# Patient Record
Sex: FEMALE | Race: WHITE | NOT HISPANIC OR LATINO | Employment: OTHER | URBAN - NONMETROPOLITAN AREA
[De-identification: names, ages, dates, MRNs, and addresses within clinical notes are randomized per-mention and may not be internally consistent; named-entity substitution may affect disease eponyms.]

---

## 2017-01-02 ENCOUNTER — HISTORY (OUTPATIENT)
Dept: FAMILY MEDICINE | Facility: OTHER | Age: 62
End: 2017-01-02

## 2017-01-02 ENCOUNTER — OFFICE VISIT - GICH (OUTPATIENT)
Dept: FAMILY MEDICINE | Facility: OTHER | Age: 62
End: 2017-01-02

## 2017-01-02 ENCOUNTER — COMMUNICATION - GICH (OUTPATIENT)
Dept: FAMILY MEDICINE | Facility: OTHER | Age: 62
End: 2017-01-02

## 2017-01-02 DIAGNOSIS — M76.60 ACHILLES TENDINITIS: ICD-10-CM

## 2017-01-02 DIAGNOSIS — L82.1 OTHER SEBORRHEIC KERATOSIS: ICD-10-CM

## 2017-01-02 ASSESSMENT — PATIENT HEALTH QUESTIONNAIRE - PHQ9: SUM OF ALL RESPONSES TO PHQ QUESTIONS 1-9: 15

## 2017-02-08 ENCOUNTER — COMMUNICATION - GICH (OUTPATIENT)
Dept: FAMILY MEDICINE | Facility: OTHER | Age: 62
End: 2017-02-08

## 2017-02-08 DIAGNOSIS — G89.4 CHRONIC PAIN SYNDROME: ICD-10-CM

## 2017-02-23 ENCOUNTER — COMMUNICATION - GICH (OUTPATIENT)
Dept: FAMILY MEDICINE | Facility: OTHER | Age: 62
End: 2017-02-23

## 2017-03-03 ENCOUNTER — OFFICE VISIT - GICH (OUTPATIENT)
Dept: FAMILY MEDICINE | Facility: OTHER | Age: 62
End: 2017-03-03

## 2017-03-03 ENCOUNTER — HISTORY (OUTPATIENT)
Dept: FAMILY MEDICINE | Facility: OTHER | Age: 62
End: 2017-03-03

## 2017-03-03 DIAGNOSIS — J06.9 ACUTE UPPER RESPIRATORY INFECTION: ICD-10-CM

## 2017-03-03 DIAGNOSIS — B97.89 OTHER VIRAL AGENTS AS THE CAUSE OF DISEASES CLASSIFIED ELSEWHERE: ICD-10-CM

## 2017-05-16 ENCOUNTER — OFFICE VISIT - GICH (OUTPATIENT)
Dept: FAMILY MEDICINE | Facility: OTHER | Age: 62
End: 2017-05-16

## 2017-05-16 ENCOUNTER — HISTORY (OUTPATIENT)
Dept: FAMILY MEDICINE | Facility: OTHER | Age: 62
End: 2017-05-16

## 2017-05-16 DIAGNOSIS — M75.51 BURSITIS OF RIGHT SHOULDER: ICD-10-CM

## 2017-05-16 DIAGNOSIS — G89.4 CHRONIC PAIN SYNDROME: ICD-10-CM

## 2017-06-06 ENCOUNTER — OFFICE VISIT - GICH (OUTPATIENT)
Dept: FAMILY MEDICINE | Facility: OTHER | Age: 62
End: 2017-06-06

## 2017-06-06 ENCOUNTER — HISTORY (OUTPATIENT)
Dept: FAMILY MEDICINE | Facility: OTHER | Age: 62
End: 2017-06-06

## 2017-06-06 DIAGNOSIS — G89.29 OTHER CHRONIC PAIN: ICD-10-CM

## 2017-06-06 DIAGNOSIS — M25.512 PAIN IN LEFT SHOULDER: ICD-10-CM

## 2017-06-09 ENCOUNTER — HOSPITAL ENCOUNTER (OUTPATIENT)
Dept: RADIOLOGY | Facility: OTHER | Age: 62
End: 2017-06-09
Attending: FAMILY MEDICINE

## 2017-06-09 DIAGNOSIS — G89.29 OTHER CHRONIC PAIN: ICD-10-CM

## 2017-06-09 DIAGNOSIS — M25.512 PAIN IN LEFT SHOULDER: ICD-10-CM

## 2017-06-20 ENCOUNTER — COMMUNICATION - GICH (OUTPATIENT)
Dept: FAMILY MEDICINE | Facility: OTHER | Age: 62
End: 2017-06-20

## 2017-06-20 DIAGNOSIS — M25.511 PAIN IN RIGHT SHOULDER: ICD-10-CM

## 2017-06-20 DIAGNOSIS — G89.29 OTHER CHRONIC PAIN: ICD-10-CM

## 2017-06-20 DIAGNOSIS — M25.512 PAIN IN LEFT SHOULDER: ICD-10-CM

## 2017-08-03 ENCOUNTER — HISTORY (OUTPATIENT)
Dept: FAMILY MEDICINE | Facility: OTHER | Age: 62
End: 2017-08-03

## 2017-08-03 ENCOUNTER — OFFICE VISIT - GICH (OUTPATIENT)
Dept: FAMILY MEDICINE | Facility: OTHER | Age: 62
End: 2017-08-03

## 2017-08-03 DIAGNOSIS — R06.83 SNORING: ICD-10-CM

## 2017-08-03 DIAGNOSIS — Z63.79 OTHER STRESSFUL LIFE EVENTS AFFECTING FAMILY AND HOUSEHOLD: ICD-10-CM

## 2017-08-03 DIAGNOSIS — R40.0 SOMNOLENCE: ICD-10-CM

## 2017-08-03 DIAGNOSIS — F33.2 MAJOR DEPRESSIVE DISORDER, RECURRENT SEVERE WITHOUT PSYCHOTIC FEATURES (H): ICD-10-CM

## 2017-08-20 ENCOUNTER — HOSPITAL ENCOUNTER (OUTPATIENT)
Dept: SLEEP MEDICINE | Facility: OTHER | Age: 62
Setting detail: THERAPIES SERIES
End: 2017-08-20
Attending: INTERNAL MEDICINE

## 2017-08-25 ENCOUNTER — AMBULATORY - GICH (OUTPATIENT)
Dept: SCHEDULING | Facility: OTHER | Age: 62
End: 2017-08-25

## 2017-09-08 ENCOUNTER — OFFICE VISIT - GICH (OUTPATIENT)
Dept: FAMILY MEDICINE | Facility: OTHER | Age: 62
End: 2017-09-08

## 2017-09-08 ENCOUNTER — HISTORY (OUTPATIENT)
Dept: FAMILY MEDICINE | Facility: OTHER | Age: 62
End: 2017-09-08

## 2017-09-08 ENCOUNTER — HOSPITAL ENCOUNTER (OUTPATIENT)
Dept: RADIOLOGY | Facility: OTHER | Age: 62
End: 2017-09-08
Attending: FAMILY MEDICINE

## 2017-09-08 ENCOUNTER — COMMUNICATION - GICH (OUTPATIENT)
Dept: FAMILY MEDICINE | Facility: OTHER | Age: 62
End: 2017-09-08

## 2017-09-08 DIAGNOSIS — F33.2 MAJOR DEPRESSIVE DISORDER, RECURRENT SEVERE WITHOUT PSYCHOTIC FEATURES (H): ICD-10-CM

## 2017-09-08 DIAGNOSIS — M25.512 PAIN IN LEFT SHOULDER: ICD-10-CM

## 2017-09-08 DIAGNOSIS — M77.8 OTHER ENTHESOPATHIES, NOT ELSEWHERE CLASSIFIED: ICD-10-CM

## 2017-09-08 DIAGNOSIS — M25.532 PAIN IN LEFT WRIST: ICD-10-CM

## 2017-09-08 DIAGNOSIS — G89.29 OTHER CHRONIC PAIN: ICD-10-CM

## 2017-09-08 DIAGNOSIS — M75.42 IMPINGEMENT SYNDROME OF LEFT SHOULDER: ICD-10-CM

## 2017-09-08 DIAGNOSIS — E78.5 HYPERLIPIDEMIA: ICD-10-CM

## 2017-09-08 DIAGNOSIS — D86.9 SARCOIDOSIS: ICD-10-CM

## 2017-09-08 LAB
ABSOLUTE BASOPHILS - HISTORICAL: 0.1 THOU/CU MM
ABSOLUTE EOSINOPHILS - HISTORICAL: 0.6 THOU/CU MM
ABSOLUTE IMMATURE GRANULOCYTES(METAS,MYELOS,PROS) - HISTORICAL: 0 THOU/CU MM
ABSOLUTE LYMPHOCYTES - HISTORICAL: 1.8 THOU/CU MM (ref 0.9–2.9)
ABSOLUTE MONOCYTES - HISTORICAL: 0.6 THOU/CU MM
ABSOLUTE NEUTROPHILS - HISTORICAL: 5.1 THOU/CU MM (ref 1.7–7)
ANION GAP - HISTORICAL: 11 (ref 5–18)
BASOPHILS # BLD AUTO: 1.3 %
BUN SERPL-MCNC: 18 MG/DL (ref 7–25)
BUN/CREAT RATIO - HISTORICAL: 14
CALCIUM SERPL-MCNC: 9.6 MG/DL (ref 8.6–10.3)
CHLORIDE SERPLBLD-SCNC: 106 MMOL/L (ref 98–107)
CO2 SERPL-SCNC: 24 MMOL/L (ref 21–31)
CREAT SERPL-MCNC: 1.28 MG/DL (ref 0.7–1.3)
EOSINOPHIL NFR BLD AUTO: 7.2 %
ERYTHROCYTE [DISTWIDTH] IN BLOOD BY AUTOMATED COUNT: 12.9 % (ref 11.5–15.5)
GFR IF NOT AFRICAN AMERICAN - HISTORICAL: 43 ML/MIN/1.73M2
GLUCOSE SERPL-MCNC: 114 MG/DL (ref 70–105)
HCT VFR BLD AUTO: 38.3 % (ref 33–51)
HEMOGLOBIN: 12.5 G/DL (ref 12–16)
IMMATURE GRANULOCYTES(METAS,MYELOS,PROS) - HISTORICAL: 0.4 %
LYMPHOCYTES NFR BLD AUTO: 21.5 % (ref 20–44)
MCH RBC QN AUTO: 32 PG (ref 26–34)
MCHC RBC AUTO-ENTMCNC: 32.6 G/DL (ref 32–36)
MCV RBC AUTO: 98 FL (ref 80–100)
MONOCYTES NFR BLD AUTO: 7.1 %
NEUTROPHILS NFR BLD AUTO: 62.5 % (ref 42–72)
PLATELET # BLD AUTO: 280 THOU/CU MM (ref 140–440)
PMV BLD: 8.4 FL (ref 6.5–11)
POTASSIUM SERPL-SCNC: 4.5 MMOL/L (ref 3.5–5.1)
RED BLOOD COUNT - HISTORICAL: 3.91 MIL/CU MM (ref 4–5.2)
SODIUM SERPL-SCNC: 141 MMOL/L (ref 133–143)
WHITE BLOOD COUNT - HISTORICAL: 8.2 THOU/CU MM (ref 4.5–11)

## 2017-10-09 ENCOUNTER — OFFICE VISIT - GICH (OUTPATIENT)
Dept: SCHEDULING | Facility: OTHER | Age: 62
End: 2017-10-09

## 2017-11-20 ENCOUNTER — HOSPITAL ENCOUNTER (OUTPATIENT)
Dept: RADIOLOGY | Facility: OTHER | Age: 62
End: 2017-11-20
Attending: FAMILY MEDICINE

## 2017-11-20 ENCOUNTER — HISTORY (OUTPATIENT)
Dept: FAMILY MEDICINE | Facility: OTHER | Age: 62
End: 2017-11-20

## 2017-11-20 ENCOUNTER — HISTORY (OUTPATIENT)
Dept: RADIOLOGY | Facility: OTHER | Age: 62
End: 2017-11-20

## 2017-11-20 ENCOUNTER — OFFICE VISIT - GICH (OUTPATIENT)
Dept: FAMILY MEDICINE | Facility: OTHER | Age: 62
End: 2017-11-20

## 2017-11-20 DIAGNOSIS — D86.9 SARCOIDOSIS: ICD-10-CM

## 2017-11-20 DIAGNOSIS — Z00.00 ENCOUNTER FOR GENERAL ADULT MEDICAL EXAMINATION WITHOUT ABNORMAL FINDINGS: ICD-10-CM

## 2017-11-20 DIAGNOSIS — H04.123 DRY EYE SYNDROME OF BOTH LACRIMAL GLANDS: ICD-10-CM

## 2017-11-20 DIAGNOSIS — Z23 ENCOUNTER FOR IMMUNIZATION: ICD-10-CM

## 2017-11-20 DIAGNOSIS — G89.4 CHRONIC PAIN SYNDROME: ICD-10-CM

## 2017-11-20 DIAGNOSIS — Z99.89 DEPENDENCE ON OTHER ENABLING MACHINES AND DEVICES: ICD-10-CM

## 2017-11-20 DIAGNOSIS — G47.33 OBSTRUCTIVE SLEEP APNEA: ICD-10-CM

## 2017-11-20 DIAGNOSIS — E78.5 HYPERLIPIDEMIA: ICD-10-CM

## 2017-11-20 DIAGNOSIS — F33.2 MAJOR DEPRESSIVE DISORDER, RECURRENT SEVERE WITHOUT PSYCHOTIC FEATURES (H): ICD-10-CM

## 2017-11-20 DIAGNOSIS — Z12.31 ENCOUNTER FOR SCREENING MAMMOGRAM FOR MALIGNANT NEOPLASM OF BREAST: ICD-10-CM

## 2017-11-20 LAB
A/G RATIO - HISTORICAL: 1.6 (ref 1–2)
ABSOLUTE BASOPHILS - HISTORICAL: 0.1 THOU/CU MM
ABSOLUTE EOSINOPHILS - HISTORICAL: 0.9 THOU/CU MM
ABSOLUTE IMMATURE GRANULOCYTES(METAS,MYELOS,PROS) - HISTORICAL: 0 THOU/CU MM
ABSOLUTE LYMPHOCYTES - HISTORICAL: 3.1 THOU/CU MM (ref 0.9–2.9)
ABSOLUTE MONOCYTES - HISTORICAL: 1.1 THOU/CU MM
ABSOLUTE NEUTROPHILS - HISTORICAL: 5.5 THOU/CU MM (ref 1.7–7)
ALBUMIN SERPL-MCNC: 4.4 G/DL (ref 3.5–5.7)
ALP SERPL-CCNC: 80 IU/L (ref 34–104)
ALT (SGPT) - HISTORICAL: 16 IU/L (ref 7–52)
ANION GAP - HISTORICAL: 11 (ref 5–18)
AST SERPL-CCNC: 19 IU/L (ref 13–39)
BASOPHILS # BLD AUTO: 0.9 %
BILIRUB SERPL-MCNC: 0.3 MG/DL (ref 0.3–1)
BUN SERPL-MCNC: 22 MG/DL (ref 7–25)
BUN/CREAT RATIO - HISTORICAL: 16
CALCIUM SERPL-MCNC: 9.6 MG/DL (ref 8.6–10.3)
CHLORIDE SERPLBLD-SCNC: 105 MMOL/L (ref 98–107)
CHOL/HDL RATIO - HISTORICAL: 4.64
CHOLESTEROL TOTAL: 269 MG/DL
CO2 SERPL-SCNC: 21 MMOL/L (ref 21–31)
CREAT SERPL-MCNC: 1.34 MG/DL (ref 0.7–1.3)
EOSINOPHIL NFR BLD AUTO: 7.9 %
ERYTHROCYTE [DISTWIDTH] IN BLOOD BY AUTOMATED COUNT: 13.4 % (ref 11.5–15.5)
GFR IF NOT AFRICAN AMERICAN - HISTORICAL: 40 ML/MIN/1.73M2
GLOBULIN - HISTORICAL: 2.7 G/DL (ref 2–3.7)
GLUCOSE SERPL-MCNC: 74 MG/DL (ref 70–105)
HCT VFR BLD AUTO: 37.7 % (ref 33–51)
HDLC SERPL-MCNC: 58 MG/DL (ref 23–92)
HEMOGLOBIN: 12.2 G/DL (ref 12–16)
IMMATURE GRANULOCYTES(METAS,MYELOS,PROS) - HISTORICAL: 0.4 %
LDLC SERPL CALC-MCNC: 161 MG/DL
LYMPHOCYTES NFR BLD AUTO: 29.3 % (ref 20–44)
MCH RBC QN AUTO: 31 PG (ref 26–34)
MCHC RBC AUTO-ENTMCNC: 32.4 G/DL (ref 32–36)
MCV RBC AUTO: 96 FL (ref 80–100)
MONOCYTES NFR BLD AUTO: 10.3 %
NEUTROPHILS NFR BLD AUTO: 51.2 % (ref 42–72)
NON-HDL CHOLESTEROL - HISTORICAL: 211 MG/DL
PLATELET # BLD AUTO: 323 THOU/CU MM (ref 140–440)
PMV BLD: 8.9 FL (ref 6.5–11)
POTASSIUM SERPL-SCNC: 4.1 MMOL/L (ref 3.5–5.1)
PROT SERPL-MCNC: 7.1 G/DL (ref 6.4–8.9)
PROVIDER ORDERDED STATUS - HISTORICAL: ABNORMAL
RED BLOOD COUNT - HISTORICAL: 3.94 MIL/CU MM (ref 4–5.2)
SODIUM SERPL-SCNC: 137 MMOL/L (ref 133–143)
TRIGL SERPL-MCNC: 252 MG/DL
WHITE BLOOD COUNT - HISTORICAL: 10.7 THOU/CU MM (ref 4.5–11)

## 2017-11-28 ENCOUNTER — AMBULATORY - GICH (OUTPATIENT)
Dept: SCHEDULING | Facility: OTHER | Age: 62
End: 2017-11-28

## 2017-12-18 ENCOUNTER — AMBULATORY - GICH (OUTPATIENT)
Dept: SCHEDULING | Facility: OTHER | Age: 62
End: 2017-12-18

## 2017-12-18 ENCOUNTER — HISTORY (OUTPATIENT)
Dept: SCHEDULING | Facility: OTHER | Age: 62
End: 2017-12-18

## 2017-12-18 ENCOUNTER — OFFICE VISIT - GICH (OUTPATIENT)
Dept: SCHEDULING | Facility: OTHER | Age: 62
End: 2017-12-18

## 2017-12-22 ENCOUNTER — COMMUNICATION - GICH (OUTPATIENT)
Dept: FAMILY MEDICINE | Facility: OTHER | Age: 62
End: 2017-12-22

## 2017-12-22 DIAGNOSIS — F33.2 MAJOR DEPRESSIVE DISORDER, RECURRENT SEVERE WITHOUT PSYCHOTIC FEATURES (H): ICD-10-CM

## 2017-12-27 NOTE — PROGRESS NOTES
Patient Information     Patient Name MRIvy Landeros [id number] Female 1955      Progress Notes by Olivier Glass MD at 8/3/2017 11:15 AM     Author:  Olivier Glass MD Service:  (none) Author Type:  Physician     Filed:  2017 10:00 AM Encounter Date:  8/3/2017 Status:  Signed     :  Olivier Glass MD (Physician)            SUBJECTIVE:  62 y.o. female who presents for evaluation for possible sleep apnea. She notes that she has excessive snoring, and her family including her  and daughter have told her that she snores excessively loud. They have not mentioned that she positive send her breathing, but she does have daytime somnolence.    She's been somewhat stressed and depressed because of the recent death of her mother. There've been some family dynamics that it made things difficult. She is working on this and plans to seek psychologic counseling from Re[facility]. This is been successful for her in the past.    Her left shoulder is bothering her and she plans to have surgery in the fall. She plans on a preop and general evaluation at that time.    Medications otherwise remain the same. She has used occasional lorazepam and would like a refill.    Additional Review of Systems: see HPI:   No new symptoms otherwise    Past Medical History:     Diagnosis  Date     ALLERGIC RHINITIS, SEASONAL      BACK PAIN 10/7/2010     Dyslipidemia 10/24/2013     Elevated C-reactive protein      Elevated sedimentation rate     chronically      Erythema nodosum  2009     Essential tremor     Possible benign      History of pregnancy      Hx of pregnancy     Childbirth      ll defined rheumatologic disorder     characterized by chronic pain, positive BARBARA at a low titer of 1:160, negative HLAB27,       MIGRAINE HEADACHE      Postconcussion syndrome 2016     Sarcoidosis (HC) 2007        Current Outpatient Prescriptions       Medication  Sig Dispense Refill      "acetaminophen (TYLENOL EXTRA STRGTH) 500 mg tablet Take 2 tablets by mouth 3 times daily if needed. Max acetaminophen dose: 4000mg in 24 hrs.  0     buPROPion (WELLBUTRIN XL) 300 mg Extended-Release tablet Take 1 tablet by mouth once daily. 90 tablet 4     cholecalciferol (VITAMIN D) 1,000 unit capsule Take 1 capsule by mouth once daily.  0     Cholestyramine powd 1-3 tsp in water Q AM 1 jar 3     cycloSPORINE (RESTASIS) 0.05 % ophthalmic emulsion Place 1 Drop into both eyes 2 times daily. 1 Bottle 12     gabapentin (NEURONTIN) 600 mg tablet Take 1 tablet by mouth 3 times daily. 270 tablet 4     lamoTRIgine (LAMICTAL) 100 mg tablet Take 1 tablet po every morning, and continue the 200 mg dose at night. 90 tablet 4     lamoTRIgine (LAMICTAL) 200 mg tablet Take 1 tablet by mouth once daily. 90 tablet 4     lidocaine 5% (LIDODERM) 5 % patch Apply 1 Patch on dry, clean, hairless skin once daily. 30 Patch 4     LORazepam (ATIVAN) 0.5 mg tab Take 1 tablet by mouth 3 times daily if needed for Other (Specify) (irritability). 30 tablet 2     medication order composer Melox/lamo/lido/pril 0.2/2.5/2/2%  For headaches.  0     oxyCODONE-acetaminophen, 5-325 mg, (PERCOCET) 5-325 mg per tablet Take 1 tablet by mouth every 6 hours if needed  for Pain Max acetaminophen dose: 4000mg in 24 hrs. 35 tablet 0     SUMAtriptan (IMITREX) 100 mg tablet Take one tablet PO after headache. May repeat after 2 hours if headache returns. Do not exceed 200 mg in 24 hours       venlafaxine (EFFEXOR XR) 150 mg Extended-Release capsule Take 1 capsule by mouth once daily with a meal. 90 capsule 4     No current facility-administered medications for this visit.      Medications have been reviewed by me and are current to the best of my knowledge and ability.      Allergies as of 08/03/2017 - Jasvir as Reviewed 08/03/2017      Allergen  Reaction Noted     Penicillins Rash 09/05/2012        OBJECTIVE:  /80  Pulse 80  Ht 1.626 m (5' 4\")  " Breastfeeding? No  EXAM:  General Appearance: Pleasant, alert, appropriate appearance for age. No acute distress, however becomes quite tearful when discussing her mother's death.  OroPharynx Exam: Uvula is midline. There is decreased between the tonsillar pillars and decrease pain is posteriorly between the uvula and the posterior pharynx.  Thyroid Exam: No nodules or enlargement.  Chest/Respiratory Exam: Normal chest wall and respirations. Clear to auscultation.  Cardiovascular Exam: Regular rate and rhythm. S1, S2, no murmur, click, gallop, or rubs.  Neurologic Exam: Intact and nonfocal  Psychiatric Exam: Alert and oriented, appropriate affect.  Somewhat sad, tearful, discussing her mother's death.  She is also somewhat resentful of her siblings because of the arguments and are taking place.      ASSESSMENT/PLAN:  Probable sleep apnea. She has daytime somnolence and heavy snoring. I recommended a sleep study which will be scheduled.    History of major depressive disorder recurrent, mainly the issue now is stress over her mother's death and her problems with her siblings. 20 minutes were spent in counseling discussing the issues with her mother's death, depression, and the problems with her siblings.    Plan: Sleep study will be scheduled. Lorazepam refilled. Referral was made for her to see Reji Davis for counseling. We will see her back in follow-up in September for a general exam and she plans on scheduling shoulder surgery in October.  Olivier Glass MD ....................  8/4/2017   9:59 AM

## 2017-12-27 NOTE — PROGRESS NOTES
Patient Information     Patient Name MRN Sex Ivy Bentley [id number] Female 1955      Progress Notes by L[facility] R.T. [facility] at 2017 12:07 PM     Author:  Lacy Toussaint R.T. ([facility]) Service:  (none) Author Type:  (none)     Filed:  2017 12:07 PM Date of Service:  2017 12:07 PM Status:  Signed     :  Lacy Toussaint R.T. (BRIANAT) ([facility] - Registered Radiologic Technologist)            Falls Risk Criteria:    Age 65 and older or under age 4        Sensory deficits    Poor vision    Use of ambulatory aides    Impaired judgment    Unable to walk independently    Meets High Risk criteria for falls:  no

## 2017-12-28 NOTE — TELEPHONE ENCOUNTER
Patient Information     Patient Name MRN Ivy Gonzalez [id number] Female 1955      Telephone Encounter by Malu Glass at 2017 12:47 PM     Author:  Malu Glass Service:  (none) Author Type:  (none)     Filed:  2017 12:49 PM Encounter Date:  2017 Status:  Signed     :  Malu Glass            Patient called regarding a prescription that was suppose to be put in but the pharmacy never received it.    Malu Glass ....................  2017   12:48 PM

## 2017-12-28 NOTE — TELEPHONE ENCOUNTER
Patient Information     Patient Name MRN Sex Ivy Bentley [id number] Female 1955      Telephone Encounter by Amena Day at 2017  1:19 PM     Author:  Amena Day Service:  (none) Author Type:  (none)     Filed:  2017  1:21 PM Encounter Date:  2017 Status:  Signed     :  Amena Day            Patient came in and requested a ortho referral to be placed for Dr Friedman in [place], for at least 3 visits starting  for insurance reasons. Thanks.    Amena Day ....................  2017   1:20 PM

## 2017-12-28 NOTE — PROGRESS NOTES
Patient Information     Patient Name MRN Ivy Gonzalez [id number] Female 1955      Progress Notes by Olivier Glass MD at 2017  2:00 PM     Author:  Olivier Glass MD Service:  (none) Author Type:  Physician     Filed:  2017  3:42 PM Encounter Date:  2017 Status:  Signed     :  Olivier Glass MD (Physician)            SUBJECTIVE:  62 y.o. female who presents for follow-up of the pain in her  left shoulder. Prednisone did not give any benefit. It hurts to lie on it, it hurts to move in all directions and it's sore diffusely around the shoulder.    She had surgery done 2 years ago which was basically a debridement and subacromial decompression. At that time an MRI done previously showed a possible labral tear. She can't tell if it feels unstable or not. There is been no new injury.    Additional Review of Systems: see HPI:   The right shoulder feels fine, especially since she had the replacement done    Past Medical History:     Diagnosis  Date     ALLERGIC RHINITIS, SEASONAL      BACK PAIN 10/7/2010     Dyslipidemia 10/24/2013     Elevated C-reactive protein      Elevated sedimentation rate     chronically      Erythema nodosum  2009     Essential tremor     Possible benign      History of pregnancy      Hx of pregnancy     Childbirth      ll defined rheumatologic disorder     characterized by chronic pain, positive BARBARA at a low titer of 1:160, negative HLAB27,       MIGRAINE HEADACHE      Postconcussion syndrome 2016     Sarcoidosis (HC)         Current Outpatient Prescriptions       Medication  Sig Dispense Refill     acetaminophen (TYLENOL EXTRA STRGTH) 500 mg tablet Take 2 tablets by mouth 3 times daily if needed. Max acetaminophen dose: 4000mg in 24 hrs.  0     buPROPion (WELLBUTRIN XL) 300 mg Extended-Release tablet Take 1 tablet by mouth once daily. 90 tablet 4     cholecalciferol (VITAMIN D) 1,000 unit capsule Take 1 capsule by mouth once  "daily.  0     Cholestyramine powd 1-3 tsp in water Q AM 1 jar 3     cycloSPORINE (RESTASIS) 0.05 % ophthalmic emulsion Place 1 Drop into both eyes 2 times daily. 1 Bottle 12     gabapentin (NEURONTIN) 600 mg tablet Take 1 tablet by mouth 3 times daily. 270 tablet 4     lamoTRIgine (LAMICTAL) 100 mg tablet Take 1 tablet po every morning, and continue the 200 mg dose at night. 90 tablet 4     lamoTRIgine (LAMICTAL) 200 mg tablet Take 1 tablet by mouth once daily. 90 tablet 4     lidocaine 5% (LIDODERM) 5 % patch Apply 1 Patch on dry, clean, hairless skin once daily. 30 Patch 4     LORazepam (ATIVAN) 0.5 mg tab Take 1 tablet by mouth 3 times daily if needed for Other (Specify) (irritability). 30 tablet 2     medication order composer Melox/lamo/lido/pril 0.2/2.5/2/2%  For headaches.  0     oxyCODONE-acetaminophen, 5-325 mg, (PERCOCET) 5-325 mg per tablet Take 1 tablet by mouth every 6 hours if needed  for Pain Max acetaminophen dose: 4000mg in 24 hrs. 35 tablet 0     SUMAtriptan (IMITREX) 100 mg tablet Take one tablet PO after headache. May repeat after 2 hours if headache returns. Do not exceed 200 mg in 24 hours       venlafaxine (EFFEXOR XR) 150 mg Extended-Release capsule Take 1 capsule by mouth once daily with a meal. 90 capsule 4     No current facility-administered medications for this visit.      Medications have been reviewed by me and are current to the best of my knowledge and ability.      Allergies as of 06/06/2017 - Jasvir as Reviewed 06/06/2017      Allergen  Reaction Noted     Penicillins Rash 09/05/2012        OBJECTIVE:  /80  Pulse 86  Ht 1.626 m (5' 4\")  Breastfeeding? No  EXAM:  General Appearance: Pleasant, alert, appropriate appearance for age. No acute distress  Musculoskeletal Exam: Shoulder: Exam of the right shoulder reveals no gross deformity. There is mild diffuse tenderness but more tenderness directly over the before meals joint area and the acromion. There is no skin rash. Range of " motion is limited because of pain by about 30%. This is in all directions but mainly with abduction. Distal CMS is intact.  Report of the MRI done in 2014 was reviewed      ASSESSMENT/PLAN:  Persistent shoulder pain with previous surgery and evidence of a possible labral tear done recently on MRI. We discussed further evaluation and/or the possibility of physical therapy. At this point we will repeat the MRI and then make appropriate recommendations. No new medications were prescribed.  Olivier Glass MD ....................  6/6/2017   3:41 PM

## 2017-12-28 NOTE — TELEPHONE ENCOUNTER
Patient Information     Patient Name MRN Ivy Gonzalez [id number] Female 1955      Telephone Encounter by Jackie Patel at 2017  9:34 AM     Author:  Jackie Patel Service:  (none) Author Type:  NURS- Student Practical Nurse     Filed:  2017  9:34 AM Encounter Date:  2017 Status:  Signed     :  Jackie Patel (NURS- Student Practical Nurse)            Please Advise.   Jackie Patel ....................  2017   9:34 AM

## 2017-12-28 NOTE — PROGRESS NOTES
Patient Information     Patient Name MRN Sex Ivy Bentley [id number] Female 1955      Progress Notes by Olivier Glass MD at 2017  8:45 AM     Author:  Olivier Glass MD Service:  (none) Author Type:  Physician     Filed:  2017 12:53 PM Encounter Date:  2017 Status:  Signed     :  Olivier Glass MD (Physician)            .

## 2017-12-28 NOTE — PROGRESS NOTES
Patient Information     Patient Name MRN Sex Ivy Bentley [id number] Female 1955      Progress Notes by Susana Armendariz at 10/17/2017 10:21 AM     Author:  Susana Armendariz Service:  (none) Author Type:  (none)     Filed:  10/17/2017 10:21 AM Encounter Date:  10/9/2017 Status:  Signed     :  Susana Armendariz              This encounter was opened in error.  Please disregard.

## 2017-12-28 NOTE — TELEPHONE ENCOUNTER
Patient Information     Patient Name MRN Ivy Gonzalez [id number] Female 1955      Telephone Encounter by Gisele Lepe at 2017  9:03 AM     Author:  Gisele Lepe Service:  (none) Author Type:  (none)     Filed:  2017  9:03 AM Encounter Date:  2017 Status:  Signed     :  Gisele Lepe            Patient states that the pharmacy did rec'd the prescription .  Gisele Amos ....................  2017   9:03 AM

## 2017-12-28 NOTE — TELEPHONE ENCOUNTER
Patient Information     Patient Name MRN Ivy Gonzalez [id number] Female 1955      Telephone Encounter by Hanna Perdue at 2017  9:24 AM     Author:  Hanna Perdue Service:  (none) Author Type:  (none)     Filed:  2017  9:24 AM Encounter Date:  2017 Status:  Signed     :  Hanna Perdue            Left message stating referral was placed.  Hanna Perdue LPN...................2017  9:24 AM

## 2017-12-28 NOTE — PROGRESS NOTES
Patient Information     Patient Name MRN Sex Ivy Bentley [id number] Female 1955      Progress Notes by Jeanie Zamora at 2017  1:17 PM     Author:  Jeanie Zamora Service:  (none) Author Type:  Other Clinical Staff     Filed:  2017  1:17 PM Date of Service:  2017  1:17 PM Status:  Signed     :  Jeanie Zamora (Other Clinical Staff)            Falls Risk Criteria:    Age 65 and older or under age 4        Sensory deficits    Poor vision    Use of ambulatory aides    Impaired judgment    Unable to walk independently    Meets High Risk criteria for falls:  no

## 2017-12-28 NOTE — TELEPHONE ENCOUNTER
Patient Information     Patient Name MRN Sex Ivy Bentley [id number] Female 1955      Telephone Encounter by Dayanara Franks at 2017  2:35 PM     Author:  Dayanara Franks Service:  (none) Author Type:  (none)     Filed:  2017  2:35 PM Encounter Date:  2017 Status:  Signed     :  Dayanara Franks            Left message to call back  ....................Whitney Franks LPN  2017   2:35 PM

## 2017-12-29 NOTE — H&P
Patient Information     Patient Name MRN Ivy Gonzalez [id number] Female 1955      H&P by Olivier Glass MD at 2017  8:45 AM     Author:  Olivier Glass MD Service:  (none) Author Type:  Physician     Filed:  2017 12:53 PM Encounter Date:  2017 Status:  Signed     :  Olivier Glass MD (Physician)            ----------------- PREOPERATIVE EXAM ------------------  2017    SUBJECTIVE:  Ivy Darling is a 62 y.o. female here for preoperative optimization.    I was asked to see Ivy Darling by Dr. Friedman for  preoperative evaluation due to a history of sarcoidosis and major depressive disorder.    HPI:  Patient is 62-year-old female who has left shoulder pain. She is scheduled to undergo arthroscopy. She has a history of major depressive disorder and has been stable on antidepressant as well as mood stabilizers.    She also has a history of sarcoidosis. She's had no recent pulmonary problems and no pulmonary involvement that is known. At this point other than the left shoulder pain she feels well.    She is currently in the process of being evaluated for sleep apnea and likely will be a candidate for CPAP.    She also has significant pain in the left wrist which is been bothering her.    Nursing Notes:   Gisele Lepe  2017  9:19 AM  Signed  Patient presents to clinic for pre op exam. Patient is scheduled at [facility] in [place] on 17 for Left shoulder surgery with Dr. Friedman.  Gisele Amos ....................  2017   8:47 AM          Patient Active Problem List      Diagnosis Date Noted     Dyslipidemia 10/24/2013     Major depressive disorder, recurrent episode, severe (HC) 08/10/2011     FIBROMYALGIA      CHRONIC PAIN SYNDROME      SARCOIDOSIS 2007       Past Medical History:     Diagnosis  Date     ALLERGIC RHINITIS, SEASONAL      BACK PAIN 10/7/2010     Dyslipidemia 10/24/2013     Elevated  C-reactive protein      Elevated sedimentation rate     chronically      Erythema nodosum  2009     Essential tremor     Possible benign      History of pregnancy      Hx of pregnancy     Childbirth      ll defined rheumatologic disorder     characterized by chronic pain, positive BARBARA at a low titer of 1:160, negative HLAB27,       MIGRAINE HEADACHE      Postconcussion syndrome 1/26/2016     Sarcoidosis (HC) 2007       Past Surgical History:      Procedure  Laterality Date     COLONOSCOPY SCREENING  8/2011    Normal--10 year followup       DILATION AND CURETTAGE  11/05     and ablation       LAP CHOLECYSTECTOMY  6/23/15    Cholecystectomy,Laparoscopic       SHOULDER ARTHROSCOPY  12/07    Right type 3 SLAP lesion repair of labrum - suprascapular ganglion removal with posterior approach - acromioplasty and distal clavicle resection       SHOULDER ARTHROSCOPY Left 2/2015     SHOULDER REPLACEMENT Right 2012    improvement in pain control and mobility       TONSILLECTOMY       and adenoidectomy age 12         Current Outpatient Prescriptions       Medication  Sig Dispense Refill     acetaminophen (TYLENOL EXTRA STRGTH) 500 mg tablet Take 2 tablets by mouth 3 times daily if needed. Max acetaminophen dose: 4000mg in 24 hrs.  0     buPROPion (WELLBUTRIN XL) 300 mg Extended-Release tablet Take 1 tablet by mouth once daily. 90 tablet 4     cholecalciferol (VITAMIN D) 1,000 unit capsule Take 1 capsule by mouth once daily.  0     Cholestyramine powd 1-3 tsp in water Q AM 1 jar 3     cycloSPORINE (RESTASIS) 0.05 % ophthalmic emulsion Place 1 Drop into both eyes 2 times daily. 1 Bottle 12     gabapentin (NEURONTIN) 600 mg tablet Take 1 tablet by mouth 3 times daily. 270 tablet 4     lamoTRIgine (LAMICTAL) 100 mg tablet Take 1 tablet po every morning, and continue the 200 mg dose at night. 90 tablet 4     lamoTRIgine (LAMICTAL) 200 mg tablet Take 1 tablet by mouth once daily. 90 tablet 4     lidocaine 5% (LIDODERM) 5 % patch  Apply 1 Patch on dry, clean, hairless skin once daily. 30 Patch 4     LORazepam (ATIVAN) 0.5 mg tab Take 1 tablet by mouth 3 times daily if needed for Other (Specify) (irritability). 30 tablet 2     medication order composer Melox/lamo/lido/pril 0.2/2.5/2/2%  For headaches.  0     oxyCODONE-acetaminophen, 5-325 mg, (PERCOCET) 5-325 mg per tablet Take 1 tablet by mouth every 6 hours if needed  for Pain Max acetaminophen dose: 4000mg in 24 hrs. 35 tablet 0     SUMAtriptan (IMITREX) 100 mg tablet Take one tablet PO after headache. May repeat after 2 hours if headache returns. Do not exceed 200 mg in 24 hours       venlafaxine (EFFEXOR XR) 150 mg Extended-Release capsule Take 1 capsule by mouth once daily with a meal. 90 capsule 4     No current facility-administered medications for this visit.      Medications have been reviewed by me and are current to the best of my knowledge and ability.    Recent use of: She does use aspirin and anti-inflammatories    Allergies:  Allergies     Allergen  Reactions     Penicillins Rash     Latex allergy  no  Immunizations:  Immunization History     Administered  Date(s) Administered     Herpes-zoster Vaccine 11/09/2015     Influenza Virus, Unspecified 09/01/2010     Influenza, IIV3 (Age >=3 years) 10/22/2012, 10/24/2013     Influenza, IIV4 10/23/2014, 11/09/2015, 09/20/2016     Pneumococcal Poly,23-Valent (Pneumovax) 10/23/2014     Pneumococcal conj 13-Valent (Prevnar 13) 11/09/2015     Tdap 10/31/2011, 10/22/2012     Tdap, Unspecified 11/26/2003       Family History       Problem   Relation Age of Onset     Adopted: Yes        Unknown  Other      Unknown. She is ADOPTED         Denies family hx of bleeding tendencies, anesthesia complications, or other problems with surgery.    Social History        Substance Use Topics          Smoking status:   Former Smoker      Quit date:  10/22/1991      Smokeless tobacco:   Never Used      Alcohol use   Yes      Comment: rarely         ROS:   "  Surgical:  patient denies previous complications from prior surgeries including but not limited to prolonged bleeding, anesthesia complications, dysrhythmias, surgical wound infections, or prolonged hospital stay.  Cardiorespiratory: denies chest pain, palpitations, shortness of breath, cough. Most exertion in the past 2 weeks was greater than 4 METs  Complete ROS otherwise negative except as noted in HPI.     -------------------------------------------------------------    PHYSICAL EXAM:  /80  Pulse 72  Temp 97.5  F (36.4  C) (Oral)  Ht 1.626 m (5' 4\")  Wt 97.5 kg (215 lb)  SpO2 98%  BMI 36.9 kg/m2    EXAM:  General Appearance: Pleasant, alert, appropriate appearance for age. No acute distress  Head Exam: Normal. Normocephalic, atraumatic.  Eyes: PERRL, EOMI  Ears: Normal TM's bilaterally.   OroPharynx: Mucosa pink and moist. Dentition in good repair.  Neck: Supple, no masses or nodes, no lymphadenopathy.  No thyromegaly.  Lungs: Normal chest wall and respirations. Clear to auscultation, no wheezes or crackles.  Cardiovascular: Regular rate and rhythm. S1, S2, no murmurs.  Gastrointestinal: Soft, nontender, no abnormal masses or organomegaly. BS normal   Musculoskeletal: No edema. No warm or erythematous joints.  Skin: no concerning or new rashes.  Neurologic Exam: CN 2-12 grossly intact.  Normal gait.  Symmetric DTRs, normal gross motor movement, tone, and coordination. No tremor.  Psychiatric Exam: Alert and oriented, appropriate affect.      EKG:  EKG was personally reviewed by me. There is a first-degree AV block otherwise normal. CBC and basic metabolic panel are essentially normal. Chest x-ray and wrist x-ray were personally reviewed by me and appear unremarkable.  ---------------------------------------------------------------    ASSESSEMENT AND PLAN: Normal preoperative exam. The patient does have possible sleep apnea and is in the process of being evaluated for possible CPAP. Sarcoidosis is " in remission. Left wrist pain is likely tendinitis and she was given a wrist splint for this.    Ivy was seen today for pre-op exam.    Diagnoses and all orders for this visit:    SARCOIDOSIS  -     EKG 12 LEAD UNIT PERFORMED  -     CBC WITH DIFFERENTIAL; Future  -     BASIC METABOLIC PANEL; Future  -     XR CHEST 2 VIEWS PA AND LATERAL; Future  -     CBC WITH DIFFERENTIAL  -     BASIC METABOLIC PANEL  -     CBC WITH AUTO DIFFERENTIAL    Dyslipidemia  -     EKG 12 LEAD UNIT PERFORMED  -     CBC WITH DIFFERENTIAL; Future  -     BASIC METABOLIC PANEL; Future  -     CBC WITH DIFFERENTIAL  -     BASIC METABOLIC PANEL  -     CBC WITH AUTO DIFFERENTIAL    Chronic left shoulder pain    Shoulder impingement, left    Left wrist pain  -     XR WRIST 3 VIEWS LEFT; Future  -     WRIST SPLINT    Left wrist tendonitis  -     XR WRIST 3 VIEWS LEFT; Future  -     WRIST SPLINT    Major depressive disorder, recurrent, severe without psychotic features (HC)  -     lamoTRIgine (LAMICTAL) 200 mg tablet; Take 1 tablet by mouth once daily.        PRE OP RECOMMENDATIONS:    No family history of problems with bleeding or anesthesia. Patient is able to tolerate greater than 4 METs of activity without any cardiopulmonary symptoms. ASA PS class 1 . No cardiopulmonary workup is neccessary for the current procedure. Please contact the office with any questions or concerns.    Patient is on chronic pain medications (NO);   Patient is on antiplatlet/anticoagulation (NO)  Other medications that need adjustment perioperatively (NO)    Other:  Patient was advised to call our office and the surgical services with any change in condition or new symptoms if they were to develop between today and their surgical date; especially any cardiopulmonary symptoms or symptoms concerning for an infection.    Recommendations: Clearance is given to proceed with surgery as scheduled. This is based on today's exam, review of history, review of her EKG and chest  x-ray, and lab work.  Olivier Glass MD ....................  9/8/2017   12:51 PM

## 2017-12-30 NOTE — NURSING NOTE
Patient Information     Patient Name MRN Sex Ivy Bentley [id number] Female 1955      Nursing Note by Gisele Lepe at 2017 12:45 PM     Author:  Gisele Lepe Service:  (none) Author Type:  (none)     Filed:  2017  1:03 PM Encounter Date:  2017 Status:  Signed     :  Gisele Lepe            Patient presents to clinic for annual exam  Gisele Amos ....................  2017   12:53 PM

## 2017-12-30 NOTE — NURSING NOTE
Patient Information     Patient Name MRN Ivy Gonzalez [id number] Female 1955      Nursing Note by Gisele Lepe at 2017  8:45 AM     Author:  Gisele Lepe Service:  (none) Author Type:  (none)     Filed:  2017  9:19 AM Encounter Date:  2017 Status:  Signed     :  Gisele Lepe            Patient presents to clinic for pre op exam. Patient is scheduled at [facility] on 17 for Left shoulder surgery with Dr. Friedman.  Gisele Amos ....................  2017   8:47 AM

## 2017-12-30 NOTE — NURSING NOTE
Patient Information     Patient Name MRN Ivy Gonzalez [id number] Female 1955      Nursing Note by Darline Zazueta at 2017  2:00 PM     Author:  Darline Zazueta Service:  (none) Author Type:  (none)     Filed:  2017  2:34 PM Encounter Date:  2017 Status:  Signed     :  Darline Zazueta            Patient presents to the clinic today for left shoulder and arm pain.      Darline Zazueta ....................  2017   2:09 PM

## 2017-12-30 NOTE — NURSING NOTE
Patient Information     Patient Name MRN Ivy Gonzalez [id number] Female 1955      Nursing Note by Darline Zazueta at 8/3/2017 11:15 AM     Author:  Darline Zazueta Service:  (none) Author Type:  (none)     Filed:  8/3/2017 11:52 AM Encounter Date:  8/3/2017 Status:  Signed     :  Darline Zazueta            Patient presents to the clinic today to discuss sleep apnea.    Darline Zazueta ....................  8/3/2017   11:28 AM

## 2018-01-02 NOTE — PROGRESS NOTES
Patient Information     Patient Name MRN Ivy Gonzalez [id number] Female 1955      Progress Notes by Olivier Glass MD at 2017  3:15 PM     Author:  Olivier Glass MD Service:  (none) Author Type:  Physician     Filed:  2017  4:47 PM Encounter Date:  2017 Status:  Signed     :  Olivier Glass MD (Physician)            SUBJECTIVE:  61 y.o. female who presents for evaluation of a skin lesion on her back. She said there is a small lesion that her  and noted. He was worried about it. She said it really doesn't bother her at all although it occasionally itches.    She also has developed some mild pain in the left heel where the Achilles inserts. Is not severe, it bothers her more in the morning and gets better as the morning goes by. She has had no injury.    Additional Review of Systems: see HPI:       Past Medical History      Diagnosis   Date     ALLERGIC RHINITIS, SEASONAL       BACK PAIN  10/7/2010     Dyslipidemia  10/24/2013     Elevated C-reactive protein       Elevated sedimentation rate       chronically      Erythema nodosum   2009     Essential tremor       Possible benign      History of pregnancy       Hx of pregnancy       Childbirth      ll defined rheumatologic disorder       characterized by chronic pain, positive BARBARA at a low titer of 1:160, negative HLAB27,       MIGRAINE HEADACHE       Postconcussion syndrome  2016     Sarcoidosis (HC)          Current Outpatient Prescriptions       Medication  Sig Dispense Refill     acetaminophen (TYLENOL EXTRA STRGTH) 500 mg tablet Take 2 tablets by mouth 3 times daily if needed. Max acetaminophen dose: 4000mg in 24 hrs.  0     buPROPion (WELLBUTRIN XL) 300 mg Extended-Release tablet Take 1 tablet by mouth once daily. 90 tablet 4     cholecalciferol (VITAMIN D) 1,000 unit capsule Take 1 capsule by mouth once daily.  0     Cholestyramine powd 1-3 tsp in water Q AM 1 jar 3     clindamycin  (CLEOCIN) 150 mg capsule 1 capsule 1 hour piror to dental appointments. 40 capsule 0     cycloSPORINE (RESTASIS) 0.05 % ophthalmic emulsion Place 1 Drop into both eyes 2 times daily. 1 Bottle 12     gabapentin (NEURONTIN) 600 mg tablet Take 1 tablet by mouth 3 times daily. 270 tablet 4     lamoTRIgine (LAMICTAL) 100 mg tablet Take 1 tablet po every morning, and continue the 200 mg dose at night. 90 tablet 4     lamoTRIgine (LAMICTAL) 200 mg tablet Take 1 tablet by mouth once daily. 90 tablet 4     lidocaine 5% (LIDODERM) 5 % patch Apply 1 Patch on dry, clean, hairless skin once daily. 30 Patch 4     LORazepam (ATIVAN) 0.5 mg tab Take 1 tablet by mouth 3 times daily if needed for Other (Specify) (irritability). 30 tablet 2     medication order composer Melox/lamo/lido/pril 0.2/2.5/2/2%  For headaches.  0     oxyCODONE-acetaminophen, 5-325 mg, (PERCOCET) 5-325 mg per tablet Take 1 tablet by mouth every 6 hours if needed  for Pain Max acetaminophen dose: 4000mg in 24 hrs. 35 tablet 0     silver sulfADIAZINE (SILVADENE) 1 % cream Apply  topically to affected area(s) once daily. Use with daily dressing changes 20 g 0     SUMAtriptan (IMITREX) 100 mg tablet Take one tablet PO after headache. May repeat after 2 hours if headache returns. Do not exceed 200 mg in 24 hours       traMADol (ULTRAM) 50 mg tablet Take 2 tablets by mouth 3 times daily. 180 tablet 5     traZODone (DESYREL) 100 mg tablet Take 1 tablet by mouth at bedtime. 30 tablet 12     venlafaxine (EFFEXOR XR) 150 mg Extended-Release capsule Take 1 capsule by mouth once daily with a meal. 90 capsule 4     ZOSTAVAX, PF, 19,400 unit/0.65 mL injection        No current facility-administered medications for this visit.      Medications have been reviewed by me and are current to the best of my knowledge and ability.      Allergies as of 01/02/2017 - Jasvir as Reviewed 01/02/2017      Allergen  Reaction Noted     Penicillins Rash 09/05/2012        OBJECTIVE:  Visit  "Vitals       /80     Pulse 80     Ht 1.626 m (5' 4\")     EXAM:  General Appearance: Pleasant, alert, appropriate appearance for age. No acute distress  Foot Exam: Normal pulses. Mild tenderness over the insertion of the left Achilles tendon on the calcaneus. No erythema, minimal tenderness going up the tendon, and no sign of any more significant problem.  Skin: no rash or abnormalities and the lesion in question on the back, about 3 cm below the bra strap, on the left side, is a small slightly raised about 3 mm scaly lesion consistent with seborrheic keratosis. She has 2 smaller ones on the back and several cherry hemangiomas.      ASSESSMENT/PLAN:  Skin lesion-seborrheic keratosis. She was reassured that no treatment was needed but it could be removed if it bothers her.    Mild Achilles tendinitis. Recommended she eat dairy up good in the morning and stretch her Achilles well, use anti-inflammatory's if needed, and ice it down at night. Consider physical therapy if it doesn't improve.  Olivier Glass MD ....................  1/2/2017   4:47 PM            "

## 2018-01-02 NOTE — TELEPHONE ENCOUNTER
Patient Information     Patient Name MRN Ivy Gonzalez [id number] Female 1955      Telephone Encounter by Emy Sellers RN at 2017 10:17 AM     Author:  Emy Sellers RN Service:  (none) Author Type:  NURS- Registered Nurse     Filed:  2017 10:22 AM Encounter Date:  2017 Status:  Signed     :  Emy Sellers RN (NURS- Registered Nurse)            Depression-in adults 18 and over    Office visit in the past 12 months or as indicated in chart.  Should have clinic visit 1-2 months after initial prescription.    Last visit with JV OLEA was on: 11/15/2016 in [facility]  Next visit with JV OLEA is on: No future appointment listed with this provider  Next visit with Family Practice is on: No future appointment listed in this department    Max refills 12 months from last office visit or per providers notes.    On 11/15/16 was given #90 with four refills, so will refuse as refills not needed at this time.    Prescription refilled per RN Medication Refill Policy.................... EMY SELLERS RN ....................  2017   10:21 AM

## 2018-01-03 NOTE — TELEPHONE ENCOUNTER
Patient Information     Patient Name MRN Ivy Gonzalez [id number] Female 1955      Telephone Encounter by Danii Mejias RN at 2017 11:17 AM     Author:  Danii Mejias RN Service:  (none) Author Type:  (none)     Filed:  2017 11:19 AM Encounter Date:  2017 Status:  Signed     :  Danii Mejias RN (NURS- Registered Nurse)            Nsaids    Office visit in the past 12 months or per provider note.    Last visit with JV OLEA was on: 2017 in [facility]  Next visit with JV OLEA is on: No future appointment listed with this provider  Next visit with Family Practice is on: No future appointment listed in this department    Max refill for 12 months from last office visit or per provider note.    Refill refused - patient was given #270 R-4 11/15/2016.    Unable to complete prescription refill per RN Medication Refill Policy.................... Danii Mejias ....................  2017   11:18 AM

## 2018-01-03 NOTE — NURSING NOTE
Patient Information     Patient Name MRN Sex Ivy Bentley [id number] Female 1955      Nursing Note by Master Yung LPN at 3/3/2017 12:30 PM     Author:  Master Yung LPN Service:  (none) Author Type:  NURS- Licensed Practical Nurse     Filed:  3/3/2017 12:41 PM Encounter Date:  3/3/2017 Status:  Signed     :  Master Yung LPN (NURS- Licensed Practical Nurse)            Patient presents to the clinic for a cough and sore throat started, last .  Master Yung LPN ..............3/3/2017 12:33 PM

## 2018-01-03 NOTE — PATIENT INSTRUCTIONS
Patient Information     Patient Name MRN Ivy Gonzalez [id number] Female 1955      Patient Instructions by Otilia Zee NP at 3/3/2017 12:30 PM     Author:  Otilia Zee NP Service:  (none) Author Type:  PHYS- Nurse Practitioner     Filed:  3/3/2017 12:37 PM Encounter Date:  3/3/2017 Status:  Signed     :  Otilia Zee NP (PHYS- Nurse Practitioner)            Cold Medicines   What are cold medicines?  Symptoms of the common cold start gradually over several days and usually last about two weeks. Symptoms may include sneezing, a stuffy or runny nose, sore throat, cough, watery eyes, mild headache, or body aches. A cold will go away on its own without treatment. However, there are many nonprescription products that may help relieve some of the symptoms of a cold. Cold medicines often contain more than one ingredient and are used to treat more than one symptom. Read the labels and buy products that have only the ingredients that you need. If you are not sure which medicine is best, ask your pharmacist.  How do they work?  Decongestants reduce swelling in your nose and sinuses. They may also lessen the amount of mucus made by your nose. If you use decongestants more often than directed, your stuffy nose may get worse.   Antihistamines block the effect of histamine. Histamine is a chemical your body makes when you have an allergic reaction. Antihistamines are most often used to treat itchy or watery eyes or a stuffy or runny nose caused by an allergy. Antihistamines may not help a stuffy or runny nose caused by a cold because they can make mucus thick and dry.  Mucolytics are medicines that make mucus thinner so that it is easier to cough up out of your throat and lungs.  Expectorants are cough medicines that may help to keep the mucus thin and bring up mucus from the lungs when you cough. This may relieve chest congestion and make it easier to breathe.   Cough  suppressants (antitussives) are medicines that lessen the urge to cough. They may give relief from a dry, hacking cough. If you have a cough that is wet sounding and produces mucus, it is important for you to cough the mucus up out of your lungs. For this reason, cough suppressants are not recommended for a wet sounding cough.  Fever and pain relievers, such as acetaminophen, aspirin, or other nonsteroidal anti-inflammatory drugs (NSAIDs), are often included in cold medicine. Read labels carefully to avoid taking more medicine than you need.  What else do I need to know about this medicine?    Talk to your healthcare provider if your symptoms start suddenly or you have severe symptoms. This may mean you have something more serious than a cold.    Follow the directions that come with your medicine, including information about food or alcohol. Make sure you know how and when to take your medicine. Do not take more or less than you are supposed to take.    Try to get all of your medicine at the same place. Your pharmacist can help make sure that all of your medicines are safe to take together.    Keep a list of your medicines with you. List all of the prescription medicines, nonprescription medicines, supplements, natural remedies, and vitamins that you take. Tell all healthcare providers who treat you about all of the products you are taking.    Many medicines have side effects. A side effect is a symptom or problem that is caused by the medicine. Ask your healthcare provider or pharmacist what side effects the medicine may cause and what you should do if you have side effects.    Nonsteroidal anti-inflammatory medicines (NSAIDs), such as ibuprofen, naproxen, and aspirin, may cause stomach bleeding and other problems. These risks increase with age. Read the label and take as directed. Unless recommended by your healthcare provider, do not take for more than 10 days for any reason.    Acetaminophen may cause liver  damage or other problems. Unless recommended by your provider, don't take more than 3000 milligrams (mg) in 24 hours. To make sure you don t take too much, check other medicines you take to see if they also contain acetaminophen. Ask your provider if you need to avoid drinking alcohol while taking this medicine.  If you have any questions, ask your healthcare provider or pharmacist for more information. Be sure to keep all appointments for provider visits or tests.      Symptoms likely due to virus. No antibiotic is needed at this time. Symptoms typically worse on days 2-5 and then stabilize and you are sick for days 5-12. Days 12-14 there is slow resolution and if there is a cough, studies show it can linger longer, however one is not as ill as in the beginning. If symptoms begin worsening or fail to improve after 14 days, return to clinic for reevaluation.

## 2018-01-03 NOTE — TELEPHONE ENCOUNTER
Patient Information     Patient Name MRN Ivy Gonzalez [id number] Female 1955      Telephone Encounter by Modesta Burgess at 2017 10:31 AM     Author:  Modesta Burgess Service:  (none) Author Type:  (none)     Filed:  2017 10:32 AM Encounter Date:  2017 Status:  Signed     :  Modesta Burgess            WLR - PT IS CHECKING ON REFERRAL

## 2018-01-03 NOTE — PROGRESS NOTES
Patient Information     Patient Name MRN Sex Ivy Bentley [id number] Female 1955      Progress Notes by Otilia Zee NP at 3/3/2017 12:30 PM     Author:  Otilia Zee NP Service:  (none) Author Type:  PHYS- Nurse Practitioner     Filed:  3/3/2017  1:00 PM Encounter Date:  3/3/2017 Status:  Signed     :  Otilia Zee NP (PHYS- Nurse Practitioner)            Nursing Notes:   Master Yung LPN  3/3/2017 12:41 PM  Signed  Patient presents to the clinic for a cough and sore throat started, last .  Master Yung LPN ..............3/3/2017 12:33 PM  SUBJECTIVE:    Ivy Darling is a 61 y.o. female who presents for Cough    URI    This is a new problem. The current episode started in the past 7 days. The problem has been unchanged. There has been no fever. Associated symptoms include congestion, coughing, headaches, a plugged ear sensation, rhinorrhea and a sore throat. Pertinent negatives include no ear pain, nausea, sneezing or wheezing. She has tried decongestant, acetaminophen and increased fluids for the symptoms. The treatment provided mild relief.       Current Outpatient Prescriptions on File Prior to Visit       Medication  Sig Dispense Refill     acetaminophen (TYLENOL EXTRA STRGTH) 500 mg tablet Take 2 tablets by mouth 3 times daily if needed. Max acetaminophen dose: 4000mg in 24 hrs.  0     buPROPion (WELLBUTRIN XL) 300 mg Extended-Release tablet Take 1 tablet by mouth once daily. 90 tablet 4     cholecalciferol (VITAMIN D) 1,000 unit capsule Take 1 capsule by mouth once daily.  0     Cholestyramine powd 1-3 tsp in water Q AM 1 jar 3     clindamycin (CLEOCIN) 150 mg capsule 1 capsule 1 hour piror to dental appointments. 40 capsule 0     cycloSPORINE (RESTASIS) 0.05 % ophthalmic emulsion Place 1 Drop into both eyes 2 times daily. 1 Bottle 12     gabapentin (NEURONTIN) 600 mg tablet Take 1 tablet by mouth 3 times daily. 270 tablet 4      "lamoTRIgine (LAMICTAL) 100 mg tablet Take 1 tablet po every morning, and continue the 200 mg dose at night. 90 tablet 4     lamoTRIgine (LAMICTAL) 200 mg tablet Take 1 tablet by mouth once daily. 90 tablet 4     lidocaine 5% (LIDODERM) 5 % patch Apply 1 Patch on dry, clean, hairless skin once daily. 30 Patch 4     LORazepam (ATIVAN) 0.5 mg tab Take 1 tablet by mouth 3 times daily if needed for Other (Specify) (irritability). 30 tablet 2     medication order composer Melox/lamo/lido/pril 0.2/2.5/2/2%  For headaches.  0     oxyCODONE-acetaminophen, 5-325 mg, (PERCOCET) 5-325 mg per tablet Take 1 tablet by mouth every 6 hours if needed  for Pain Max acetaminophen dose: 4000mg in 24 hrs. 35 tablet 0     silver sulfADIAZINE (SILVADENE) 1 % cream Apply  topically to affected area(s) once daily. Use with daily dressing changes 20 g 0     SUMAtriptan (IMITREX) 100 mg tablet Take one tablet PO after headache. May repeat after 2 hours if headache returns. Do not exceed 200 mg in 24 hours       traMADol (ULTRAM) 50 mg tablet Take 2 tablets by mouth 3 times daily. 180 tablet 5     traZODone (DESYREL) 100 mg tablet Take 1 tablet by mouth at bedtime. 30 tablet 12     venlafaxine (EFFEXOR XR) 150 mg Extended-Release capsule Take 1 capsule by mouth once daily with a meal. 90 capsule 4     ZOSTAVAX, PF, 19,400 unit/0.65 mL injection        No current facility-administered medications on file prior to visit.        REVIEW OF SYSTEMS:  Review of Systems   HENT: Positive for congestion, rhinorrhea and sore throat. Negative for ear pain and sneezing.    Respiratory: Positive for cough. Negative for wheezing.    Gastrointestinal: Negative for nausea.   Neurological: Positive for headaches.       OBJECTIVE:  /80  Pulse 86  Temp 97.2  F (36.2  C) (Tympanic)  Ht 1.626 m (5' 4\")    EXAM:   Physical Exam   Constitutional: She is well-developed, well-nourished, and in no distress.   HENT:   Head: Normocephalic and atraumatic.   Right " Ear: Tympanic membrane and ear canal normal.   Left Ear: Tympanic membrane and ear canal normal.   Nose: Mucosal edema and rhinorrhea present. Right sinus exhibits no maxillary sinus tenderness and no frontal sinus tenderness. Left sinus exhibits no maxillary sinus tenderness and no frontal sinus tenderness.   Mouth/Throat: Uvula is midline, oropharynx is clear and moist and mucous membranes are normal.   Eyes: Conjunctivae are normal.   Neck: Neck supple.   Cardiovascular: Normal rate, regular rhythm and normal heart sounds.    Pulmonary/Chest: Effort normal and breath sounds normal. No respiratory distress. She has no wheezes. She has no rales.   Lymphadenopathy:     She has no cervical adenopathy.   Nursing note and vitals reviewed.      ASSESSMENT/PLAN:    ICD-10-CM    1. Viral URI with cough J06.9      B97.89         Plan:  Home cares and OTC gone over. Symptoms likely due to virus. No antibiotic is needed at this time. Symptoms typically worse on days 2-5 and then stabilize and you are sick for days 5-12. Days 12-14 there is slow resolution and if there is a cough, studies show it can linger longer, however one is not as ill as in the beginning. If symptoms begin worsening or fail to improve after 14 days, return to clinic for reevaluation. All questions were answered and she is in agreement with plan.       JEM DIMAS NP ....................  3/3/2017   1:00 PM

## 2018-01-05 NOTE — NURSING NOTE
Patient Information     Patient Name MRN Ivy Gonzalez [id number] Female 1955      Nursing Note by Darline Zazueta at 2017 10:45 AM     Author:  Darline Zazueta Service:  (none) Author Type:  (none)     Filed:  2017 11:05 AM Encounter Date:  2017 Status:  Signed     :  Darline Zazueta            Patient presents to the clinic today for left shoulder pain, she thinks it is arthritis.     Darline Zazueta ....................  2017   10:53 AM

## 2018-01-05 NOTE — PROGRESS NOTES
Patient Information     Patient Name MRN Sex Ivy Bentley [id number] Female 1955      Progress Notes by Olivier Glass MD at 2017 10:45 AM     Author:  Olivier Glass MD Service:  (none) Author Type:  Physician     Filed:  2017 11:33 AM Encounter Date:  2017 Status:  Signed     :  Olivier Glass MD (Physician)            SUBJECTIVE:  61 y.o. female who presents for evaluation of left shoulder pain. She had arthroscopic surgery done on it 2 years ago, a decompression and debridement. MRI at that time did show a possible labral tear. She started having more pain 2-3 weeks ago. There is been no injury at all. It hurts to lie on the shoulder. She has a sore spot posteriorly over the shoulder blade and then the pain tends to radiate up toward the point of the shoulder and down the arm. It does not go all the way to the hand. She is able to move the shoulder but it's painful.    Additional Review of Systems: see HPI:   She has general aching all over due to fibromyalgia, which she states is unchanged. She does report the tramadol was not beneficial and she plans to discontinue it.    Past Medical History:     Diagnosis  Date     ALLERGIC RHINITIS, SEASONAL      BACK PAIN 10/7/2010     Dyslipidemia 10/24/2013     Elevated C-reactive protein      Elevated sedimentation rate     chronically      Erythema nodosum  2009     Essential tremor     Possible benign      History of pregnancy      Hx of pregnancy     Childbirth      ll defined rheumatologic disorder     characterized by chronic pain, positive BARBARA at a low titer of 1:160, negative HLAB27,       MIGRAINE HEADACHE      Postconcussion syndrome 2016     Sarcoidosis (HC)         Current Outpatient Prescriptions       Medication  Sig Dispense Refill     acetaminophen (TYLENOL EXTRA STRGTH) 500 mg tablet Take 2 tablets by mouth 3 times daily if needed. Max acetaminophen dose: 4000mg in 24 hrs.  0      buPROPion (WELLBUTRIN XL) 300 mg Extended-Release tablet Take 1 tablet by mouth once daily. 90 tablet 4     cholecalciferol (VITAMIN D) 1,000 unit capsule Take 1 capsule by mouth once daily.  0     Cholestyramine powd 1-3 tsp in water Q AM 1 jar 3     cycloSPORINE (RESTASIS) 0.05 % ophthalmic emulsion Place 1 Drop into both eyes 2 times daily. 1 Bottle 12     gabapentin (NEURONTIN) 600 mg tablet Take 1 tablet by mouth 3 times daily. 270 tablet 4     lamoTRIgine (LAMICTAL) 100 mg tablet Take 1 tablet po every morning, and continue the 200 mg dose at night. 90 tablet 4     lamoTRIgine (LAMICTAL) 200 mg tablet Take 1 tablet by mouth once daily. 90 tablet 4     lidocaine 5% (LIDODERM) 5 % patch Apply 1 Patch on dry, clean, hairless skin once daily. 30 Patch 4     LORazepam (ATIVAN) 0.5 mg tab Take 1 tablet by mouth 3 times daily if needed for Other (Specify) (irritability). 30 tablet 2     medication order composer Melox/lamo/lido/pril 0.2/2.5/2/2%  For headaches.  0     oxyCODONE-acetaminophen, 5-325 mg, (PERCOCET) 5-325 mg per tablet Take 1 tablet by mouth every 6 hours if needed  for Pain Max acetaminophen dose: 4000mg in 24 hrs. 35 tablet 0     predniSONE (DELTASONE) 20 mg tablet 60 mg po daily for 3 days, then 40 mg po daily for 3 days, then 20 mg po daily for 3 days 18 tablet 0     SUMAtriptan (IMITREX) 100 mg tablet Take one tablet PO after headache. May repeat after 2 hours if headache returns. Do not exceed 200 mg in 24 hours       venlafaxine (EFFEXOR XR) 150 mg Extended-Release capsule Take 1 capsule by mouth once daily with a meal. 90 capsule 4     ZOSTAVAX, PF, 19,400 unit/0.65 mL injection        No current facility-administered medications for this visit.      Medications have been reviewed by me and are current to the best of my knowledge and ability.      Allergies as of 05/16/2017 - Jasvir as Reviewed 05/16/2017      Allergen  Reaction Noted     Penicillins Rash 09/05/2012        OBJECTIVE:  /74   "Pulse 76  Ht 1.626 m (5' 4\")  Breastfeeding? No  EXAM:  General Appearance: Pleasant, alert, appropriate appearance for age. No acute distress  Musculoskeletal Exam: She has full range of motion of the left shoulder but pain with motion. There is a tender area directly over the middle of the scapula posteriorly. There is mild diffuse tenderness around the shoulder. There is no deformity, and no gross instability. Distal CMS is intact.  Skin: no rash or abnormalities      ASSESSMENT/PLAN:  Shoulder pain-likely bursitis. I reviewed her operative note and her previous MRI. Recommended a trial of prednisone tapering from 60 mg to 20 mg over 9 days and use heat to the area. Tramadol will be discontinued.  was reviewed and oxycodone was refilled, her last prescription being in November. We'll follow-up again in 2 weeks if symptoms don't improve.  Olivier Glass MD ....................  5/16/2017   11:32 AM            "

## 2018-01-25 ENCOUNTER — COMMUNICATION - GICH (OUTPATIENT)
Dept: FAMILY MEDICINE | Facility: OTHER | Age: 63
End: 2018-01-25

## 2018-01-25 DIAGNOSIS — F33.2 MAJOR DEPRESSIVE DISORDER, RECURRENT SEVERE WITHOUT PSYCHOTIC FEATURES (H): ICD-10-CM

## 2018-01-26 ENCOUNTER — DOCUMENTATION ONLY (OUTPATIENT)
Dept: FAMILY MEDICINE | Facility: OTHER | Age: 63
End: 2018-01-26

## 2018-01-26 PROBLEM — G89.4 CHRONIC PAIN DISORDER: Status: ACTIVE | Noted: 2018-01-26

## 2018-01-26 PROBLEM — G47.33 OSA ON CPAP: Status: ACTIVE | Noted: 2017-11-20

## 2018-01-26 RX ORDER — GABAPENTIN 600 MG/1
600 TABLET ORAL 3 TIMES DAILY
COMMUNITY
Start: 2016-11-15 | End: 2018-03-28

## 2018-01-26 RX ORDER — LAMOTRIGINE 200 MG/1
200 TABLET ORAL DAILY
COMMUNITY
Start: 2017-09-08 | End: 2018-09-24

## 2018-01-26 RX ORDER — SUMATRIPTAN 100 MG/1
1 TABLET, FILM COATED ORAL
COMMUNITY
Start: 2015-07-22 | End: 2019-11-04

## 2018-01-26 RX ORDER — LORAZEPAM 0.5 MG/1
0.5 TABLET ORAL 3 TIMES DAILY PRN
COMMUNITY
Start: 2017-08-03 | End: 2019-11-04

## 2018-01-26 RX ORDER — BUPROPION HYDROCHLORIDE 300 MG/1
300 TABLET ORAL DAILY
COMMUNITY
Start: 2017-11-20 | End: 2018-11-26

## 2018-01-26 RX ORDER — CYCLOSPORINE 0.5 MG/ML
1 EMULSION OPHTHALMIC 2 TIMES DAILY
COMMUNITY
Start: 2017-11-20 | End: 2019-11-04

## 2018-01-26 RX ORDER — ACETAMINOPHEN 500 MG
1000 TABLET ORAL 3 TIMES DAILY PRN
Status: ON HOLD | COMMUNITY
Start: 2015-07-22 | End: 2023-05-04

## 2018-01-26 RX ORDER — LAMOTRIGINE 100 MG/1
1 TABLET ORAL EVERY MORNING
COMMUNITY
Start: 2017-08-03 | End: 2018-11-26

## 2018-01-26 RX ORDER — VENLAFAXINE HYDROCHLORIDE 150 MG/1
150 CAPSULE, EXTENDED RELEASE ORAL
COMMUNITY
Start: 2017-11-20 | End: 2018-11-26

## 2018-01-26 RX ORDER — OXYCODONE AND ACETAMINOPHEN 5; 325 MG/1; MG/1
1 TABLET ORAL EVERY 6 HOURS PRN
COMMUNITY
Start: 2017-05-16 | End: 2019-11-04

## 2018-01-26 RX ORDER — LIDOCAINE 50 MG/G
1 PATCH TOPICAL DAILY
COMMUNITY
Start: 2017-11-20 | End: 2019-01-04

## 2018-01-27 VITALS
HEART RATE: 86 BPM | HEIGHT: 64 IN | TEMPERATURE: 97.2 F | DIASTOLIC BLOOD PRESSURE: 80 MMHG | SYSTOLIC BLOOD PRESSURE: 115 MMHG | HEIGHT: 64 IN | HEIGHT: 64 IN | HEART RATE: 72 BPM | BODY MASS INDEX: 36.7 KG/M2 | SYSTOLIC BLOOD PRESSURE: 130 MMHG | DIASTOLIC BLOOD PRESSURE: 80 MMHG | OXYGEN SATURATION: 98 % | WEIGHT: 215 LBS | SYSTOLIC BLOOD PRESSURE: 130 MMHG | DIASTOLIC BLOOD PRESSURE: 76 MMHG | HEART RATE: 80 BPM | TEMPERATURE: 97.5 F

## 2018-01-27 VITALS — HEIGHT: 64 IN | DIASTOLIC BLOOD PRESSURE: 80 MMHG | HEART RATE: 80 BPM | SYSTOLIC BLOOD PRESSURE: 120 MMHG

## 2018-01-27 VITALS — HEIGHT: 64 IN | DIASTOLIC BLOOD PRESSURE: 74 MMHG | SYSTOLIC BLOOD PRESSURE: 122 MMHG | HEART RATE: 76 BPM

## 2018-01-27 VITALS — HEIGHT: 64 IN | SYSTOLIC BLOOD PRESSURE: 132 MMHG | DIASTOLIC BLOOD PRESSURE: 80 MMHG | HEART RATE: 80 BPM

## 2018-01-27 VITALS — DIASTOLIC BLOOD PRESSURE: 80 MMHG | SYSTOLIC BLOOD PRESSURE: 114 MMHG | HEIGHT: 64 IN | HEART RATE: 86 BPM

## 2018-01-27 VITALS — SYSTOLIC BLOOD PRESSURE: 128 MMHG | HEIGHT: 64 IN | HEART RATE: 80 BPM | DIASTOLIC BLOOD PRESSURE: 80 MMHG

## 2018-01-31 ENCOUNTER — COMMUNICATION - GICH (OUTPATIENT)
Dept: FAMILY MEDICINE | Facility: OTHER | Age: 63
End: 2018-01-31

## 2018-01-31 DIAGNOSIS — M25.519 PAIN IN SHOULDER: ICD-10-CM

## 2018-02-02 ASSESSMENT — PATIENT HEALTH QUESTIONNAIRE - PHQ9: SUM OF ALL RESPONSES TO PHQ QUESTIONS 1-9: 15

## 2018-02-09 VITALS
SYSTOLIC BLOOD PRESSURE: 122 MMHG | WEIGHT: 215 LBS | HEART RATE: 82 BPM | DIASTOLIC BLOOD PRESSURE: 62 MMHG | HEIGHT: 64 IN | BODY MASS INDEX: 36.7 KG/M2

## 2018-02-12 NOTE — PROGRESS NOTES
Patient Information     Patient Name MRN Sex Ivy Bentley [id number] Female 1955      Progress Notes by Susana Armendariz at 2017  9:17 AM     Author:  Susana Armendariz Service:  (none) Author Type:  (none)     Filed:  2017  9:17 AM Encounter Date:  2017 Status:  Signed     :  Susana Armendariz              This encounter was opened in error.  Please disregard.

## 2018-02-12 NOTE — TELEPHONE ENCOUNTER
Patient Information     Patient Name MRN Ivy Gonzalez [id number] Female 1955      Telephone Encounter by Gila Sumner RN at 2017  8:43 AM     Author:  Gila Sumner RN Service:  (none) Author Type:  NURS- Registered Nurse     Filed:  2017  8:45 AM Encounter Date:  2017 Status:  Signed     :  Gila Sumner RN (NURS- Registered Nurse)            Medication filled 2017 #90 R4  Unable to complete prescription refill per RN Medication Refill Policy.................... Gila Sumner RN ....................  2017   8:45 AM

## 2018-02-12 NOTE — NURSING NOTE
Patient Information     Patient Name MRN Sex Ivy Bentley [id number] Female 1955      Nursing Note by Ayala Coe at 2017  1:40 PM     Author:  Ayala Coe Service:  (none) Author Type:  (none)     Filed:  2017  2:36 PM Encounter Date:  2017 Status:  Signed     :  Ayala Coe            Pt presents for follow up on C-pap.  Ayala Coe

## 2018-02-13 NOTE — TELEPHONE ENCOUNTER
Patient Information     Patient Name MRN Ivy Gonzalez [id number] Female 1955      Telephone Encounter by Christal Yuan RN at 2018  3:56 PM     Author:  Christal Yuan RN Service:  (none) Author Type:  NURS- Registered Nurse     Filed:  2018  3:57 PM Encounter Date:  2018 Status:  Signed     :  Christal Yuan RN (NURS- Registered Nurse)            Effexor refilled on 17 #90 x 4 refills to [facility].  CHRISTAL YUAN RN ....................  2018   3:57 PM

## 2018-02-13 NOTE — TELEPHONE ENCOUNTER
Patient Information     Patient Name MRN Sex Ivy Bentley [id number] Female 1955      Telephone Encounter by Malu Glass at 2018  2:01 PM     Author:  Malu Glass Service:  (none) Author Type:  (none)     Filed:  2018  2:02 PM Encounter Date:  2018 Status:  Signed     :  Malu Glass            Patient would like a referral put in to the insurance company to cover her ortho visit on 2017.

## 2018-02-13 NOTE — TELEPHONE ENCOUNTER
Patient Information     Patient Name MRN Sex Ivy Bentley [id number] Female 1955      Telephone Encounter by Ivette De lRosario at 2018  3:59 PM     Author:  Ivette Del Rosario Service:  (none) Author Type:  (none)     Filed:  2018  4:12 PM Encounter Date:  2018 Status:  Signed     :  Ivette Del Rosario            Needs another referral for orthopedic consult Follow up was for Dr. Friedman.  For Surgery orthoscopic Left shoulder   His office is in [place], [place]      Patient was only aloud 2 follow up visits but she needed 3.  Wondering if we can send a new referral.   Ivette Del Rosario LPN........................2018  4:07 PM

## 2018-03-01 ENCOUNTER — HOSPITAL ENCOUNTER (OUTPATIENT)
Dept: GENERAL RADIOLOGY | Facility: OTHER | Age: 63
Discharge: HOME | End: 2018-03-01
Attending: FAMILY MEDICINE | Admitting: FAMILY MEDICINE
Payer: COMMERCIAL

## 2018-03-01 ENCOUNTER — OFFICE VISIT (OUTPATIENT)
Dept: FAMILY MEDICINE | Facility: OTHER | Age: 63
End: 2018-03-01
Attending: FAMILY MEDICINE
Payer: COMMERCIAL

## 2018-03-01 VITALS — DIASTOLIC BLOOD PRESSURE: 74 MMHG | HEART RATE: 80 BPM | SYSTOLIC BLOOD PRESSURE: 142 MMHG

## 2018-03-01 DIAGNOSIS — S60.552A SUPERFICIAL FOREIGN BODY OF LEFT HAND, INITIAL ENCOUNTER: ICD-10-CM

## 2018-03-01 DIAGNOSIS — S60.552A SUPERFICIAL FOREIGN BODY OF LEFT HAND, INITIAL ENCOUNTER: Primary | ICD-10-CM

## 2018-03-01 PROCEDURE — 73130 X-RAY EXAM OF HAND: CPT | Mod: LT

## 2018-03-01 PROCEDURE — 99213 OFFICE O/P EST LOW 20 MIN: CPT | Performed by: FAMILY MEDICINE

## 2018-03-01 NOTE — MR AVS SNAPSHOT
"              After Visit Summary   3/1/2018    Ivy Abad    MRN: [id number]           Patient Information     Date Of Birth          1955        Visit Information        Provider Department      3/1/2018 9:45 AM Olivier Glass MD [facility]        Today's Diagnoses     Superficial foreign body of left hand, initial encounter    -  1       Follow-ups after your visit        Future tests that were ordered for you today     Open Future Orders        Priority Expected Expires Ordered    XR Hand Left G/E 3 Views Routine 3/1/2018 3/1/2019 3/1/2018            Who to contact     If you have questions or need follow up information about today's clinic visit or your schedule please contact [facility] directly at [phone].  Normal or non-critical lab and imaging results will be communicated to you by Snapsheethart, letter or phone within 4 business days after the clinic has received the results. If you do not hear from us within 7 days, please contact the clinic through Snapsheethart or phone. If you have a critical or abnormal lab result, we will notify you by phone as soon as possible.  Submit refill requests through Urvew or call your pharmacy and they will forward the refill request to us. Please allow 3 business days for your refill to be completed.          Additional Information About Your Visit        Snapsheethart Information     Urvew lets you send messages to your doctor, view your test results, renew your prescriptions, schedule appointments and more. To sign up, go to [email or web address] . Click on \"Log in\" on the left side of the screen, which will take you to the Welcome page. Then click on \"Sign up Now\" on the right side of the page.     You will be asked to enter the access code listed below, as well as some personal information. Please follow the directions to create your username and password.     Your access code is: [email or web address]  Expires: " 2018 12:12 PM     Your access code will  in 90 days. If you need help or a new code, please call your [place] clinic or [phone].        Care EveryWhere ID     This is your Care EveryWhere ID. This could be used by other organizations to access your [facility] medical records  [id number]        Your Vitals Were     Pulse                   80            Blood Pressure from Last 3 Encounters:   18 142/74   17 122/62   17 130/76    Weight from Last 3 Encounters:   17 215 lb (97.5 kg)   17 215 lb (97.5 kg)   16 214 lb (97.1 kg)               Primary Care Provider Office Phone # Fax #    Olivier Glass [phone]       [address] [facility] [address]        Equal Access to Services     [facility]: Hadii aad ku hadasho Soomaali, waaxda luqadaha, qaybta kaalmada adeegyada, mikel aritain haydarlenen sheeba acosta . So [facility] [phone].    ATENCIÓN: Si habla español, tiene a queen disposición servicios gratuitos de asistencia lingüística. Llame al [phone].    We comply with applicable federal civil rights laws and [place] laws. We do not discriminate on the basis of race, color, national origin, age, disability, sex, sexual orientation, or gender identity.            Thank you!     Thank you for choosing [facility] AND [facility]  for your care. Our goal is always to provide you with excellent care. Hearing back from our patients is one way we can continue to improve our services. Please take a few minutes to complete the written survey that you may receive in the mail after your visit with us. Thank you!             Your Updated Medication List - Protect others around you: Learn how to safely use, store and throw away your medicines at www.disposemymeds.org.          This list is accurate as of 3/1/18 12:12 PM.  Always use your most recent med list.                   Brand Name Dispense Instructions for use Diagnosis     acetaminophen 500 MG tablet    TYLENOL     Take 1,000 mg by mouth 3 times daily as needed        buPROPion 300 MG 24 hr tablet    WELLBUTRIN XL     Take 300 mg by mouth daily        cycloSPORINE 0.05 % ophthalmic emulsion    RESTASIS     Apply 1 drop to eye 2 times daily        gabapentin 600 MG tablet    NEURONTIN     Take 600 mg by mouth 3 times daily        * lamoTRIgine 100 MG tablet    LaMICtal     Take 1 tablet by mouth every morning        * lamoTRIgine 200 MG tablet    LaMICtal     Take 200 mg by mouth daily        lidocaine 5 % Patch    LIDODERM     Place 1 patch onto the skin daily        LORazepam 0.5 MG tablet    ATIVAN     Take 0.5 mg by mouth 3 times daily as needed for irritation        oxyCODONE-acetaminophen 5-325 MG per tablet    PERCOCET     Take 1 tablet by mouth every 6 hours as needed for pain        SUMAtriptan 100 MG tablet    IMITREX     Take 1 tablet by mouth every 2 hours as needed for headaches        venlafaxine 150 MG 24 hr capsule    EFFEXOR-XR     Take 150 mg by mouth daily with food        vitamin D3 1000 UNITS Caps      Take 1,000 Units by mouth daily        * Notice:  This list has 2 medication(s) that are the same as other medications prescribed for you. Read the directions carefully, and ask your doctor or other care provider to review them with you.

## 2018-03-01 NOTE — NURSING NOTE
Patient presents to clinic with concerns of left hand pain. Patient states that the pain is no longer there. She states that she had a sliver in it about 2 weeks ago.  Gisele Amos ....................  3/1/2018   9:58 AM

## 2018-03-01 NOTE — PROGRESS NOTES
SUBJECTIVE:  62 year old female who presents for evaluation of a possible foreign body in her left hand.  She was moving some furniture around 2 or 3 weeks ago when she got a sliver in the hand at the base of the little finger on the palmar surface.  She thought she got the whole thing out, but she has a little lump there and it is sore.  It is not really painful, does not affect range of motion, and she is noted no erythema.  No rash elsewhere.  No fever or chills.    She states that she had some lab work done for life insurance and was told that she had kidney trouble.  She denies any symptoms or problems at all.    Additional Review of Systems: See HPI:      Past Medical History:   Diagnosis Date     Allergic rhinitis due to pollen     No Comments Provided     Dorsalgia     10/7/2010     Elevated C-reactive protein (CRP)     No Comments Provided     Elevated erythrocyte sedimentation rate     chronically     Erythema nodosum      2009     Essential tremor     Possible benign     Hyperlipidemia     10/24/2013     Ill-defined and unknown cause of mortality (CODE)     characterized by chronic pain, positive BARBARA at a low titer of 1:160, negative HLAB27,     Migraine without status migrainosus, not intractable     No Comments Provided     Personal history of other medical treatment (CODE)     No Comments Provided     Personal history of other medical treatment (CODE)     Childbirth     Postconcussional syndrome     1/26/2016     Sarcoidosis     2007        Current Outpatient Prescriptions   Medication Sig Dispense Refill     acetaminophen (TYLENOL) 500 MG tablet Take 1,000 mg by mouth 3 times daily as needed       buPROPion (WELLBUTRIN XL) 300 MG 24 hr tablet Take 300 mg by mouth daily       Cholecalciferol (VITAMIN D3) 1000 UNITS CAPS Take 1,000 Units by mouth daily       cycloSPORINE (RESTASIS) 0.05 % ophthalmic emulsion Apply 1 drop to eye 2 times daily       gabapentin (NEURONTIN) 600 MG tablet Take 600 mg by  mouth 3 times daily       lamoTRIgine (LAMICTAL) 100 MG tablet Take 1 tablet by mouth every morning       lamoTRIgine (LAMICTAL) 200 MG tablet Take 200 mg by mouth daily       lidocaine (LIDODERM) 5 % Patch Place 1 patch onto the skin daily       LORazepam (ATIVAN) 0.5 MG tablet Take 0.5 mg by mouth 3 times daily as needed for irritation       oxyCODONE-acetaminophen (PERCOCET) 5-325 MG per tablet Take 1 tablet by mouth every 6 hours as needed for pain       SUMAtriptan (IMITREX) 100 MG tablet Take 1 tablet by mouth every 2 hours as needed for headaches       venlafaxine (EFFEXOR-XR) 150 MG 24 hr capsule Take 150 mg by mouth daily with food         Allergies as of 03/01/2018 - Jasvir as Reviewed 03/01/2018   Allergen Reaction Noted     Penicillins Rash 09/05/2012        OBJECTIVE:  /74  Pulse 80  EXAM: {EXAM -  General: She is a healthy-appearing 8-year-old female, pleasant and cooperative, in no acute distress  Chest/cardiac: Blood pressure recheck was 136/78.  Extremities: Examination of the left hand reveals a small scar at the base of the little finger on the palmar surface.  There is no sign of inflammation.  There is minimal tenderness to palpation.  She has full range of motion.  Skin: No rashes or lesions elsewhere  Neuro/psych: Alert and oriented with normal mood and affect    Labs/imaging: Reviewed her labs over the last 2 years with her, creatinine has been stable between 1.28 and 134.  X-ray was taken and personally reviewed by me.  No foreign body is seen.  She does have tiny accessory ossicles at the base of the little finger and the thumb.      ASSESSMENT/PLAN:  She was reassured that there is no evidence of a foreign body.  We also discussed her creatinine.  This is been stable and will be checked again at her next lab exam.  She will follow-up as needed for the finger problem.  I suspect the lump is just scar tissue and will gradually resolve.  JV OLEA MD on 3/1/2018 at 12:11  PM

## 2018-03-28 ENCOUNTER — TELEPHONE (OUTPATIENT)
Dept: FAMILY MEDICINE | Facility: OTHER | Age: 63
End: 2018-03-28

## 2018-03-28 DIAGNOSIS — G89.4 CHRONIC PAIN DISORDER: ICD-10-CM

## 2018-03-28 DIAGNOSIS — D86.9 SARCOIDOSIS: Primary | ICD-10-CM

## 2018-03-28 NOTE — TELEPHONE ENCOUNTER
Spoke with the pharmacy who stated that they had sent out a refill request and it was sent back stating it had been declined due to patient not having a primary. Patients primary is listed as Olivier Glass MD, clarified that with the pharmacy and they stated they would fax the request over again. The medication request is for gabapentin.   Writer spoke with the patient who verified her last name and birth date. Informed patient that there had been a mix up and that Olivier Glass MD is listed as her primary doctor. Patient stated she is out of the Gabapentin and is requested a refill as soon as possible. Informed her that a request had been made for [facility] to send over a new request and that this message would be forwarded to Olivier Glass MD for review. Patient was seen on 3/1/18 and will be seen again on 4/9/18. Please fill prescription if appropriate   Flavia Kerr LPN 3/28/2018 12:12 PM

## 2018-03-28 NOTE — TELEPHONE ENCOUNTER
[facility]-Pt was told by Karthikeyan that [facility] is not PCP for her so could not refill meds. I am seeing [facility] as PCP. Can we call Karthikeyan to clear this up for her? Thank You.  Jasmin Bartlett

## 2018-03-29 RX ORDER — GABAPENTIN 600 MG/1
600 TABLET ORAL 3 TIMES DAILY
Qty: 270 TABLET | Refills: 3 | Status: SHIPPED | OUTPATIENT
Start: 2018-03-29 | End: 2019-04-15

## 2018-04-09 ENCOUNTER — OFFICE VISIT (OUTPATIENT)
Dept: FAMILY MEDICINE | Facility: OTHER | Age: 63
End: 2018-04-09
Attending: FAMILY MEDICINE
Payer: COMMERCIAL

## 2018-04-09 VITALS — DIASTOLIC BLOOD PRESSURE: 70 MMHG | HEART RATE: 96 BPM | SYSTOLIC BLOOD PRESSURE: 138 MMHG

## 2018-04-09 DIAGNOSIS — M25.512 CHRONIC LEFT SHOULDER PAIN: Primary | ICD-10-CM

## 2018-04-09 DIAGNOSIS — M75.02 ADHESIVE CAPSULITIS OF LEFT SHOULDER: ICD-10-CM

## 2018-04-09 DIAGNOSIS — M75.82 ROTATOR CUFF TENDINITIS, LEFT: Primary | ICD-10-CM

## 2018-04-09 DIAGNOSIS — G89.29 CHRONIC LEFT SHOULDER PAIN: Primary | ICD-10-CM

## 2018-04-09 PROCEDURE — 99213 OFFICE O/P EST LOW 20 MIN: CPT | Performed by: FAMILY MEDICINE

## 2018-04-09 ASSESSMENT — PAIN SCALES - GENERAL: PAINLEVEL: SEVERE PAIN (6)

## 2018-04-09 NOTE — MR AVS SNAPSHOT
After Visit Summary   4/9/2018    Ivy Abad    MRN: [id number]           Patient Information     Date Of Birth          1955        Visit Information        Provider Department      4/9/2018 11:30 AM Olivier Glass MD [facility]        Today's Diagnoses     Rotator cuff tendinitis, left    -  1    Adhesive capsulitis of left shoulder           Follow-ups after your visit        Additional Services     ORTHOPEDICS ADULT REFERRAL       Your provider has referred you to: Dr. Magdaleno SIMMONS    Please be aware that coverage of these services is subject to the terms and limitations of your health insurance plan.  Call member services at your health plan with any benefit or coverage questions.      Please bring the following to your appointment:    >>   Any x-rays, CTs or MRIs which have been performed.  Contact the facility where they were done to arrange for  prior to your scheduled appointment.    >>   List of current medications   >>   This referral request   >>   Any documents/labs given to you for this referral                  Your next 10 appointments already scheduled     Apr 10, 2018  7:45 AM CDT   [facility] Visit with Roe Dolan DO   [facility]    [address]  [place] [id number] [phone]              Who to contact     If you have questions or need follow up information about today's clinic visit or your schedule please contact [facility] directly at [phone].  Normal or non-critical lab and imaging results will be communicated to you by MyChart, letter or phone within 4 business days after the clinic has received the results. If you do not hear from us within 7 days, please contact the clinic through MyChart or phone. If you have a critical or abnormal lab result, we will notify you by phone as soon as possible.  Submit refill requests  "through [employer] or call your pharmacy and they will forward the refill request to us. Please allow 3 business days for your refill to be completed.          Additional Information About Your Visit        myMatrixxhart Information     [employer] lets you send messages to your doctor, view your test results, renew your prescriptions, schedule appointments and more. To sign up, go to [email or web address] . Click on \"Log in\" on the left side of the screen, which will take you to the Welcome page. Then click on \"Sign up Now\" on the right side of the page.     You will be asked to enter the access code listed below, as well as some personal information. Please follow the directions to create your username and password.     Your access code is: [email or web address]  Expires: 2018  1:12 PM     Your access code will  in 90 days. If you need help or a new code, please call your [facility] clinic or [phone].        Care EveryWhere ID     This is your Care EveryWhere ID. This could be used by other organizations to access your [facility] medical records  [id number]        Your Vitals Were     Pulse                   96            Blood Pressure from Last 3 Encounters:   18 138/70   18 142/74   17 122/62    Weight from Last 3 Encounters:   17 215 lb (97.5 kg)   17 215 lb (97.5 kg)   16 214 lb (97.1 kg)              We Performed the Following     ORTHOPEDICS ADULT REFERRAL        Primary Care Provider Office Phone # Fax #    Olivier Glass [phone] [address] [facility] [address]        Equal Access to Services     [facility] : Hadcathy Hogue, mikel nolasco. So [facility] [phone].    ATENCIÓN: Si habla español, tiene a queen disposición servicios gratuitos de asistencia lingüística. Eze al [phone].    We comply with applicable federal civil rights laws and " [place] laws. We do not discriminate on the basis of race, color, national origin, age, disability, sex, sexual orientation, or gender identity.            Thank you!     Thank you for choosing [facility] AND hospitals  for your care. Our goal is always to provide you with excellent care. Hearing back from our patients is one way we can continue to improve our services. Please take a few minutes to complete the written survey that you may receive in the mail after your visit with us. Thank you!             Your Updated Medication List - Protect others around you: Learn how to safely use, store and throw away your medicines at www.disposemymeds.org.          This list is accurate as of 4/9/18 12:35 PM.  Always use your most recent med list.                   Brand Name Dispense Instructions for use Diagnosis    acetaminophen 500 MG tablet    TYLENOL     Take 1,000 mg by mouth 3 times daily as needed        buPROPion 300 MG 24 hr tablet    WELLBUTRIN XL     Take 300 mg by mouth daily        cycloSPORINE 0.05 % ophthalmic emulsion    RESTASIS     Apply 1 drop to eye 2 times daily        gabapentin 600 MG tablet    NEURONTIN    270 tablet    Take 1 tablet (600 mg) by mouth 3 times daily    Sarcoidosis, Chronic pain disorder       * lamoTRIgine 100 MG tablet    LaMICtal     Take 1 tablet by mouth every morning        * lamoTRIgine 200 MG tablet    LaMICtal     Take 200 mg by mouth daily        lidocaine 5 % Patch    LIDODERM     Place 1 patch onto the skin daily        LORazepam 0.5 MG tablet    ATIVAN     Take 0.5 mg by mouth 3 times daily as needed for irritation        oxyCODONE-acetaminophen 5-325 MG per tablet    PERCOCET     Take 1 tablet by mouth every 6 hours as needed for pain        SUMAtriptan 100 MG tablet    IMITREX     Take 1 tablet by mouth every 2 hours as needed for headaches        venlafaxine 150 MG 24 hr capsule    EFFEXOR-XR     Take 150 mg by mouth daily with food        vitamin D3 1000  UNITS Caps      Take 1,000 Units by mouth daily        * Notice:  This list has 2 medication(s) that are the same as other medications prescribed for you. Read the directions carefully, and ask your doctor or other care provider to review them with you.

## 2018-04-09 NOTE — PROGRESS NOTES
SUBJECTIVE:  62 year old female who presents for follow-up of left shoulder pain.  She had been seeing Dr. Friedman, and had an MRI done a year ago that showed fairly significant rotator cuff tendinitis.  She had an injection at that time which was beneficial.  She would like to turn her care over to our orthopedic department here and not travel to see Dr. Friedman.  She is having increased pain, and limited range of motion because of the pain.    Additional Review of Systems: See HPI: Follow-up of left shoulder pain, otherwise no new symptoms.    Past Medical History:   Diagnosis Date     Allergic rhinitis due to pollen     No Comments Provided     Dorsalgia     10/7/2010     Elevated C-reactive protein (CRP)     No Comments Provided     Elevated erythrocyte sedimentation rate     chronically     Erythema nodosum      2009     Essential tremor     Possible benign     Hyperlipidemia     10/24/2013     Ill-defined and unknown cause of mortality (CODE)     characterized by chronic pain, positive BARBARA at a low titer of 1:160, negative HLAB27,     Migraine without status migrainosus, not intractable     No Comments Provided     Personal history of other medical treatment (CODE)     No Comments Provided     Personal history of other medical treatment (CODE)     Childbirth     Postconcussional syndrome     1/26/2016     Sarcoidosis     2007        Current Outpatient Prescriptions   Medication Sig Dispense Refill     gabapentin (NEURONTIN) 600 MG tablet Take 1 tablet (600 mg) by mouth 3 times daily 270 tablet 3     acetaminophen (TYLENOL) 500 MG tablet Take 1,000 mg by mouth 3 times daily as needed       buPROPion (WELLBUTRIN XL) 300 MG 24 hr tablet Take 300 mg by mouth daily       Cholecalciferol (VITAMIN D3) 1000 UNITS CAPS Take 1,000 Units by mouth daily       cycloSPORINE (RESTASIS) 0.05 % ophthalmic emulsion Apply 1 drop to eye 2 times daily       lamoTRIgine (LAMICTAL) 100 MG tablet Take 1 tablet by mouth every morning        lamoTRIgine (LAMICTAL) 200 MG tablet Take 200 mg by mouth daily       lidocaine (LIDODERM) 5 % Patch Place 1 patch onto the skin daily       LORazepam (ATIVAN) 0.5 MG tablet Take 0.5 mg by mouth 3 times daily as needed for irritation       oxyCODONE-acetaminophen (PERCOCET) 5-325 MG per tablet Take 1 tablet by mouth every 6 hours as needed for pain       SUMAtriptan (IMITREX) 100 MG tablet Take 1 tablet by mouth every 2 hours as needed for headaches       venlafaxine (EFFEXOR-XR) 150 MG 24 hr capsule Take 150 mg by mouth daily with food         Allergies as of 04/09/2018 - Jasvir as Reviewed 04/09/2018   Allergen Reaction Noted     Penicillins Rash 09/05/2012        OBJECTIVE:  /70  Pulse 96  EXAM: {EXAM -  General: She is a pleasant 62-year-old female, cooperative, and in no apparent distress.  Chest/cardiac: Normal  Extremities: There is diffuse tenderness over the anterior aspect of the shoulder with marked limitation of motion mainly because of pain.  No gross deformity.  Distal CMS is intact.  Skin: No skin rash or lesions  Neuro/psych: Neurologically intact.  Alert and oriented with normal mood and affect.    Labs/imaging:, Reviewed her MRI report, done 1 year ago      ASSESSMENT/PLAN:  Rotator cuff tendinitis with increasing symptoms, and symptoms consistent with developing adhesive capsulitis.  Recommended referral to Dr. Dolan for consultation and probably another injection.  Consider physical therapy if not improving.  JV OLEA MD on 4/9/2018 at 12:34 PM

## 2018-04-09 NOTE — NURSING NOTE
Patient presents to clinic for left shoulder pain.  Gisele Amos ....................  4/9/2018   11:29 AM

## 2018-04-10 ENCOUNTER — OFFICE VISIT (OUTPATIENT)
Dept: ORTHOPEDICS | Facility: OTHER | Age: 63
End: 2018-04-10
Attending: FAMILY MEDICINE
Payer: COMMERCIAL

## 2018-04-10 ENCOUNTER — HOSPITAL ENCOUNTER (OUTPATIENT)
Dept: GENERAL RADIOLOGY | Facility: OTHER | Age: 63
Discharge: HOME | End: 2018-04-10
Attending: ORTHOPAEDIC SURGERY | Admitting: ORTHOPAEDIC SURGERY
Payer: COMMERCIAL

## 2018-04-10 VITALS
HEIGHT: 64 IN | BODY MASS INDEX: 36.7 KG/M2 | WEIGHT: 215 LBS | HEART RATE: 88 BPM | DIASTOLIC BLOOD PRESSURE: 72 MMHG | SYSTOLIC BLOOD PRESSURE: 148 MMHG

## 2018-04-10 DIAGNOSIS — M75.82 TENDINITIS OF LEFT ROTATOR CUFF: ICD-10-CM

## 2018-04-10 DIAGNOSIS — M25.512 CHRONIC LEFT SHOULDER PAIN: ICD-10-CM

## 2018-04-10 DIAGNOSIS — M75.42 IMPINGEMENT SYNDROME OF LEFT SHOULDER: ICD-10-CM

## 2018-04-10 DIAGNOSIS — G89.29 CHRONIC LEFT SHOULDER PAIN: ICD-10-CM

## 2018-04-10 DIAGNOSIS — M19.019 AC (ACROMIOCLAVICULAR) ARTHRITIS: ICD-10-CM

## 2018-04-10 DIAGNOSIS — M75.82 ROTATOR CUFF TENDINITIS, LEFT: ICD-10-CM

## 2018-04-10 DIAGNOSIS — M19.012 OSTEOARTHRITIS OF LEFT GLENOHUMERAL JOINT: Primary | ICD-10-CM

## 2018-04-10 PROCEDURE — 73030 X-RAY EXAM OF SHOULDER: CPT | Mod: LT

## 2018-04-10 PROCEDURE — 99243 OFF/OP CNSLTJ NEW/EST LOW 30: CPT | Performed by: ORTHOPAEDIC SURGERY

## 2018-04-10 ASSESSMENT — PAIN SCALES - GENERAL: PAINLEVEL: SEVERE PAIN (6)

## 2018-04-10 NOTE — MR AVS SNAPSHOT
"              After Visit Summary   4/10/2018    Ivy Abad    MRN: [id number]           Patient Information     Date Of Birth          1955        Visit Information        Provider Department      4/10/2018 7:45 AM Roe Dolan DO [facility]        Today's Diagnoses     Osteoarthritis of left glenohumeral joint    -  1    Rotator cuff tendinitis, left        AC (acromioclavicular) arthritis        Impingement syndrome of left shoulder        Tendinitis of left rotator cuff           Follow-ups after your visit        Follow-up notes from your care team     Return if symptoms worsen or fail to improve.      Future tests that were ordered for you today     Open Future Orders        Priority Expected Expires Ordered    XR Shoulder Left G/E 3 Views Routine 4/10/2018 4/9/2019 4/9/2018            Who to contact     If you have questions or need follow up information about today's clinic visit or your schedule please contact [facility] directly at [phone].  Normal or non-critical lab and imaging results will be communicated to you by [employer], letter or phone within 4 business days after the clinic has received the results. If you do not hear from us within 7 days, please contact the clinic through [employer] or phone. If you have a critical or abnormal lab result, we will notify you by phone as soon as possible.  Submit refill requests through i-marker or call your pharmacy and they will forward the refill request to us. Please allow 3 business days for your refill to be completed.          Additional Information About Your Visit        [employer] Information     i-marker lets you send messages to your doctor, view your test results, renew your prescriptions, schedule appointments and more. To sign up, go to [email or web address] . Click on \"Log in\" on the left side of the screen, which will take you to the Welcome page. Then click on \"Sign up Now\" on the " "right side of the page.     You will be asked to enter the access code listed below, as well as some personal information. Please follow the directions to create your username and password.     Your access code is: [email or web address]  Expires: 2018  1:12 PM     Your access code will  in 90 days. If you need help or a new code, please call your [facility] or [phone].        Care EveryWhere ID     This is your Care EveryWhere ID. This could be used by other organizations to access your [facility] medical records  [id number]        Your Vitals Were     Pulse Height BMI (Body Mass Index)             88 1.632 m (5' 4.25\") 36.62 kg/m2          Blood Pressure from Last 3 Encounters:   04/10/18 148/72   18 138/70   18 142/74    Weight from Last 3 Encounters:   04/10/18 97.5 kg (215 lb)   17 97.5 kg (215 lb)   17 97.5 kg (215 lb)              Today, you had the following     No orders found for display       Primary Care Provider Office Phone # Fax #    Olivier Glass [phone] [address] [facility] [address]        Equal Access to Services     LAEJA VILLATORO AH: Hadii lauri mobleyo Sonaomi, waaxda luqadaha, qaybta kaalmada adeegyada, mikel acosta . So [facility] [place] [phone].    ATENCIÓN: Si habla español, tiene a queen disposición servicios gratuitos de asistencia lingüística. Llame al [phone].    We comply with applicable federal civil rights laws and [place] laws. We do not discriminate on the basis of race, color, national origin, age, disability, sex, sexual orientation, or gender identity.            Thank you!     Thank you for choosing [facility] AND [facility]  for your care. Our goal is always to provide you with excellent care. Hearing back from our patients is one way we can continue to improve our services. Please take a few minutes to complete the written survey that you may receive in the mail " after your visit with us. Thank you!             Your Updated Medication List - Protect others around you: Learn how to safely use, store and throw away your medicines at www.disposemymeds.org.          This list is accurate as of 4/10/18  8:43 AM.  Always use your most recent med list.                   Brand Name Dispense Instructions for use Diagnosis    acetaminophen 500 MG tablet    TYLENOL     Take 1,000 mg by mouth 3 times daily as needed        buPROPion 300 MG 24 hr tablet    WELLBUTRIN XL     Take 300 mg by mouth daily        cycloSPORINE 0.05 % ophthalmic emulsion    RESTASIS     Apply 1 drop to eye 2 times daily        gabapentin 600 MG tablet    NEURONTIN    270 tablet    Take 1 tablet (600 mg) by mouth 3 times daily    Sarcoidosis, Chronic pain disorder       * lamoTRIgine 100 MG tablet    LaMICtal     Take 1 tablet by mouth every morning        * lamoTRIgine 200 MG tablet    LaMICtal     Take 200 mg by mouth daily        lidocaine 5 % Patch    LIDODERM     Place 1 patch onto the skin daily        LORazepam 0.5 MG tablet    ATIVAN     Take 0.5 mg by mouth 3 times daily as needed for irritation        oxyCODONE-acetaminophen 5-325 MG per tablet    PERCOCET     Take 1 tablet by mouth every 6 hours as needed for pain        SUMAtriptan 100 MG tablet    IMITREX     Take 1 tablet by mouth every 2 hours as needed for headaches        venlafaxine 150 MG 24 hr capsule    EFFEXOR-XR     Take 150 mg by mouth daily with food        vitamin D3 1000 UNITS Caps      Take 1,000 Units by mouth daily        * Notice:  This list has 2 medication(s) that are the same as other medications prescribed for you. Read the directions carefully, and ask your doctor or other care provider to review them with you.

## 2018-04-10 NOTE — PROGRESS NOTES
Ivy Abad was seen in consultation for JV OLEA MD for a chief complaint of pain about the left shoulder.    CHIEF COMPLAINT: Ivy Abad is a 62 year old  female  Chief Complaint   Patient presents with     Consult     Left Shoulder        HISTORY OF PRESENTING INJURY   History of presenting injury, patient comes in with ongoing complaints of left shoulder pain.  She has had 2 arthroscopic procedures done by Dr. Friedman on that shoulder.  She was told that she was eventually going to need a total arthroplasty on that shoulder she already has had the right shoulder completed.  She did have a recent injection with very minimal relief.  Pain is more dull achy and worse with increased activity.  Description of pain: mild.  Radiation of pain: No.  Pain course: gradually worsening.  Worse with: Increased activities.  Improved by: Rest.  History of injection: yes.  Any PT: no.      PAST MEDICAL HISTORY:  Past Medical History:   Diagnosis Date     AC (acromioclavicular) arthritis 4/10/2018     Allergic rhinitis due to pollen     No Comments Provided     Dorsalgia     10/7/2010     Elevated C-reactive protein (CRP)     No Comments Provided     Elevated erythrocyte sedimentation rate     chronically     Erythema nodosum      2009     Essential tremor     Possible benign     Hyperlipidemia     10/24/2013     Ill-defined and unknown cause of mortality (CODE)     characterized by chronic pain, positive BARBARA at a low titer of 1:160, negative HLAB27,     Impingement syndrome of left shoulder 4/10/2018     Migraine without status migrainosus, not intractable     No Comments Provided     Osteoarthritis of left glenohumeral joint 4/10/2018     Personal history of other medical treatment (CODE)     No Comments Provided     Personal history of other medical treatment (CODE)     Childbirth     Postconcussional syndrome     1/26/2016     Sarcoidosis     2007     Tendinitis of left rotator cuff  4/10/2018       PAST SURGICAL HISTORY:  Past Surgical History:   Procedure Laterality Date     ARTHROSCOPY SHOULDER      12/07,Right type 3 SLAP lesion repair of labrum - suprascapular ganglion removal with posterior approach - acromioplasty and distal clavicle resection     ARTHROSCOPY SHOULDER      2/2015     COLONOSCOPY      8/2011,Normal--10 year followup     DILATION AND CURETTAGE      11/05, and ablation     LAPAROSCOPIC CHOLECYSTECTOMY      6/23/15,Cholecystectomy,Laparoscopic     OTHER SURGICAL HISTORY      2012,[id number],SHOULDER REPLACEMENT,Right,improvement in pain control and mobility     TONSILLECTOMY       and adenoidectomy age 12       ALLERGIES:  Allergies   Allergen Reactions     Penicillins Rash       CURRENT MEDICATIONS:  Current Outpatient Prescriptions   Medication Sig Dispense Refill     gabapentin (NEURONTIN) 600 MG tablet Take 1 tablet (600 mg) by mouth 3 times daily 270 tablet 3     acetaminophen (TYLENOL) 500 MG tablet Take 1,000 mg by mouth 3 times daily as needed       buPROPion (WELLBUTRIN XL) 300 MG 24 hr tablet Take 300 mg by mouth daily       Cholecalciferol (VITAMIN D3) 1000 UNITS CAPS Take 1,000 Units by mouth daily       cycloSPORINE (RESTASIS) 0.05 % ophthalmic emulsion Apply 1 drop to eye 2 times daily       lamoTRIgine (LAMICTAL) 100 MG tablet Take 1 tablet by mouth every morning       lamoTRIgine (LAMICTAL) 200 MG tablet Take 200 mg by mouth daily       lidocaine (LIDODERM) 5 % Patch Place 1 patch onto the skin daily       LORazepam (ATIVAN) 0.5 MG tablet Take 0.5 mg by mouth 3 times daily as needed for irritation       oxyCODONE-acetaminophen (PERCOCET) 5-325 MG per tablet Take 1 tablet by mouth every 6 hours as needed for pain       SUMAtriptan (IMITREX) 100 MG tablet Take 1 tablet by mouth every 2 hours as needed for headaches       venlafaxine (EFFEXOR-XR) 150 MG 24 hr capsule Take 150 mg by mouth daily with food         SOCIAL HISTORY:  Marital Status: .  Children:  "yes.  Occupation: .  Alcohol use:Occassional.  Tobacco use: Smoker: no, quit smoking.  Are you or have you used illicit drugs:  no.    FAMILY HISTORY:  Family History   Problem Relation Age of Onset     Adopted: Yes     Unknown/Adopted Other      Unknown,Unknown. She is ADOPTED       REVIEW OF SYSTEMS:  The review of systems as documented in the HPI and on the intake questionnaire, completed by the patient on 4/10/2018 have been reviewed by myself and the pertinentpositives and negatives addressed.  The remainder of the complete review of systems was non-contributory.    PHYSICAL EXAM:   /72 (BP Location: Right arm, Patient Position: Sitting, Cuff Size: Adult Large)  Pulse 88  Ht 1.632 m (5' 4.25\")  Wt 97.5 kg (215 lb)  BMI 36.62 kg/m2 Body mass index is 36.62 kg/(m^2).    General Appearance: Pleasant 62 year old female in good appearance, mood and affect.  Alert and orientated times three ( time, date and location).    Skin: Intact there are well-healed surgical incision sites.    Shoulder:  Motion: Her active forward flexion 0-165  forward flexion passively to approximately 170 .  Fairly solid endpoint.  Her abduction is relatively the same range.  External rotation 45  internal rotation barely to her back pocket.  Hawkin's Sign: positive.  Neer's Sign: positive.  Cross body adduction:  positive.  Drop arm test: negative.  Weakness at waist level: negative.  Bicipital testing: negative.  Lift off test:  negative.  Cesar's test:negative.    Elbow:  Motion: Full motion.    Hand:  Sensation: Intact.  Radial and ulnar blood flow:  normal.    Eyes: Pupils are round.    Ears: Hearing: Intact.    Heart: Good capillary refill and her hands pulses are regular.    Lungs: Coarse breath sounds.    Radiographic images from 6/9/17, 4/10/18 where independently reviewed and discussed with the patient.      Xray:     X-rays demonstrate glenohumeral arthritis.  There is a type II acromial hook.  " Narrowing of the AC joint.  Cysts in the glenoid and also increased osteophytes off the inferior humeral head.    PROCEDURE: XR SHOULDER LT G/E 3 VW    HISTORY: ; Chronic left shoulder pain; Chronic left shoulder pain    COMPARISON: 7/21/2014    TECHNIQUE: 3 views of the left shoulder were obtained.    FINDINGS: There are moderate degenerative changes of the glenohumeral  joint including inferior humeral head osteophytes. There are also  moderate degenerative changes of the acromioclavicular joint. No acute  fracture or dislocation.     IMPRESSION: Moderate degenerative disease. No acute fracture.     SEGUNDO HI MD    MRI/MRA:     MRI from last year does show cystic changes in the glenoid as well as arthritic changes of the humeral head.    MR SHOULDER LEFT WO    HISTORY: 62 years Female Chronic left shoulder pain    COMPARISON: None    TECHNIQUE: Coronal fat sat, sagittal T2, sagittal T1, axial gradient echo and axial T2 fat-sat imaging of the right shoulder was performed.    FINDINGS:    Rotator cuff:  Supraspinatus : There is thickening and increased signal within the tendon with mild to moderate partial-thickness undersurface tearing.  Infraspinatus: The tendon is intact  Teres minor: The tendon is intact  Subscapularis: The tendon is intact      Biceps tendon: The intra-and extra-articular portions of the biceps tendon are intact. The biceps labral anchor is intact.    Labrum: There is a nondisplaced extensive labral tear of the superior to posterior labrum extending from the 12-5 o'clock positions. There is mild perilabral cystic change from the 11-10 o'clock positions and also at the 5:00 position.    There is subcortical sclerosis and cystic change of the glenoid with extensive grade 3 to grade IV chondromalacia of the glenoid.    Acromioclavicular joint: There is osteoarthritic change of moderate severity with inferior osteophytic spurring.    IMPRESSION:  Tendinosis of moderate severity of the rotator  cuff primarily involving the supraspinatus tendon. There is thickening and increased signal with mild to moderate partial-thickness undersurface tearing.    Advanced chondromalacia of the glenoid with associated subcortical cystic change or and sclerosis. There is an extensive nondisplaced degenerative appearing tear of the labrum with perilabral cystic change.    Electronically Signed By: Yaya Bourne M.D. on 6/9/2017 2:29 PM    IMPRESSION:  Left glenohumeral arthritic changes.  History of 2 arthroscopic procedures on the left shoulder by Dr. Friedman.  Left impingement syndrome.  Left AC arthritis.  Left rotator cuff tendinitis.    PLAN:  Risks, benefits, conservative, surgical and alternatives to treatment where discussed and the patient would like to proceed with consideration of left total shoulder arthroplasty.  She did have her right shoulder replaced by Dr. Friedman she would like to continue with him.  She will continue to work on range of motion exercises which I outlined here today for her.  Medications per her primary.  I would hold off on any injections if she is thinking about an upcoming replacement.  Questions and concerns answered.    Roe Dolan D.O.  Orthopaedic Surgeon    [address] [address]  Phone [phone] (KNEE)  Fax [phone]    Disclaimer:  This note consists of words and symbols derived from keyboarding, dictation, or using voice recognition software. As a result, there may be errors in the script that have gone undetected. Please consider this when interpreting information found in this note.    8:37 AM 4/10/2018

## 2018-06-20 ENCOUNTER — TELEPHONE (OUTPATIENT)
Dept: FAMILY MEDICINE | Facility: OTHER | Age: 63
End: 2018-06-20

## 2018-06-20 DIAGNOSIS — M25.512 SHOULDER PAIN, LEFT: ICD-10-CM

## 2018-06-20 DIAGNOSIS — M75.102 ROTATOR CUFF SYNDROME, LEFT: Primary | ICD-10-CM

## 2018-06-20 NOTE — TELEPHONE ENCOUNTER
Patient called and stated that she saw Dr. Friedman with orthopedics on April 23rd at [facility]. Please place referral and call pt to let her know when it's done. Thanks.    Amena Day on 6/20/2018 at 12:00 PM

## 2018-08-10 ENCOUNTER — OFFICE VISIT (OUTPATIENT)
Dept: FAMILY MEDICINE | Facility: OTHER | Age: 63
End: 2018-08-10
Attending: FAMILY MEDICINE
Payer: COMMERCIAL

## 2018-08-10 VITALS — SYSTOLIC BLOOD PRESSURE: 130 MMHG | HEART RATE: 80 BPM | DIASTOLIC BLOOD PRESSURE: 90 MMHG

## 2018-08-10 DIAGNOSIS — M19.012 OSTEOARTHRITIS OF LEFT GLENOHUMERAL JOINT: ICD-10-CM

## 2018-08-10 DIAGNOSIS — G47.33 OSA ON CPAP: ICD-10-CM

## 2018-08-10 DIAGNOSIS — F33.2 SEVERE EPISODE OF RECURRENT MAJOR DEPRESSIVE DISORDER, WITHOUT PSYCHOTIC FEATURES (H): Primary | ICD-10-CM

## 2018-08-10 DIAGNOSIS — D86.9 SARCOIDOSIS: ICD-10-CM

## 2018-08-10 LAB
ALBUMIN UR-MCNC: NEGATIVE MG/DL
ANION GAP SERPL CALCULATED.3IONS-SCNC: 6 MMOL/L (ref 3–14)
APPEARANCE UR: CLEAR
BASOPHILS # BLD AUTO: 0.1 10E9/L (ref 0–0.2)
BASOPHILS NFR BLD AUTO: 1 %
BILIRUB UR QL STRIP: NEGATIVE
BUN SERPL-MCNC: 17 MG/DL (ref 7–25)
CALCIUM SERPL-MCNC: 9.5 MG/DL (ref 8.6–10.3)
CHLORIDE SERPL-SCNC: 104 MMOL/L (ref 98–107)
CO2 SERPL-SCNC: 28 MMOL/L (ref 21–31)
COLOR UR AUTO: YELLOW
CREAT SERPL-MCNC: 1.53 MG/DL (ref 0.6–1.2)
DIFFERENTIAL METHOD BLD: NORMAL
EOSINOPHIL # BLD AUTO: 0.5 10E9/L (ref 0–0.7)
EOSINOPHIL NFR BLD AUTO: 6.7 %
ERYTHROCYTE [DISTWIDTH] IN BLOOD BY AUTOMATED COUNT: 14.5 % (ref 10–15)
GFR SERPL CREATININE-BSD FRML MDRD: 35 ML/MIN/1.7M2
GLUCOSE SERPL-MCNC: 99 MG/DL (ref 70–105)
GLUCOSE UR STRIP-MCNC: NEGATIVE MG/DL
HCT VFR BLD AUTO: 37.7 % (ref 35–47)
HGB BLD-MCNC: 12 G/DL (ref 11.7–15.7)
HGB UR QL STRIP: ABNORMAL
IMM GRANULOCYTES # BLD: 0 10E9/L (ref 0–0.4)
IMM GRANULOCYTES NFR BLD: 0.3 %
KETONES UR STRIP-MCNC: NEGATIVE MG/DL
LEUKOCYTE ESTERASE UR QL STRIP: NEGATIVE
LYMPHOCYTES # BLD AUTO: 1.8 10E9/L (ref 0.8–5.3)
LYMPHOCYTES NFR BLD AUTO: 24.1 %
MCH RBC QN AUTO: 30.1 PG (ref 26.5–33)
MCHC RBC AUTO-ENTMCNC: 31.8 G/DL (ref 31.5–36.5)
MCV RBC AUTO: 95 FL (ref 78–100)
MONOCYTES # BLD AUTO: 0.6 10E9/L (ref 0–1.3)
MONOCYTES NFR BLD AUTO: 8.7 %
NEUTROPHILS # BLD AUTO: 4.3 10E9/L (ref 1.6–8.3)
NEUTROPHILS NFR BLD AUTO: 59.2 %
NITRATE UR QL: NEGATIVE
NON-SQ EPI CELLS #/AREA URNS LPF: NORMAL /LPF
PH UR STRIP: 5.5 PH (ref 5–9)
PLATELET # BLD AUTO: 266 10E9/L (ref 150–450)
POTASSIUM SERPL-SCNC: 4.5 MMOL/L (ref 3.5–5.1)
RBC # BLD AUTO: 3.99 10E12/L (ref 3.8–5.2)
RBC #/AREA URNS AUTO: NORMAL /HPF
SODIUM SERPL-SCNC: 138 MMOL/L (ref 134–144)
SOURCE: ABNORMAL
SP GR UR STRIP: >1.03 (ref 1–1.03)
UROBILINOGEN UR STRIP-ACNC: 0.2 EU/DL (ref 0.2–1)
WBC # BLD AUTO: 7.3 10E9/L (ref 4–11)
WBC #/AREA URNS AUTO: NORMAL /HPF

## 2018-08-10 PROCEDURE — 85025 COMPLETE CBC W/AUTO DIFF WBC: CPT | Performed by: FAMILY MEDICINE

## 2018-08-10 PROCEDURE — 36415 COLL VENOUS BLD VENIPUNCTURE: CPT | Performed by: FAMILY MEDICINE

## 2018-08-10 PROCEDURE — 80048 BASIC METABOLIC PNL TOTAL CA: CPT | Performed by: FAMILY MEDICINE

## 2018-08-10 PROCEDURE — 81001 URINALYSIS AUTO W/SCOPE: CPT | Performed by: FAMILY MEDICINE

## 2018-08-10 PROCEDURE — 99214 OFFICE O/P EST MOD 30 MIN: CPT | Performed by: FAMILY MEDICINE

## 2018-08-10 RX ORDER — BUPROPION HYDROCHLORIDE 150 MG/1
150 TABLET ORAL EVERY MORNING
Qty: 90 TABLET | Refills: 3 | Status: SHIPPED | OUTPATIENT
Start: 2018-08-10 | End: 2018-11-26

## 2018-08-10 ASSESSMENT — ANXIETY QUESTIONNAIRES
1. FEELING NERVOUS, ANXIOUS, OR ON EDGE: NOT AT ALL
GAD7 TOTAL SCORE: 0
3. WORRYING TOO MUCH ABOUT DIFFERENT THINGS: NOT AT ALL
6. BECOMING EASILY ANNOYED OR IRRITABLE: NOT AT ALL
5. BEING SO RESTLESS THAT IT IS HARD TO SIT STILL: NOT AT ALL
7. FEELING AFRAID AS IF SOMETHING AWFUL MIGHT HAPPEN: NOT AT ALL
IF YOU CHECKED OFF ANY PROBLEMS ON THIS QUESTIONNAIRE, HOW DIFFICULT HAVE THESE PROBLEMS MADE IT FOR YOU TO DO YOUR WORK, TAKE CARE OF THINGS AT HOME, OR GET ALONG WITH OTHER PEOPLE: NOT DIFFICULT AT ALL
2. NOT BEING ABLE TO STOP OR CONTROL WORRYING: NOT AT ALL
4. TROUBLE RELAXING: NOT AT ALL

## 2018-08-10 ASSESSMENT — PAIN SCALES - GENERAL: PAINLEVEL: SEVERE PAIN (7)

## 2018-08-10 NOTE — Clinical Note
Please fax a copy of today's note, lab work done today, and a copy of the EKG done today, to the [facility] for surgery August 21 with Dr. Friedman

## 2018-08-10 NOTE — MR AVS SNAPSHOT
After Visit Summary   8/10/2018    Ivy Abad    MRN: [id number]           Patient Information     Date Of Birth          1955        Visit Information        Provider Department      8/10/2018 1:00 PM Olivier Glass MD [facility]        Today's Diagnoses     Severe episode of recurrent major depressive disorder, without psychotic features (H)    -  1    PRADIP on CPAP        Sarcoidosis        Osteoarthritis of left glenohumeral joint           Follow-ups after your visit        Who to contact     If you have questions or need follow up information about today's clinic visit or your schedule please contact [facility] directly at [phone].  Normal or non-critical lab and imaging results will be communicated to you by Triductorhart, letter or phone within 4 business days after the clinic has received the results. If you do not hear from us within 7 days, please contact the clinic through Triductorhart or phone. If you have a critical or abnormal lab result, we will notify you by phone as soon as possible.  Submit refill requests through Shipzi or call your pharmacy and they will forward the refill request to us. Please allow 3 business days for your refill to be completed.          Additional Information About Your Visit        MyChart Information     Shipzi gives you secure access to your electronic health record. If you see a primary care provider, you can also send messages to your care team and make appointments. If you have questions, please call your primary care clinic.  If you do not have a primary care provider, please call [phone] and they will assist you.        Care EveryWhere ID     This is your Care EveryWhere ID. This could be used by other organizations to access your [facility] medical records  [id number]        Your Vitals Were     Pulse                   80            Blood Pressure from Last 3 Encounters:   08/10/18  130/90   04/10/18 148/72   04/09/18 138/70    Weight from Last 3 Encounters:   04/10/18 215 lb (97.5 kg)   12/18/17 215 lb (97.5 kg)   09/08/17 215 lb (97.5 kg)              We Performed the Following     *UA reflex to Microscopic     Basic metabolic panel     CBC with platelets differential     Urine Microscopic          Today's Medication Changes          These changes are accurate as of 8/10/18  2:16 PM.  If you have any questions, ask your nurse or doctor.               These medicines have changed or have updated prescriptions.        Dose/Directions    * buPROPion 300 MG 24 hr tablet   Commonly known as:  WELLBUTRIN XL   This may have changed:  Another medication with the same name was added. Make sure you understand how and when to take each.   Changed by:  Olivier Glass MD        Dose:  300 mg   Take 300 mg by mouth daily   Refills:  0       * buPROPion 150 MG 24 hr tablet   Commonly known as:  WELLBUTRIN XL   This may have changed:  You were already taking a medication with the same name, and this prescription was added. Make sure you understand how and when to take each.   Used for:  Severe episode of recurrent major depressive disorder, without psychotic features (H)   Changed by:  Olivier Glass MD        Dose:  150 mg   Take 1 tablet (150 mg) by mouth every morning Take with a 300 mg dose, to total 450 mg daily   Quantity:  90 tablet   Refills:  3       * Notice:  This list has 2 medication(s) that are the same as other medications prescribed for you. Read the directions carefully, and ask your doctor or other care provider to review them with you.         Where to get your medicines      These medications were sent to [id number] - [place], [place] - [address], [facility] [address]     Phone:  [phone]     buPROPion 150 MG 24 hr tablet                Primary Care Provider Office Phone # Fax #    Olivier Glass  [phone]       [address]  [place] [address]        Equal Access to Services     BRIAN VILLATORO : Hadii aad ku hadlivedia Angeles, neemamilvia hdeztristaha, dawitmarbella wingbridgermilvia mendezjanicemilvia, mikel mcneil estelayolie brayivettemaryann bell. So [facility] [phone].    ATENCIÓN: Si habla español, tiene a queen disposición servicios gratuitos de asistencia lingüística. Llame al [phone].    We comply with applicable federal civil rights laws and [place] laws. We do not discriminate on the basis of race, color, national origin, age, disability, sex, sexual orientation, or gender identity.            Thank you!     Thank you for choosing [facility] AND [facility]  for your care. Our goal is always to provide you with excellent care. Hearing back from our patients is one way we can continue to improve our services. Please take a few minutes to complete the written survey that you may receive in the mail after your visit with us. Thank you!             Your Updated Medication List - Protect others around you: Learn how to safely use, store and throw away your medicines at www.disposemymeds.org.          This list is accurate as of 8/10/18  2:16 PM.  Always use your most recent med list.                   Brand Name Dispense Instructions for use Diagnosis    acetaminophen 500 MG tablet    TYLENOL     Take 1,000 mg by mouth 3 times daily as needed        * buPROPion 300 MG 24 hr tablet    WELLBUTRIN XL     Take 300 mg by mouth daily        * buPROPion 150 MG 24 hr tablet    WELLBUTRIN XL    90 tablet    Take 1 tablet (150 mg) by mouth every morning Take with a 300 mg dose, to total 450 mg daily    Severe episode of recurrent major depressive disorder, without psychotic features (H)       cycloSPORINE 0.05 % ophthalmic emulsion    RESTASIS     Apply 1 drop to eye 2 times daily        gabapentin 600 MG tablet    NEURONTIN    270 tablet    Take 1 tablet (600 mg) by mouth 3 times daily    Sarcoidosis, Chronic  pain disorder       * lamoTRIgine 100 MG tablet    LaMICtal     Take 1 tablet by mouth every morning        * lamoTRIgine 200 MG tablet    LaMICtal     Take 200 mg by mouth daily        lidocaine 5 % Patch    LIDODERM     Place 1 patch onto the skin daily        LORazepam 0.5 MG tablet    ATIVAN     Take 0.5 mg by mouth 3 times daily as needed for irritation        oxyCODONE-acetaminophen 5-325 MG per tablet    PERCOCET     Take 1 tablet by mouth every 6 hours as needed for pain        SUMAtriptan 100 MG tablet    IMITREX     Take 1 tablet by mouth every 2 hours as needed for headaches        venlafaxine 150 MG 24 hr capsule    EFFEXOR-XR     Take 150 mg by mouth daily with food        vitamin D3 1000 units Caps      Take 1,000 Units by mouth daily        * Notice:  This list has 4 medication(s) that are the same as other medications prescribed for you. Read the directions carefully, and ask your doctor or other care provider to review them with you.

## 2018-08-10 NOTE — PROGRESS NOTES
----------------- PREOPERATIVE EXAM ------------------  8/10/2018    SUBJECTIVE:  Ivy Abad is a 63 year old female here for preoperative optimization.    I was asked to see Ivy Abad by Dr. Friedman for preoperative optimization due to multiple medical problems including sarcoidosis and sleep apnea for which he uses CPAP set at a level of 8.    Date of Surgery: August 21, 2018  Type of Surgery: Left shoulder total arthroplasty.  Surgeon: Dr. Friedman  Hospital: [facility]    HPI: I am asked to see this patient for preoperative evaluation because of a history of sleep apnea, sarcoidosis, and also a history of major depression.  The patient reports that her depression symptoms have been worse.  She has been seeing a psychologist who suggested increasing her antidepressant medication.  She is interested in pursuing this.  She is on both Wellbutrin and Effexor, along with mood stabilizers.  She has been doing reasonably well.    She is on CPAP set at a level of 8.  She sleeps reasonably well with this although her depression symptoms have interfered with her sleep somewhat.  She has not been suicidal and has not been tearful.    She has a history of sarcoid which is been in remission for a number of years.  She has no cardiopulmonary symptoms.  Overall she feels fairly well.  All her medications remain the same.    She is allergic to penicillin.  She does not smoke or drink alcohol..      Fever/Chills or other infectious symptoms in past month: No  >10lb weight loss in past two months: No    Patient Active Problem List    Diagnosis Date Noted     Tendinitis of left rotator cuff 04/10/2018     Priority: Medium     Impingement syndrome of left shoulder 04/10/2018     Priority: Medium     AC (acromioclavicular) arthritis 04/10/2018     Priority: Medium     Osteoarthritis of left glenohumeral joint 04/10/2018     Priority: Medium     Chronic pain disorder 01/26/2018     Priority: Medium      Myalgia and myositis 01/26/2018     Priority: Medium     PRADIP on CPAP 11/20/2017     Priority: Medium     Dyslipidemia 10/24/2013     Priority: Medium     Major depressive disorder, recurrent episode, severe (H) 08/10/2011     Priority: Medium     Sarcoidosis 01/01/2007     Priority: Medium       Past Medical History:   Diagnosis Date     AC (acromioclavicular) arthritis 4/10/2018     Allergic rhinitis due to pollen     No Comments Provided     Dorsalgia     10/7/2010     Elevated C-reactive protein (CRP)     No Comments Provided     Elevated erythrocyte sedimentation rate     chronically     Erythema nodosum      2009     Essential tremor     Possible benign     Hyperlipidemia     10/24/2013     Ill-defined and unknown cause of mortality (CODE)     characterized by chronic pain, positive BARBARA at a low titer of 1:160, negative HLAB27,     Impingement syndrome of left shoulder 4/10/2018     Migraine without status migrainosus, not intractable     No Comments Provided     Osteoarthritis of left glenohumeral joint 4/10/2018     Personal history of other medical treatment (CODE)     No Comments Provided     Personal history of other medical treatment (CODE)     Childbirth     Postconcussional syndrome     1/26/2016     Sarcoidosis     2007     Tendinitis of left rotator cuff 4/10/2018       Past Surgical History:   Procedure Laterality Date     ARTHROSCOPY SHOULDER      12/07,Right type 3 SLAP lesion repair of labrum - suprascapular ganglion removal with posterior approach - acromioplasty and distal clavicle resection     ARTHROSCOPY SHOULDER      2/2015     COLONOSCOPY      8/2011,Normal--10 year followup     DILATION AND CURETTAGE      11/05, and ablation     LAPAROSCOPIC CHOLECYSTECTOMY      6/23/15,Cholecystectomy,Laparoscopic     OTHER SURGICAL HISTORY      2012,[id number],SHOULDER REPLACEMENT,Right,improvement in pain control and mobility     TONSILLECTOMY       and adenoidectomy age 12       Family History   Problem  Relation Age of Onset     Adopted: Yes     Unknown/Adopted Other      Unknown,Unknown. She is ADOPTED       Social History   Substance Use Topics     Smoking status: Former Smoker     Quit date: 10/22/1991     Smokeless tobacco: Never Used     Alcohol use Yes      Comment: Alcoholic Drinks/day: rarely       Current Outpatient Prescriptions   Medication Sig Dispense Refill     buPROPion (WELLBUTRIN XL) 150 MG 24 hr tablet Take 1 tablet (150 mg) by mouth every morning Take with a 300 mg dose, to total 450 mg daily 90 tablet 3     acetaminophen (TYLENOL) 500 MG tablet Take 1,000 mg by mouth 3 times daily as needed       buPROPion (WELLBUTRIN XL) 300 MG 24 hr tablet Take 300 mg by mouth daily       Cholecalciferol (VITAMIN D3) 1000 UNITS CAPS Take 1,000 Units by mouth daily       cycloSPORINE (RESTASIS) 0.05 % ophthalmic emulsion Apply 1 drop to eye 2 times daily       gabapentin (NEURONTIN) 600 MG tablet Take 1 tablet (600 mg) by mouth 3 times daily 270 tablet 3     lamoTRIgine (LAMICTAL) 100 MG tablet Take 1 tablet by mouth every morning       lamoTRIgine (LAMICTAL) 200 MG tablet Take 200 mg by mouth daily       lidocaine (LIDODERM) 5 % Patch Place 1 patch onto the skin daily       LORazepam (ATIVAN) 0.5 MG tablet Take 0.5 mg by mouth 3 times daily as needed for irritation       oxyCODONE-acetaminophen (PERCOCET) 5-325 MG per tablet Take 1 tablet by mouth every 6 hours as needed for pain       SUMAtriptan (IMITREX) 100 MG tablet Take 1 tablet by mouth every 2 hours as needed for headaches       venlafaxine (EFFEXOR-XR) 150 MG 24 hr capsule Take 150 mg by mouth daily with food         Allergies:  Allergies   Allergen Reactions     Penicillins Rash       ROS:    Surgical:  patient denies previous complications from prior surgeries including but not limited to prolonged bleeding, anesthesia complications, dysrhythmias, surgical wound infections, or prolonged hospital stay.  Other than was noted in the HPI, comprehensive  review of systems is negative.    Denies family hx of bleeding tendencies, anesthesia complications, or other problems with surgery.    Smoking/alcohol --none     -------------------------------------------------------------    PHYSICAL EXAM:  /90 (BP Location: Right arm, Patient Position: Sitting, Cuff Size: Adult Large)  Pulse 80    EXAM:  General Appearance: Pleasant, alert, appropriate appearance for age. No acute distress  Head Exam: Normal. Normocephalic, atraumatic.  Eyes: PERRL, EOMI  Ears: Normal TM's bilaterally. Normal auditory canals and external ears.   OroPharynx: Normal buccal mucosa. Normal pharynx.  No dentures  Neck: Supple, no masses or nodes, no lymphadenopathy.  No thyromegaly.  Good carotid pulses, no bruits heard.  Lungs: Normal chest wall and respirations. Clear to auscultation, no wheezes or crackles.  Cardiovascular: Regular rate and rhythm. S1, S2, no murmurs.  Peripheral pulses are palpable  Gastrointestinal: Soft, nontender, no abnormal masses or organomegaly. BS normal   Musculoskeletal: No edema.  No muscle atrophy.  Skin: no concerning or new rashes.  There are no lesions or rashes seen on the left shoulder area where she will be having her incisions.  Neurologic Exam: CN 2-12 grossly intact.  Normal gait.  Symmetric DTRs, normal gross motor movement, tone, and coordination. No tremor.  Psychiatric Exam: Alert and oriented, appropriate affect.      EKG:  normal EKG, normal sinus rhythm, 1st degree AV block.  CBC is normal, basic metabolic panel and urinalysis are pending.  ---------------------------------------------------------------  LABS  Results for orders placed or performed in visit on 08/10/18   CBC with platelets differential   Result Value Ref Range    WBC 7.3 4.0 - 11.0 10e9/L    RBC Count 3.99 3.8 - 5.2 10e12/L    Hemoglobin 12.0 11.7 - 15.7 g/dL    Hematocrit 37.7 35.0 - 47.0 %    MCV 95 78 - 100 fl    MCH 30.1 26.5 - 33.0 pg    MCHC 31.8 31.5 - 36.5 g/dL    RDW  14.5 10.0 - 15.0 %    Platelet Count 266 150 - 450 10e9/L    Diff Method Automated Method     % Neutrophils 59.2 %    % Lymphocytes 24.1 %    % Monocytes 8.7 %    % Eosinophils 6.7 %    % Basophils 1.0 %    % Immature Granulocytes 0.3 %    Absolute Neutrophil 4.3 1.6 - 8.3 10e9/L    Absolute Lymphocytes 1.8 0.8 - 5.3 10e9/L    Absolute Monocytes 0.6 0.0 - 1.3 10e9/L    Absolute Eosinophils 0.5 0.0 - 0.7 10e9/L    Absolute Basophils 0.1 0.0 - 0.2 10e9/L    Abs Immature Granulocytes 0.0 0 - 0.4 10e9/L       ASSESSEMENT AND PLAN:  Clearance is given to proceed with surgery based on today's exam, and review of her CBC.  Other labs are pending and will be reviewed.  EKG was also reviewed and was essentially normal.    (F33.2) Severe episode of recurrent major depressive disorder, without psychotic features (H)  (primary encounter diagnosis)  Comment: Symptoms have worsened.  Plan: buPROPion (WELLBUTRIN XL) 150 MG 24 hr tablet        Wellbutrin dose will be increased to 450 mg daily.  Continue same Effexor for now.  We will follow-up again postoperatively.    (G47.33,  Z99.89) PRADIP on CPAP  Comment: She is quite stable on her CPAP with a pressure of 8.  Plan: CBC with platelets differential, Basic         metabolic panel, *UA reflex to Microscopic        CBC normal, other labs pending.    (D86.9) Sarcoidosis  Comment: This is in remission.  Chest x-ray was done last fall and was normal.  She has no cardiopulmonary symptoms.  Plan: CBC with platelets differential, Basic         metabolic panel, *UA reflex to Microscopic        Check labs, okay for surgery.    (M19.012) Osteoarthritis of left glenohumeral joint  Comment: Symptoms are quite severe  Plan: Proceed with left total shoulder arthroplasty.      PRE OP RECOMMENDATIONS:  Patient is on chronic pain medications--she does not take pain meds chronically.  She does use Percocet occasionally.  Patient is on antiplatlet/anticoagulation medication--no  Other medications that  need adjustment perioperatively--none-just the increase in Wellbutrin as noted above.        Other:  Patient was advised to call our office and the surgical services with any change in condition or new symptoms if they were to develop between today and their surgical date.  Especially any cardiopulmonary symptoms or symptoms concerning for an infection.    JV OLEA 8/10/2018

## 2018-08-10 NOTE — NURSING NOTE
This patient presents today for a Preoperative exam for this procedure: Shoulder replacement   Date of Surgery: 8/21/18   Surgeon:  Dr. Friedman  Facility:  [facility] LPN..............8/10/2018 12:49 PM

## 2018-08-11 ASSESSMENT — PATIENT HEALTH QUESTIONNAIRE - PHQ9: SUM OF ALL RESPONSES TO PHQ QUESTIONS 1-9: 20

## 2018-08-11 ASSESSMENT — ANXIETY QUESTIONNAIRES: GAD7 TOTAL SCORE: 0

## 2018-08-27 ENCOUNTER — TELEPHONE (OUTPATIENT)
Dept: FAMILY MEDICINE | Facility: OTHER | Age: 63
End: 2018-08-27

## 2018-08-27 NOTE — TELEPHONE ENCOUNTER
WLR - Patient needs a referral to see Dr. Danilo Cobb on September 5th for a follow up on her surgery.  Please call patient with any questions.  Patient is ok with waiting for WLR to return to office.    Alyssa Rene

## 2018-09-24 ENCOUNTER — TELEPHONE (OUTPATIENT)
Dept: FAMILY MEDICINE | Facility: OTHER | Age: 63
End: 2018-09-24

## 2018-09-24 DIAGNOSIS — Z47.1 AFTERCARE FOLLOWING LEFT SHOULDER JOINT REPLACEMENT SURGERY: ICD-10-CM

## 2018-09-24 DIAGNOSIS — M79.602 PAIN OF LEFT UPPER EXTREMITY: Primary | ICD-10-CM

## 2018-09-24 DIAGNOSIS — F33.2 SEVERE EPISODE OF RECURRENT MAJOR DEPRESSIVE DISORDER, WITHOUT PSYCHOTIC FEATURES (H): ICD-10-CM

## 2018-09-24 DIAGNOSIS — Z96.612 AFTERCARE FOLLOWING LEFT SHOULDER JOINT REPLACEMENT SURGERY: ICD-10-CM

## 2018-09-24 DIAGNOSIS — G89.18 POSTOPERATIVE PAIN: ICD-10-CM

## 2018-09-24 RX ORDER — LAMOTRIGINE 200 MG/1
200 TABLET ORAL DAILY
Qty: 90 TABLET | Refills: 3 | Status: SHIPPED | OUTPATIENT
Start: 2018-09-24 | End: 2018-11-26

## 2018-09-24 NOTE — TELEPHONE ENCOUNTER
Patient needs multiple referrals for her left shoulder replacement, hospital stay and follow up care of the left shoulder in order for [employer] to cover everything that already taken place.  Referrals pending.    Patient also indicates that she tried to get a refill of Lamictal 200 mg with [facility] and it has no refills.  Patient is almost out and denies any further refills at this time.  Refill pending.      Mandi Ruiz LPN 9/24/2018 10:44 AM

## 2018-09-24 NOTE — TELEPHONE ENCOUNTER
After patient's name and date of birth verified the below information was given.    Roxy Perez ---- 9/24/2018 3:28 PM

## 2018-10-08 ENCOUNTER — TELEPHONE (OUTPATIENT)
Dept: FAMILY MEDICINE | Facility: OTHER | Age: 63
End: 2018-10-08

## 2018-10-08 DIAGNOSIS — M75.42 IMPINGEMENT SYNDROME OF LEFT SHOULDER: Primary | ICD-10-CM

## 2018-10-08 NOTE — TELEPHONE ENCOUNTER
Patient needs the date of the physical therapy referral to start on 09/01/18 so her insurance will cover her appointments in September.      Nell Curry on 10/8/2018 at 2:01 PM

## 2018-11-12 ENCOUNTER — TELEPHONE (OUTPATIENT)
Dept: FAMILY MEDICINE | Facility: OTHER | Age: 63
End: 2018-11-12

## 2018-11-12 NOTE — TELEPHONE ENCOUNTER
WLR-Pt needs a referral letter to see Dr Friedman for f/u from shoulder surgery. Please call her on primary number.    thanks

## 2018-11-12 NOTE — TELEPHONE ENCOUNTER
Left message to call back....................  11/12/2018   10:42 AM  Hanna Perdue LPN...................11/12/2018  10:42 AM

## 2018-11-14 NOTE — TELEPHONE ENCOUNTER
Patient notified referral is good through 12/31/18.  Ashley Slater LPN .............11/14/2018     10:58 AM

## 2018-11-26 ENCOUNTER — HOSPITAL ENCOUNTER (OUTPATIENT)
Dept: MAMMOGRAPHY | Facility: OTHER | Age: 63
Discharge: HOME | End: 2018-11-26
Attending: FAMILY MEDICINE | Admitting: FAMILY MEDICINE
Payer: COMMERCIAL

## 2018-11-26 ENCOUNTER — OFFICE VISIT (OUTPATIENT)
Dept: FAMILY MEDICINE | Facility: OTHER | Age: 63
End: 2018-11-26
Attending: FAMILY MEDICINE
Payer: COMMERCIAL

## 2018-11-26 VITALS
BODY MASS INDEX: 39.17 KG/M2 | HEART RATE: 80 BPM | DIASTOLIC BLOOD PRESSURE: 78 MMHG | RESPIRATION RATE: 18 BRPM | WEIGHT: 230 LBS | SYSTOLIC BLOOD PRESSURE: 132 MMHG

## 2018-11-26 DIAGNOSIS — E78.5 DYSLIPIDEMIA: ICD-10-CM

## 2018-11-26 DIAGNOSIS — G47.33 OSA ON CPAP: ICD-10-CM

## 2018-11-26 DIAGNOSIS — Z12.31 VISIT FOR SCREENING MAMMOGRAM: ICD-10-CM

## 2018-11-26 DIAGNOSIS — F33.2 SEVERE EPISODE OF RECURRENT MAJOR DEPRESSIVE DISORDER, WITHOUT PSYCHOTIC FEATURES (H): Primary | ICD-10-CM

## 2018-11-26 DIAGNOSIS — Z23 FLU VACCINE NEED: ICD-10-CM

## 2018-11-26 LAB
ALBUMIN SERPL-MCNC: 4 G/DL (ref 3.5–5.7)
ALP SERPL-CCNC: 112 U/L (ref 34–104)
ALT SERPL W P-5'-P-CCNC: 15 U/L (ref 7–52)
ANION GAP SERPL CALCULATED.3IONS-SCNC: 8 MMOL/L (ref 3–14)
AST SERPL W P-5'-P-CCNC: 16 U/L (ref 13–39)
BILIRUB SERPL-MCNC: 0.3 MG/DL (ref 0.3–1)
BUN SERPL-MCNC: 17 MG/DL (ref 7–25)
CALCIUM SERPL-MCNC: 9.3 MG/DL (ref 8.6–10.3)
CHLORIDE SERPL-SCNC: 105 MMOL/L (ref 98–107)
CHOLEST SERPL-MCNC: 217 MG/DL
CO2 SERPL-SCNC: 24 MMOL/L (ref 21–31)
CREAT SERPL-MCNC: 1.37 MG/DL (ref 0.6–1.2)
GFR SERPL CREATININE-BSD FRML MDRD: 39 ML/MIN/1.7M2
GLUCOSE SERPL-MCNC: 119 MG/DL (ref 70–105)
HDLC SERPL-MCNC: 46 MG/DL (ref 23–92)
LDLC SERPL CALC-MCNC: 136 MG/DL
NONHDLC SERPL-MCNC: 171 MG/DL
POTASSIUM SERPL-SCNC: 4.7 MMOL/L (ref 3.5–5.1)
PROT SERPL-MCNC: 7.3 G/DL (ref 6.4–8.9)
SODIUM SERPL-SCNC: 137 MMOL/L (ref 134–144)
TRIGL SERPL-MCNC: 175 MG/DL

## 2018-11-26 PROCEDURE — 90686 IIV4 VACC NO PRSV 0.5 ML IM: CPT | Performed by: FAMILY MEDICINE

## 2018-11-26 PROCEDURE — 36415 COLL VENOUS BLD VENIPUNCTURE: CPT | Performed by: FAMILY MEDICINE

## 2018-11-26 PROCEDURE — 80053 COMPREHEN METABOLIC PANEL: CPT | Performed by: FAMILY MEDICINE

## 2018-11-26 PROCEDURE — 80061 LIPID PANEL: CPT | Performed by: FAMILY MEDICINE

## 2018-11-26 PROCEDURE — 90471 IMMUNIZATION ADMIN: CPT | Performed by: FAMILY MEDICINE

## 2018-11-26 PROCEDURE — 77067 SCR MAMMO BI INCL CAD: CPT

## 2018-11-26 PROCEDURE — 99213 OFFICE O/P EST LOW 20 MIN: CPT | Mod: 25 | Performed by: FAMILY MEDICINE

## 2018-11-26 RX ORDER — BUPROPION HYDROCHLORIDE 300 MG/1
300 TABLET ORAL DAILY
Qty: 90 TABLET | Refills: 3 | Status: SHIPPED | OUTPATIENT
Start: 2018-11-26 | End: 2019-11-04

## 2018-11-26 RX ORDER — BUPROPION HYDROCHLORIDE 150 MG/1
150 TABLET ORAL EVERY MORNING
Qty: 90 TABLET | Refills: 3 | Status: SHIPPED | OUTPATIENT
Start: 2018-11-26 | End: 2020-01-07

## 2018-11-26 RX ORDER — LAMOTRIGINE 200 MG/1
200 TABLET ORAL EVERY EVENING
Qty: 90 TABLET | Refills: 3 | Status: SHIPPED | OUTPATIENT
Start: 2018-11-26 | End: 2019-10-29

## 2018-11-26 RX ORDER — LAMOTRIGINE 100 MG/1
100 TABLET ORAL EVERY MORNING
Qty: 90 TABLET | Refills: 3 | Status: SHIPPED | OUTPATIENT
Start: 2018-11-26 | End: 2019-10-29

## 2018-11-26 RX ORDER — VENLAFAXINE HYDROCHLORIDE 150 MG/1
150 CAPSULE, EXTENDED RELEASE ORAL
Qty: 90 CAPSULE | Refills: 3 | Status: SHIPPED | OUTPATIENT
Start: 2018-11-26 | End: 2020-01-07

## 2018-11-26 ASSESSMENT — PAIN SCALES - GENERAL: PAINLEVEL: EXTREME PAIN (8)

## 2018-11-26 NOTE — PROGRESS NOTES
SUBJECTIVE:  63 year old female who presents for medication refills.  She has had some difficulty getting her refills because she is on 2 different strengths of bupropion and Lamictal.  The Lamictal is taken twice daily, 100 mg in the morning and 200 mg at night.  These prescriptions were refilled.    The Wellbutrin is 450 mg of extended release daily, taking 300 mg and 150 mg together.  These were also refilled.  She also needs refills of her Effexor which is 150 mg daily.    She states she is doing very well from a depression standpoint.  Her family has agreed.  She will continue on these medications the same.    She had a mammogram done this morning.  She is due for labs to follow-up on her lipids.  She also requested CPAP supplies.  She is doing very well with the CPAP and says that it helps considerably, keeps her blood pressure down, and she has no daytime somnolence.    Her left shoulder is still quite uncomfortable and she is under undergoing physical therapy which is been beneficial.    Additional Review of Systems: See HPI: No new symptoms otherwise    Past Medical History:   Diagnosis Date     AC (acromioclavicular) arthritis 4/10/2018     Allergic rhinitis due to pollen     No Comments Provided     Dorsalgia     10/7/2010     Elevated C-reactive protein (CRP)     No Comments Provided     Elevated erythrocyte sedimentation rate     chronically     Erythema nodosum      2009     Essential tremor     Possible benign     Hyperlipidemia     10/24/2013     Ill-defined and unknown cause of mortality (CODE)     characterized by chronic pain, positive BARBARA at a low titer of 1:160, negative HLAB27,     Impingement syndrome of left shoulder 4/10/2018     Migraine without status migrainosus, not intractable     No Comments Provided     Osteoarthritis of left glenohumeral joint 4/10/2018     Personal history of other medical treatment (CODE)     No Comments Provided     Personal history of other medical treatment  (CODE)     Childbirth     Postconcussional syndrome     1/26/2016     Sarcoidosis     2007     Tendinitis of left rotator cuff 4/10/2018        Current Outpatient Prescriptions   Medication Sig Dispense Refill     acetaminophen (TYLENOL) 500 MG tablet Take 1,000 mg by mouth 3 times daily as needed       buPROPion (WELLBUTRIN XL) 150 MG 24 hr tablet Take 1 tablet (150 mg) by mouth every morning Take with a 300 mg dose, to total 450 mg daily 90 tablet 3     buPROPion (WELLBUTRIN XL) 300 MG 24 hr tablet Take 1 tablet (300 mg) by mouth daily 90 tablet 3     Cholecalciferol (VITAMIN D3) 1000 UNITS CAPS Take 1,000 Units by mouth daily       cycloSPORINE (RESTASIS) 0.05 % ophthalmic emulsion Apply 1 drop to eye 2 times daily       gabapentin (NEURONTIN) 600 MG tablet Take 1 tablet (600 mg) by mouth 3 times daily 270 tablet 3     lamoTRIgine (LAMICTAL) 100 MG tablet Take 1 tablet (100 mg) by mouth every morning 90 tablet 3     lamoTRIgine (LAMICTAL) 200 MG tablet Take 1 tablet (200 mg) by mouth every evening 90 tablet 3     lidocaine (LIDODERM) 5 % Patch Place 1 patch onto the skin daily       LORazepam (ATIVAN) 0.5 MG tablet Take 0.5 mg by mouth 3 times daily as needed for irritation       order for DME Equipment being ordered: CPAP supplies 1 each 0     oxyCODONE-acetaminophen (PERCOCET) 5-325 MG per tablet Take 1 tablet by mouth every 6 hours as needed for pain       SUMAtriptan (IMITREX) 100 MG tablet Take 1 tablet by mouth every 2 hours as needed for headaches       venlafaxine (EFFEXOR-XR) 150 MG 24 hr capsule Take 1 capsule (150 mg) by mouth daily with food 90 capsule 3     [DISCONTINUED] buPROPion (WELLBUTRIN XL) 150 MG 24 hr tablet Take 1 tablet (150 mg) by mouth every morning Take with a 300 mg dose, to total 450 mg daily 90 tablet 3     [DISCONTINUED] buPROPion (WELLBUTRIN XL) 300 MG 24 hr tablet Take 300 mg by mouth daily       [DISCONTINUED] lamoTRIgine (LAMICTAL) 100 MG tablet Take 1 tablet by mouth every  morning       [DISCONTINUED] lamoTRIgine (LAMICTAL) 200 MG tablet Take 1 tablet (200 mg) by mouth daily 90 tablet 3     [DISCONTINUED] venlafaxine (EFFEXOR-XR) 150 MG 24 hr capsule Take 150 mg by mouth daily with food         Allergies as of 11/26/2018 - Jasvir as Reviewed 11/26/2018   Allergen Reaction Noted     Penicillins Rash 09/05/2012        OBJECTIVE:  /78 (BP Location: Right arm, Patient Position: Sitting, Cuff Size: Adult Large)  Pulse 80  Resp 18  Wt 230 lb (104.3 kg)  LMP  (LMP Unknown)  Breastfeeding? No  BMI 39.17 kg/m2  EXAM: {EXAM -  General: She is pleasant and healthy-appearing and in no apparent distress  Lungs are clear.  Cardiac exam is normal.  Repeat blood pressure by me was the same.  Psychiatric exam reveals her to be alert and oriented with normal mood and affect.    Labs/imaging: Lipid panel and comprehensive metabolic panel will be done today      ASSESSMENT/PLAN:  History of major depression-stable on current meds.  Refills were given to last for a year of the Lamictal, bupropion, and Effexor.    Preventive healthcare-mammogram was done this morning and she will be notified of the results.  Flu vaccine was recommended and given.    Sleep apnea-prescription for supplies sent to [facility] in [place].    Dyslipidemia-labs pending, she will be notified of the results.  JV OLEA MD on 11/26/2018 at 12:21 PM

## 2018-11-26 NOTE — NURSING NOTE
"Ivy presents to the clinic today for a medication check with refills and would like to discuss a new provider. Denies flu shot today.          Milton Coe LPN 11/26/18 11:33 AM         Chief Complaint   Patient presents with     Recheck Medication     Medication check and refills       Initial /78 (BP Location: Right arm, Patient Position: Sitting, Cuff Size: Adult Large)  Pulse 80  Resp 18  Wt 230 lb (104.3 kg)  LMP  (LMP Unknown)  Breastfeeding? No  BMI 39.17 kg/m2 Estimated body mass index is 39.17 kg/(m^2) as calculated from the following:    Height as of 4/10/18: 5' 4.25\" (1.632 m).    Weight as of this encounter: 230 lb (104.3 kg).  Medication Reconciliation: complete    Milton Coe LPN        "

## 2018-11-26 NOTE — LETTER
November 26, 2018      Ivy Darling  [address] [id number]        Dear Ivy,    Your lab work is enclosed.  Your cholesterol is quite markedly improved from last year, down from 269.  You can continue to just watch your diet and have this checked again in a year.    Your blood profile is normal or stable.  The minimal elevation of the glucose or blood sugar is because she had breakfast this morning.  Your creatinine is stable, the same as it was last year.    Overall this lab work looks just fine and should just be repeated again in 1 year.    Resulted Orders   Lipid Profile   Result Value Ref Range    Cholesterol 217 (H) <200 mg/dL    Triglycerides 175 (H) <150 mg/dL      Comment:      Borderline high:  150-199 mg/dl  High:             200-499 mg/dl  Very high:       >499 mg/dl      HDL Cholesterol 46 23 - 92 mg/dL    LDL Cholesterol Calculated 136 (H) <100 mg/dL      Comment:      Above desirable:  100-129 mg/dl  Borderline High:  130-159 mg/dL  High:             160-189 mg/dL  Very high:       >189 mg/dl      Non HDL Cholesterol 171 (H) <130 mg/dL      Comment:      Above Desirable:  130-159 mg/dl  Borderline high:  160-189 mg/dl  High:             190-219 mg/dl  Very high:       >219 mg/dl     Comprehensive metabolic panel   Result Value Ref Range    Sodium 137 134 - 144 mmol/L    Potassium 4.7 3.5 - 5.1 mmol/L    Chloride 105 98 - 107 mmol/L    Carbon Dioxide 24 21 - 31 mmol/L    Anion Gap 8 3 - 14 mmol/L    Glucose 119 (H) 70 - 105 mg/dL    Urea Nitrogen 17 7 - 25 mg/dL    Creatinine 1.37 (H) 0.60 - 1.20 mg/dL    GFR Estimate 39 (L) >60 mL/min/1.7m2    GFR Estimate If Black 47 (L) >60 mL/min/1.7m2    Calcium 9.3 8.6 - 10.3 mg/dL    Bilirubin Total 0.3 0.3 - 1.0 mg/dL    Albumin 4.0 3.5 - 5.7 g/dL    Protein Total 7.3 6.4 - 8.9 g/dL    Alkaline Phosphatase 112 (H) 34 - 104 U/L    ALT 15 7 - 52 U/L    AST 16 13 - 39 U/L       If you have any questions or concerns, please call  the clinic at the number listed above.       Sincerely,        JV OLEA MD

## 2018-11-26 NOTE — MR AVS SNAPSHOT
After Visit Summary   11/26/2018    Ivy Darling    MRN: [id number]           Patient Information     Date Of Birth          1955        Visit Information        Provider Department      11/26/2018 11:30 AM Olivier Glass MD [facility]        Today's Diagnoses     Severe episode of recurrent major depressive disorder, without psychotic features (H)    -  1    Dyslipidemia        PRADIP on CPAP        Flu vaccine need           Follow-ups after your visit        Who to contact     If you have questions or need follow up information about today's clinic visit or your schedule please contact [facility] directly at [phone].  Normal or non-critical lab and imaging results will be communicated to you by [address] [employer], letter or phone within 4 business days after the clinic has received the results. If you do not hear from us within 7 days, please contact the clinic through [employer] or phone. If you have a critical or abnormal lab result, we will notify you by phone as soon as possible.  Submit refill requests through [employer] or call your pharmacy and they will forward the refill request to us. Please allow 3 business days for your refill to be completed.          Additional Information About Your Visit        MyChart Information     [employer] gives you secure access to your electronic health record. If you see a primary care provider, you can also send messages to your care team and make appointments. If you have questions, please call your primary care clinic.  If you do not have a primary care provider, please call [phone] and they will assist you.        Care EveryWhere ID     This is your Care EveryWhere ID. This could be used by other organizations to access your [place] medical records  [id number]        Your Vitals Were     Pulse Respirations Last Period Breastfeeding? BMI (Body Mass Index)       80 18 (LMP Unknown) No 39.17 kg/m2         Blood Pressure from Last 3 Encounters:   11/26/18 132/78   08/10/18 130/90   04/10/18 148/72    Weight from Last 3 Encounters:   11/26/18 230 lb (104.3 kg)   04/10/18 215 lb (97.5 kg)   12/18/17 215 lb (97.5 kg)              We Performed the Following     Comprehensive metabolic panel     GH IMM-  HC FLU VAC PRESRV FREE QUAD SPLIT VIR 3+YRS IM     Lipid Profile          Today's Medication Changes          These changes are accurate as of 11/26/18 12:21 PM.  If you have any questions, ask your nurse or doctor.               Start taking these medicines.        Dose/Directions    order for DME   Used for:  PRADIP on CPAP   Started by:  Olivier Glass MD        Equipment being ordered: CPAP supplies   Quantity:  1 each   Refills:  0         These medicines have changed or have updated prescriptions.        Dose/Directions    * lamoTRIgine 100 MG tablet   Commonly known as:  LaMICtal   This may have changed:  Another medication with the same name was changed. Make sure you understand how and when to take each.   Used for:  Severe episode of recurrent major depressive disorder, without psychotic features (H)   Changed by:  Olivier Glass MD        Dose:  100 mg   Take 1 tablet (100 mg) by mouth every morning   Quantity:  90 tablet   Refills:  3       * lamoTRIgine 200 MG tablet   Commonly known as:  LaMICtal   This may have changed:  when to take this   Used for:  Severe episode of recurrent major depressive disorder, without psychotic features (H)   Changed by:  Olivier Glass MD        Dose:  200 mg   Take 1 tablet (200 mg) by mouth every evening   Quantity:  90 tablet   Refills:  3       * Notice:  This list has 2 medication(s) that are the same as other medications prescribed for you. Read the directions carefully, and ask your doctor or other care provider to review them with you.         Where to get your medicines      These medications were sent to [facility] [id number] [facility]  [place], [place] - [address]  [address], [place] [address]     Phone:  [phone]     buPROPion 150 MG 24 hr tablet    buPROPion 300 MG 24 hr tablet    lamoTRIgine 100 MG tablet    lamoTRIgine 200 MG tablet    venlafaxine 150 MG 24 hr capsule         Some of these will need a paper prescription and others can be bought over the counter.  Ask your nurse if you have questions.     Bring a paper prescription for each of these medications     order for DME                Primary Care Provider Office Phone # Fax #    Yodit Starks [phone]       [address]  [place] [address]        Equal Access to Services     BRIAN VILLATORO : Alicia Angeles, waamelie panchal, qaybta kaalmada lenny, mikel acosta . So [facility] [phone].    ATENCIÓN: Si habla español, tiene a queen disposición servicios gratuitos de asistencia lingüística. Ll[facility] [phone].    We comply with applicable federal civil rights laws and [place] laws. We do not discriminate on the basis of race, color, national origin, age, disability, sex, sexual orientation, or gender identity.            Thank you!     Thank you for choosing [facility]  for your care. Our goal is always to provide you with excellent care. Hearing back from our patients is one way we can continue to improve our services. Please take a few minutes to complete the written survey that you may receive in the mail after your visit with us. Thank you!             Your Updated Medication List - Protect others around you: Learn how to safely use, store and throw away your medicines at www.disposemymeds.org.          This list is accurate as of 11/26/18 12:21 PM.  Always use your most recent med list.                   Brand Name Dispense Instructions for use Diagnosis    acetaminophen 500 MG tablet    TYLENOL     Take 1,000 mg by mouth 3 times  daily as needed        * buPROPion 300 MG 24 hr tablet    WELLBUTRIN XL    90 tablet    Take 1 tablet (300 mg) by mouth daily    Severe episode of recurrent major depressive disorder, without psychotic features (H)       * buPROPion 150 MG 24 hr tablet    WELLBUTRIN XL    90 tablet    Take 1 tablet (150 mg) by mouth every morning Take with a 300 mg dose, to total 450 mg daily    Severe episode of recurrent major depressive disorder, without psychotic features (H)       cycloSPORINE 0.05 % ophthalmic emulsion    RESTASIS     Apply 1 drop to eye 2 times daily        gabapentin 600 MG tablet    NEURONTIN    270 tablet    Take 1 tablet (600 mg) by mouth 3 times daily    Sarcoidosis, Chronic pain disorder       * lamoTRIgine 100 MG tablet    LaMICtal    90 tablet    Take 1 tablet (100 mg) by mouth every morning    Severe episode of recurrent major depressive disorder, without psychotic features (H)       * lamoTRIgine 200 MG tablet    LaMICtal    90 tablet    Take 1 tablet (200 mg) by mouth every evening    Severe episode of recurrent major depressive disorder, without psychotic features (H)       lidocaine 5 % Patch    LIDODERM     Place 1 patch onto the skin daily        LORazepam 0.5 MG tablet    ATIVAN     Take 0.5 mg by mouth 3 times daily as needed for irritation        order for DME     1 each    Equipment being ordered: CPAP supplies    PRADIP on CPAP       oxyCODONE-acetaminophen 5-325 MG tablet    PERCOCET     Take 1 tablet by mouth every 6 hours as needed for pain        SUMAtriptan 100 MG tablet    IMITREX     Take 1 tablet by mouth every 2 hours as needed for headaches        venlafaxine 150 MG 24 hr capsule    EFFEXOR-XR    90 capsule    Take 1 capsule (150 mg) by mouth daily with food    Severe episode of recurrent major depressive disorder, without psychotic features (H)       vitamin D3 1000 units Caps      Take 1,000 Units by mouth daily        * Notice:  This list has 4 medication(s) that are the same as  other medications prescribed for you. Read the directions carefully, and ask your doctor or other care provider to review them with you.

## 2018-12-06 ENCOUNTER — TELEPHONE (OUTPATIENT)
Dept: FAMILY MEDICINE | Facility: OTHER | Age: 63
End: 2018-12-06

## 2018-12-06 NOTE — TELEPHONE ENCOUNTER
Patient was [facility] patient, only saw [facility] once in 2015.   Ashley Slater LPN .............12/6/2018     2:24 PM    Patient is wanting a referral for shoulder, has been seeing Dr Friedman.   Ashley Slater LPN .............12/6/2018     2:29 PM

## 2018-12-06 NOTE — TELEPHONE ENCOUNTER
TYP-Pt has been seeing Dr. Friedman for shoulder issue and needs another referral to cover visits. Does she need an appt or can a referral be sent. Please call. Thank you.  Jasmin Bartlett

## 2018-12-12 ENCOUNTER — TELEPHONE (OUTPATIENT)
Dept: FAMILY MEDICINE | Facility: OTHER | Age: 63
End: 2018-12-12

## 2018-12-12 DIAGNOSIS — M75.42 IMPINGEMENT SYNDROME OF LEFT SHOULDER: ICD-10-CM

## 2018-12-12 DIAGNOSIS — M75.82 TENDINITIS OF LEFT ROTATOR CUFF: Primary | ICD-10-CM

## 2018-12-12 DIAGNOSIS — M19.019 ACROMIOCLAVICULAR JOINT ARTHRITIS, UNSPECIFIED LATERALITY: ICD-10-CM

## 2018-12-12 DIAGNOSIS — M19.012 OSTEOARTHRITIS OF LEFT GLENOHUMERAL JOINT: ICD-10-CM

## 2018-12-12 NOTE — TELEPHONE ENCOUNTER
The patient called to establish care with Dr Villalta which is scheduled for 1/4/19. She is in need of more PT appointments before then so asked to have Dr Glass could give an order for more.

## 2019-01-04 ENCOUNTER — OFFICE VISIT (OUTPATIENT)
Dept: FAMILY MEDICINE | Facility: OTHER | Age: 64
End: 2019-01-04
Attending: FAMILY MEDICINE
Payer: COMMERCIAL

## 2019-01-04 VITALS
TEMPERATURE: 98.3 F | DIASTOLIC BLOOD PRESSURE: 68 MMHG | SYSTOLIC BLOOD PRESSURE: 114 MMHG | RESPIRATION RATE: 20 BRPM | HEART RATE: 96 BPM

## 2019-01-04 DIAGNOSIS — F33.2 SEVERE EPISODE OF RECURRENT MAJOR DEPRESSIVE DISORDER, WITHOUT PSYCHOTIC FEATURES (H): ICD-10-CM

## 2019-01-04 DIAGNOSIS — G89.4 CHRONIC PAIN DISORDER: Primary | ICD-10-CM

## 2019-01-04 PROCEDURE — 99213 OFFICE O/P EST LOW 20 MIN: CPT | Performed by: FAMILY MEDICINE

## 2019-01-04 ASSESSMENT — ANXIETY QUESTIONNAIRES
7. FEELING AFRAID AS IF SOMETHING AWFUL MIGHT HAPPEN: SEVERAL DAYS
GAD7 TOTAL SCORE: 12
1. FEELING NERVOUS, ANXIOUS, OR ON EDGE: SEVERAL DAYS
IF YOU CHECKED OFF ANY PROBLEMS ON THIS QUESTIONNAIRE, HOW DIFFICULT HAVE THESE PROBLEMS MADE IT FOR YOU TO DO YOUR WORK, TAKE CARE OF THINGS AT HOME, OR GET ALONG WITH OTHER PEOPLE: SOMEWHAT DIFFICULT
6. BECOMING EASILY ANNOYED OR IRRITABLE: MORE THAN HALF THE DAYS
2. NOT BEING ABLE TO STOP OR CONTROL WORRYING: NEARLY EVERY DAY
5. BEING SO RESTLESS THAT IT IS HARD TO SIT STILL: NOT AT ALL
3. WORRYING TOO MUCH ABOUT DIFFERENT THINGS: MORE THAN HALF THE DAYS

## 2019-01-04 ASSESSMENT — PATIENT HEALTH QUESTIONNAIRE - PHQ9
5. POOR APPETITE OR OVEREATING: NEARLY EVERY DAY
SUM OF ALL RESPONSES TO PHQ QUESTIONS 1-9: 18

## 2019-01-04 ASSESSMENT — PAIN SCALES - GENERAL: PAINLEVEL: SEVERE PAIN (7)

## 2019-01-04 NOTE — NURSING NOTE
Patient is here to establish care, patient had previously been doctoring with Olivier Glass MD.  Ashley Slater LPN .............1/4/2019     1:04 PM      No LMP recorded. Patient has had an ablation.  Medication Reconciliation: complete    Ashley Slater LPN  1/4/2019 1:10 PM

## 2019-01-05 ASSESSMENT — ANXIETY QUESTIONNAIRES: GAD7 TOTAL SCORE: 12

## 2019-01-08 NOTE — PROGRESS NOTES
SUBJECTIVE:   Ivy Darling is a 63 year old female who presents to clinic today for the following health issues:  Nursing Notes:   Ashley Slater LPN  1/4/2019  1:21 PM  Signed  Patient is here to establish care, patient had previously been doctoring with Olivier Glass MD.  Ashley Slater LPN .............1/4/2019     1:04 PM      No LMP recorded. Patient has had an ablation.  Medication Reconciliation: complete    Ashley Slater LPN  1/4/2019 1:10 PM      HPI    63-year-old female presents to establish care.  She has no specific concerns or questions.  Had been seeing Dr. Glass for over 25 years.  Appears her chronic medical problems are stable.    Patient Active Problem List    Diagnosis Date Noted     Tendinitis of left rotator cuff 04/10/2018     Priority: Medium     Impingement syndrome of left shoulder 04/10/2018     Priority: Medium     AC (acromioclavicular) arthritis 04/10/2018     Priority: Medium     Osteoarthritis of left glenohumeral joint 04/10/2018     Priority: Medium     Chronic pain disorder 01/26/2018     Priority: Medium     Myalgia and myositis 01/26/2018     Priority: Medium     PRADIP on CPAP 11/20/2017     Priority: Medium     Dyslipidemia 10/24/2013     Priority: Medium     Major depressive disorder, recurrent episode, severe (H) 08/10/2011     Priority: Medium     Sarcoidosis 01/01/2007     Priority: Medium     Past Medical History:   Diagnosis Date     AC (acromioclavicular) arthritis 4/10/2018     Allergic rhinitis due to pollen     No Comments Provided     Dorsalgia     10/7/2010     Erythema nodosum      2009     Essential tremor     Possible benign     Hyperlipidemia     10/24/2013     Ill-defined and unknown cause of mortality (CODE)     characterized by chronic pain, positive BARBARA at a low titer of 1:160, negative HLAB27,     Migraine without status migrainosus, not intractable     No Comments Provided     Osteoarthritis of left glenohumeral joint  4/10/2018     Postconcussional syndrome     1/26/2016     Sarcoidosis     2007     Tendinitis of left rotator cuff 4/10/2018        Review of Systems   See hpi  OBJECTIVE:     /68 (BP Location: Right arm, Patient Position: Sitting, Cuff Size: Adult Large)   Pulse 96   Temp 98.3  F (36.8  C) (Tympanic)   Resp 20   Breastfeeding? No   There is no height or weight on file to calculate BMI.  Physical Exam   Constitutional: She appears well-developed.   Psychiatric: She has a normal mood and affect. Her behavior is normal.       Diagnostic Test Results:  none     ASSESSMENT/PLAN:         1. Chronic pain disorder    2. Severe episode of recurrent major depressive disorder, without psychotic features (H)      Chronic medical problems are stable.  She is not due for any refills.  Encouraged her to increase her exercise and attempt to lose weight.  She will follow-up in the spring when she is due for her annual physical.  I spent over 15 minutes with the patient, greater than 50% spent in counseling and coordination of care.        Yodit Villalta MD  [facility]

## 2019-01-09 DIAGNOSIS — F33.2 SEVERE EPISODE OF RECURRENT MAJOR DEPRESSIVE DISORDER, WITHOUT PSYCHOTIC FEATURES (H): ICD-10-CM

## 2019-01-10 RX ORDER — VENLAFAXINE HYDROCHLORIDE 150 MG/1
CAPSULE, EXTENDED RELEASE ORAL
Qty: 90 CAPSULE | Refills: 0 | OUTPATIENT
Start: 2019-01-10

## 2019-01-17 ENCOUNTER — TELEPHONE (OUTPATIENT)
Dept: FAMILY MEDICINE | Facility: OTHER | Age: 64
End: 2019-01-17

## 2019-01-17 DIAGNOSIS — M75.82 TENDINITIS OF LEFT ROTATOR CUFF: ICD-10-CM

## 2019-01-17 DIAGNOSIS — M75.42 IMPINGEMENT SYNDROME OF LEFT SHOULDER: Primary | ICD-10-CM

## 2019-01-17 NOTE — TELEPHONE ENCOUNTER
Patient needs another referral for insurance purposes again to see Dr. Solis @ [facility], regarding the ongoing shoulder issues. She said to call her if you need more info or have questions. Thanks

## 2019-01-18 ENCOUNTER — DOCUMENTATION ONLY (OUTPATIENT)
Dept: OTHER | Facility: CLINIC | Age: 64
End: 2019-01-18

## 2019-01-22 ENCOUNTER — TELEPHONE (OUTPATIENT)
Dept: FAMILY MEDICINE | Facility: OTHER | Age: 64
End: 2019-01-22

## 2019-01-22 DIAGNOSIS — M75.42 IMPINGEMENT SYNDROME OF LEFT SHOULDER: ICD-10-CM

## 2019-01-22 DIAGNOSIS — M75.82 TENDINITIS OF LEFT ROTATOR CUFF: Primary | ICD-10-CM

## 2019-01-22 NOTE — TELEPHONE ENCOUNTER
TYP- pt called in looking for another referral for more pt and to see Dr Friedman. Please call her on primary number.    Thanks

## 2019-01-24 ENCOUNTER — TELEPHONE (OUTPATIENT)
Dept: FAMILY MEDICINE | Facility: OTHER | Age: 64
End: 2019-01-24

## 2019-01-24 NOTE — TELEPHONE ENCOUNTER
Referral for P.T for left shoulder issues is pended.  Ashley Slater LPN .............1/24/2019     12:00 PM

## 2019-02-15 ENCOUNTER — HEALTH MAINTENANCE LETTER (OUTPATIENT)
Age: 64
End: 2019-02-15

## 2019-04-15 DIAGNOSIS — D86.9 SARCOIDOSIS: ICD-10-CM

## 2019-04-15 DIAGNOSIS — G89.4 CHRONIC PAIN DISORDER: ICD-10-CM

## 2019-04-16 RX ORDER — GABAPENTIN 600 MG/1
TABLET ORAL
Qty: 270 TABLET | Refills: 3 | Status: SHIPPED | OUTPATIENT
Start: 2019-04-16 | End: 2020-03-17

## 2019-04-16 NOTE — TELEPHONE ENCOUNTER
Chart review shows that Rx as requested was last filled as follows and is due for a refill:    Outpatient Medication Detail      Disp Refills Start End CADE   gabapentin (NEURONTIN) 600 MG tablet 270 tablet 3 3/29/2018  No   Sig - Route: Take 1 tablet (600 mg) by mouth 3 times daily - Oral   Sent to pharmacy as: gabapentin (NEURONTIN) 600 MG tablet   Class: E-Prescribe   Order: [id number]   E-Prescribing Status: Receipt confirmed by pharmacy (3/29/2018  8:52 AM CDT)   Printout Tracking     External Result Report   Pharmacy     [id number] - [place], [place] - [address]     LOV with PCP was on 1/4/19. No changes noted to Rx as requested as per office visit notes on that date. Writer will soco up and route Rx request to PCP for her consideration/approval.    Unable to complete prescription refill per RN Medication Refill Policy. Garrick Byers 4/16/2019 1:52 PM

## 2019-05-30 ENCOUNTER — TELEPHONE (OUTPATIENT)
Dept: FAMILY MEDICINE | Facility: OTHER | Age: 64
End: 2019-05-30

## 2019-05-30 NOTE — TELEPHONE ENCOUNTER
ELLE-Spoke with pt who has an appointment with Dr. Mahmood's office tomorrow, may31 @ 3pm. Says she needs a referral. Aware ELLE not here today. Please call. Thank you.  Jasmin Bartlett

## 2019-05-31 NOTE — TELEPHONE ENCOUNTER
Number left to return call has been changed, disconnected, or no longer in service.  Ashley Slater LPN .............5/31/2019     8:28 AM      Left message to call back on home number.   Gadiel LARSON ....................  5/31/2019   8:29 AM

## 2019-06-11 NOTE — TELEPHONE ENCOUNTER
Patient states can disregard referral request.   Ashley Slater LPN .............6/11/2019     11:27 AM

## 2019-06-18 ENCOUNTER — OFFICE VISIT (OUTPATIENT)
Dept: FAMILY MEDICINE | Facility: OTHER | Age: 64
End: 2019-06-18
Attending: FAMILY MEDICINE
Payer: COMMERCIAL

## 2019-06-18 VITALS
SYSTOLIC BLOOD PRESSURE: 112 MMHG | DIASTOLIC BLOOD PRESSURE: 74 MMHG | HEART RATE: 79 BPM | RESPIRATION RATE: 18 BRPM | OXYGEN SATURATION: 98 % | TEMPERATURE: 98.4 F

## 2019-06-18 DIAGNOSIS — G47.33 OSA ON CPAP: Primary | ICD-10-CM

## 2019-06-18 DIAGNOSIS — Z96.612 HISTORY OF LEFT SHOULDER REPLACEMENT: ICD-10-CM

## 2019-06-18 PROCEDURE — 99213 OFFICE O/P EST LOW 20 MIN: CPT | Performed by: FAMILY MEDICINE

## 2019-06-18 ASSESSMENT — PAIN SCALES - GENERAL: PAINLEVEL: SEVERE PAIN (7)

## 2019-06-18 ASSESSMENT — PATIENT HEALTH QUESTIONNAIRE - PHQ9: SUM OF ALL RESPONSES TO PHQ QUESTIONS 1-9: 24

## 2019-06-18 NOTE — NURSING NOTE
Patient is here for sleep apnea and renewal of cpap supplies.  Ashley Slater LPN .............6/18/2019     3:49 PM      No LMP recorded. Patient has had an ablation.  Medication Reconciliation: complete    Ashley Slater LPN  6/18/2019 3:55 PM

## 2019-06-18 NOTE — PROGRESS NOTES
SUBJECTIVE:   Ivy Darling is a 64 year old female who presents to clinic today for the following health issues:  Nursing Notes:   Ashley Slater LPN  6/18/2019  4:07 PM  Signed  Patient is here for sleep apnea and renewal of cpap supplies.  Ashley Slater LPN .............6/18/2019     3:49 PM      No LMP recorded. Patient has had an ablation.  Medication Reconciliation: complete    Ashley Slater LPN  6/18/2019 3:55 PM      HPI  65 yo female presents for renewal of CPAP supplies. Using nasal CPAP.  Continues to struggle with left shoulder plane.  She would like to continue physical therapy and needs a new referral.  Range of motion has not returned to normal after her shoulder replacement.    Patient Active Problem List    Diagnosis Date Noted     Tendinitis of left rotator cuff 04/10/2018     Priority: Medium     Impingement syndrome of left shoulder 04/10/2018     Priority: Medium     AC (acromioclavicular) arthritis 04/10/2018     Priority: Medium     Osteoarthritis of left glenohumeral joint 04/10/2018     Priority: Medium     Chronic pain disorder 01/26/2018     Priority: Medium     Myalgia and myositis 01/26/2018     Priority: Medium     PRADIP on CPAP 11/20/2017     Priority: Medium     Dyslipidemia 10/24/2013     Priority: Medium     Major depressive disorder, recurrent episode, severe (H) 08/10/2011     Priority: Medium     Sarcoidosis 01/01/2007     Priority: Medium     Past Medical History:   Diagnosis Date     AC (acromioclavicular) arthritis 4/10/2018     Allergic rhinitis due to pollen     No Comments Provided     Dorsalgia     10/7/2010     Erythema nodosum      2009     Essential tremor     Possible benign     Hyperlipidemia     10/24/2013     Ill-defined and unknown cause of mortality (CODE)     characterized by chronic pain, positive BARBARA at a low titer of 1:160, negative HLAB27,     Migraine without status migrainosus, not intractable     No Comments Provided     Osteoarthritis of  left glenohumeral joint 4/10/2018     Postconcussional syndrome     1/26/2016     Sarcoidosis     2007     Tendinitis of left rotator cuff 4/10/2018      Current Outpatient Medications   Medication Sig Dispense Refill     acetaminophen (TYLENOL) 500 MG tablet Take 1,000 mg by mouth 3 times daily as needed       buPROPion (WELLBUTRIN XL) 150 MG 24 hr tablet Take 1 tablet (150 mg) by mouth every morning Take with a 300 mg dose, to total 450 mg daily 90 tablet 3     buPROPion (WELLBUTRIN XL) 300 MG 24 hr tablet Take 1 tablet (300 mg) by mouth daily 90 tablet 3     Cholecalciferol (VITAMIN D3) 1000 UNITS CAPS Take 1,000 Units by mouth daily       cycloSPORINE (RESTASIS) 0.05 % ophthalmic emulsion Apply 1 drop to eye 2 times daily       gabapentin (NEURONTIN) 600 MG tablet TAKE 1 TABLET BY MOUTH THREE TIMES DAILY. 270 tablet 3     lamoTRIgine (LAMICTAL) 100 MG tablet Take 1 tablet (100 mg) by mouth every morning 90 tablet 3     lamoTRIgine (LAMICTAL) 200 MG tablet Take 1 tablet (200 mg) by mouth every evening 90 tablet 3     LORazepam (ATIVAN) 0.5 MG tablet Take 0.5 mg by mouth 3 times daily as needed for irritation       order for DME Equipment being ordered: CPAP supplies 1 each 0     oxyCODONE-acetaminophen (PERCOCET) 5-325 MG per tablet Take 1 tablet by mouth every 6 hours as needed for pain       SUMAtriptan (IMITREX) 100 MG tablet Take 1 tablet by mouth every 2 hours as needed for headaches       venlafaxine (EFFEXOR-XR) 150 MG 24 hr capsule Take 1 capsule (150 mg) by mouth daily with food 90 capsule 3       Review of Systems   See hpi  OBJECTIVE:     /74 (BP Location: Right arm, Patient Position: Sitting, Cuff Size: Adult Large)   Pulse 79   Temp 98.4  F (36.9  C) (Tympanic)   Resp 18   SpO2 98%   Breastfeeding? No   There is no height or weight on file to calculate BMI.  Physical Exam   Constitutional: She appears well-developed.   Neck: No thyromegaly present.   Musculoskeletal:        Left shoulder:  She exhibits decreased range of motion, spasm and abnormal pulse. She exhibits no tenderness, no bony tenderness, no swelling, no effusion, no crepitus and no pain.       Diagnostic Test Results:  none     ASSESSMENT/PLAN:           ICD-10-CM    1. PRADIP on CPAP G47.33 Sleep DME    Z99.89    2. History of left shoulder replacement Z96.612 PHYSICAL THERAPY REFERRAL     Renewed CPAP supplies.  Updated sleep study was provided for home care.    Patient will continue physical therapy status post left shoulder replacement.    Yodit Villalta MD  [facility]

## 2019-07-19 ENCOUNTER — TELEPHONE (OUTPATIENT)
Dept: FAMILY MEDICINE | Facility: OTHER | Age: 64
End: 2019-07-19

## 2019-07-19 DIAGNOSIS — G47.33 OSA ON CPAP: Primary | ICD-10-CM

## 2019-07-19 NOTE — TELEPHONE ENCOUNTER
Spoke to patient, [facility] home medical still has not received cpap orders. Notified information is in chart, will contact home medical.   Ashley Slater LPN .............7/19/2019     1:29 PM

## 2019-07-19 NOTE — TELEPHONE ENCOUNTER
Patient is calling regarding DME order & referral to [facility] from appt with you on 6/18/2019    Please call to discuss    Thank you

## 2019-07-19 NOTE — TELEPHONE ENCOUNTER
Patient needs CPAP supply order sent to [place]. Ruthie Whitten LPN .............7/19/2019  11:31 AM

## 2019-07-19 NOTE — TELEPHONE ENCOUNTER
"The order was in the chart under \"Other Orders\", rather than under Medications. They are able to locate it and use that.  They will follow up with the patient.  Abbey Radford CMA([facility])..................7/19/2019   1:34 PM   "

## 2019-08-17 DIAGNOSIS — F33.2 SEVERE EPISODE OF RECURRENT MAJOR DEPRESSIVE DISORDER, WITHOUT PSYCHOTIC FEATURES (H): ICD-10-CM

## 2019-08-21 RX ORDER — BUPROPION HYDROCHLORIDE 300 MG/1
TABLET ORAL
Qty: 90 TABLET | Refills: 0 | OUTPATIENT
Start: 2019-08-21

## 2019-10-29 DIAGNOSIS — F33.2 SEVERE EPISODE OF RECURRENT MAJOR DEPRESSIVE DISORDER, WITHOUT PSYCHOTIC FEATURES (H): Primary | ICD-10-CM

## 2019-11-04 RX ORDER — BUPROPION HYDROCHLORIDE 300 MG/1
TABLET ORAL
Qty: 90 TABLET | Refills: 3 | COMMUNITY
Start: 2019-11-04 | End: 2019-12-19

## 2019-11-04 NOTE — TELEPHONE ENCOUNTER
" Disp Refills Start End CADE   lamoTRIgine (LAMICTAL) 100 MG tablet 90 tablet 3 11/26/2018  No   Sig - Route: Take 1 tablet (100 mg) by mouth every morning - Oral      Disp Refills Start End CADE   lamoTRIgine (LAMICTAL) 200 MG tablet 90 tablet 3 11/26/2018  No   Sig - Route: Take 1 tablet (200 mg) by mouth every evening - Oral       LOV: 1/4/2019  Future Office visit: No future appointment scheduled at this time.       Routing refill request to provider for review/approval because:  Failed Protocol    Unable to complete prescription refill per RN Medication Refill Policy.................... Modesta Machado RN ....................  11/4/2019   9:48 AM    Requested Prescriptions   Pending Prescriptions Disp Refills     lamoTRIgine (LAMICTAL) 100 MG tablet [Pharmacy Med Name: LAMOTRIGINE 100MG TABLETS] 90 tablet 0     Sig: TAKE 1 TABLET BY MOUTH EVERY MORNING       Anti-Seizure Meds Protocol  Failed - 10/29/2019  8:49 AM        Failed - Recent (12 mo) or future (30 days) visit within the authorizing provider's specialty     Patient has had an office visit with the authorizing provider or a provider within the authorizing providers department within the previous 12 mos or has a future within next 30 days. See \"Patient Info\" tab in inbasket, or \"Choose Columns\" in Meds & Orders section of the refill encounter.              Failed - Review Authorizing provider's last note.      Refer to last progress notes: confirm request is for original authorizing provider (cannot be through other providers).          Passed - Normal CBC on file in past 26 months     Recent Labs   Lab Test 08/10/18  1346 11/20/17  1356   WBC 7.3  --    GICHWBC  --  10.7   RBC 3.99  --    GICHRBC  --  3.94*   HGB 12.0 12.2   HCT 37.7 37.7    323                 Passed - Normal ALT or AST on file in past 26 months     Recent Labs   Lab Test 11/26/18  1208 11/20/17  1410   ALT 15  --    GICHALT  --  16     Recent Labs   Lab Test 11/26/18  1208 " "11/20/17  1410   AST 16  --    GICHAST  --  19             Passed - Normal platelet count on file in past 26 months     Recent Labs   Lab Test 08/10/18  1346                  Passed - Medication is active on med list        Passed - No active pregnancy on record        Passed - No positive pregnancy test in last 12 months        lamoTRIgine (LAMICTAL) 200 MG tablet [Pharmacy Med Name: LAMOTRIGINE 200MG TABLETS] 90 tablet 0     Sig: TAKE 1 TABLET BY MOUTH EVERY EVENING       Anti-Seizure Meds Protocol  Failed - 10/29/2019  8:49 AM        Failed - Recent (12 mo) or future (30 days) visit within the authorizing provider's specialty     Patient has had an office visit with the authorizing provider or a provider within the authorizing providers department within the previous 12 mos or has a future within next 30 days. See \"Patient Info\" tab in inbasket, or \"Choose Columns\" in Meds & Orders section of the refill encounter.              Failed - Review Authorizing provider's last note.      Refer to last progress notes: confirm request is for original authorizing provider (cannot be through other providers).          Passed - Normal CBC on file in past 26 months     Recent Labs   Lab Test 08/10/18  1346 11/20/17  1356   WBC 7.3  --    GICHWBC  --  10.7   RBC 3.99  --    GICHRBC  --  3.94*   HGB 12.0 12.2   HCT 37.7 37.7    323                 Passed - Normal ALT or AST on file in past 26 months     Recent Labs   Lab Test 11/26/18  1208 11/20/17  1410   ALT 15  --    GICHALT  --  16     Recent Labs   Lab Test 11/26/18  1208 11/20/17  1410   AST 16  --    GICHAST  --  19             Passed - Normal platelet count on file in past 26 months     Recent Labs   Lab Test 08/10/18  1346                  Passed - Medication is active on med list        Passed - No active pregnancy on record        Passed - No positive pregnancy test in last 12 months        "

## 2019-11-04 NOTE — TELEPHONE ENCOUNTER
Called and verified medications. Updated medication list. Patient was previously seeing Dr. Glass. She reports she is only seeing you now. She is taking a Lamictal as listed in the Med list and the Wellbutrin she is taking 450mg in the AM. I did transfer her to scheduling to schedule an annual appointment. Modesta Machado RN  ....................  11/4/2019   3:35 PM

## 2019-11-04 NOTE — TELEPHONE ENCOUNTER
Call patient, given multiple mental health medications, assuming these meds are prescribed by someone else.=?    Yodit Villalta MD

## 2019-11-05 RX ORDER — LAMOTRIGINE 100 MG/1
TABLET ORAL
Qty: 90 TABLET | Refills: 0 | Status: SHIPPED | OUTPATIENT
Start: 2019-11-05 | End: 2020-01-07

## 2019-11-05 RX ORDER — LAMOTRIGINE 200 MG/1
TABLET ORAL
Qty: 90 TABLET | Refills: 0 | Status: SHIPPED | OUTPATIENT
Start: 2019-11-05 | End: 2020-01-07

## 2019-11-05 NOTE — TELEPHONE ENCOUNTER
I will refill for 3 months.    I am concerned about the use of high dose Wellbutrin with Lamictal. I would like her to see pyschiatry in the near future  Yodit Villalta MD

## 2019-11-05 NOTE — TELEPHONE ENCOUNTER
I called patient and relayed Dr. Jeter response. Patient was agreeable and verbalized understanding. Modesta Machado RN  ....................  11/5/2019   2:30 PM

## 2019-12-19 DIAGNOSIS — F33.2 SEVERE EPISODE OF RECURRENT MAJOR DEPRESSIVE DISORDER, WITHOUT PSYCHOTIC FEATURES (H): ICD-10-CM

## 2019-12-19 RX ORDER — BUPROPION HYDROCHLORIDE 300 MG/1
TABLET ORAL
Qty: 30 TABLET | Refills: 0 | Status: SHIPPED | OUTPATIENT
Start: 2019-12-19 | End: 2020-01-07

## 2019-12-19 NOTE — TELEPHONE ENCOUNTER
"Call center states Pt only has 4 days left of   buPROPion (WELLBUTRIN XL) 300 MG 24 hr tablet     Upcoming appt noted 1/7/2020 with PCP    Refilled 30 day supply.    Requested Prescriptions   Pending Prescriptions Disp Refills     buPROPion (WELLBUTRIN XL) 300 MG 24 hr tablet [Pharmacy Med Name: BUPROPION XL 300MG TABLETS] 90 tablet 3     Sig: TAKE 1 TABLET BY MOUTH ONCE DAILY       SSRIs Protocol Failed - 12/19/2019  1:13 PM        Failed - PHQ-9 score less than 5 in past 6 months     Please review last PHQ-9 score.           Passed - Medication is Bupropion     If the medication is Bupropion (Wellbutrin), and the patient is taking for smoking cessation; OK to refill.          Passed - Medication is active on med list        Passed - Patient is age 18 or older        Passed - No active pregnancy on record        Passed - No positive pregnancy test in last 12 months        Passed - Recent (6 mo) or future (30 days) visit within the authorizing provider's specialty     Patient had office visit in the last 6 months or has a visit in the next 30 days with authorizing provider or within the authorizing provider's specialty.  See \"Patient Info\" tab in inbasket, or \"Choose Columns\" in Meds & Orders section of the refill encounter.            Florecita Nichole RN  ....................  12/19/2019   2:38 PM      "

## 2020-01-07 ENCOUNTER — HOSPITAL ENCOUNTER (OUTPATIENT)
Dept: MAMMOGRAPHY | Facility: OTHER | Age: 65
Discharge: HOME | End: 2020-01-07
Attending: FAMILY MEDICINE | Admitting: FAMILY MEDICINE
Payer: COMMERCIAL

## 2020-01-07 ENCOUNTER — OFFICE VISIT (OUTPATIENT)
Dept: FAMILY MEDICINE | Facility: OTHER | Age: 65
End: 2020-01-07
Attending: FAMILY MEDICINE
Payer: COMMERCIAL

## 2020-01-07 VITALS
RESPIRATION RATE: 20 BRPM | DIASTOLIC BLOOD PRESSURE: 74 MMHG | HEART RATE: 96 BPM | HEIGHT: 63 IN | OXYGEN SATURATION: 93 % | BODY MASS INDEX: 40.74 KG/M2 | SYSTOLIC BLOOD PRESSURE: 138 MMHG | TEMPERATURE: 98.7 F

## 2020-01-07 DIAGNOSIS — Z11.59 ENCOUNTER FOR HEPATITIS C SCREENING TEST FOR LOW RISK PATIENT: ICD-10-CM

## 2020-01-07 DIAGNOSIS — E78.5 DYSLIPIDEMIA: ICD-10-CM

## 2020-01-07 DIAGNOSIS — G47.33 OSA ON CPAP: ICD-10-CM

## 2020-01-07 DIAGNOSIS — F33.2 SEVERE EPISODE OF RECURRENT MAJOR DEPRESSIVE DISORDER, WITHOUT PSYCHOTIC FEATURES (H): ICD-10-CM

## 2020-01-07 DIAGNOSIS — Z00.00 ANNUAL PHYSICAL EXAM: Primary | ICD-10-CM

## 2020-01-07 DIAGNOSIS — Z12.31 VISIT FOR SCREENING MAMMOGRAM: ICD-10-CM

## 2020-01-07 PROBLEM — M75.42 IMPINGEMENT SYNDROME OF LEFT SHOULDER: Status: RESOLVED | Noted: 2018-04-10 | Resolved: 2020-01-07

## 2020-01-07 PROBLEM — M19.019 AC (ACROMIOCLAVICULAR) ARTHRITIS: Status: RESOLVED | Noted: 2018-04-10 | Resolved: 2020-01-07

## 2020-01-07 PROBLEM — M75.82 TENDINITIS OF LEFT ROTATOR CUFF: Status: RESOLVED | Noted: 2018-04-10 | Resolved: 2020-01-07

## 2020-01-07 PROCEDURE — 90471 IMMUNIZATION ADMIN: CPT | Performed by: FAMILY MEDICINE

## 2020-01-07 PROCEDURE — 90686 IIV4 VACC NO PRSV 0.5 ML IM: CPT | Performed by: FAMILY MEDICINE

## 2020-01-07 PROCEDURE — 77067 SCR MAMMO BI INCL CAD: CPT

## 2020-01-07 PROCEDURE — 99396 PREV VISIT EST AGE 40-64: CPT | Mod: 25 | Performed by: FAMILY MEDICINE

## 2020-01-07 RX ORDER — VENLAFAXINE HYDROCHLORIDE 150 MG/1
150 CAPSULE, EXTENDED RELEASE ORAL
Qty: 90 CAPSULE | Refills: 3 | Status: SHIPPED | OUTPATIENT
Start: 2020-01-07 | End: 2020-11-03

## 2020-01-07 RX ORDER — BUPROPION HYDROCHLORIDE 300 MG/1
TABLET ORAL
Qty: 90 TABLET | Refills: 3 | Status: SHIPPED | OUTPATIENT
Start: 2020-01-07 | End: 2020-11-03

## 2020-01-07 RX ORDER — LAMOTRIGINE 200 MG/1
200 TABLET ORAL AT BEDTIME
Qty: 90 TABLET | Refills: 3 | Status: SHIPPED | OUTPATIENT
Start: 2020-01-07 | End: 2020-11-03

## 2020-01-07 RX ORDER — LAMOTRIGINE 100 MG/1
100 TABLET ORAL EVERY MORNING
Qty: 90 TABLET | Refills: 3 | Status: SHIPPED | OUTPATIENT
Start: 2020-01-07 | End: 2020-11-03

## 2020-01-07 ASSESSMENT — ANXIETY QUESTIONNAIRES
7. FEELING AFRAID AS IF SOMETHING AWFUL MIGHT HAPPEN: MORE THAN HALF THE DAYS
5. BEING SO RESTLESS THAT IT IS HARD TO SIT STILL: NOT AT ALL
3. WORRYING TOO MUCH ABOUT DIFFERENT THINGS: NOT AT ALL
GAD7 TOTAL SCORE: 11
6. BECOMING EASILY ANNOYED OR IRRITABLE: NEARLY EVERY DAY
1. FEELING NERVOUS, ANXIOUS, OR ON EDGE: NEARLY EVERY DAY
2. NOT BEING ABLE TO STOP OR CONTROL WORRYING: NEARLY EVERY DAY
IF YOU CHECKED OFF ANY PROBLEMS ON THIS QUESTIONNAIRE, HOW DIFFICULT HAVE THESE PROBLEMS MADE IT FOR YOU TO DO YOUR WORK, TAKE CARE OF THINGS AT HOME, OR GET ALONG WITH OTHER PEOPLE: VERY DIFFICULT

## 2020-01-07 ASSESSMENT — PATIENT HEALTH QUESTIONNAIRE - PHQ9
SUM OF ALL RESPONSES TO PHQ QUESTIONS 1-9: 23
5. POOR APPETITE OR OVEREATING: NOT AT ALL

## 2020-01-07 ASSESSMENT — PAIN SCALES - GENERAL: PAINLEVEL: SEVERE PAIN (6)

## 2020-01-07 NOTE — NURSING NOTE
Patient presents to clinic for physical and annual exam.    Medication Reconciliation: complete    Kelly Smith LPN

## 2020-01-07 NOTE — PATIENT INSTRUCTIONS
Decrease Wellbutrin to 300 mg in the morning (stop the 150 mg tablet)    May consider decreasing dosage of gabepentin    Lab tomorrow morning    Pap next year    Continue annual mammograms

## 2020-01-07 NOTE — PROGRESS NOTES
Female Physical Note    Concerns today: No special concerns today.      65 yo female presents for annual exam.  She has a long history of mental health.  Currently on multiple medications previously prescribed by Dr. Glass.  She was followed by psychiatry briefly but then transferred care back to her primary.  She feels these moods allow her to be functional and stable in her life and relationships.  She is willing to consider decreasing dosage.    She believes her with 10 to 15 pounds due to lack of exercise.  She had a shoulder operation 2 years ago and this is limited her ability to walk her dog.    Mammogram done today  Pap due in 2020  Denies vaginal bleeding, no abnormal pap, no new partners      ROS:  CONSTITUTIONAL: no fatigue, no unexpected change in weight  SKIN: no worrisome rashes, no worrisome moles, no worrisome lesions  EYES: no acute vision problems or changes  ENT: no ear problems, no mouth problems, no throat problems  RESP: no significant cough, no shortness of breath  CV: no chest pain, no palpitations, no new or worsening peripheral edema  GI: no nausea, no vomiting, no constipation, no diarrhea  MS: no claudication, no myalgias, no joint aches  NEURO: no weakness, no dizziness, no syncope, no headaches    Sexually Active: Yes  Sexual concerns: No   Contraception:not needed   P: 1  Menarche:  No LMP recorded (lmp unknown). Patient has had an ablation. Menopausal since: age 52  STD History: Neg  Last Pap Smear Date:  normal  Abnormal Pap History: None    Patient Active Problem List   Diagnosis     Chronic pain disorder     Dyslipidemia     Myalgia and myositis     Major depressive disorder, recurrent episode, severe (H)     PRADIP on CPAP     Sarcoidosis     Tendinitis of left rotator cuff     Impingement syndrome of left shoulder     AC (acromioclavicular) arthritis     Osteoarthritis of left glenohumeral joint       Current Outpatient Medications   Medication Sig Dispense Refill      acetaminophen (TYLENOL) 500 MG tablet Take 1,000 mg by mouth 3 times daily as needed       buPROPion (WELLBUTRIN XL) 150 MG 24 hr tablet Take 1 tablet (150 mg) by mouth every morning Take with a 300 mg dose, to total 450 mg daily 90 tablet 3     buPROPion (WELLBUTRIN XL) 300 MG 24 hr tablet TAKE 1 TABLET BY MOUTH ONCE DAILY 30 tablet 0     Cholecalciferol (VITAMIN D3) 1000 UNITS CAPS Take 1,000 Units by mouth daily       gabapentin (NEURONTIN) 600 MG tablet TAKE 1 TABLET BY MOUTH THREE TIMES DAILY. 270 tablet 3     lamoTRIgine (LAMICTAL) 100 MG tablet TAKE 1 TABLET BY MOUTH EVERY MORNING 90 tablet 0     lamoTRIgine (LAMICTAL) 200 MG tablet TAKE 1 TABLET BY MOUTH EVERY EVENING 90 tablet 0     order for DME Equipment being ordered: CPAP supplies 1 each 0     venlafaxine (EFFEXOR-XR) 150 MG 24 hr capsule Take 1 capsule (150 mg) by mouth daily with food 90 capsule 3       Past Medical History:   Diagnosis Date     AC (acromioclavicular) arthritis 4/10/2018     Allergic rhinitis due to pollen     No Comments Provided     Dorsalgia     10/7/2010     Erythema nodosum      2009     Essential tremor     Possible benign     Hyperlipidemia     10/24/2013     Ill-defined and unknown cause of mortality (CODE)     characterized by chronic pain, positive BARBARA at a low titer of 1:160, negative HLAB27,     Migraine without status migrainosus, not intractable     No Comments Provided     Osteoarthritis of left glenohumeral joint 4/10/2018     Postconcussional syndrome     1/26/2016     Sarcoidosis     2007     Tendinitis of left rotator cuff 4/10/2018        Family History     *Patient is Adopted       Problem (# of Occurrences) Relation (Name,Age of Onset)    Prostate Cancer (1) Father: metastic Prostate CA    Unknown/Adopted (1) Other: Unknown,Unknown. She is ADOPTED          Problem List Medication List and Allergy List were reviewed.    Patient is an established patient of this clinic..    Social History     Tobacco Use      "Smoking status: Former Smoker     Last attempt to quit: 10/22/1991     Years since quittin.2     Smokeless tobacco: Never Used   Substance Use Topics     Alcohol use: Yes     Comment: Alcoholic Drinks/day: rarely       Children ? yes     Has anyone hurt you physically, for example by pushing, hitting, slapping or kicking you or forcing you to have sex? Denies  Do you feel threatened or controlled by a partner, ex-partner or anyone in your life? Denies    RISK BEHAVIORS AND HEALTHY HABITS:  Tobacco Use/Smoking: None  Illicit Drug Use: None  Do you use alcohol? Yes  Number of drinking days a week 2  Diet (5-7 servings of fruits/veg daily): Yes   Exercise (30 min accumulated most days):Yes  Dental Care: Yes   Calcium 1500 mg/d:  Yes  Seat Belt Use: Yes     Pap/HPV cotest every 5 years for women 30-65   Testing not indicated   Breast CA Screening (>41 yo or 10 y before 1st degree relative diagnosis): Testing not indicated   CV Risk based on Pooled Cohort Risk:   Pooled Cohort Risk Calculator    Immunization History   Administered Date(s) Administered     DTaP, Unspecified 2003     Flu, Unspecified 10/05/2009, 2010     HepB-Adult 1996     Influenza (IIV3) PF 10/31/2011, 10/22/2012, 10/24/2013     Influenza Vaccine IM > 6 months Valent IIV4 10/23/2014, 2015, 2016, 2017, 2018     Pneumo Conj 13-V (2010&after) 2015     Pneumococcal 23 valent 10/12/1998, 10/23/2014     TD (ADULT, 7+) 1992, 2003     TDAP Vaccine (Boostrix) 10/31/2011, 10/22/2012     Td (Adult), Adsorbed 1992, 2003     Zoster vaccine recombinant adjuvanted (SHINGRIX) 2018, 2018     Zoster vaccine, live 2015    Reviewed Immunization Record Today    EXAMINATION:   /74 (BP Location: Right arm, Patient Position: Sitting, Cuff Size: Adult Large)   Pulse 96   Temp 98.7  F (37.1  C) (Tympanic)   Resp 20   Ht 1.6 m (5' 3\")   LMP  (LMP Unknown)   SpO2 (!) " 83%   Breastfeeding No   BMI 40.74 kg/m    GENERAL: healthy, alert and no distress  EYES: Eyes grossly normal to inspection, extraocular movements - intact, and PERRL  HENT: ear canals- normal; TMs- normal; Nose- normal; Mouth- no ulcers, no lesions  NECK: no tenderness, no adenopathy, no asymmetry, no masses, no stiffness; thyroid- normal to palpation  RESP: lungs clear to auscultation - no rales, no rhonchi, no wheezes  BREAST: no masses, no tenderness, no nipple discharge, no palpable axillary masses or adenopathy  CV: regular rates and rhythm, normal S1 S2, no S3 or S4 and no murmur, no click or rub -  ABDOMEN: soft, no tenderness, no  hepatosplenomegaly, no masses, normal bowel sounds  MS: extremities- no gross deformities noted, no edema  SKIN: no suspicious lesions, no rashes  NEURO: strength and tone- normal, sensory exam- grossly normal, mentation- intact, speech- normal, reflexes- symmetric  PSYCH: Alert and oriented times 3; speech- coherent , normal rate and volume; able to articulate logical thoughts, able to abstract reason, no tangential thoughts, no hallucinations or delusions, affect- normal  LYMPHATICS: ant. cervical- normal, post. cervical- normal, axillary- normal, supraclavicular- normal, inguinal- normal    ASSESSMENT:    1. Annual physical exam    2. Severe episode of recurrent major depressive disorder, without psychotic features (H)    3. Dyslipidemia    4. Encounter for hepatitis C screening test for low risk patient    5. PRADIP on CPAP        PLAN:  Patient is counseled on importance of regular self breast exams and mammograms every 1-2 years starting at age 40. Patient will proceed with pap smears every 3-5 years until age 65. Discussed importance of calcium and vitamin D supplementation and osteoporosis screening. Immunizations are updated based on CDC recommendations and patients desire. Reviewed importance of sunscreen, limit sun exposure and monitoring for changing moles with ABCDEs.  Recommend seatbelt use and helmets with biking, skiing and ATV/Snowmobile use.    Prolonged discussion about polypharmacy and I recommend weaning down on some medications.  I think the best option is to try going down to Wellbutrin 300 mg daily which is a total dosage decrease of 150 mg daily.  I doubt she is getting much therapeutic benefit from this dosage and potential risks are greater than benefit.    I would like her also to consider weaning down on the Lamictal 200 mg twice daily.  She will consider this in the near future.    Preventive cares are reviewed.  Patient Instructions   Decrease Wellbutrin to 300 mg in the morning (stop the 150 mg tablet)    May consider decreasing dosage of gabepentin    Lab tomorrow morning    Pap next year    Continue annual mammograms            Yodit Villalta MD

## 2020-01-08 ASSESSMENT — ANXIETY QUESTIONNAIRES: GAD7 TOTAL SCORE: 11

## 2020-01-09 DIAGNOSIS — R73.09 ELEVATED GLUCOSE: Primary | ICD-10-CM

## 2020-01-09 DIAGNOSIS — E78.5 DYSLIPIDEMIA: ICD-10-CM

## 2020-01-09 DIAGNOSIS — Z11.59 ENCOUNTER FOR HEPATITIS C SCREENING TEST FOR LOW RISK PATIENT: ICD-10-CM

## 2020-01-09 LAB
ALBUMIN SERPL-MCNC: 4.3 G/DL (ref 3.5–5.7)
ALP SERPL-CCNC: 98 U/L (ref 34–104)
ALT SERPL W P-5'-P-CCNC: 15 U/L (ref 7–52)
ANION GAP SERPL CALCULATED.3IONS-SCNC: 9 MMOL/L (ref 3–14)
AST SERPL W P-5'-P-CCNC: 24 U/L (ref 13–39)
BASOPHILS # BLD AUTO: 0.1 10E9/L (ref 0–0.2)
BASOPHILS NFR BLD AUTO: 1.1 %
BILIRUB SERPL-MCNC: 0.4 MG/DL (ref 0.3–1)
BUN SERPL-MCNC: 23 MG/DL (ref 7–25)
CALCIUM SERPL-MCNC: 9 MG/DL (ref 8.6–10.3)
CHLORIDE SERPL-SCNC: 104 MMOL/L (ref 98–107)
CHOLEST SERPL-MCNC: 234 MG/DL
CO2 SERPL-SCNC: 23 MMOL/L (ref 21–31)
CREAT SERPL-MCNC: 1.46 MG/DL (ref 0.6–1.2)
DIFFERENTIAL METHOD BLD: ABNORMAL
EOSINOPHIL # BLD AUTO: 0.6 10E9/L (ref 0–0.7)
EOSINOPHIL NFR BLD AUTO: 8.6 %
ERYTHROCYTE [DISTWIDTH] IN BLOOD BY AUTOMATED COUNT: 13.3 % (ref 10–15)
GFR SERPL CREATININE-BSD FRML MDRD: 36 ML/MIN/{1.73_M2}
GLUCOSE SERPL-MCNC: 120 MG/DL (ref 70–105)
HCT VFR BLD AUTO: 40.2 % (ref 35–47)
HDLC SERPL-MCNC: 55 MG/DL (ref 23–92)
HGB BLD-MCNC: 12.5 G/DL (ref 11.7–15.7)
IMM GRANULOCYTES # BLD: 0 10E9/L (ref 0–0.4)
IMM GRANULOCYTES NFR BLD: 0.3 %
LDLC SERPL CALC-MCNC: 151 MG/DL
LYMPHOCYTES # BLD AUTO: 1.5 10E9/L (ref 0.8–5.3)
LYMPHOCYTES NFR BLD AUTO: 23.6 %
MCH RBC QN AUTO: 30.1 PG (ref 26.5–33)
MCHC RBC AUTO-ENTMCNC: 31.1 G/DL (ref 31.5–36.5)
MCV RBC AUTO: 97 FL (ref 78–100)
MONOCYTES # BLD AUTO: 0.6 10E9/L (ref 0–1.3)
MONOCYTES NFR BLD AUTO: 8.4 %
NEUTROPHILS # BLD AUTO: 3.8 10E9/L (ref 1.6–8.3)
NEUTROPHILS NFR BLD AUTO: 58 %
NONHDLC SERPL-MCNC: 179 MG/DL
PLATELET # BLD AUTO: 303 10E9/L (ref 150–450)
POTASSIUM SERPL-SCNC: 4.6 MMOL/L (ref 3.5–5.1)
PROT SERPL-MCNC: 7.2 G/DL (ref 6.4–8.9)
RBC # BLD AUTO: 4.15 10E12/L (ref 3.8–5.2)
SODIUM SERPL-SCNC: 136 MMOL/L (ref 134–144)
TRIGL SERPL-MCNC: 138 MG/DL
WBC # BLD AUTO: 6.5 10E9/L (ref 4–11)

## 2020-01-09 PROCEDURE — 36415 COLL VENOUS BLD VENIPUNCTURE: CPT | Mod: ZL | Performed by: FAMILY MEDICINE

## 2020-01-09 PROCEDURE — 85025 COMPLETE CBC W/AUTO DIFF WBC: CPT | Mod: ZL | Performed by: FAMILY MEDICINE

## 2020-01-09 PROCEDURE — 86803 HEPATITIS C AB TEST: CPT | Mod: ZL | Performed by: FAMILY MEDICINE

## 2020-01-09 PROCEDURE — 80061 LIPID PANEL: CPT | Mod: ZL | Performed by: FAMILY MEDICINE

## 2020-01-09 PROCEDURE — 80053 COMPREHEN METABOLIC PANEL: CPT | Mod: ZL | Performed by: FAMILY MEDICINE

## 2020-01-10 LAB — HCV AB SERPL QL IA: NONREACTIVE

## 2020-01-14 DIAGNOSIS — F33.2 SEVERE EPISODE OF RECURRENT MAJOR DEPRESSIVE DISORDER, WITHOUT PSYCHOTIC FEATURES (H): ICD-10-CM

## 2020-01-15 RX ORDER — BUPROPION HYDROCHLORIDE 150 MG/1
TABLET ORAL
Qty: 90 TABLET | Refills: 3 | OUTPATIENT
Start: 2020-01-15

## 2020-01-15 NOTE — TELEPHONE ENCOUNTER
"[facility] sent Rx request for the following:      Bupropion 150 mg 24 hr tablet    Medication discontinued 1/7/2020 by Dr Villalta - \"decrease Wellbutrin to 300 mg in the morning (stop the 150 mg tablet)\"      Last Office Visit:              1/7/2020   Future Office visit:           None noted    Refill request refused.    Unable to complete prescription refill per RNMedication Refill Policy.................... Kelly Pritchett RN ....................  1/15/2020   2:52 PM              "

## 2020-03-10 ENCOUNTER — DOCUMENTATION ONLY (OUTPATIENT)
Dept: OTHER | Facility: CLINIC | Age: 65
End: 2020-03-10

## 2020-03-11 ENCOUNTER — HEALTH MAINTENANCE LETTER (OUTPATIENT)
Age: 65
End: 2020-03-11

## 2020-03-13 ENCOUNTER — NURSE TRIAGE (OUTPATIENT)
Dept: FAMILY MEDICINE | Facility: OTHER | Age: 65
End: 2020-03-13
Payer: COMMERCIAL

## 2020-03-13 DIAGNOSIS — R05.9 COUGH: Primary | ICD-10-CM

## 2020-03-13 LAB
FLUAV+FLUBV RNA SPEC QL NAA+PROBE: NEGATIVE
FLUAV+FLUBV RNA SPEC QL NAA+PROBE: NEGATIVE
RSV RNA SPEC NAA+PROBE: NEGATIVE
SPECIMEN SOURCE: NORMAL

## 2020-03-13 PROCEDURE — 87631 RESP VIRUS 3-5 TARGETS: CPT | Mod: ZL | Performed by: FAMILY MEDICINE

## 2020-03-13 PROCEDURE — 99207 ZZC NO CHARGE NURSE ONLY: CPT | Performed by: FAMILY MEDICINE

## 2020-03-13 NOTE — TELEPHONE ENCOUNTER
Yes the patient should be tested for influenza and coronavirus. Please give them directions on how/when to present to the ER for evaluation and testing.   Jeanie Caal PA-C ..................3/13/2020 12:13 PM

## 2020-03-13 NOTE — TELEPHONE ENCOUNTER
Contacted ED and updated to physician's response for testing of Influenza and Coronavirus. Writer directed to House Supervisor. Per House Supervisor gather the following information:    ETA- 130 pm   Car make/model-2019 Red Ford Escape.    Bring a photo ID.   Patient to find a parking spot in the ED parking lot and call #[phone] for further instruction.     Patient contacted and provided the above information. Patient verbalized understanding and intent to comply.     Contacted ED and informed Rachel of physician's response and the above information. Rachel verbalized understanding.    Adore Earl, RN on 3/13/2020 at 12:44 PM

## 2020-03-13 NOTE — TELEPHONE ENCOUNTER
"Patient called and feels like she has a \"cold\" but people have told her she should come in and be tested for the \"Virus\" she would like a call to see if she should stay home or if she should come in and be seen.  Please give patient a call and advise    Harriett Mendoza on 3/13/2020 at 10:25 AM    "

## 2020-03-13 NOTE — TELEPHONE ENCOUNTER
Instructions for Patients  It is recommended that you go to one of our designated Corona Virus 19 testing centers to get a test done from your car.  To do this follow these instructions:    What to expect:    When you arrive please come park in the parking lot.    They will add you to the queue to get your test (you will stay in your car the entire time).    On that phone call you will give them the information to register your for the visit.    You will then be met by a provider who will perform a brief assessment in your car and collect samples to send for Corona Virus 19,  influenza and possibly RSV.    We will call you with the results.    Isolate Yourself:    Isolate yourself while traveling.    Do Not allow any visitors within 6 feet.    Do Not go to work or school.    Do Not go to [employer],  centers, shopping, or other public places.    Do Not shake hands.    Avoid close contact with others (hugging, kissing).    Protect Others:    Cover Your Mouth and Nose with a mask, disposable tissue or wash cloth to avoid spreading germs to others.    Wash your hands and face frequently with soap and water    Fever Medicines:    For fever relief, take acetaminophen or ibuprofen.    Treat fevers above 101  F (38.3  C) to lower fevers and make you more comfortable.     Acetaminophen (e.g., Tylenol): Take 650 mg (two 325 mg pills) by mouth every 4-6 hours as needed of regular strength Tyleno or 1,000 mg (two 500 mg pills) every 8 hours as needed of Extra Strength Tylenol.     Ibuprofen (e.g., Motrin, Advil): Take 400 mg (two 200 mg pills) by mouth every 6 hours as needed.     Acetaminophen is thought to be safer than ibuprofen or naproxen for people over 65 years old. Acetaminophen is in many OTC and prescription medicines. It might be in more than one medicine that you are taking. You need to be careful and not take an overdose. Before taking any medicine, read all the instructions on the package.    Caution  -NSAIDs (e.g., ibuprofen, naproxen): Do not take nonsteroidal anti-inflammatory drugs (NSAIDs) if you have stomach problems, kidney disease, heart failure, or other contraindications to using this type of medicine. Do not take NSAID medicines for over 7 days without consulting your PCP. Do not take NSAID medicines if you are pregnant. Do not take NSAID medicines if you are also taking blood thinners.     Call Back If: Breathing difficulty develops or you become worse.      Thank you for limiting contact with others, wearing a simple mask to cover your cough, practice good hand hygiene habits and accessing our virtual services where possible to limit the spread of this virus.    For more information about COVID19 and options for caring for yourself at home, please visit the CDC website at https://www.cdc.gov/coronavirus/2019-ncov/about/steps-when-sick.html  For more options for care at [facility], please visit our website at [email or web address]     Adore Earl RN on 3/13/2020 at 1:01 PM

## 2020-03-13 NOTE — TELEPHONE ENCOUNTER
"S-(situation): Per call center- Patient called and feels like she has a \"cold\" but people have told her she should come in and be tested for the \"Virus\" she would like a call to see if she should stay home or if she should come in and be seen.  Please give patient a call and advise       B-(background): Patient reports no known exposure of positive Coronavirus. We just got home after traveling for 3 weeks in the car- \"We were in [facility] and could very well have been exposed to coronavirus\".      A-(assessment): Patient reports symptoms started Wednesday night.   Cough, feel hot and cold, laryngitis, headache comes and goes. Like a tension headache.  Runny nose yesterday. No wheezing. No chest pain. No difficulty breathing. Slight fever. 99.0 oral. I am usually 97.3. I think it is a bad cold\".       R-(recommendations): Please note writer did review coronavirus care advice with patient. Patient is requesting physician consideration to review and advise, \"Should I get tested for coronavirus'?    PCP is out of clinic. Will route to doc of the day for consideration.     Pt requests physician consideration and a callback today please      Adore Earl RN on 3/13/2020 at 11:24 AM      . Reason for Disposition    [1] No CORONAVIRUS EXPOSURE BUT [2] questions about    Additional Information    Negative: Bluish (or gray) lips or face    Negative: Severe difficulty breathing (e.g., struggling for each breath, speaks in single words)    Negative: Rapid onset of cough and has hives    Negative: Coughing started suddenly after medicine, an allergic food or bee sting    Negative: Difficulty breathing after exposure to flames, smoke, or fumes    Negative: Sounds like a life-threatening emergency to the triager    Negative: Previous asthma attacks and this feels like asthma attack    Negative: Chest pain present when not coughing    Negative: Difficulty breathing    Negative: Passed out (i.e., fainted, " collapsed and was not responding)    Negative: Patient sounds very sick or weak to the triager    Negative: Coughed up > 1 tablespoon (15 ml) blood (Exception: blood-tinged sputum)    Negative: Fever > 103 F (39.4 C)    Negative: Fever > 101 F (38.3 C) and over 60 years of age    Negative: Fever > 100.0 F (37.8 C) and has diabetes mellitus or a weak immune system (e.g., HIV positive, cancer chemotherapy, organ transplant, splenectomy, chronic steroids)    Negative: Fever > 100.0 F (37.8 C) and bedridden (e.g., nursing home patient, stroke, chronic illness, recovering from surgery)    Negative: Increasing ankle swelling    Negative: Wheezing is present    Negative: SEVERE coughing spells (e.g., whooping sound after coughing, vomiting after coughing)    Negative: Coughing up destiny-colored (reddish-brown) or blood-tinged sputum    Negative: Fever present > 3 days (72 hours)    Negative: Fever returns after gone for over 24 hours and symptoms worse or not improved    Negative: Using nasal washes and pain medicine > 24 hours and sinus pain persists    Negative: Known COPD or other severe lung disease (i.e., bronchiectasis, cystic fibrosis, lung surgery) and worsening symptoms (i.e., increased sputum purulence or amount, increased breathing difficulty)    Negative: Severe difficulty breathing (e.g., struggling for each breath, speak in single words, bluish lips)    Negative: Sounds like a life-threatening emergency to the triager    Negative: [1] Difficulty breathing (shortness of breath) occurs AND [2] onset > 14 days after CORONAVIRUS EXPOSURE (Close Contact)    Negative: [1] Dry cough occurs AND [2] onset > 14 days after CORONAVIRUS EXPOSURE    Negative: [1] Wet cough (i.e., white-yellow, yellow, green, or destiny colored sputum) AND [2] onset > 14 days after CORONAVIRUS EXPOSURE    Negative: [1] Common cold symptoms AND [2] onset > 14 days after CORONAVIRUS EXPOSURE    Negative: [1] Difficulty breathing occurs AND [2]  "within 14 days of CORONAVIRUS EXPOSURE    Negative: Patient sounds very sick or weak to the triager    Negative: [1] Fever or feeling feverish AND [2] within 14 Days of CORONAVIRUS EXPOSURE    Negative: [1] Cough occurs AND [2] within 14 days of CORONAVIRUS EXPOSURE    Negative: [1] Fever or feeling feverish AND [2] symptoms of lower respiratory illness (e.g., cough, difficulty breathing) AND [3] TRAVEL FROM [place] within last 14 days    Negative: [1] Body aches, chills, diarrhea, headache, runny nose, or sore throat occur AND [2] within 14 days of CORONAVIRUS EXPOSURE    Negative: [1] CORONAVIRUS EXPOSURE within last 14 days AND [2] NO cough, fever, or breathing difficulty    Negative: [1] TRAVEL FROM [place] in last 14 days AND  [2] NO cough or fever or breathing difficulty    Answer Assessment - Initial Assessment Questions  1. ONSET: \"When did the cough begin?\"       Wednesday night   2. SEVERITY: \"How bad is the cough today?\"       Cold cough. Not that bad  3. RESPIRATORY DISTRESS: \"Describe your breathing.\"       No trouble breathing   4. FEVER: \"Do you have a fever?\" If so, ask: \"What is your temperature, how was it measured, and when did it start?\"      Slight fever. 99.0 oral. I am usually 97.3. I think it is a bad cold.   5. HEMOPTYSIS: \"Are you coughing up any blood?\" If so ask: \"How much?\" (flecks, streaks, tablespoons, etc.)      No   6. TREATMENT: \"What have you done so far to treat the cough?\" (e.g., meds, fluids, humidifier)      No just fluids   7. CARDIAC HISTORY: \"Do you have any history of heart disease?\" (e.g., heart attack, congestive heart failure)       No   8. LUNG HISTORY: \"Do you have any history of lung disease?\"  (e.g., pulmonary embolus, asthma, emphysema)    Sarcoidosis  9. PE RISK FACTORS: \"Do you have a history of blood clots?\" (or: recent major surgery, recent prolonged travel, bedridden )      No   10. OTHER SYMPTOMS: \"Do you have any other symptoms? (e.g., runny nose, wheezing, chest " "pain)        Runny nose yesterday. No wheezing. No chest pain. Feel hot and cold. Headache that comes and goes. Like a tension headache   11. PREGNANCY: \"Is there any chance you are pregnant?\" \"When was your last menstrual period?\"        No   12. TRAVEL: \"Have you traveled out of the country in the last month?\" (e.g., travel history, exposures)        No    Protocols used: CORONAVIRUS (2019-NCOV) EXPOSURE-A-AH, COUGH-A-OH    "

## 2020-03-14 ENCOUNTER — VIRTUAL VISIT (OUTPATIENT)
Dept: FAMILY MEDICINE | Facility: OTHER | Age: 65
End: 2020-03-14

## 2020-03-17 ENCOUNTER — NURSE TRIAGE (OUTPATIENT)
Dept: FAMILY MEDICINE | Facility: OTHER | Age: 65
End: 2020-03-17

## 2020-03-17 DIAGNOSIS — D86.9 SARCOIDOSIS: ICD-10-CM

## 2020-03-17 DIAGNOSIS — G89.4 CHRONIC PAIN DISORDER: ICD-10-CM

## 2020-03-17 RX ORDER — GABAPENTIN 600 MG/1
TABLET ORAL
Qty: 270 TABLET | Refills: 0 | Status: SHIPPED | OUTPATIENT
Start: 2020-03-17 | End: 2020-06-12

## 2020-03-17 NOTE — TELEPHONE ENCOUNTER
S-(situation): Cough    B-(background): Cough wont go away. PRADIP on CPAP.     A-(assessment): Cough medicine: [facility] severe congestion and cough. Cough suppressant. Started it on Saturday. She says it helps. No SOB. Able to talk a lot more now.  Patient also requested a refill for her gabapentin as she is almost out of it.     R-(recommendations): Recommended to continue taking medication that she received as she reports that has been helping. Discussed home care strategies. Modesta Machado RN  ....................  3/17/2020   1:05 PM        Additional Information    Cough with no complications    Protocols used: COUGH-A-[place]

## 2020-03-17 NOTE — TELEPHONE ENCOUNTER
Patient was recently triaged, no lab for corona done.  Will send to triage to triage again  Ashley Slater LPN .............3/17/2020     10:59 AM

## 2020-03-17 NOTE — TELEPHONE ENCOUNTER
Patient has an ongoing cough that won't go away. She was seen for this awhile ago and is wondering if there is something stronger that she could take to help.

## 2020-03-19 NOTE — PROGRESS NOTES
"Date: 2020 14:05:16  Clinician: Vika Ford  Clinician NPI: [id number]  Patient: Ivy Romero  Patient : 1955  Patient Address: [address] [place] [id number]  Patient Phone: [phone]  Visit Protocol: URI  Patient Summary:  Ivy is a 64 year old ( : 1955 ) female who initiated a Visit for COVID-19 (Coronavirus) evaluation and screening. When asked the question \"Please sign me up to receive news, health information and promotions from [employer].\", Ivy responded \"No\".    Ivy states her symptoms started suddenly 3-6 days ago. After her symptoms started, they improved and then got worse again.   Her symptoms consist of ear pain, malaise, a headache, facial pain or pressure, myalgia, chills, a sore throat, a cough, and nasal congestion. She is experiencing difficulty breathing due to nasal congestion but she is not short of breath. Ivy also feels feverish.   Symptom details     Nasal secretions: The color of her mucus is white.    Cough: Ivy coughs every 5-10 minutes and her cough is more bothersome at night. Phlegm does not come into her throat when she coughs. She does not believe her cough is caused by post-nasal drip.     Sore throat: Ivy reports having moderate throat pain (4-6 on a 10 point pain scale), does not have exudate on her tonsils, and can swallow liquids. She is not sure if the lymph nodes in her neck are enlarged. A rash has not appeared on the skin since the sore throat started.     Temperature: Her current temperature is 99 degrees Fahrenheit.     Facial pain or pressure: The facial pain or pressure feels worse when bending over or leaning forward.     Headache: She states the headache is moderate (4-6 on a 10 point pain scale).      Ivy denies having teeth pain, rhinitis, and wheezing. She also denies having recent facial or sinus surgery in the past 60 days and taking antibiotic medication for the symptoms.   Precipitating events  " Ivy is not sure if she has been exposed to someone with strep throat. She has not recently been exposed to someone with influenza. Ivy has been in close contact with the following high risk individuals: adults 65 or older and people with asthma, heart disease or diabetes.   Pertinent COVID-19 (Coronavirus) information  Ivy has not traveled internationally or to the areas where COVID-19 (Coronavirus) is widespread in the last 14 days before the start of her symptoms.   Ivy has not had close contact with a suspected or laboratory-confirmed COVID-19 patient within 14 days of symptom onset.   Ivy is not a healthcare worker and does not work in a healthcare facility.   Pertinent medical history  Ivy had 2 sinus infections within the past year.   Ivy does not get yeast infections when she takes antibiotics.   Ivy does not need a return to work/school note.   Weight: 215 lbs   Ivy does not smoke or use smokeless tobacco.   Additional information as reported by the patient (free text): Sarcoidosis   Weight: 215 lbs    MEDICATIONS: No current medications, ALLERGIES: Penicillins  Clinician Response:  Dear Ivy,   Dear Ivy,  Based on the information you have provided, it does not appear you need Coronavirus (COVID-19) testing. Unfortunately, based on your information we are also not able to provide a diagnosis. We feel an in-person visit with a provider is more appropriate for your condition based on your interview. We'd be honored to see you in one of our clinics or urgent cares. Please call [phone] to schedule an appointment.  We request that you bring documentation of your completed [facility] visit to your clinic appointment. Failure to do so may result in a request to repeat your [facility] visit. Documentation could include a printout from your visit or show the  the completed visit on your phone.  Why no testing?  At this time, we recommend testing primarily for those  people who have typical symptoms of cough and fever and have either traveled to a known high risk area of infection or who have been exposed to someone with Coronavirus by close contact.   For more information about COVID19 and options for caring for yourself at home, please visit the CDC website at https://www.cdc.gov/coronavirus/2019-ncov/about/steps-when-sick.html   For more options for care at [facility], please visit our website at [email or web address]     Diagnosis: Cough  Diagnosis ICD: R05

## 2020-03-23 ENCOUNTER — OFFICE VISIT (OUTPATIENT)
Dept: FAMILY MEDICINE | Facility: OTHER | Age: 65
End: 2020-03-23
Payer: COMMERCIAL

## 2020-03-23 ENCOUNTER — NURSE TRIAGE (OUTPATIENT)
Dept: FAMILY MEDICINE | Facility: OTHER | Age: 65
End: 2020-03-23

## 2020-03-23 VITALS
OXYGEN SATURATION: 95 % | RESPIRATION RATE: 16 BRPM | SYSTOLIC BLOOD PRESSURE: 140 MMHG | TEMPERATURE: 97.4 F | HEART RATE: 82 BPM | DIASTOLIC BLOOD PRESSURE: 80 MMHG

## 2020-03-23 DIAGNOSIS — R05.9 COUGH: Primary | ICD-10-CM

## 2020-03-23 PROCEDURE — 99213 OFFICE O/P EST LOW 20 MIN: CPT | Performed by: FAMILY MEDICINE

## 2020-03-23 RX ORDER — CODEINE PHOSPHATE AND GUAIFENESIN 10; 100 MG/5ML; MG/5ML
1-2 SOLUTION ORAL EVERY 4 HOURS PRN
Qty: 120 ML | Refills: 1 | Status: SHIPPED | OUTPATIENT
Start: 2020-03-23 | End: 2020-11-03

## 2020-03-23 ASSESSMENT — PATIENT HEALTH QUESTIONNAIRE - PHQ9: SUM OF ALL RESPONSES TO PHQ QUESTIONS 1-9: 20

## 2020-03-23 ASSESSMENT — PAIN SCALES - GENERAL: PAINLEVEL: MODERATE PAIN (5)

## 2020-03-23 NOTE — PATIENT INSTRUCTIONS
Please use the information at the end of this document to sign up for [facility] [employer] where you can get your results and a message about those results sent to you through the [employer] application. If you do not have mychart we will call you with your results but it may take longer.    Regardless of if you have been tested or not:  Patient who have symptoms (cough, fever, or shortness of breath), need to isolate for 7 days from when symptoms started OR 72 hours after fever resolves (without fever reducing medications) AND improvement of respiratory symptoms (whichever is longer).      Isolate yourself at home (in own room/own bathroom if possible)    Do Not allow any visitors    Do Not go to work or school    Do Not go to [employer],  centers, shopping, or other public places.    Do Not shake hands.    Avoid close and intimate contact with others (hugging, kissing).    Follow CDC recommendations for household cleaning of frequently touched services.     After the initial 7 days, continue to isolate yourself from household members as much as possible. To continue decrease the risk of community spread and exposure, you and any members of your household should limit activities in public for 14 days after starting home isolation.     You can reference the following CDC link for helpful home isolation/care tips:  https://www.cdc.gov/coronavirus/2019-ncov/downloads/10Things.pdf    Protect Others:    Cover Your Mouth and Nose with a mask, disposable tissue or wash cloth to avoid spreading germs to others.    Wash your hands and face frequently with soap and water    Call Back If: Breathing difficulty develops or you become worse.    For more information about COVID19 and options for caring for yourself at home, please visit the CDC website at https://www.cdc.gov/coronavirus/2019-ncov/about/steps-when-sick.html  For more options for care at [facility], please visit our website at  [email or web address]

## 2020-03-23 NOTE — TELEPHONE ENCOUNTER
Pt transferred to triage from [facility] with worsening of symptoms since last triage 3/17/2020 and virtual visit 3/14/2020.    Negative for influenza 3/13/2020    Her cough has since worsening with burning sensation in lungs/chest, laryngitis, sore throat.      Pt had traveled by car from [place] to [place] and back 2-3 weeks ago and has been sick since returning.     Hx of sarcoidosis and concerned of inflamed lungs as chest feels heavier and congested along with sinus congestion.  Pt states she has difficulty expelling any sputum and cough feels tight.  Despite cough/cold medicine, drinking honey tea, and hot showers, cough has not improved.  Lungs burn most of the day now even without coughing.     Denies fever, wheezing, lightheadedness.    Pt will present to front of clinic for assessment/to be seen and will call back/present to ED with any worsening of symptoms.    Florecita Nichole RN  ....................  3/23/2020   10:17 AM

## 2020-03-23 NOTE — PROGRESS NOTES
"Nursing Notes:   Bunny Colindres LPN  3/23/2020 11:19 AM  Signed  Patient presents with cough. States that lungs burn and that she has laryngitis, a sore throat. Patient states the \"actual reason\" she is here today is because she has scardosis in her right lung.   Medication Reconciliation: complete    Bunny Colindres LPN  3/23/2020 11:07 AM    SUBJECTIVE:  Ivy Darling is a 64 year old female who complains of a cough Cough  Here with throat sore, then laryngitis in past 10 days but comes and goes.   No fevers.   Distant history of sarcoidosis in 2007 and no current symptoms or treatment.  Returned form [place] via auto early March.   No SOB.       Current Outpatient Medications   Medication Sig Dispense Refill     acetaminophen (TYLENOL) 500 MG tablet Take 1,000 mg by mouth 3 times daily as needed       buPROPion (WELLBUTRIN XL) 300 MG 24 hr tablet TAKE 1 TABLET BY MOUTH ONCE DAILY 90 tablet 3     Cholecalciferol (VITAMIN D3) 1000 UNITS CAPS Take 1,000 Units by mouth daily       gabapentin (NEURONTIN) 600 MG tablet TAKE 1 TABLET BY MOUTH THREE TIMES DAILY. 270 tablet 0     lamoTRIgine (LAMICTAL) 100 MG tablet Take 1 tablet (100 mg) by mouth every morning 90 tablet 3     lamoTRIgine (LAMICTAL) 200 MG tablet Take 1 tablet (200 mg) by mouth At Bedtime 90 tablet 3     order for DME Equipment being ordered: CPAP supplies 1 each 0     venlafaxine (EFFEXOR-XR) 150 MG 24 hr capsule Take 1 capsule (150 mg) by mouth daily with food 90 capsule 3        Allergies   Allergen Reactions     Penicillins Rash       Social History     Socioeconomic History     Marital status:      Spouse name: None     Number of children: None     Years of education: None     Highest education level: None   Occupational History     None   Social Needs     Financial resource strain: None     Food insecurity     Worry: None     Inability: None     Transportation needs     Medical: None     Non-medical: None   Tobacco Use     " Smoking status: Former Smoker     Last attempt to quit: 10/22/1991     Years since quittin.4     Smokeless tobacco: Never Used   Substance and Sexual Activity     Alcohol use: Not Currently     Comment: Alcoholic Drinks/day: rarely     Drug use: No     Comment: Drug use: No     Sexual activity: Yes     Partners: Male     Birth control/protection: None   Lifestyle     Physical activity     Days per week: None     Minutes per session: None     Stress: None   Relationships     Social connections     Talks on phone: None     Gets together: None     Attends L[employer] service: None     Active member of club or organization: None     Attends meetings of clubs or organizations: None     Relationship status: None     Intimate partner violence     Fear of current or ex partner: None     Emotionally abused: None     Physically abused: None     Forced sexual activity: None   Other Topics Concern     Parent/sibling w/ CABG, MI or angioplasty before 65F 55M? Not Asked   Social History Narrative    .   One child.   Substitute teaches.    employed by the [employer].   Daughter in college.    Walks her dog for exercise on a daily basis.        OBJECTIVE:  BP (!) 140/80 (BP Location: Right arm, Patient Position: Sitting, Cuff Size: Adult Large)   Pulse 82   Temp 97.4  F (36.3  C) (Tympanic)   Resp 16   SpO2 95%   Breastfeeding No   General appearance: alert, cooperative and pleasant. No stridor and voice not hoarse at present time.   Ear exam: External ears normal. Canals clear. TM's normal.  Oropharynx: moist, pink, no tonsillar hypertrophy and no exudates present  Neck: supple, non-tender, free range of motion, no adenopathy  Lungs: clear to auscultation  CXR: Discussed and patient declined.     ASSESSMENT/PLAN:  1. Cough        Meds as per orders:  Prescription for codeine cough syrup provided.   Symptomatic therapy suggested: use vaporizer and drink lots of fluids prn.    Call or return to clinic prn if these  symptoms worsen or fail to improve as anticipated. COVID 19 symptoms reviewed.       Hanane Brown MD ........... 11:27 AM 3/23/2020

## 2020-06-10 DIAGNOSIS — G89.4 CHRONIC PAIN DISORDER: ICD-10-CM

## 2020-06-10 DIAGNOSIS — D86.9 SARCOIDOSIS: ICD-10-CM

## 2020-06-12 RX ORDER — GABAPENTIN 600 MG/1
TABLET ORAL
Qty: 270 TABLET | Refills: 0 | Status: SHIPPED | OUTPATIENT
Start: 2020-06-12 | End: 2020-07-28

## 2020-06-12 NOTE — TELEPHONE ENCOUNTER
Disp  Refills  Start  End  CADE    gabapentin (NEURONTIN) 600 MG tablet  270 tablet  0  3/17/2020   No    Sig: TAKE 1 TABLET BY MOUTH THREE TIMES DAILY.        LOV: 1/7/2020  Future Office visit: No future appointment scheduled at this time.      Routing refill request to provider for review/approval because:  Drug not on the [facility], [facility] or [facility] refill protocol or controlled substance  Requested Prescriptions   Pending Prescriptions Disp Refills     gabapentin (NEURONTIN) 600 MG tablet 270 tablet 0     Sig: TAKE 1 TABLET BY MOUTH THREE TIMES DAILY.       There is no refill protocol information for this order      Unable to complete prescription refill per RN Medication Refill Policy.................... Modesta Machado RN ....................  6/12/2020   8:29 AM

## 2020-07-26 DIAGNOSIS — G89.4 CHRONIC PAIN DISORDER: ICD-10-CM

## 2020-07-26 DIAGNOSIS — D86.9 SARCOIDOSIS: ICD-10-CM

## 2020-07-28 RX ORDER — GABAPENTIN 600 MG/1
TABLET ORAL
Qty: 270 TABLET | Refills: 1 | Status: SHIPPED | OUTPATIENT
Start: 2020-07-28 | End: 2020-11-03

## 2020-07-28 NOTE — TELEPHONE ENCOUNTER
Routing refill request to provider for review/approval because:  Drug not on the [facility] refill protocol     LOV: 1/7/2020  Christal Yuan RN on 7/28/2020 at 3:37 PM

## 2020-10-06 ENCOUNTER — ALLIED HEALTH/NURSE VISIT (OUTPATIENT)
Dept: FAMILY MEDICINE | Facility: OTHER | Age: 65
End: 2020-10-06
Payer: COMMERCIAL

## 2020-10-06 DIAGNOSIS — R05.9 COUGH: ICD-10-CM

## 2020-10-06 DIAGNOSIS — R51.9 HEAD ACHE: Primary | ICD-10-CM

## 2020-10-06 PROCEDURE — C9803 HOPD COVID-19 SPEC COLLECT: HCPCS

## 2020-10-06 PROCEDURE — U0003 INFECTIOUS AGENT DETECTION BY NUCLEIC ACID (DNA OR RNA); SEVERE ACUTE RESPIRATORY SYNDROME CORONAVIRUS 2 (SARS-COV-2) (CORONAVIRUS DISEASE [COVID-19]), AMPLIFIED PROBE TECHNIQUE, MAKING USE OF HIGH THROUGHPUT TECHNOLOGIES AS DESCRIBED BY CMS-2020-01-R: HCPCS | Mod: ZL

## 2020-10-06 PROCEDURE — 99207 PR NO CHARGE NURSE ONLY: CPT

## 2020-10-06 NOTE — NURSING NOTE
Patient swabbed for COVID-19 testing.  Theodora Nails LPN on 10/6/2020 at 4:07 PM      Headache   Cough

## 2020-10-08 ENCOUNTER — TELEPHONE (OUTPATIENT)
Dept: FAMILY MEDICINE | Facility: OTHER | Age: 65
End: 2020-10-08

## 2020-10-08 ENCOUNTER — NURSE TRIAGE (OUTPATIENT)
Dept: FAMILY MEDICINE | Facility: OTHER | Age: 65
End: 2020-10-08

## 2020-10-08 LAB
SARS-COV-2 RNA SPEC QL NAA+PROBE: ABNORMAL
SPECIMEN SOURCE: ABNORMAL

## 2020-10-08 NOTE — TELEPHONE ENCOUNTER
"Coronavirus (COVID-19) Notification    Caller Name (Patient, parent, daughter/son, grandparent, etc)  Ivy Romero    Reason for call  Notify of Positive Coronavirus (COVID-19) lab results, assess symptoms,  review  [employer] recommendations    Lab Result    Lab test:  2019-nCoV rRt-PCR or SARS-CoV-2 PCR    Oropharyngeal AND/OR nasopharyngeal swabs is POSITIVE for 2019-nCoV RNA/SARS-COV-2 PCR (COVID-19 virus)    RN Recommendations/Instructions per [facility] Coronavirus COVID-19 recommendations    Brief introduction script  Introduce self then review script:  \"I am calling on behalf of [employer].  We were notified that your Coronavirus test (COVID-19) for was POSITIVE for the virus.  I have some information to relay to you but first I wanted to mention that the [place] Dept of Health will be contacting you shortly [it's possible MD already called Patient] to talk to you more about how you are feeling and other people you have had contact with who might now also have the virus.  Also,  [employer] is Partnering with the [facility] for Covid-19 research, you may be contacted directly by research staff.\"    Assessment (Inquire about Patient's current symptoms)   Assessment   Current Symptoms at time of phone call: (if no symptoms, document No symptoms] Fatigue   Symptoms onset (if applicable) 10/7/20     If at time of call, Patients symptoms hare worsened, the Patient should contact 911 or have someone drive them to Emergency Dept promptly:      If Patient calling 911, inform 911 personal that you have tested positive for the Coronavirus (COVID-19).  Place mask on and await 911 to arrive.    If Emergency Dept, If possible, please have another adult drive you to the Emergency Dept but you need to wear mask when in contact with other people.      Review information with Patient    Your result was positive. This means you have COVID-19 (coronavirus).  We have sent you a letter that " reviews the information that I'll be reviewing with you now.    How can I protect others?    If you have symptoms: stay home and away from others (self-isolate) until:    You've had no fever--and no medicine that reduces fever--for 1 full day (24 hours). And       Your other symptoms have gotten better. For example, your cough or breathing has improved. And     At least 10 days have passed since your symptoms started. (If you've been told by a doctor that you have a weak immune system, wait 20 days.)     If you don't have symptoms: Stay home and away from others (self-isolate) until at least 10 days have passed since your first positive COVID-19 test. (Date test collected)    During this time:    Stay in your own room, including for meals. Use your own bathroom if you can.    Stay away from others in your home. No hugging, kissing or shaking hands. No visitors.     Don't go to work, school or anywhere else.     Clean  high touch  surfaces often (doorknobs, counters, handles, etc.). Use a household cleaning spray or wipes. You'll find a full list on the EPA website at www.epa.gov/pesticide-registration/list-n-disinfectants-use-against-sars-cov-2.     Cover your mouth and nose with a mask, tissue or other face covering to avoid spreading germs.    Wash your hands and face often with soap and water.    Caregivers in these groups are at risk for severe illness due to COVID-19:  o People 65 years and older  o People who live in a nursing home or long-term care facility  o People with chronic disease (lung, heart, cancer, diabetes, kidney, liver, immunologic)  o People who have a weakened immune system, including those who:  - Are in cancer treatment  - Take medicine that weakens the immune system, such as corticosteroids  - Had a bone marrow or organ transplant  - Have an immune deficiency  - Have poorly controlled HIV or AIDS  - Are obese (body mass index of 40 or higher)  - Smoke regularly    Caregivers should wear  gloves while washing dishes, handling laundry and cleaning bedrooms and bathrooms.    Wash and dry laundry with special caution. Don't shake dirty laundry, and use the warmest water setting you can.    If you have a weakened immune system, ask your doctor about other actions you should take.    For more tips, go to www.cdc.gov/coronavirus/2019-ncov/downloads/10Things.pdf.    You should not go back to work until you meet the guidelines above for ending your home isolation. You don't need to be retested for COVID-19 before going back to work--studies show that you won't spread the virus if it's been at least 10 days since your symptoms started (or 20 days, if you have a weak immune system).    Employers: This document serves as formal notice of your employee's medical guidelines for going back to work. They must meet the above guidelines before going back to work in person.    How can I take care of myself?    1. Get lots of rest. Drink extra fluids (unless a doctor has told you not to).    2. Take Tylenol (acetaminophen) for fever or pain. If you have liver or kidney problems, ask your family doctor if it's okay to take Tylenol.     Take either:     650 mg (two 325 mg pills) every 4 to 6 hours, or     1,000 mg (two 500 mg pills) every 8 hours as needed.     Note: Don't take more than 3,000 mg in one day. Acetaminophen is found in many medicines (both prescribed and over-the-counter medicines). Read all labels to be sure you don't take too much.    For children, check the Tylenol bottle for the right dose (based on their age or weight).    3. If you have other health problems (like cancer, heart failure, an organ transplant or severe kidney disease): Call your specialty clinic if you don't feel better in the next 2 days.    4. Know when to call 911: Emergency warning signs include:    Trouble breathing or shortness of breath    Pain or pressure in the chest that doesn't go away    Feeling confused like you haven't  felt before, or not being able to wake up    Bluish-colored lips or face    5. Sign up for [employer]. We know it's scary to hear that you have COVID-19. We want to track your symptoms to make sure you're okay over the next 2 weeks. Please look for an email from [employer]--this is a free, online program that we'll use to keep in touch. To sign up, follow the link in the email. Learn more at [email or web address].    Where can I get more information?    [facility]: [email or web address]    Coronavirus Basics: www.health.[facility].[place]/diseases/coronavirus/basics.html    What to Do If You're Sick: www.cdc.gov/coronavirus/2019-ncov/about/steps-when-sick.html    Ending Home Isolation: www.cdc.gov/coronavirus/2019-ncov/hcp/disposition-in-home-patients.html     Caring for Someone with COVID-19: www.cdc.gov/coronavirus/2019-ncov/if-you-are-sick/care-for-someone.html     [facility] clinical trials (COVID-19 research studies): [email or web address]-clinical-trials     A Positive COVID-19 letter will be sent via [employer] or the mail. (Exception, no letters sent to Presurgerical/Preprocedure Patients)    [Name]  Marce Miller LPN

## 2020-10-12 NOTE — TELEPHONE ENCOUNTER
Phone call from pt with c/o continued SOB since being tested for COVID-19. Pt reports her S&S are about the same as they were. Pt would also like to know if her results are back yet. Pt informed her COVID-29 testing is still in process, she will get a call if she is + and a letter if she is -. The patient indicates understanding of these issues and agrees with the plan.      Elena Meyer RN  ....................  10/12/2020   12:57 PM

## 2020-10-19 ENCOUNTER — TELEPHONE (OUTPATIENT)
Dept: FAMILY MEDICINE | Facility: OTHER | Age: 65
End: 2020-10-19

## 2020-10-19 DIAGNOSIS — G47.33 OSA ON CPAP: Primary | ICD-10-CM

## 2020-10-19 NOTE — TELEPHONE ENCOUNTER
Patient is needing an order for all her CPap supplies - she let the current prescription lapse.     She needs it sent to [facility] [place] [employer] Phone [phone]    Please submit order and call patient to advise or with any questions    Thank you

## 2020-11-03 ENCOUNTER — VIRTUAL VISIT (OUTPATIENT)
Dept: FAMILY MEDICINE | Facility: OTHER | Age: 65
End: 2020-11-03
Attending: FAMILY MEDICINE
Payer: COMMERCIAL

## 2020-11-03 DIAGNOSIS — G89.4 CHRONIC PAIN DISORDER: ICD-10-CM

## 2020-11-03 DIAGNOSIS — F33.2 SEVERE EPISODE OF RECURRENT MAJOR DEPRESSIVE DISORDER, WITHOUT PSYCHOTIC FEATURES (H): ICD-10-CM

## 2020-11-03 DIAGNOSIS — D86.9 SARCOIDOSIS: ICD-10-CM

## 2020-11-03 DIAGNOSIS — R05.9 COUGH: ICD-10-CM

## 2020-11-03 DIAGNOSIS — Z86.16 HISTORY OF 2019 NOVEL CORONAVIRUS DISEASE (COVID-19): Primary | ICD-10-CM

## 2020-11-03 PROCEDURE — 99213 OFFICE O/P EST LOW 20 MIN: CPT | Mod: 95 | Performed by: FAMILY MEDICINE

## 2020-11-03 RX ORDER — VENLAFAXINE HYDROCHLORIDE 150 MG/1
150 CAPSULE, EXTENDED RELEASE ORAL
Qty: 90 CAPSULE | Refills: 3 | Status: SHIPPED | OUTPATIENT
Start: 2020-11-03 | End: 2021-11-19

## 2020-11-03 RX ORDER — LAMOTRIGINE 200 MG/1
200 TABLET ORAL AT BEDTIME
Qty: 90 TABLET | Refills: 3 | Status: SHIPPED | OUTPATIENT
Start: 2020-11-03 | End: 2021-11-19

## 2020-11-03 RX ORDER — LAMOTRIGINE 100 MG/1
100 TABLET ORAL EVERY MORNING
Qty: 90 TABLET | Refills: 3 | Status: SHIPPED | OUTPATIENT
Start: 2020-11-03 | End: 2021-11-19

## 2020-11-03 RX ORDER — BUPROPION HYDROCHLORIDE 300 MG/1
TABLET ORAL
Qty: 90 TABLET | Refills: 3 | Status: SHIPPED | OUTPATIENT
Start: 2020-11-03 | End: 2021-11-19

## 2020-11-03 RX ORDER — GABAPENTIN 600 MG/1
TABLET ORAL
Qty: 270 TABLET | Refills: 3 | Status: SHIPPED | OUTPATIENT
Start: 2020-11-03 | End: 2021-11-19

## 2020-11-03 RX ORDER — CODEINE PHOSPHATE AND GUAIFENESIN 10; 100 MG/5ML; MG/5ML
1-2 SOLUTION ORAL EVERY 4 HOURS PRN
Qty: 120 ML | Refills: 1 | Status: SHIPPED | OUTPATIENT
Start: 2020-11-03 | End: 2020-12-31

## 2020-11-03 ASSESSMENT — PAIN SCALES - GENERAL: PAINLEVEL: NO PAIN (0)

## 2020-11-03 NOTE — PROGRESS NOTES
"Ivy Darling is a 65 year old female who is being evaluated via a billable telephone visit.      The patient has been notified of following:     \"This telephone visit will be conducted via a call between you and your physician/provider. We have found that certain health care needs can be provided without the need for a physical exam.  This service lets us provide the care you need with a short phone conversation.  If a prescription is necessary we can send it directly to your pharmacy.  If lab work is needed we can place an order for that and you can then stop by our lab to have the test done at a later time.    Telephone visits are billed at different rates depending on your insurance coverage. During this emergency period, for some insurers they may be billed the same as an in-person visit.  Please reach out to your insurance provider with any questions.    If during the course of the call the physician/provider feels a telephone visit is not appropriate, you will not be charged for this service.\"    Patient has given verbal consent for Telephone visit?  Yes    What phone number would you like to be contacted at? [phone]    How would you like to obtain your AVS? Juli Seaman     Ivy Darling is a 65 year old female who presents via phone visit today for the following health issues:  Nursing Notes:   Ashley Slater LPN  11/3/2020  9:45 AM  Signed  Patient is having virtual visit to follow up from having covid. States started having symptoms on 10/4. Was tested 10/6/20, was quarantine until 10/19. Still having a dry cough, poor attention and headaches.       No LMP recorded (lmp unknown). Patient has had an ablation.  Medication Reconciliation: leticia Slater LPN  11/3/2020 9:40 AM        HPI     64 yo female evaluated via telephone visit for Covid. Tested positive after exposure at [facility].  was also positive, he was hospitalized.  She has many " unanswered questions about how she was infected as well as expectations for recovery.    Has a residual dry cough, headache and poor focus.  Denies fever, shortness of breath or chest pain.  She has no history of underlying lung disease.    She is also due for follow-up of fibromyalgia and mental health.  Overall these things are stable.  She is due for refill of medications.             Fibromyalgia, tends to flare during the wintertime.  She has used prednisone during flares in the past.         Review of Systems   Constitutional, HEENT, cardiovascular, pulmonary, gi and gu systems are negative, except as otherwise noted.       Objective   Vitals - Patient Reported  Pain Score: No Pain (0)        healthy, alert and no distress  PSYCH: Alert and oriented times 3; coherent speech, normal   rate and volume, able to articulate logical thoughts, able   to abstract reason, no tangential thoughts, no hallucinations   or delusions  Her affect is normal  RESP: No cough, no audible wheezing, able to talk in full sentences  Remainder of exam unable to be completed due to telephone visits          Assessment/Plan:    1. History of 2019 novel coronavirus disease (COVID-19)    2. Severe episode of recurrent major depressive disorder, without psychotic features (H)    3. Sarcoidosis    4. Chronic pain disorder    5. Cough      Spent a great deal time discussing Covid.  Patient has been very compliant with mask wearing.  Reinforce that the mask is protecting others from her and if she was around people not wearing masks likely the source of her exposure.  I also know that there were multiple positive cases associated with that salon and likely had a significant viral exposure.  Certainly seems that she is having a typical clinical course.  Do not feel any labs or diagnostic studies are indicated.  We briefly discussed a prednisone taper which she is used in the past for fibro-.  She is decided to hold off on that and will update  me in the next few weeks should her symptoms persist.    Refilled her chronic medications as requested    Phone call duration:  17 minutes            Yodit Villalta MD

## 2020-11-03 NOTE — NURSING NOTE
Patient is having virtual visit to follow up from having covid. States started having symptoms on 10/4. Was tested 10/6/20, was quarantine until 10/19. Still having a dry cough, poor attention and headaches.       No LMP recorded (lmp unknown). Patient has had an ablation.  Medication Reconciliation: complete    Ashley Slater LPN  11/3/2020 9:40 AM

## 2020-11-23 ENCOUNTER — TELEPHONE (OUTPATIENT)
Dept: FAMILY MEDICINE | Facility: OTHER | Age: 65
End: 2020-11-23

## 2020-11-23 DIAGNOSIS — R05.9 COUGH: Primary | ICD-10-CM

## 2020-11-23 NOTE — TELEPHONE ENCOUNTER
Patient still has cough from October. Had positive covid in Oct.  Please call patient,  Thank you Eliza Brooks on 11/23/2020 at 10:02 AM

## 2020-11-23 NOTE — TELEPHONE ENCOUNTER
Patient states she is still having cough, was supposed to call back if still having. No other symptoms.   Ashley Slater LPN .............11/23/2020     2:58 PM      Do not feel any labs or diagnostic studies are indicated.  We briefly discussed a prednisone taper which she is used in the past for fibro-.  She is decided to hold off on that and will update me in the next few weeks should her symptoms persist.     Refilled her chronic medications as requested     Phone call duration:  17 minutes                 Yodit Villalta MD

## 2020-11-24 RX ORDER — PREDNISONE 10 MG/1
TABLET ORAL
Qty: 20 TABLET | Refills: 0 | Status: SHIPPED | OUTPATIENT
Start: 2020-11-24 | End: 2021-02-11

## 2020-12-08 ENCOUNTER — VIRTUAL VISIT (OUTPATIENT)
Dept: FAMILY MEDICINE | Facility: OTHER | Age: 65
End: 2020-12-08
Attending: FAMILY MEDICINE
Payer: COMMERCIAL

## 2020-12-08 ENCOUNTER — DOCUMENTATION ONLY (OUTPATIENT)
Dept: OTHER | Facility: CLINIC | Age: 65
End: 2020-12-08

## 2020-12-08 DIAGNOSIS — R05.9 COUGH: Primary | ICD-10-CM

## 2020-12-08 PROCEDURE — 99213 OFFICE O/P EST LOW 20 MIN: CPT | Mod: 95 | Performed by: FAMILY MEDICINE

## 2020-12-08 ASSESSMENT — ANXIETY QUESTIONNAIRES
1. FEELING NERVOUS, ANXIOUS, OR ON EDGE: NOT AT ALL
3. WORRYING TOO MUCH ABOUT DIFFERENT THINGS: NOT AT ALL
IF YOU CHECKED OFF ANY PROBLEMS ON THIS QUESTIONNAIRE, HOW DIFFICULT HAVE THESE PROBLEMS MADE IT FOR YOU TO DO YOUR WORK, TAKE CARE OF THINGS AT HOME, OR GET ALONG WITH OTHER PEOPLE: NOT DIFFICULT AT ALL
GAD7 TOTAL SCORE: 5
2. NOT BEING ABLE TO STOP OR CONTROL WORRYING: NEARLY EVERY DAY
5. BEING SO RESTLESS THAT IT IS HARD TO SIT STILL: NOT AT ALL
7. FEELING AFRAID AS IF SOMETHING AWFUL MIGHT HAPPEN: NOT AT ALL
6. BECOMING EASILY ANNOYED OR IRRITABLE: MORE THAN HALF THE DAYS

## 2020-12-08 ASSESSMENT — PATIENT HEALTH QUESTIONNAIRE - PHQ9
5. POOR APPETITE OR OVEREATING: NOT AT ALL
SUM OF ALL RESPONSES TO PHQ QUESTIONS 1-9: 20

## 2020-12-08 ASSESSMENT — PAIN SCALES - GENERAL: PAINLEVEL: NO PAIN (0)

## 2020-12-08 NOTE — PROGRESS NOTES
"Ivy Darling is a 65 year old female who is being evaluated via a billable telephone visit.      The patient has been notified of following:     \"This telephone visit will be conducted via a call between you and your physician/provider. We have found that certain health care needs can be provided without the need for a physical exam.  This service lets us provide the care you need with a short phone conversation.  If a prescription is necessary we can send it directly to your pharmacy.  If lab work is needed we can place an order for that and you can then stop by our lab to have the test done at a later time.    Telephone visits are billed at different rates depending on your insurance coverage. During this emergency period, for some insurers they may be billed the same as an in-person visit.  Please reach out to your insurance provider with any questions.    If during the course of the call the physician/provider feels a telephone visit is not appropriate, you will not be charged for this service.\"    Patient has given verbal consent for Telephone visit?  Yes    What phone number would you like to be contacted at? [phone]    How would you like to obtain your AVS? Juli Seaman     Ivy Darling is a 65 year old female who presents via phone visit today for the following health issues:    HPI      64 yo female evaluated via telephone visit for ongoing cough after Covid in early October.    Initially had symptoms of dry cough, fatigue, fever, still has an intermittent dry cough that is worse in the evening.    Cough is nonproductive.  Denies shortness of breath, chest pain, palpitations, orthopnea or PND.    Patient was given a course of prednisone and this did not change her symptoms.         Review of Systems   Constitutional, HEENT, cardiovascular, pulmonary, gi and gu systems are negative, except as otherwise noted.       Objective   Vitals - Patient Reported  Pain Score: No Pain " (0)        healthy, alert and no distress  PSYCH: Alert and oriented times 3; coherent speech, normal   rate and volume, able to articulate logical thoughts, able   to abstract reason, no tangential thoughts, no hallucinations   or delusions  Her affect is normal  RESP: No cough, no audible wheezing, able to talk in full sentences  Remainder of exam unable to be completed due to telephone visits            Assessment/Plan:  1. Cough      Chronic persistent cough for over 3 months post Covid.  No improvement after prednisone burst.  Discussed with the patient the need for follow-up in clinic to perform lung exam and possible chest x-ray.  She is in agreement to make an appointment later this week.      Phone call duration: 12 minutes      Yodit Villalta MD

## 2020-12-08 NOTE — NURSING NOTE
Patient is having telephone visit for on going cough. States has had since October.     No LMP recorded (lmp unknown). Patient has had an ablation.  Medication Reconciliation: complete    Ashley Slater LPN  12/8/2020 1:51 PM

## 2020-12-09 ASSESSMENT — ANXIETY QUESTIONNAIRES: GAD7 TOTAL SCORE: 5

## 2020-12-10 ENCOUNTER — HOSPITAL ENCOUNTER (OUTPATIENT)
Dept: GENERAL RADIOLOGY | Facility: OTHER | Age: 65
End: 2020-12-10
Attending: FAMILY MEDICINE
Payer: COMMERCIAL

## 2020-12-10 ENCOUNTER — OFFICE VISIT (OUTPATIENT)
Dept: FAMILY MEDICINE | Facility: OTHER | Age: 65
End: 2020-12-10
Attending: FAMILY MEDICINE
Payer: COMMERCIAL

## 2020-12-10 VITALS
SYSTOLIC BLOOD PRESSURE: 106 MMHG | RESPIRATION RATE: 20 BRPM | HEART RATE: 88 BPM | TEMPERATURE: 97.2 F | DIASTOLIC BLOOD PRESSURE: 70 MMHG

## 2020-12-10 DIAGNOSIS — R05.9 COUGH: Primary | ICD-10-CM

## 2020-12-10 DIAGNOSIS — R05.9 COUGH: ICD-10-CM

## 2020-12-10 PROCEDURE — 71046 X-RAY EXAM CHEST 2 VIEWS: CPT

## 2020-12-10 PROCEDURE — 99213 OFFICE O/P EST LOW 20 MIN: CPT | Performed by: FAMILY MEDICINE

## 2020-12-10 ASSESSMENT — PAIN SCALES - GENERAL: PAINLEVEL: NO PAIN (0)

## 2020-12-10 ASSESSMENT — PATIENT HEALTH QUESTIONNAIRE - PHQ9: SUM OF ALL RESPONSES TO PHQ QUESTIONS 1-9: 20

## 2020-12-11 NOTE — PROGRESS NOTES
"  SUBJECTIVE:   Ivy Darling is a 65 year old female who presents to clinic today for the following health issues:  Nursing Notes:   Cherelle Rose LPN  12/10/2020 10:14 AM  Signed  Chief Complaint   Patient presents with     Clinic Care Coordination - Follow-up     follow up ongoing cough since last March       Initial /70 (BP Location: Right arm, Patient Position: Sitting, Cuff Size: Adult Large)   Pulse 88   Temp 97.2  F (36.2  C) (Tympanic)   Resp 20   LMP  (LMP Unknown)   Breastfeeding No  Estimated body mass index is 40.74 kg/m  as calculated from the following:    Height as of 1/7/20: 1.6 m (5' 3\").    Weight as of 11/26/18: 104.3 kg (230 lb).  Medication Reconciliation: complete    Cherelle Rose LPN     Follow up for a chronic cough since last March.  Cough is not better and she does have some shortness of breath.  Cherelle Rose LPN..........12/10/2020  10:14 AM        HPI    65-year-old female presents with chronic cough since March.  We had a brief telephone visit earlier in the week and I recommend she follow-up in clinic.  She did have Covid that was nearly a month ago.  She reports having a dry nonproductive cough that is worse in the evenings.  She has no fever, chills, fatigue, nausea, vomiting, loss of taste or smell.  She does not have heartburn.  She is a non-smoker.  No history of underlying lung disease.    She was tried on a prednisone burst and that provided no relief.  Patient Active Problem List    Diagnosis Date Noted     History of 2019 novel coronavirus disease (COVID-19) 11/03/2020     Priority: Medium     Osteoarthritis of left glenohumeral joint 04/10/2018     Priority: Medium     Chronic pain disorder 01/26/2018     Priority: Medium     PRADIP on CPAP 11/20/2017     Priority: Medium     Dyslipidemia 10/24/2013     Priority: Medium     Major depressive disorder, recurrent episode, severe (H) 08/10/2011     Priority: Medium     Sarcoidosis 01/01/2007     Priority: Medium "     Past Medical History:   Diagnosis Date     AC (acromioclavicular) arthritis 4/10/2018     Allergic rhinitis due to pollen     No Comments Provided     Dorsalgia     10/7/2010     Erythema nodosum      2009     Essential tremor     Possible benign     Hyperlipidemia     10/24/2013     Ill-defined and unknown cause of mortality (CODE)     characterized by chronic pain, positive BARBARA at a low titer of 1:160, negative HLAB27,     Migraine without status migrainosus, not intractable     No Comments Provided     Osteoarthritis of left glenohumeral joint 4/10/2018     Postconcussional syndrome     1/26/2016     Sarcoidosis     2007     Tendinitis of left rotator cuff 4/10/2018        Review of Systems   All other systems reviewed and are negative.       OBJECTIVE:     /70 (BP Location: Right arm, Patient Position: Sitting, Cuff Size: Adult Large)   Pulse 88   Temp 97.2  F (36.2  C) (Tympanic)   Resp 20   LMP  (LMP Unknown)   Breastfeeding No   There is no height or weight on file to calculate BMI.  Physical Exam  Constitutional:       Appearance: She is not ill-appearing.   Cardiovascular:      Rate and Rhythm: Normal rate.      Heart sounds: No murmur.   Pulmonary:      Effort: Pulmonary effort is normal. No respiratory distress.      Breath sounds: No wheezing or rales.   Abdominal:      General: Abdomen is flat. There is no distension.      Tenderness: There is no abdominal tenderness.   Neurological:      Mental Status: She is alert.   Psychiatric:         Mood and Affect: Mood normal.         Diagnostic Test Results:  No results found for this or any previous visit (from the past 24 hour(s)).     Chest x-ray shows some scarring in the left base otherwise normal without acute infiltrate or mass.    ASSESSMENT/PLAN:           ICD-10-CM    1. Cough  R05 XR Chest 2 Views     omeprazole (PRILOSEC) 20 MG DR capsule       Chronic nonproductive cough.  No improvement after prednisone burst.  O2 sats and chest  x-ray were normal.  Suspect patient is experiencing silent acid reflux.  Recommend 4 weeks of PPI.  She agrees with this plan will follow up with me at that time.  Yodit Villalta MD  [facility] AND [facility]

## 2020-12-30 NOTE — PROGRESS NOTES
"Nursing Notes:   Gisele Lepe LPN  12/31/2020  1:19 PM  Sign at exiting of workspace  Patient presents to clinic with pain by right ear and goes down cheek area. Unsure if it is a tooth problem but wanted to \"start\" here.  Gisele Lepe LPN ....................  12/31/2020   1:19 PM      SUBJECTIVE:     HPI  Ivyrocio Darling is a 65 year old female who presents to clinic today for evaluation of ear concerns. States she has some pain near her right ear that began three weeks ago. Pain travels down jaw line and has recently has some more pain travel up towards the right side of her forehead as well. Pain is worse with chewing. Has not tried anything for symptomatic relief. Has not seen a dentist recently. No associated fever/chills, cough or cold symptoms, overlying skin changes.       Review of Systems   Per HPI.     PAST MEDICAL HISTORY:   Past Medical History:   Diagnosis Date     AC (acromioclavicular) arthritis 4/10/2018     Allergic rhinitis due to pollen     No Comments Provided     Dorsalgia     10/7/2010     Erythema nodosum      2009     Essential tremor     Possible benign     Hyperlipidemia     10/24/2013     Ill-defined and unknown cause of mortality (CODE)     characterized by chronic pain, positive BARBARA at a low titer of 1:160, negative HLAB27,     Migraine without status migrainosus, not intractable     No Comments Provided     Osteoarthritis of left glenohumeral joint 4/10/2018     Postconcussional syndrome     1/26/2016     Sarcoidosis     2007     Tendinitis of left rotator cuff 4/10/2018       PAST SURGICAL HISTORY:   Past Surgical History:   Procedure Laterality Date     ARTHROSCOPY SHOULDER      12/07,Right type 3 SLAP lesion repair of labrum - suprascapular ganglion removal with posterior approach - acromioplasty and distal clavicle resection     ARTHROSCOPY SHOULDER      2/2015     COLONOSCOPY  08/29/2011 8/2011,Normal--10 year followup     DILATION AND CURETTAGE      " ", and ablation     LAPAROSCOPIC CHOLECYSTECTOMY      6/23/15,Cholecystectomy,Laparoscopic     left shoulder replacement       right total shoulder replacement       TONSILLECTOMY       and adenoidectomy age 12       FAMILY HISTORY:   Family History   Adopted: Yes   Problem Relation Age of Onset     Unknown/Adopted Other         Unknown,Unknown. She is ADOPTED     Prostate Cancer Father         metastic Prostate CA       SOCIAL HISTORY:   Social History     Tobacco Use     Smoking status: Former Smoker     Quit date: 10/22/1991     Years since quittin.2     Smokeless tobacco: Never Used   Substance Use Topics     Alcohol use: Not Currently     Comment: Alcoholic Drinks/day: rarely        Allergies   Allergen Reactions     Penicillins Rash     Current Outpatient Medications   Medication     acetaminophen (TYLENOL) 500 MG tablet     buPROPion (WELLBUTRIN XL) 300 MG 24 hr tablet     Cholecalciferol (VITAMIN D3) 1000 UNITS CAPS     gabapentin (NEURONTIN) 600 MG tablet     lamoTRIgine (LAMICTAL) 100 MG tablet     lamoTRIgine (LAMICTAL) 200 MG tablet     omeprazole (PRILOSEC) 20 MG DR capsule     order for DME     predniSONE (DELTASONE) 10 MG tablet     venlafaxine (EFFEXOR-XR) 150 MG 24 hr capsule     No current facility-administered medications for this visit.        OBJECTIVE:     /66   Pulse 77   Temp 97.3  F (36.3  C)   Resp 14   Ht 1.6 m (5' 3\")   LMP  (LMP Unknown)   SpO2 98%   Breastfeeding No   BMI 40.74 kg/m    Body mass index is 40.74 kg/m .  Physical Exam  General: Pleasant, in no apparent distress.  Eyes: Sclera are white and conjunctiva are clear bilaterally. Lacrimal apparatus free of erythema, edema, and discharge bilaterally.  Ears: External ears without erythema or edema. Tympanic membranes are pearly white and without erythema, scarring or perforations bilaterally. External auditory canals are free of foreign bodies, erythema, ulcers, and masses.  Nose: External nose is " symmetrical and free of lesions and deformities.   Oropharynx: Oral mucosa is pink and without ulcers, nodules, and white patches. Tongue is symmetrical, pink, and without masses or lesions. Pharynx is pink, symmetrical, and without lesions. Uvula is midline. Tonsils are pink, symmetrical, and without edema, ulcers, or exudates, and 1+ bilaterally.  Tenderness to palpation over right TMJ and pterygoid muscles on right. Clicking in TMJ bilaterally with jaw ROM.   Neck: No cervical lymphadenopathy on inspection and palpation.  Skin: No jaundice, pallor, rashes, or lesions.  Psych: Appropriate mood and affect.        ASSESSMENT/PLAN:     (M26.621) TMJ tenderness, right  (primary encounter diagnosis)  Comment: Discussed with patient that symptoms seem most consistent with TMJ or muscle or mastication given exam findings and symptoms worsening with chewing. Encouraged symptomatic management with ibuprofen or Tylenol, icing, resting those areas. Follow up with dentist if no improvement to rule out dental concern. If still no diagnosis follow up in clinic as needed.   Plan: Follow up as needed.       Anya Calhoun PA-C  [facility] AND [facility]

## 2020-12-31 ENCOUNTER — OFFICE VISIT (OUTPATIENT)
Dept: FAMILY MEDICINE | Facility: OTHER | Age: 65
End: 2020-12-31
Attending: PHYSICIAN ASSISTANT
Payer: COMMERCIAL

## 2020-12-31 VITALS
OXYGEN SATURATION: 98 % | DIASTOLIC BLOOD PRESSURE: 66 MMHG | SYSTOLIC BLOOD PRESSURE: 126 MMHG | HEART RATE: 77 BPM | RESPIRATION RATE: 14 BRPM | HEIGHT: 63 IN | BODY MASS INDEX: 40.74 KG/M2 | TEMPERATURE: 97.3 F

## 2020-12-31 DIAGNOSIS — M26.621 TMJ TENDERNESS, RIGHT: Primary | ICD-10-CM

## 2020-12-31 PROCEDURE — 99213 OFFICE O/P EST LOW 20 MIN: CPT | Performed by: PHYSICIAN ASSISTANT

## 2020-12-31 ASSESSMENT — PAIN SCALES - GENERAL: PAINLEVEL: MILD PAIN (3)

## 2020-12-31 NOTE — PATIENT INSTRUCTIONS
Patient Education     TMJ Syndrome  The temporomandibular joint (TMJ) is the joint that connects your lower jaw to your head. You can feel it in front of your ears when you open and close your mouth. TMJ disorders involve chronic or recurrent pain in the joint. When treated, symptoms of TMJ disorders usually go away within a few months.  Causes  There is no widely agreed-on cause of TMJ disorders. They have been linked to injury, arthritis, chronic fatigue syndrome, and fibromyalgia. A definite connection has not been shown, though.  Symptoms    Pain in the face, jaw, or neck    Pain with jaw movement or chewing    Locking or catching sensation of the jaw    Clicking, popping, or grinding sounds with movement of the TMJ    Headache    Ear pain  Home care  Modest, nonsurgical treatments are a good first step toward relieving symptoms. Try the approaches described below.    Rest the jaw by avoiding crunchy or hard-to-chew foods. Don t eat hard or sticky candies. Soft foods and liquids are easier on the jaw.    Protect your jaw while yawning. If you need to yawn, put your fist under your chin to prevent your mouth from opening up too wide.    To help relieve pain, try applying hot or cold packs to the painful area. Try both hot and cold to find out which works best for you. To make a cold pack, put ice cubes in a plastic bag that seals at the top. Wrap the bag in a clean, thin towel or cloth. Never put ice or an ice pack directly on the skin. If you use hot packs (small towels soaked in hot water), be careful not to burn yourself.    You may take acetaminophen or ibuprofen for pain, unless you were given a different pain medicine. (Note: If you have chronic liver or kidney disease or have ever had a stomach ulcer or gastrointestinal bleeding, talk with your healthcare provider before using these medicines. Also talk to your provider if you are taking medicine to prevent blood clots.) Don t give aspirin to a child  younger than age 19 unless directed by the child s provider. Taking aspirin can put a child at risk for Reye syndrome. This is a rare but very serious disorder that most often affects the brain and the liver.  Reducing stress  If stress seems to be contributing to your symptoms, try to identify the sources of stress in your life. These aren t always obvious. Common stressors include:    Everyday hassles. These include things such as traffic jams, missed appointments, or car trouble.    Major life changes. These can be good, such as a new baby or job promotion. And they can be bad, such as losing a job or losing a loved one.    Overload. The feeling that you have too many responsibilities and can't take care of everything at once.    Helplessness. Feeling like your problems are more than you can solve.  When possible, do something about your sources of stress. See if you can avoid hassles, limit the amount of change in your life at one time, and take breaks when you feel overloaded.  Unfortunately, many stressful situations cannot be avoided. So learning how to manage stress better is very important. Getting regular exercise, eating nutritious, balanced meals, and getting adequate rest all help to make everyday stress more manageable. Certain techniques are also helpful: relaxation and breathing exercises, visualization, biofeedback, meditation, or simply taking some time out to clear your mind. For more information, talk with your healthcare provider.  Follow-up care  Follow up with your healthcare provider, or as advised. Further testing and additional treatment may be required. If changes to your lifestyle do not improve your symptoms, talk with your healthcare provider about other available therapies. These include bite guards for help with teeth grinding, stress management techniques, and more. If stress is an important factor and does not respond to the above simple measures, talk with your healthcare provider  about a referral for stress management.  If X-rays were done, they will be reviewed by a specialist. You will be notified of the results, especially if they affect treatment.  Call 911  Call 911 if any of these occur:    Trouble breathing or swallowing, wheezing    Confusion    Extreme drowsiness or trouble awakening    Fainting or loss of consciousness    Rapid heart rate  When to seek medical advice  Call your healthcare provider right away if any of these occur:    Swollen or red face    Pain gets worse    Neck, mouth, tooth, or throat pain gets worse    Fever of 100.4 F (38 C) or higher, or as directed by your healthcare provider  Bruna last reviewed this educational content on 10/1/2017    [id number] [employer]. [address] [place] [address], [place] [address]. All rights reserved. This information is not intended as a substitute for professional medical care. Always follow your healthcare professional's instructions.

## 2020-12-31 NOTE — NURSING NOTE
"Patient presents to clinic with pain by right ear and goes down cheek area. Unsure if it is a tooth problem but wanted to \"start\" here.  Gisele Lepe LPN ....................  12/31/2020   1:19 PM      "

## 2021-01-25 ENCOUNTER — TELEPHONE (OUTPATIENT)
Dept: FAMILY MEDICINE | Facility: OTHER | Age: 66
End: 2021-01-25

## 2021-01-25 DIAGNOSIS — M26.621 TMJ TENDERNESS, RIGHT: Primary | ICD-10-CM

## 2021-01-25 NOTE — TELEPHONE ENCOUNTER
[facility] is looking for a reff for PT    Please Call and advise    Thank You    Harriett Mendoza on 1/25/2021 at 12:38 PM

## 2021-02-11 ENCOUNTER — OFFICE VISIT (OUTPATIENT)
Dept: FAMILY MEDICINE | Facility: OTHER | Age: 66
End: 2021-02-11
Attending: FAMILY MEDICINE
Payer: COMMERCIAL

## 2021-02-11 VITALS
RESPIRATION RATE: 20 BRPM | DIASTOLIC BLOOD PRESSURE: 72 MMHG | TEMPERATURE: 98.1 F | SYSTOLIC BLOOD PRESSURE: 130 MMHG | HEART RATE: 86 BPM | OXYGEN SATURATION: 97 %

## 2021-02-11 DIAGNOSIS — F33.2 SEVERE EPISODE OF RECURRENT MAJOR DEPRESSIVE DISORDER, WITHOUT PSYCHOTIC FEATURES (H): ICD-10-CM

## 2021-02-11 DIAGNOSIS — R05.3 CHRONIC COUGH: Primary | ICD-10-CM

## 2021-02-11 PROCEDURE — 99214 OFFICE O/P EST MOD 30 MIN: CPT | Performed by: FAMILY MEDICINE

## 2021-02-11 RX ORDER — CETIRIZINE HYDROCHLORIDE 10 MG/1
10 TABLET ORAL DAILY
Qty: 14 TABLET | Refills: 0 | Status: SHIPPED | OUTPATIENT
Start: 2021-02-11 | End: 2021-02-25

## 2021-02-11 RX ORDER — LAMOTRIGINE 200 MG/1
200 TABLET ORAL AT BEDTIME
Qty: 90 TABLET | Refills: 3 | OUTPATIENT
Start: 2021-02-11

## 2021-02-11 ASSESSMENT — ENCOUNTER SYMPTOMS
CHEST TIGHTNESS: 0
APNEA: 0
SINUS PAIN: 0
COUGH: 1
FEVER: 0
SHORTNESS OF BREATH: 0
CHOKING: 0
ANAL BLEEDING: 0
WHEEZING: 0
PALPITATIONS: 0
SORE THROAT: 0
ABDOMINAL DISTENTION: 1
SINUS PRESSURE: 0
STRIDOR: 0
FATIGUE: 0

## 2021-02-11 ASSESSMENT — PAIN SCALES - GENERAL: PAINLEVEL: MILD PAIN (3)

## 2021-02-11 NOTE — TELEPHONE ENCOUNTER
Disp Refills Start End CADE   lamoTRIgine (LAMICTAL) 200 MG tablet 90 tablet 3 11/3/2020  No   Sig - Route: Take 1 tablet (200 mg) by mouth At Bedtime - Oral     Redundant refill request refused: Too soon: Should have a refill available at pharmacy. Modesta Machado RN  ....................  2/11/2021   8:35 AM

## 2021-02-11 NOTE — PATIENT INSTRUCTIONS
Trial of zyrtec 10 mg daily x 14 days    If no change, will proceed with chest CT scan to look closer at the lungs

## 2021-02-11 NOTE — NURSING NOTE
Patient is here for on going abdomen pain and gas. States has been for a while, no changes.     No LMP recorded (lmp unknown). Patient has had an ablation.  Medication Reconciliation: leticia Slater LPN  2/11/2021 12:20 PM

## 2021-02-11 NOTE — PROGRESS NOTES
SUBJECTIVE:   Ivy Darling is a 65 year old female who presents to clinic today for the following health issues:  Nursing Notes:   Ashley Slater LPN  2/11/2021 12:24 PM  Signed  Patient is here for on going abdomen pain and gas. States has been for a while, no changes.     No LMP recorded (lmp unknown). Patient has had an ablation.  Medication Reconciliation: leticia Slater LPN  2/11/2021 12:20 PM        HPI    66 yo female presents for follow-up of a few concerns.    Patient thinks that she had Covid in March.  Since that time she does not feel right.  She has had a constant dry cough.  Denies shortness of breath.  Some upper abdominal bloating.    Started on Prilosec due to chronic cough. No improvement.   Tried Prednisone burst with no change either  Cough actually does not bother her but is irritating to her .    Upper abdominal bloating/pressure. Hard BM, q 2 days.  Weight stable appetite good.  No nausea vomiting.    No history of asthma or use of inhalers.        Patient Active Problem List    Diagnosis Date Noted     History of 2019 novel coronavirus disease (COVID-19) 11/03/2020     Priority: Medium     Osteoarthritis of left glenohumeral joint 04/10/2018     Priority: Medium     Chronic pain disorder 01/26/2018     Priority: Medium     PRADIP on CPAP 11/20/2017     Priority: Medium     Dyslipidemia 10/24/2013     Priority: Medium     Major depressive disorder, recurrent episode, severe (H) 08/10/2011     Priority: Medium     Sarcoidosis 01/01/2007     Priority: Medium     Past Medical History:   Diagnosis Date     AC (acromioclavicular) arthritis 4/10/2018     Allergic rhinitis due to pollen     No Comments Provided     Dorsalgia     10/7/2010     Erythema nodosum      2009     Essential tremor     Possible benign     Hyperlipidemia     10/24/2013     Ill-defined and unknown cause of mortality (CODE)     characterized by chronic pain, positive BARBARA at a low titer of  1:160, negative HLAB27,     Migraine without status migrainosus, not intractable     No Comments Provided     Osteoarthritis of left glenohumeral joint 4/10/2018     Postconcussional syndrome     1/26/2016     Sarcoidosis     2007     Tendinitis of left rotator cuff 4/10/2018      Current Outpatient Medications   Medication Sig Dispense Refill     acetaminophen (TYLENOL) 500 MG tablet Take 1,000 mg by mouth 3 times daily as needed       buPROPion (WELLBUTRIN XL) 300 MG 24 hr tablet TAKE 1 TABLET BY MOUTH ONCE DAILY 90 tablet 3     cetirizine (ZYRTEC) 10 MG tablet Take 1 tablet (10 mg) by mouth daily for 14 days 14 tablet 0     Cholecalciferol (VITAMIN D3) 1000 UNITS CAPS Take 1,000 Units by mouth daily       gabapentin (NEURONTIN) 600 MG tablet 1 tab  tablet 3     lamoTRIgine (LAMICTAL) 100 MG tablet Take 1 tablet (100 mg) by mouth every morning 90 tablet 3     lamoTRIgine (LAMICTAL) 200 MG tablet Take 1 tablet (200 mg) by mouth At Bedtime 90 tablet 3     omeprazole (PRILOSEC) 20 MG DR capsule Take 1 capsule (20 mg) by mouth daily 30 capsule 0     order for DME Equipment being ordered: CPAP supplies 1 each 0     venlafaxine (EFFEXOR-XR) 150 MG 24 hr capsule Take 1 capsule (150 mg) by mouth daily with food 90 capsule 3       Review of Systems   Constitutional: Negative for fatigue and fever.   HENT: Negative for sinus pressure, sinus pain, sneezing and sore throat.    Respiratory: Positive for cough. Negative for apnea, choking, chest tightness, shortness of breath, wheezing and stridor.    Cardiovascular: Negative for palpitations and peripheral edema.   Gastrointestinal: Positive for abdominal distention. Negative for anal bleeding.        OBJECTIVE:     /72 (BP Location: Right arm, Patient Position: Sitting, Cuff Size: Adult Large)   Pulse 86   Temp 98.1  F (36.7  C) (Tympanic)   Resp 20   LMP  (LMP Unknown)   SpO2 97%   Breastfeeding No   There is no height or weight on file to calculate  BMI.  Physical Exam  Neck:      Musculoskeletal: Normal range of motion. No neck rigidity.   Cardiovascular:      Rate and Rhythm: Normal rate.      Heart sounds: No murmur.   Pulmonary:      Effort: Pulmonary effort is normal. No respiratory distress.      Breath sounds: Normal breath sounds. No wheezing.   Abdominal:      General: Abdomen is flat. There is no distension.      Tenderness: There is no abdominal tenderness.   Lymphadenopathy:      Cervical: No cervical adenopathy.   Neurological:      Mental Status: She is alert.   Psychiatric:         Mood and Affect: Mood normal.         Chest x-ray at last visit showed some scarring in the left lower lobe otherwise normal.  Patient has no history of pulmonary function studies.    ASSESSMENT/PLAN:           ICD-10-CM    1. Chronic cough  R05 cetirizine (ZYRTEC) 10 MG tablet   65-year-old female with chronic dry  cough for nearly a year.  Slightly worsened since Covid diagnosis in early October.  Exercise tolerance is normal and she has no associated shortness of breath.    Discussed with the patient differential including silent acid reflux, reactive airway disease, allergies or infection.    No improvement with 4-week trial of Prilosec.    No improvement with prednisone burst.    We will try Zyrtec 10 mg daily for 2 weeks for possible allergy/postnasal drainage.    Patient Instructions   Trial of zyrtec 10 mg daily x 14 days    If no change, will proceed with chest CT scan to look closer at the lungs      I spent over 30 minutes with the patient in direct contact, physical examination, review of electronic medical record and recent diagnostic studies and documentation.  Yodit Villalta MD  [facility] AND [facility]

## 2021-02-16 ENCOUNTER — DOCUMENTATION ONLY (OUTPATIENT)
Dept: OTHER | Facility: CLINIC | Age: 66
End: 2021-02-16

## 2021-03-06 ENCOUNTER — HEALTH MAINTENANCE LETTER (OUTPATIENT)
Age: 66
End: 2021-03-06

## 2021-09-20 ENCOUNTER — ALLIED HEALTH/NURSE VISIT (OUTPATIENT)
Dept: FAMILY MEDICINE | Facility: OTHER | Age: 66
End: 2021-09-20
Attending: FAMILY MEDICINE
Payer: COMMERCIAL

## 2021-09-20 DIAGNOSIS — J02.9 SORE THROAT: ICD-10-CM

## 2021-09-20 DIAGNOSIS — R51.9 NONINTRACTABLE HEADACHE, UNSPECIFIED CHRONICITY PATTERN, UNSPECIFIED HEADACHE TYPE: Primary | ICD-10-CM

## 2021-09-20 PROCEDURE — U0005 INFEC AGEN DETEC AMPLI PROBE: HCPCS | Mod: ZL

## 2021-09-20 PROCEDURE — C9803 HOPD COVID-19 SPEC COLLECT: HCPCS

## 2021-09-22 LAB — SARS-COV-2 RNA RESP QL NAA+PROBE: NEGATIVE

## 2021-10-08 ENCOUNTER — ALLIED HEALTH/NURSE VISIT (OUTPATIENT)
Dept: FAMILY MEDICINE | Facility: OTHER | Age: 66
End: 2021-10-08
Attending: FAMILY MEDICINE
Payer: COMMERCIAL

## 2021-10-08 DIAGNOSIS — R05.9 COUGH: Primary | ICD-10-CM

## 2021-10-08 PROCEDURE — U0005 INFEC AGEN DETEC AMPLI PROBE: HCPCS | Mod: ZL

## 2021-10-08 PROCEDURE — C9803 HOPD COVID-19 SPEC COLLECT: HCPCS

## 2021-10-09 ENCOUNTER — HEALTH MAINTENANCE LETTER (OUTPATIENT)
Age: 66
End: 2021-10-09

## 2021-10-10 LAB — SARS-COV-2 RNA RESP QL NAA+PROBE: NEGATIVE

## 2021-10-12 ENCOUNTER — OFFICE VISIT (OUTPATIENT)
Dept: FAMILY MEDICINE | Facility: OTHER | Age: 66
End: 2021-10-12
Attending: FAMILY MEDICINE
Payer: COMMERCIAL

## 2021-10-12 VITALS
OXYGEN SATURATION: 96 % | HEART RATE: 73 BPM | BODY MASS INDEX: 40.74 KG/M2 | TEMPERATURE: 98.1 F | DIASTOLIC BLOOD PRESSURE: 76 MMHG | SYSTOLIC BLOOD PRESSURE: 132 MMHG | HEIGHT: 63 IN | RESPIRATION RATE: 21 BRPM

## 2021-10-12 DIAGNOSIS — E66.01 MORBID OBESITY (H): ICD-10-CM

## 2021-10-12 DIAGNOSIS — J21.0 RSV BRONCHIOLITIS: ICD-10-CM

## 2021-10-12 DIAGNOSIS — R05.9 COUGH: Primary | ICD-10-CM

## 2021-10-12 LAB
FLUAV RNA SPEC QL NAA+PROBE: NEGATIVE
FLUBV RNA RESP QL NAA+PROBE: NEGATIVE
RSV RNA SPEC NAA+PROBE: POSITIVE
SARS-COV-2 RNA RESP QL NAA+PROBE: NEGATIVE

## 2021-10-12 PROCEDURE — U0005 INFEC AGEN DETEC AMPLI PROBE: HCPCS | Mod: ZL | Performed by: FAMILY MEDICINE

## 2021-10-12 PROCEDURE — 99213 OFFICE O/P EST LOW 20 MIN: CPT | Performed by: FAMILY MEDICINE

## 2021-10-12 PROCEDURE — 87631 RESP VIRUS 3-5 TARGETS: CPT | Mod: ZL | Performed by: FAMILY MEDICINE

## 2021-10-12 PROCEDURE — C9803 HOPD COVID-19 SPEC COLLECT: HCPCS

## 2021-10-12 ASSESSMENT — PAIN SCALES - GENERAL: PAINLEVEL: MODERATE PAIN (4)

## 2021-10-12 ASSESSMENT — PATIENT HEALTH QUESTIONNAIRE - PHQ9: SUM OF ALL RESPONSES TO PHQ QUESTIONS 1-9: 17

## 2021-10-12 NOTE — NURSING NOTE
"Chief Complaint   Patient presents with     Fever     low grade     Pharyngitis     Cough     Patient presents to clinic with low grade fever, sore throat and cough. Symptoms started last Tuesday. Tested negative for covid on 10/8/21.    Initial /76 (BP Location: Right arm, Patient Position: Sitting, Cuff Size: Adult Regular)   Pulse 73   Temp 98.1  F (36.7  C) (Temporal)   Resp 21   Ht 1.6 m (5' 3\")   SpO2 96%   BMI 40.74 kg/m   Estimated body mass index is 40.74 kg/m  as calculated from the following:    Height as of this encounter: 1.6 m (5' 3\").    Weight as of 11/26/18: 104.3 kg (230 lb).     FOOD SECURITY SCREENING QUESTIONS  Hunger Vital Signs:  Within the past 12 months we worried whether our food would run out before we got money to buy more. Never  Within the past 12 months the food we bought just didn't last and we didn't have money to get more. Never      Medication Reconciliation: Complete      Rachael Oconnor   "

## 2021-10-12 NOTE — PROGRESS NOTES
"  SUBJECTIVE:   Ivy TREADWELL Vika Romero is a 66 year old female who presents to clinic today for the following health issues:  Nursing Notes:   Rachael Oconnor  10/12/2021 11:25 AM  Sign at exiting of workspace  Chief Complaint   Patient presents with     Fever     low grade     Pharyngitis     Cough     Patient presents to clinic with low grade fever, sore throat and cough. Symptoms started last Tuesday. Tested negative for covid on 10/8/21.    Initial /76 (BP Location: Right arm, Patient Position: Sitting, Cuff Size: Adult Regular)   Pulse 73   Temp 98.1  F (36.7  C) (Temporal)   Resp 21   Ht 1.6 m (5' 3\")   SpO2 96%   BMI 40.74 kg/m   Estimated body mass index is 40.74 kg/m  as calculated from the following:    Height as of this encounter: 1.6 m (5' 3\").    Weight as of 11/26/18: 104.3 kg (230 lb).     FOOD SECURITY SCREENING QUESTIONS  Hunger Vital Signs:  Within the past 12 months we worried whether our food would run out before we got money to buy more. Never  Within the past 12 months the food we bought just didn't last and we didn't have money to get more. Never      Medication Reconciliation: Complete      Rachael Oconnor       HPI    Pleasant 66-year-old female presents with ongoing cough.    Initial symptoms include a mild sore throat and low-grade fever.  Cough is persisted.  She feels tired.  Does not feel short of breath.  No wheezing.  She did test positive for Covid in November 2020.  She is received both of her Covid vaccines.    No known Covid exposure.  She socially distance during surgeries.    Had a Covid test 3 days ago that was negative.      Patient Active Problem List    Diagnosis Date Noted     Morbid obesity (H) 10/12/2021     Priority: Medium     History of 2019 novel coronavirus disease (COVID-19) 11/03/2020     Priority: Medium     Osteoarthritis of left glenohumeral joint 04/10/2018     Priority: Medium     Chronic pain disorder 01/26/2018     Priority: Medium     PRADIP " on CPAP 11/20/2017     Priority: Medium     Dyslipidemia 10/24/2013     Priority: Medium     Major depressive disorder, recurrent episode, severe (H) 08/10/2011     Priority: Medium     Sarcoidosis 01/01/2007     Priority: Medium     Past Medical History:   Diagnosis Date     AC (acromioclavicular) arthritis 4/10/2018     Allergic rhinitis due to pollen     No Comments Provided     Dorsalgia     10/7/2010     Erythema nodosum      2009     Essential tremor     Possible benign     Hyperlipidemia     10/24/2013     Ill-defined and unknown cause of mortality (CODE)     characterized by chronic pain, positive BARBARA at a low titer of 1:160, negative HLAB27,     Migraine without status migrainosus, not intractable     No Comments Provided     Osteoarthritis of left glenohumeral joint 4/10/2018     Postconcussional syndrome     1/26/2016     Sarcoidosis     2007     Tendinitis of left rotator cuff 4/10/2018      Current Outpatient Medications   Medication Sig Dispense Refill     acetaminophen (TYLENOL) 500 MG tablet Take 1,000 mg by mouth 3 times daily as needed       buPROPion (WELLBUTRIN XL) 300 MG 24 hr tablet TAKE 1 TABLET BY MOUTH ONCE DAILY 90 tablet 3     Cholecalciferol (VITAMIN D3) 1000 UNITS CAPS Take 1,000 Units by mouth daily       gabapentin (NEURONTIN) 600 MG tablet 1 tab  tablet 3     lamoTRIgine (LAMICTAL) 100 MG tablet Take 1 tablet (100 mg) by mouth every morning 90 tablet 3     lamoTRIgine (LAMICTAL) 200 MG tablet Take 1 tablet (200 mg) by mouth At Bedtime 90 tablet 3     omeprazole (PRILOSEC) 20 MG DR capsule Take 1 capsule (20 mg) by mouth daily 30 capsule 0     order for DME Equipment being ordered: CPAP supplies 1 each 0     venlafaxine (EFFEXOR-XR) 150 MG 24 hr capsule Take 1 capsule (150 mg) by mouth daily with food 90 capsule 3     Allergies   Allergen Reactions     Penicillins Rash       Review of Systems   All other systems reviewed and are negative.       OBJECTIVE:     /76 (BP  "Location: Right arm, Patient Position: Sitting, Cuff Size: Adult Regular)   Pulse 73   Temp 98.1  F (36.7  C) (Temporal)   Resp 21   Ht 1.6 m (5' 3\")   SpO2 96%   BMI 40.74 kg/m    Body mass index is 40.74 kg/m .  Physical Exam  HENT:      Mouth/Throat:      Mouth: Mucous membranes are moist.      Pharynx: Oropharynx is clear.   Cardiovascular:      Rate and Rhythm: Normal rate.      Heart sounds: No murmur heard.     Pulmonary:      Effort: Pulmonary effort is normal. No respiratory distress.      Breath sounds: No wheezing, rhonchi or rales.   Musculoskeletal:      Cervical back: Normal range of motion. No rigidity.   Lymphadenopathy:      Cervical: No cervical adenopathy.   Neurological:      Mental Status: She is alert.         Diagnostic Test Results:  Results for orders placed or performed in visit on 10/12/21 (from the past 24 hour(s))   Influenza A and B and RSV PCR    Specimen: Nose; Swab   Result Value Ref Range    Influenza A target Negative Negative    Influenza B target Negative Negative    RSV target Positive (A) Negative    Narrative    The [employer] Xpert  Xpress Flu/RSV Assay, performed on the [employer]  [employer]ument Systems, is an automated, multiplex real-time, reverse transcriptase polymerase chain reaction (RT-PCR) assay intended for the in vitro qualitative detection and differentiation of influenza A, influenza B, and respiratory syncytial virus (RSV) viral RNA. The Xpert Xpress Flu/RSV Assay uses nasopharyngeal swab and nasal swab specimens collected from patients with signs and symptoms of respiratory infection. The Xpert Xpress Flu/RSV Assay is intended as an aid in the diagnosis of influenza and respiratory syncytial virus infections in conjunction with clinical and epidemiological risk factors.   Negative results do not preclude influenza virus or RSV infection and should not be used as the sole basis for treatment or other patient management decisions.   Symptomatic COVID-19 Virus " (Coronavirus) by PCR Nose    Specimen: Nose; Swab   Result Value Ref Range    SARS CoV2 PCR Negative Negative    Narrative    Testing was performed using the Xpert Xpress SARS-CoV-2 Assay on the   Cepheid Gene-Xpert Instrument Systems. Additional information about   this Emergency Use Authorization (EUA) assay can be found via the Lab   Guide. This test should be ordered for the detection of SARS-CoV-2 in   individuals who meet SARS-CoV-2 clinical and/or epidemiological   criteria. Test performance is unknown in asymptomatic patients. This   test is for in vitro diagnostic use under the FDA EUA for   laboratories certified under CLIA to perform high complexity testing.   This test has not been FDA cleared or approved. A negative result   does not rule out the presence of PCR inhibitors in the specimen or   target RNA in concentration below the limit of detection for the   assay. The possibility of a false negative should be considered if   the patient's recent exposure or clinical presentation suggests   COVID-19. This test was validated by [facility]. This laboratory is certified under the Clinic  al Laboratory Improvement Amendments (CLIA) as qualified to perform high complex  ity clinical laboratory testing.       ASSESSMENT/PLAN:           ICD-10-CM    1. Cough  R05.9 Symptomatic COVID-19 Virus (Coronavirus) by PCR     Influenza A and B and RSV PCR     Symptomatic COVID-19 Virus (Coronavirus) by PCR Nose   2. Morbid obesity (H)  E66.01    3. RSV bronchiolitis  J21.0        Rapid quad screen was positive for RSV, negative for COVID-19.  Suspect she is at the worst point in RSV infection and would expect her to gradually improve over the next few days.  Discussed supportive cares and that antibiotics are not indicated.  Should she develop a fever, shortness of breath or worsening symptoms, could consider empiric course of antibiotics for possible secondary  bacterial infection.  Yodit Villalta MD  [facility] AND [facility]

## 2021-10-28 ENCOUNTER — TELEPHONE (OUTPATIENT)
Dept: FAMILY MEDICINE | Facility: OTHER | Age: 66
End: 2021-10-28

## 2021-10-28 DIAGNOSIS — R05.9 COUGH: Primary | ICD-10-CM

## 2021-10-28 RX ORDER — CODEINE PHOSPHATE AND GUAIFENESIN 10; 100 MG/5ML; MG/5ML
1-2 SOLUTION ORAL EVERY 4 HOURS PRN
Qty: 80 ML | Refills: 0 | Status: SHIPPED | OUTPATIENT
Start: 2021-10-28 | End: 2022-03-08

## 2021-10-28 NOTE — TELEPHONE ENCOUNTER
Patient is scheduled for [facility] covid testing on Sunday but is wondering if she could get some stronger Cough medicine.  Currently taking Robitussin DM and it is not helping enough.  She sounded like she did not feel well at all. Horse voice, tight cough with some wheezing.  States she has been sick like this for some a while and was already tested for covid in the beginning of oct.   Ivette Del Rosario LPN........................10/28/2021  8:54 AM

## 2021-10-28 NOTE — TELEPHONE ENCOUNTER
Patient would like to get some stronger cough syrup----also was exposed to covid so will test. Please call

## 2021-10-29 ENCOUNTER — TELEPHONE (OUTPATIENT)
Dept: FAMILY MEDICINE | Facility: OTHER | Age: 66
End: 2021-10-29

## 2021-10-29 NOTE — TELEPHONE ENCOUNTER
Spoke to patient notified the script was sent in yesterday for cough med to [facility].  Ashley Slater LPN .............10/29/2021     1:28 PM

## 2021-11-01 ENCOUNTER — TELEPHONE (OUTPATIENT)
Dept: FAMILY MEDICINE | Facility: OTHER | Age: 66
End: 2021-11-01

## 2021-11-01 ENCOUNTER — TELEPHONE (OUTPATIENT)
Dept: FAMILY MEDICINE | Facility: OTHER | Age: 66
End: 2021-11-01
Payer: COMMERCIAL

## 2021-11-01 NOTE — TELEPHONE ENCOUNTER
guaiFENesin-codeine (ROBITUSSIN AC) 100-10 MG/5ML solution    Patient said [facility] is saying that they never received this RX.   Please call patient back. Thank you  Eliza Brooks on 11/1/2021 at 12:49 PM

## 2021-11-01 NOTE — TELEPHONE ENCOUNTER
Called pharmacy script states was sent to pharmacy, and confirmed that was received, they did not get it. Spoke to pharmacist and gave a verbal.   Patient notified of jeanette at the pharmacy.   Ashley Slater LPN .............11/1/2021     1:49 PM

## 2021-11-19 ENCOUNTER — IMMUNIZATION (OUTPATIENT)
Dept: FAMILY MEDICINE | Facility: OTHER | Age: 66
End: 2021-11-19
Attending: FAMILY MEDICINE
Payer: COMMERCIAL

## 2021-11-19 VITALS
OXYGEN SATURATION: 99 % | SYSTOLIC BLOOD PRESSURE: 136 MMHG | RESPIRATION RATE: 18 BRPM | TEMPERATURE: 97.1 F | HEART RATE: 78 BPM | DIASTOLIC BLOOD PRESSURE: 78 MMHG

## 2021-11-19 DIAGNOSIS — M67.432 GANGLION CYST OF WRIST, LEFT: ICD-10-CM

## 2021-11-19 DIAGNOSIS — F33.2 SEVERE EPISODE OF RECURRENT MAJOR DEPRESSIVE DISORDER, WITHOUT PSYCHOTIC FEATURES (H): Primary | ICD-10-CM

## 2021-11-19 DIAGNOSIS — G89.4 CHRONIC PAIN DISORDER: ICD-10-CM

## 2021-11-19 PROCEDURE — 99213 OFFICE O/P EST LOW 20 MIN: CPT | Performed by: FAMILY MEDICINE

## 2021-11-19 PROCEDURE — 90662 IIV NO PRSV INCREASED AG IM: CPT

## 2021-11-19 PROCEDURE — 0004A PR COVID VAC PFIZER DIL RECON 30 MCG/0.3 ML IM: CPT

## 2021-11-19 PROCEDURE — 91300 PR COVID VAC PFIZER DIL RECON 30 MCG/0.3 ML IM: CPT

## 2021-11-19 PROCEDURE — 90471 IMMUNIZATION ADMIN: CPT

## 2021-11-19 RX ORDER — BUPROPION HYDROCHLORIDE 300 MG/1
TABLET ORAL
Qty: 90 TABLET | Refills: 3 | Status: SHIPPED | OUTPATIENT
Start: 2021-11-19 | End: 2022-12-28

## 2021-11-19 RX ORDER — LAMOTRIGINE 200 MG/1
200 TABLET ORAL AT BEDTIME
Qty: 90 TABLET | Refills: 3 | Status: SHIPPED | OUTPATIENT
Start: 2021-11-19 | End: 2022-12-28

## 2021-11-19 RX ORDER — VENLAFAXINE HYDROCHLORIDE 150 MG/1
150 CAPSULE, EXTENDED RELEASE ORAL
Qty: 90 CAPSULE | Refills: 3 | Status: SHIPPED | OUTPATIENT
Start: 2021-11-19 | End: 2022-12-20

## 2021-11-19 RX ORDER — LAMOTRIGINE 100 MG/1
100 TABLET ORAL EVERY MORNING
Qty: 90 TABLET | Refills: 3 | Status: SHIPPED | OUTPATIENT
Start: 2021-11-19 | End: 2023-01-10

## 2021-11-19 RX ORDER — GABAPENTIN 600 MG/1
TABLET ORAL
Qty: 270 TABLET | Refills: 3 | Status: SHIPPED | OUTPATIENT
Start: 2021-11-19 | End: 2022-10-20

## 2021-11-19 ASSESSMENT — ANXIETY QUESTIONNAIRES
GAD7 TOTAL SCORE: 15
8. IF YOU CHECKED OFF ANY PROBLEMS, HOW DIFFICULT HAVE THESE MADE IT FOR YOU TO DO YOUR WORK, TAKE CARE OF THINGS AT HOME, OR GET ALONG WITH OTHER PEOPLE?: VERY DIFFICULT
7. FEELING AFRAID AS IF SOMETHING AWFUL MIGHT HAPPEN: SEVERAL DAYS
1. FEELING NERVOUS, ANXIOUS, OR ON EDGE: NEARLY EVERY DAY
GAD7 TOTAL SCORE: 15
2. NOT BEING ABLE TO STOP OR CONTROL WORRYING: NEARLY EVERY DAY
4. TROUBLE RELAXING: NEARLY EVERY DAY
3. WORRYING TOO MUCH ABOUT DIFFERENT THINGS: NEARLY EVERY DAY
GAD7 TOTAL SCORE: 15
5. BEING SO RESTLESS THAT IT IS HARD TO SIT STILL: NOT AT ALL
6. BECOMING EASILY ANNOYED OR IRRITABLE: MORE THAN HALF THE DAYS
7. FEELING AFRAID AS IF SOMETHING AWFUL MIGHT HAPPEN: SEVERAL DAYS

## 2021-11-19 ASSESSMENT — PATIENT HEALTH QUESTIONNAIRE - PHQ9
10. IF YOU CHECKED OFF ANY PROBLEMS, HOW DIFFICULT HAVE THESE PROBLEMS MADE IT FOR YOU TO DO YOUR WORK, TAKE CARE OF THINGS AT HOME, OR GET ALONG WITH OTHER PEOPLE: SOMEWHAT DIFFICULT
SUM OF ALL RESPONSES TO PHQ QUESTIONS 1-9: 21
SUM OF ALL RESPONSES TO PHQ QUESTIONS 1-9: 21

## 2021-11-19 ASSESSMENT — PAIN SCALES - GENERAL: PAINLEVEL: SEVERE PAIN (6)

## 2021-11-19 NOTE — NURSING NOTE
"Chief Complaint   Patient presents with     Refill Request   Patient states the medications are working well and is requesting refills. Left wrist- started the beginning of October. Bump on wrist- patient denies traumas. No other concerns or issues at this time.    Initial /78   Pulse 78   Temp 97.1  F (36.2  C) (Tympanic)   Resp 18   SpO2 99%   Breastfeeding No  Estimated body mass index is 40.74 kg/m  as calculated from the following:    Height as of 10/12/21: 1.6 m (5' 3\").    Weight as of 11/26/18: 104.3 kg (230 lb).  Medication Reconciliation: complete    FOOD SECURITY SCREENING QUESTIONS  Hunger Vital Signs:  Within the past 12 months we worried whether our food would run out before we got money to buy more. Never  Within the past 12 months the food we bought just didn't last and we didn't have money to get more. Never    Advanced Care Directive Reviewed    Fer Louis LPN    "

## 2021-11-19 NOTE — PROGRESS NOTES
"  SUBJECTIVE:   Ivy TREADWELL Vika Roemro is a 66 year old female who presents to clinic today for the following health issues:  Nursing Notes:   Fer Louis LPN  11/19/2021 10:34 AM  Signed  Chief Complaint   Patient presents with     Refill Request   Patient states the medications are working well and is requesting refills. Left wrist- started the beginning of October. Bump on wrist- patient denies traumas. No other concerns or issues at this time.    Initial /78   Pulse 78   Temp 97.1  F (36.2  C) (Tympanic)   Resp 18   SpO2 99%   Breastfeeding No  Estimated body mass index is 40.74 kg/m  as calculated from the following:    Height as of 10/12/21: 1.6 m (5' 3\").    Weight as of 11/26/18: 104.3 kg (230 lb).  Medication Reconciliation: complete    FOOD SECURITY SCREENING QUESTIONS  Hunger Vital Signs:  Within the past 12 months we worried whether our food would run out before we got money to buy more. Never  Within the past 12 months the food we bought just didn't last and we didn't have money to get more. Never    Advanced Care Directive Reviewed    Fer Louis LPN        HPI  65 yo female presents for recheck on chronic medical problems.    Due for medication results. Mood fairly stable, more sad with weather changes and inability to get outside as much. Will be leaving for [place] after the holidays.    Lump in left wrist, painful    10/2020 +Covid  10/2021 +RSV    Patient Active Problem List    Diagnosis Date Noted     Morbid obesity (H) 10/12/2021     Priority: Medium     History of 2019 novel coronavirus disease (COVID-19) 11/03/2020     Priority: Medium     Osteoarthritis of left glenohumeral joint 04/10/2018     Priority: Medium     PRADIP on CPAP 11/20/2017     Priority: Medium     Dyslipidemia 10/24/2013     Priority: Medium     Major depressive disorder, recurrent episode, severe (H) 08/10/2011     Priority: Medium     Sarcoidosis 01/01/2007     Priority: Medium     Past Medical History: "   Diagnosis Date     AC (acromioclavicular) arthritis 4/10/2018     Allergic rhinitis due to pollen     No Comments Provided     Dorsalgia     10/7/2010     Erythema nodosum      2009     Essential tremor     Possible benign     Hyperlipidemia     10/24/2013     Ill-defined and unknown cause of mortality (CODE)     characterized by chronic pain, positive BARBARA at a low titer of 1:160, negative HLAB27,     Migraine without status migrainosus, not intractable     No Comments Provided     Osteoarthritis of left glenohumeral joint 4/10/2018     Postconcussional syndrome     1/26/2016     Sarcoidosis     2007     Tendinitis of left rotator cuff 4/10/2018        Review of Systems     OBJECTIVE:     /78   Pulse 78   Temp 97.1  F (36.2  C) (Tympanic)   Resp 18   SpO2 99%   Breastfeeding No   There is no height or weight on file to calculate BMI.  Physical Exam  Musculoskeletal:         General: Normal range of motion.   Skin:     General: Skin is warm.   Neurological:      General: No focal deficit present.      Mental Status: She is alert.   Psychiatric:         Mood and Affect: Mood normal.         Judgment: Judgment normal.     Small ganglion cyst left wrist.  Minimally tender.  Freely mobile.    Diagnostic Test Results:  No results found for any visits on 11/19/21.    ASSESSMENT/PLAN:         (F33.2) Severe episode of recurrent major depressive disorder, without psychotic features (H)  (primary encounter diagnosis)  Comment: Relatively stable on multiple meds.  Refilled as requested.  Previously followed by psychiatry but prefers to follow with me.  She remained stable and comfortable with current med management.  Plan: buPROPion (WELLBUTRIN XL) 300 MG 24 hr tablet,         venlafaxine (EFFEXOR-XR) 150 MG 24 hr capsule,         lamoTRIgine (LAMICTAL) 100 MG tablet,         lamoTRIgine (LAMICTAL) 200 MG tablet            (M67.432) Ganglion cyst of wrist, left  Comment:   Plan: Reassurance provided.  Discussed  excision versus aspiration.  It is quite tiny and not causing much discomfort.  Will monitor.    (G89.4) Chronic pain disorder  Comment:   Plan: gabapentin (NEURONTIN) 600 MG tablet        Continue on gabapentin as ordered.    There are no Patient Instructions on file for this visit.          Yodit Villalta MD  [facility]  Answers for HPI/ROS submitted by the patient on 11/19/2021  If you checked off any problems, how difficult have these problems made it for you to do your work, take care of things at home, or get along with other people?: Somewhat difficult  PHQ9 TOTAL SCORE: 21  DESI 7 TOTAL SCORE: 15

## 2021-11-20 ASSESSMENT — PATIENT HEALTH QUESTIONNAIRE - PHQ9: SUM OF ALL RESPONSES TO PHQ QUESTIONS 1-9: 21

## 2021-11-20 ASSESSMENT — ANXIETY QUESTIONNAIRES: GAD7 TOTAL SCORE: 15

## 2021-12-17 ENCOUNTER — TRANSFERRED RECORDS (OUTPATIENT)
Dept: HEALTH INFORMATION MANAGEMENT | Facility: OTHER | Age: 66
End: 2021-12-17
Payer: COMMERCIAL

## 2021-12-17 LAB — RETINOPATHY: POSITIVE

## 2021-12-28 ENCOUNTER — TELEPHONE (OUTPATIENT)
Dept: FAMILY MEDICINE | Facility: OTHER | Age: 66
End: 2021-12-28
Payer: COMMERCIAL

## 2021-12-28 DIAGNOSIS — E78.5 DYSLIPIDEMIA: Primary | ICD-10-CM

## 2021-12-28 DIAGNOSIS — Z00.00 ANNUAL PHYSICAL EXAM: ICD-10-CM

## 2021-12-28 NOTE — TELEPHONE ENCOUNTER
Pt would like to get orders to have blood work done in the morning before her appt.  Please call when done.      Antonio Klein on 12/28/2021 at 2:57 PM

## 2022-01-07 ENCOUNTER — LAB (OUTPATIENT)
Dept: LAB | Facility: OTHER | Age: 67
End: 2022-01-07
Attending: FAMILY MEDICINE
Payer: COMMERCIAL

## 2022-01-07 DIAGNOSIS — E78.5 DYSLIPIDEMIA: ICD-10-CM

## 2022-01-07 DIAGNOSIS — Z00.00 ANNUAL PHYSICAL EXAM: ICD-10-CM

## 2022-01-07 LAB
ALBUMIN SERPL-MCNC: 4.1 G/DL (ref 3.5–5.7)
ALP SERPL-CCNC: 81 U/L (ref 34–104)
ALT SERPL W P-5'-P-CCNC: 17 U/L (ref 7–52)
ANION GAP SERPL CALCULATED.3IONS-SCNC: 10 MMOL/L (ref 3–14)
AST SERPL W P-5'-P-CCNC: 20 U/L (ref 13–39)
BASOPHILS # BLD AUTO: 0.1 10E3/UL (ref 0–0.2)
BASOPHILS NFR BLD AUTO: 1 %
BILIRUB SERPL-MCNC: 0.4 MG/DL (ref 0.3–1)
BUN SERPL-MCNC: 22 MG/DL (ref 7–25)
CALCIUM SERPL-MCNC: 9.6 MG/DL (ref 8.6–10.3)
CHLORIDE BLD-SCNC: 104 MMOL/L (ref 98–107)
CHOLEST SERPL-MCNC: 208 MG/DL
CO2 SERPL-SCNC: 23 MMOL/L (ref 21–31)
CREAT SERPL-MCNC: 1.62 MG/DL (ref 0.6–1.2)
EOSINOPHIL # BLD AUTO: 1.2 10E3/UL (ref 0–0.7)
EOSINOPHIL NFR BLD AUTO: 15 %
ERYTHROCYTE [DISTWIDTH] IN BLOOD BY AUTOMATED COUNT: 13.4 % (ref 10–15)
FASTING STATUS PATIENT QL REPORTED: ABNORMAL
GFR SERPL CREATININE-BSD FRML MDRD: 35 ML/MIN/1.73M2
GLUCOSE BLD-MCNC: 93 MG/DL (ref 70–105)
HCT VFR BLD AUTO: 38.5 % (ref 35–47)
HDLC SERPL-MCNC: 43 MG/DL (ref 23–92)
HGB BLD-MCNC: 12.3 G/DL (ref 11.7–15.7)
IMM GRANULOCYTES # BLD: 0 10E3/UL
IMM GRANULOCYTES NFR BLD: 0 %
LDLC SERPL CALC-MCNC: 131 MG/DL
LYMPHOCYTES # BLD AUTO: 2.5 10E3/UL (ref 0.8–5.3)
LYMPHOCYTES NFR BLD AUTO: 30 %
MCH RBC QN AUTO: 30.4 PG (ref 26.5–33)
MCHC RBC AUTO-ENTMCNC: 31.9 G/DL (ref 31.5–36.5)
MCV RBC AUTO: 95 FL (ref 78–100)
MONOCYTES # BLD AUTO: 0.6 10E3/UL (ref 0–1.3)
MONOCYTES NFR BLD AUTO: 7 %
NEUTROPHILS # BLD AUTO: 3.7 10E3/UL (ref 1.6–8.3)
NEUTROPHILS NFR BLD AUTO: 47 %
NONHDLC SERPL-MCNC: 165 MG/DL
NRBC # BLD AUTO: 0 10E3/UL
NRBC BLD AUTO-RTO: 0 /100
PLATELET # BLD AUTO: 265 10E3/UL (ref 150–450)
POTASSIUM BLD-SCNC: 4.2 MMOL/L (ref 3.5–5.1)
PROT SERPL-MCNC: 7.2 G/DL (ref 6.4–8.9)
RBC # BLD AUTO: 4.04 10E6/UL (ref 3.8–5.2)
SODIUM SERPL-SCNC: 137 MMOL/L (ref 134–144)
TRIGL SERPL-MCNC: 170 MG/DL
TSH SERPL DL<=0.005 MIU/L-ACNC: 3.82 MU/L (ref 0.4–4)
WBC # BLD AUTO: 8.1 10E3/UL (ref 4–11)

## 2022-01-07 PROCEDURE — 80053 COMPREHEN METABOLIC PANEL: CPT | Mod: ZL

## 2022-01-07 PROCEDURE — 82040 ASSAY OF SERUM ALBUMIN: CPT | Mod: ZL

## 2022-01-07 PROCEDURE — 36415 COLL VENOUS BLD VENIPUNCTURE: CPT | Mod: ZL

## 2022-01-07 PROCEDURE — 85025 COMPLETE CBC W/AUTO DIFF WBC: CPT | Mod: ZL

## 2022-01-07 PROCEDURE — 84443 ASSAY THYROID STIM HORMONE: CPT | Mod: ZL

## 2022-01-07 PROCEDURE — 80061 LIPID PANEL: CPT | Mod: ZL

## 2022-01-11 ENCOUNTER — OFFICE VISIT (OUTPATIENT)
Dept: FAMILY MEDICINE | Facility: OTHER | Age: 67
End: 2022-01-11
Attending: FAMILY MEDICINE
Payer: COMMERCIAL

## 2022-01-11 ENCOUNTER — HOSPITAL ENCOUNTER (OUTPATIENT)
Dept: MAMMOGRAPHY | Facility: OTHER | Age: 67
End: 2022-01-11
Attending: FAMILY MEDICINE
Payer: COMMERCIAL

## 2022-01-11 VITALS
OXYGEN SATURATION: 99 % | RESPIRATION RATE: 16 BRPM | SYSTOLIC BLOOD PRESSURE: 116 MMHG | TEMPERATURE: 97.7 F | BODY MASS INDEX: 40.5 KG/M2 | HEIGHT: 63 IN | DIASTOLIC BLOOD PRESSURE: 60 MMHG | HEART RATE: 80 BPM

## 2022-01-11 DIAGNOSIS — R03.0 ELEVATED BLOOD PRESSURE READING WITHOUT DIAGNOSIS OF HYPERTENSION: ICD-10-CM

## 2022-01-11 DIAGNOSIS — Z12.11 COLON CANCER SCREENING: ICD-10-CM

## 2022-01-11 DIAGNOSIS — Z12.4 CERVICAL CANCER SCREENING: ICD-10-CM

## 2022-01-11 DIAGNOSIS — N28.9 RENAL INSUFFICIENCY: ICD-10-CM

## 2022-01-11 DIAGNOSIS — Z00.00 ANNUAL PHYSICAL EXAM: Primary | ICD-10-CM

## 2022-01-11 DIAGNOSIS — Z12.31 VISIT FOR SCREENING MAMMOGRAM: ICD-10-CM

## 2022-01-11 PROCEDURE — 99396 PREV VISIT EST AGE 40-64: CPT | Performed by: FAMILY MEDICINE

## 2022-01-11 PROCEDURE — 77067 SCR MAMMO BI INCL CAD: CPT

## 2022-01-11 PROCEDURE — 87624 HPV HI-RISK TYP POOLED RSLT: CPT | Mod: ZL | Performed by: FAMILY MEDICINE

## 2022-01-11 PROCEDURE — G0123 SCREEN CERV/VAG THIN LAYER: HCPCS | Performed by: FAMILY MEDICINE

## 2022-01-11 ASSESSMENT — ANXIETY QUESTIONNAIRES
4. TROUBLE RELAXING: NEARLY EVERY DAY
GAD7 TOTAL SCORE: 16
6. BECOMING EASILY ANNOYED OR IRRITABLE: MORE THAN HALF THE DAYS
1. FEELING NERVOUS, ANXIOUS, OR ON EDGE: NEARLY EVERY DAY
2. NOT BEING ABLE TO STOP OR CONTROL WORRYING: NEARLY EVERY DAY
7. FEELING AFRAID AS IF SOMETHING AWFUL MIGHT HAPPEN: MORE THAN HALF THE DAYS
GAD7 TOTAL SCORE: 16
GAD7 TOTAL SCORE: 16
7. FEELING AFRAID AS IF SOMETHING AWFUL MIGHT HAPPEN: MORE THAN HALF THE DAYS
3. WORRYING TOO MUCH ABOUT DIFFERENT THINGS: NEARLY EVERY DAY
5. BEING SO RESTLESS THAT IT IS HARD TO SIT STILL: NOT AT ALL

## 2022-01-11 ASSESSMENT — PATIENT HEALTH QUESTIONNAIRE - PHQ9
SUM OF ALL RESPONSES TO PHQ QUESTIONS 1-9: 14
10. IF YOU CHECKED OFF ANY PROBLEMS, HOW DIFFICULT HAVE THESE PROBLEMS MADE IT FOR YOU TO DO YOUR WORK, TAKE CARE OF THINGS AT HOME, OR GET ALONG WITH OTHER PEOPLE: VERY DIFFICULT
SUM OF ALL RESPONSES TO PHQ QUESTIONS 1-9: 14

## 2022-01-11 ASSESSMENT — PAIN SCALES - GENERAL: PAINLEVEL: NO PAIN (0)

## 2022-01-11 NOTE — LETTER
My Depression Action Plan  Name: Ivy Romero   Date of Birth 1955  Date: 1/11/2022    My doctor: Yodit Mandujano   My clinic: [facility]  [address]  [place] [address]  [phone]          GREEN    ZONE   Good Control    What it looks like:     Things are going generally well. You have normal ups and downs. You may even feel depressed from time to time, but bad moods usually last less than a day.   What you need to do:  1. Continue to care for yourself (see self care plan)  2. Check your depression survival kit and update it as needed  3. Follow your physician s recommendations including any medication.  4. Do not stop taking medication unless you consult with your physician first.           YELLOW         ZONE Getting Worse    What it looks like:     Depression is starting to interfere with your life.     It may be hard to get out of bed; you may be starting to isolate yourself from others.    Symptoms of depression are starting to last most all day and this has happened for several days.     You may have suicidal thoughts but they are not constant.   What you need to do:     1. Call your care team. Your response to treatment will improve if you keep your care team informed of your progress. Yellow periods are signs an adjustment may need to be made.     2. Continue your self-care.  Just get dressed and ready for the day.  Don't give yourself time to talk yourself out of it.    3. Talk to someone in your support network.    4. Open up your Depression Self-Care Plan/Wellness Kit.           RED    ZONE Medical Alert - Get Help    What it looks like:     Depression is seriously interfering with your life.     You may experience these or other symptoms: You can t get out of bed most days, can t work or engage in other necessary activities, you have trouble taking care of basic hygiene, or basic responsibilities, thoughts of suicide or  death that will not go away, self-injurious behavior.     What you need to do:  1. Call your care team and request a same-day appointment. If they are not available (weekends or after hours) call your local crisis line, emergency room or 911.          Depression Self-Care Plan / Wellness Kit    Many people find that medication and therapy are helpful treatments for managing depression. In addition, making small changes to your everyday life can help to boost your mood and improve your wellbeing. Below are some tips for you to consider. Be sure to talk with your medical provider and/or behavioral health consultant if your symptoms are worsening or not improving.     Sleep   Sleep hygiene  means all of the habits that support good, restful sleep. It includes maintaining a consistent bedtime and wake time, using your bedroom only for sleeping or sex, and keeping the bedroom dark and free of distractions like a computer, smartphone, or television.     Develop a Healthy Routine  Maintain good hygiene. Get out of bed in the morning, make your bed, brush your teeth, take a shower, and get dressed. Don t spend too much time viewing media that makes you feel stressed. Find time to relax each day.    Exercise  Get some form of exercise every day. This will help reduce pain and release endorphins, the  feel good  chemicals in your brain. It can be as simple as just going for a walk or doing some gardening, anything that will get you moving.      Diet  Strive to eat healthy foods, including fruits and vegetables. Drink plenty of water. Avoid excessive sugar, caffeine, alcohol, and other mood-altering substances.     Stay Connected with Others  Stay in touch with friends and family members.    Manage Your Mood  Try deep breathing, massage therapy, biofeedback, or meditation. Take part in fun activities when you can. Try to find something to smile about each day.     Psychotherapy  Be open to working with a therapist if your  provider recommends it.     Medication  Be sure to take your medication as prescribed. Most anti-depressants need to be taken every day. It usually takes several weeks for medications to work. Not all medicines work for all people. It is important to follow-up with your provider to make sure you have a treatment plan that is working for you. Do not stop your medication abruptly without first discussing it with your provider.    Crisis Resources   These hotlines are for both adults and children. They and are open 24 hours a day, 7 days a week unless noted otherwise.      National Suicide Prevention Lifeline   4-923-459-TIRU (8338)      Crisis Text Line    www.crisistextline.org  Text HOME to 955116 from anywhere in the [place], anytime, about any type of crisis. A live, trained crisis counselor will receive the text and respond quickly.      Kar Lifeline for LGBTQ Youth  A national crisis intervention and suicide lifeline for LGBTQ youth under 25. Provides a safe place to talk without judgement. Call 1-137.279.4559; text START to 666740 or visit www.thetrevorproject.org to talk to a trained counselor.      For [facility] crisis numbers, visit the [facility] website at:  https://[place].gov/dhs/people-we-serve/adults/health-care/mental-health/resources/crisis-contacts.jsp

## 2022-01-11 NOTE — NURSING NOTE
Patient is here for annual physical.     No LMP recorded (lmp unknown). Patient has had an ablation.  Medication Reconciliation: complete    FOOD SECURITY SCREENING QUESTIONS  Hunger Vital Signs:  Within the past 12 months we worried whether our food would run out before we got money to buy more. Never  Within the past 12 months the food we bought just didn't last and we didn't have money to get more. Never  Ashley Slater LPN 1/11/2022 1:48 PM       Advance care directive on file? yes  Advance care directive provided to patient? NA       Ashley Slater LPN

## 2022-01-12 ASSESSMENT — ANXIETY QUESTIONNAIRES: GAD7 TOTAL SCORE: 16

## 2022-01-12 ASSESSMENT — PATIENT HEALTH QUESTIONNAIRE - PHQ9: SUM OF ALL RESPONSES TO PHQ QUESTIONS 1-9: 14

## 2022-01-13 LAB
BKR LAB AP GYN ADEQUACY: NORMAL
BKR LAB AP GYN INTERPRETATION: NORMAL
BKR LAB AP HPV REFLEX: NORMAL
BKR LAB AP PREVIOUS ABNORMAL: NORMAL
PATH REPORT.COMMENTS IMP SPEC: NORMAL
PATH REPORT.COMMENTS IMP SPEC: NORMAL
PATH REPORT.RELEVANT HX SPEC: NORMAL

## 2022-01-14 NOTE — PROGRESS NOTES
SUBJECTIVE:   CC: Ivy TREADWELL Vika Romero is an 66 year old woman who presents for preventive health visit. Nursing Notes:   Ashley Slater LPN  2022  2:19 PM  Signed  Patient is here for annual physical.     No LMP recorded (lmp unknown). Patient has had an ablation.  Medication Reconciliation: complete    FOOD SECURITY SCREENING QUESTIONS  Hunger Vital Signs:  Within the past 12 months we worried whether our food would run out before we got money to buy more. Never  Within the past 12 months the food we bought just didn't last and we didn't have money to get more. Never  Ashley Slater LPN 2022 1:48 PM       Advance care directive on file? yes  Advance care directive provided to patient? NA       Ashley Slater LPN            HPI  66-year-old female presents for annual preventive physical and follow-up of chronic medical problems.      Patient struggles with mental health.  She is under the care of psychiatry.  No medication changes.  Feels that things have been fairly stable.    Elevated blood pressure readings.  She said a few elevated blood pressure readings over the past few months.  She is asymptomatic.  Denies headache chest pain shortness of breath or palpitations.  No excessive salt or caffeine intake.    Obstructive sleep apnea currently on CPAP compliant      Depression and Anxiety Follow-Up    How are you doing with your depression since your last visit? No change    How are you doing with your anxiety since your last visit?  No change    Are you having other symptoms that might be associated with depression or anxiety? No    Have you had a significant life event? No     Do you have any concerns with your use of alcohol or other drugs? No    Social History     Tobacco Use     Smoking status: Former Smoker     Quit date: 10/22/1991     Years since quittin.2     Smokeless tobacco: Never Used   Vaping Use     Vaping Use: Never used   Substance Use Topics     Alcohol use: Not  Currently     Comment: Alcoholic Drinks/day: rarely     Drug use: No     Comment: Drug use: No     PHQ 10/12/2021 11/19/2021 1/11/2022   PHQ-9 Total Score 17 21 14   Q9: Thoughts of better off dead/self-harm past 2 weeks Not at all Not at all Not at all     DESI-7 SCORE 12/8/2020 11/19/2021 1/11/2022   Total Score - 15 (severe anxiety) 16 (severe anxiety)   Total Score 5 15 16     Last PHQ-9 1/11/2022   1.  Little interest or pleasure in doing things 1   2.  Feeling down, depressed, or hopeless 3   3.  Trouble falling or staying asleep, or sleeping too much 3   4.  Feeling tired or having little energy 2   5.  Poor appetite or overeating 2   6.  Feeling bad about yourself 3   7.  Trouble concentrating 0   8.  Moving slowly or restless 0   Q9: Thoughts of better off dead/self-harm past 2 weeks 0   PHQ-9 Total Score 14   Difficulty at work, home, or with people -     DESI-7  1/11/2022   1. Feeling nervous, anxious, or on edge 3   2. Not being able to stop or control worrying 3   3. Worrying too much about different things 3   4. Trouble relaxing 3   5. Being so restless that it is hard to sit still 0   6. Becoming easily annoyed or irritable 2   7. Feeling afraid, as if something awful might happen 2   DESI-7 Total Score 16   If you checked any problems, how difficult have they made it for you to do your work, take care of things at home, or get along with other people? -       Suicide Assessment Five-step Evaluation and Treatment (SAFE-T)      Today's PHQ-2 Score:   PHQ-2 ( 1999 Pfizer) 1/11/2022   Q1: Little interest or pleasure in doing things 2   Q2: Feeling down, depressed or hopeless 3   PHQ-2 Score 5   PHQ-2 Total Score (12-17 Years)- Positive if 3 or more points; Administer PHQ-A if positive -   Q1: Little interest or pleasure in doing things More than half the days   Q2: Feeling down, depressed or hopeless Nearly every day   PHQ-2 Score 5       Abuse: Current or Past (Physical, Sexual or Emotional) - No  Do you  feel safe in your environment? Yes        Social History     Tobacco Use     Smoking status: Former Smoker     Quit date: 10/22/1991     Years since quittin.2     Smokeless tobacco: Never Used   Substance Use Topics     Alcohol use: Not Currently     Comment: Alcoholic Drinks/day: rarely         Alcohol Use 2022   Prescreen: >3 drinks/day or >7 drinks/week? No       Reviewed orders with patient.  Reviewed health maintenance and updated orders accordingly - Yes  Labs reviewed in EPIC  BP Readings from Last 3 Encounters:   22 116/60   21 136/78   10/12/21 132/76    Wt Readings from Last 3 Encounters:   18 104.3 kg (230 lb)   04/10/18 97.5 kg (215 lb)   17 97.5 kg (215 lb)                  Patient Active Problem List   Diagnosis     Dyslipidemia     Major depressive disorder, recurrent episode, severe (H)     PRADIP on CPAP     Sarcoidosis     Osteoarthritis of left glenohumeral joint     History of 2019 novel coronavirus disease (COVID-19)     Morbid obesity (H)     Past Surgical History:   Procedure Laterality Date     ARTHROSCOPY SHOULDER      ,Right type 3 SLAP lesion repair of labrum - suprascapular ganglion removal with posterior approach - acromioplasty and distal clavicle resection     ARTHROSCOPY SHOULDER      2015     COLONOSCOPY  2011,Normal--10 year followup     DILATION AND CURETTAGE      , and ablation     LAPAROSCOPIC CHOLECYSTECTOMY      6/23/15,Cholecystectomy,Laparoscopic     left shoulder replacement       right total shoulder replacement       TONSILLECTOMY       and adenoidectomy age 12       Social History     Tobacco Use     Smoking status: Former Smoker     Quit date: 10/22/1991     Years since quittin.2     Smokeless tobacco: Never Used   Substance Use Topics     Alcohol use: Not Currently     Comment: Alcoholic Drinks/day: rarely     Family History   Adopted: Yes   Problem Relation Age of Onset     Unknown/Adopted Other          Unknown,Unknown. She is ADOPTED     Prostate Cancer Father         metastic Prostate CA         Current Outpatient Medications   Medication Sig Dispense Refill     acetaminophen (TYLENOL) 500 MG tablet Take 1,000 mg by mouth 3 times daily as needed       buPROPion (WELLBUTRIN XL) 300 MG 24 hr tablet TAKE 1 TABLET BY MOUTH ONCE DAILY 90 tablet 3     Cholecalciferol (VITAMIN D3) 1000 UNITS CAPS Take 1,000 Units by mouth daily       gabapentin (NEURONTIN) 600 MG tablet 1 tab  tablet 3     lamoTRIgine (LAMICTAL) 100 MG tablet Take 1 tablet (100 mg) by mouth every morning 90 tablet 3     lamoTRIgine (LAMICTAL) 200 MG tablet Take 1 tablet (200 mg) by mouth At Bedtime 90 tablet 3     order for DME Equipment being ordered: CPAP supplies 1 each 0     venlafaxine (EFFEXOR-XR) 150 MG 24 hr capsule Take 1 capsule (150 mg) by mouth daily with food 90 capsule 3     guaiFENesin-codeine (ROBITUSSIN AC) 100-10 MG/5ML solution Take 5-10 mLs by mouth every 4 hours as needed for cough 80 mL 0     Allergies   Allergen Reactions     Penicillins Rash       Breast Cancer Screening:  Any new diagnosis of family breast, ovarian, or bowel cancer?     FHS-7:   Breast CA Risk Assessment (FHS-7) 1/11/2022   Did any of your first-degree relatives have breast or ovarian cancer? No   Did any of your relatives have bilateral breast cancer? No   Did any man in your family have breast cancer? No   Did any woman in your family have breast and ovarian cancer? No   Did any woman in your family have breast cancer before age 50 y? No   Do you have 2 or more relatives with breast and/or ovarian cancer? No   Do you have 2 or more relatives with breast and/or bowel cancer? No       Mammogram Screening: Recommended mammography every 1-2 years with patient discussion and risk factor consideration  Pertinent mammograms are reviewed under the imaging tab.    History of abnormal Pap smear: NO - age 30-65 PAP every 5 years with negative HPV co-testing  recommended  PAP / HPV Latest Ref Rng & Units 1/11/2022   PAP   Negative for Intraepithelial Lesion or Malignancy (NILM)     Reviewed and updated as needed this visit by clinical staff  Tobacco  Allergies  Meds   Med Hx  Surg Hx  Fam Hx  Soc Hx       Reviewed and updated as needed this visit by Provider               Past Medical History:   Diagnosis Date     AC (acromioclavicular) arthritis 4/10/2018     Allergic rhinitis due to pollen     No Comments Provided     Dorsalgia     10/7/2010     Erythema nodosum      2009     Essential tremor     Possible benign     Hyperlipidemia     10/24/2013     Ill-defined and unknown cause of mortality (CODE)     characterized by chronic pain, positive BARBARA at a low titer of 1:160, negative HLAB27,     Migraine without status migrainosus, not intractable     No Comments Provided     Osteoarthritis of left glenohumeral joint 4/10/2018     Postconcussional syndrome     1/26/2016     Sarcoidosis     2007     Tendinitis of left rotator cuff 4/10/2018      Past Surgical History:   Procedure Laterality Date     ARTHROSCOPY SHOULDER      12/07,Right type 3 SLAP lesion repair of labrum - suprascapular ganglion removal with posterior approach - acromioplasty and distal clavicle resection     ARTHROSCOPY SHOULDER      2/2015     COLONOSCOPY  08/29/2011 8/2011,Normal--10 year followup     DILATION AND CURETTAGE      11/05, and ablation     LAPAROSCOPIC CHOLECYSTECTOMY      6/23/15,Cholecystectomy,Laparoscopic     left shoulder replacement       right total shoulder replacement       TONSILLECTOMY       and adenoidectomy age 12       Review of Systems  CONSTITUTIONAL: NEGATIVE for fever, chills, change in weight  INTEGUMENTARY/SKIN: NEGATIVE for worrisome rashes, moles or lesions  EYES: NEGATIVE for vision changes or irritation  ENT: NEGATIVE for ear, mouth and throat problems  RESP: NEGATIVE for significant cough or SOB  BREAST: NEGATIVE for masses, tenderness or discharge  CV:  "NEGATIVE for chest pain, palpitations or peripheral edema  GI: NEGATIVE for nausea, abdominal pain, heartburn, or change in bowel habits  : NEGATIVE for unusual urinary or vaginal symptoms. No vaginal bleeding.  MUSCULOSKELETAL: NEGATIVE for significant arthralgias or myalgia  NEURO: NEGATIVE for weakness, dizziness or paresthesias  PSYCHIATRIC: NEGATIVE for changes in mood or affect      OBJECTIVE:   /60   Pulse 80   Temp 97.7  F (36.5  C) (Tympanic)   Resp 16   Ht 1.605 m (5' 3.19\")   LMP  (LMP Unknown)   SpO2 99%   Breastfeeding No   BMI 40.50 kg/m    Physical Exam  GENERAL: healthy, alert and no distress  EYES: Eyes grossly normal to inspection, PERRL and conjunctivae and sclerae normal  HENT: ear canals and TM's normal, nose and mouth without ulcers or lesions  NECK: no adenopathy, no asymmetry, masses, or scars and thyroid normal to palpation  RESP: lungs clear to auscultation - no rales, rhonchi or wheezes  BREAST: normal without masses, tenderness or nipple discharge and no palpable axillary masses or adenopathy  CV: regular rate and rhythm, normal S1 S2, no S3 or S4, no murmur, click or rub, no peripheral edema and peripheral pulses strong  ABDOMEN: soft, nontender, no hepatosplenomegaly, no masses and bowel sounds normal  MS: no gross musculoskeletal defects noted, no edema  SKIN: no suspicious lesions or rashes  NEURO: Normal strength and tone, mentation intact and speech normal  PSYCH: mentation appears normal, affect normal/bright    Diagnostic Test Results:  Labs reviewed in Epic  Results for orders placed or performed during the hospital encounter of 01/11/22   MA Screen Bilateral w/Niko     Status: None    Narrative    EXAM: MA SCREENING BILATERAL W/ NIKO, 1/11/2022 3:07 PM    COMPARISONS: 1/17/2022 11/5/2014    HISTORY: Visit for screening mammogram    BREAST DENSITY: Scattered fibroglandular densities.    FINDINGS: No new architectural distortion, dominant masses or  suspicious " microcalcifications are identified in either breast. Coarse  calcifications are redemonstrated.      Impression    IMPRESSION: BI-RADS CATEGORY: 2 - Benign.    No radiographic evidence of malignancy in either breast.    RECOMMENDED FOLLOW-UP: Annual Mammography.      MERRY MOLINA MD         SYSTEM ID:  [id number]   Results for orders placed or performed in visit on 01/11/22   Pap Screen with HPV - recommended age 30 - 65 years     Status: None   Result Value Ref Range    Interpretation        Negative for Intraepithelial Lesion or Malignancy (NILM)    Comment         Papanicolaou Test Limitations:  Cervical cytology is a screening test with limited sensitivity, and regular screening is critical for cancer prevention.  Pap tests are primarily effective for the diagnosis/prevention of squamous cell carcinoma, not adenocarcinoma or other cancers.        Specimen Adequacy       Satisfactory for evaluation, endocervical/transformation zone component present    Clinical Information       post-menopausal      Reflex Testing Yes regardless of result     Previous Abnormal?       No      Performing Labs       The technical component of this testing was completed at [facility]         ASSESSMENT/PLAN:   (Z00.00) Annual physical exam  (primary encounter diagnosis)  Comment:   Plan: Patient is counseled on importance of regular self breast exams and mammograms every 1-2 years starting at age 40. Patient will proceed with pap smears every 3-5 years until age 65. Discussed importance of calcium and vitamin D supplementation and osteoporosis screening. Immunizations are updated based on CDC recommendations and patients desire. Reviewed importance of sunscreen, limit sun exposure and monitoring for changing moles with ABCDEs. Recommend seatbelt use and helmets with biking, skiing and ATV/Snowmobile use.    Pap smear and HPV obtained today.  Patient received Cologuard in the mail.  Recommend annual  "mammograms.  Reviewed immunizations were up-to-date.  Reviewed the importance of adequate exercise    (Z12.4) Cervical cancer screening  Comment:   Plan: Pap Screen with HPV - recommended age 30 - 65         years, HPV High Risk Types DNA Cervical            (Z12.11) Colon cancer screening  Comment:   Plan: COLOGUARD(EX[employer] SCIENCES)            (N28.9) Renal insufficiency  Comment:   Plan: US Renal Complete w Doppler Complete, 24 Hour         Blood Pressure Monitor - Adult            (R03.0) Elevated blood pressure reading without diagnosis of hypertension  Comment:   Plan: US Renal Complete w Doppler Complete, 24 Hour         Blood Pressure Monitor - Adult        Elevated blood pressure readings over the past few months.  Encourage low-salt, low caffeine low saturated fat diet.  Avoid anti-inflammatories.  Proceed with 24-hour blood pressure monitor and renal artery ultrasound.      There are no Patient Instructions on file for this visit.    COUNSELING:  Reviewed preventive health counseling, as reflected in patient instructions  Special attention given to:        Regular exercise       Healthy diet/nutrition       Vision screening       Hearing screening       Aspirin prophylaxis       Colon cancer screening    Estimated body mass index is 40.5 kg/m  as calculated from the following:    Height as of this encounter: 1.605 m (5' 3.19\").    Weight as of 11/26/18: 104.3 kg (230 lb).    Weight management plan: Discussed healthy diet and exercise guidelines    She reports that she quit smoking about 30 years ago. She has never used smokeless tobacco.      Counseling Resources:  ATP IV Guidelines  Pooled Cohorts Equation Calculator  Breast Cancer Risk Calculator  BRCA-Related Cancer Risk Assessment: FHS-7 Tool  FRAX Risk Assessment  ICSI Preventive Guidelines  Dietary Guidelines for Americans, 2010  USDA's MyPlate  ASA Prophylaxis  Lung CA Screening    Yodit Villalta MD  [facility]  Answers " for HPI/ROS submitted by the patient on 1/11/2022  If you checked off any problems, how difficult have these problems made it for you to do your work, take care of things at home, or get along with other people?: Very difficult  PHQ9 TOTAL SCORE: 14  DESI 7 TOTAL SCORE: 16

## 2022-01-20 ENCOUNTER — HOSPITAL ENCOUNTER (OUTPATIENT)
Dept: CARDIOLOGY | Facility: OTHER | Age: 67
End: 2022-01-20
Attending: FAMILY MEDICINE
Payer: COMMERCIAL

## 2022-01-20 ENCOUNTER — HOSPITAL ENCOUNTER (OUTPATIENT)
Dept: ULTRASOUND IMAGING | Facility: OTHER | Age: 67
End: 2022-01-20
Attending: FAMILY MEDICINE
Payer: COMMERCIAL

## 2022-01-20 ENCOUNTER — TELEPHONE (OUTPATIENT)
Dept: FAMILY MEDICINE | Facility: OTHER | Age: 67
End: 2022-01-20

## 2022-01-20 DIAGNOSIS — N28.9 RENAL INSUFFICIENCY: ICD-10-CM

## 2022-01-20 DIAGNOSIS — R03.0 ELEVATED BLOOD PRESSURE READING WITHOUT DIAGNOSIS OF HYPERTENSION: ICD-10-CM

## 2022-01-20 LAB
HUMAN PAPILLOMA VIRUS 16 DNA: NEGATIVE
HUMAN PAPILLOMA VIRUS 18 DNA: NEGATIVE
HUMAN PAPILLOMA VIRUS FINAL DIAGNOSIS: NORMAL
HUMAN PAPILLOMA VIRUS OTHER HR: NEGATIVE

## 2022-01-20 PROCEDURE — 76770 US EXAM ABDO BACK WALL COMP: CPT

## 2022-01-20 PROCEDURE — 93788 AMBL BP MNTR W/SW A/R: CPT

## 2022-01-20 PROCEDURE — 999N000157 HC STATISTIC RCP TIME EA 10 MIN

## 2022-01-20 PROCEDURE — 93975 VASCULAR STUDY: CPT

## 2022-01-20 PROCEDURE — 93786 AMBL BP MNTR W/SW REC ONLY: CPT

## 2022-01-20 NOTE — PATIENT INSTRUCTIONS
skPatient was instructed on use of 24 hr blood pressure monitor:  -wear loose fitting clothing  -no showering/bathing  -do not remove device until end of testing period  -Record symptoms in patient diary (provided)  -activate a reading at the first signs of symptoms  -Remain motionless during readings.  Avoid hand movements. Avoid flexing muscles during reading  -During measurements, as you feel the cuff inflating, stop what you are doing and keep your arm relaxed and still.  If you are driving, keep the arm with the cuff off of the steering wheel and relaxed  -keep vehicle driving and travel to a minimum  -do not get device wet  -do not crimp hose  -do not use power tools or heavy equipment during a reading  -do not remove batteries from monitor    Patient was provided written instructions as well as verbal instructions.  Patient was directed to return device in 24 hrs to the diagnostics reception desk.

## 2022-01-20 NOTE — TELEPHONE ENCOUNTER
Patient is leaving for vacation on the 27th of this month and would like at least a partial refill of her Gabapentin beforehand if possible. She is willing to do a telephone visit if necessary.    Sayra Lynn on 1/20/2022 at 10:21 AM

## 2022-01-20 NOTE — TELEPHONE ENCOUNTER
Patient is leaving 1/27/22 for 6 weeks, states [facility] will not fill until 2/14/22, states her insurance ok to fill 1/26/22. Patient is wondering if TYP will ok for her to fill her gabapentin early.     Spoke to Migel Villalta MD, she ok to fill early. [facility] notified, they will make note. Patient notified.  Ashley Slater LPN .............1/20/2022     10:42 AM

## 2022-02-23 ENCOUNTER — DOCUMENTATION ONLY (OUTPATIENT)
Dept: OTHER | Facility: CLINIC | Age: 67
End: 2022-02-23
Payer: COMMERCIAL

## 2022-02-28 ENCOUNTER — TELEPHONE (OUTPATIENT)
Dept: FAMILY MEDICINE | Facility: OTHER | Age: 67
End: 2022-02-28
Payer: COMMERCIAL

## 2022-02-28 NOTE — TELEPHONE ENCOUNTER
Patient said she was returning a missed call from [facility] in Orthopedics and Dr. Cohen was the provider. I did not see a telephone message on the chart. Please call back.     Jerri Sanchez on 2/28/2022 at 9:11 AM

## 2022-02-28 NOTE — TELEPHONE ENCOUNTER
Message must have been from last week. I spoke with her on Thursday and confirmed her appointment with Dr. Cohen for 3/7, as that was the soonest we could get her in with him.

## 2022-03-01 ENCOUNTER — TELEPHONE (OUTPATIENT)
Dept: FAMILY MEDICINE | Facility: OTHER | Age: 67
End: 2022-03-01
Payer: COMMERCIAL

## 2022-03-01 NOTE — TELEPHONE ENCOUNTER
Noted Pt has the following appointments:    3/7/2022 1:45 PM Mack Cohen MD  ORTHOPEDIC SURGERY [phone] [facility]     3/8/2022 10:00 AM Yodit Mandujano MD [facility] [phone] [facility]     Routing to Dr. Villalta.     Kelly Hong RN .............. 3/1/2022  8:54 AM

## 2022-03-01 NOTE — TELEPHONE ENCOUNTER
The patient states she has a broken foot and  is wondering if Dr Villalta can get a boot for this.  Please advise.

## 2022-03-01 NOTE — TELEPHONE ENCOUNTER
The patient called back stating that she is aware of her appointment with Dr Cohen.  She would still like a call back regarding getting a boot.

## 2022-03-01 NOTE — TELEPHONE ENCOUNTER
Patient states was in [place] had x ray done there was told the ligament tore away and the bone went with it.  Happened 2.5 weeks ago. Was given a splint in [place], that was bothering her she took it off and has been wearing a support sock/ brace. Was told to get a boot when she get home from [place]. She is home now, and She is wondering if she can get a boot.    Notified she will probably need to be seen. Said she has appt with [facility] 3/8 and with ortho 3/7.   Should patient have appt or go to [facility] to be evaluated for boot?  Ashley Slater LPN .............3/1/2022     1:59 PM

## 2022-03-02 ENCOUNTER — OFFICE VISIT (OUTPATIENT)
Dept: FAMILY MEDICINE | Facility: OTHER | Age: 67
End: 2022-03-02
Attending: NURSE PRACTITIONER
Payer: COMMERCIAL

## 2022-03-02 ENCOUNTER — HOSPITAL ENCOUNTER (OUTPATIENT)
Dept: GENERAL RADIOLOGY | Facility: OTHER | Age: 67
End: 2022-03-02
Attending: PHYSICIAN ASSISTANT
Payer: COMMERCIAL

## 2022-03-02 VITALS
RESPIRATION RATE: 18 BRPM | DIASTOLIC BLOOD PRESSURE: 64 MMHG | TEMPERATURE: 97.7 F | HEART RATE: 77 BPM | SYSTOLIC BLOOD PRESSURE: 112 MMHG | OXYGEN SATURATION: 97 %

## 2022-03-02 DIAGNOSIS — S82.51XA CLOSED AVULSION FRACTURE OF MEDIAL MALLEOLUS OF RIGHT TIBIA, INITIAL ENCOUNTER: ICD-10-CM

## 2022-03-02 DIAGNOSIS — M79.671 RIGHT FOOT PAIN: Primary | ICD-10-CM

## 2022-03-02 PROCEDURE — 99213 OFFICE O/P EST LOW 20 MIN: CPT | Performed by: PHYSICIAN ASSISTANT

## 2022-03-02 PROCEDURE — 73630 X-RAY EXAM OF FOOT: CPT | Mod: RT

## 2022-03-02 ASSESSMENT — PAIN SCALES - GENERAL: PAINLEVEL: EXTREME PAIN (9)

## 2022-03-02 NOTE — PROGRESS NOTES
ASSESSMENT/PLAN:    Differential Diagnoses: sprain, fracture, contusion     I have reviewed the nursing notes.  I have reviewed the findings, diagnosis, plan and need for follow up with the patient.    1. Right foot pain  - XR Foot Right G/E 3 Views  - Questionable medial malleolus avulsion not noted by radiology.     2. Closed avulsion fracture of medial malleolus of right tibia, initial encounter  - CAST BOOT- WALKING  - Vital signs stable.  Physical exam consistent with fracture to/of right ankle. Treatment plan: walking boot and follow-up with orthopedics.  An orthopedic referral referral was placed, patient understands that orthopedics will call them directly to schedule this visit. Recommend alternating Tylenol and ibuprofen every 4-6 hours if able, do not exceed daily limits as reviewed on AVS (4000 mg of Tylenol daily, 1200 mg of ibuprofen daily), alternate heat and ice, ROM restrictions - as tolerated, weightbearing status if (applicable) - as tolerated. Patient is in agreement and understanding of the above treatment plan. All questions and concerns were addressed and answered to patient's satisfaction. AVS reviewed with patient.     Discussed warning signs/symptoms indicative of need to f/u    Follow up if symptoms persist or worsen or concerns    I explained my diagnostic considerations and recommendations to the patient, who voiced understanding and agreement with the treatment plan. All questions were answered. We discussed potential side effects of any prescribed or recommended therapies, as well as expectations for response to treatments.    Joseline Narayanan PA-C  3/2/2022  1:22 PM    HPI:    Ivy TREADWELL Vika Romero is a 66 year old female  who presents to [facility] today for concerns of right foot pain on 2/21/22. Was on vacation, right foot was asleep and right foot went parallel with floor and she fell, causing immediate onset of pain. She initially thought was a sprain, saw a provider in [facility]  [place] - had x-rays obtained, she was noted to have a torn ligament and an avulsion fracture and placed in a fiber glass splint. Splint inappropriately placed per patient and was uncomfortable. Splint was from toes to knee height. She has been out of the splint 2/21/22.     Pain: 9/10  Quality of pain: sharp   Location: top of foot to ankle level  Palliative: rest, elevation, ice/cold  Provocative: motion  Numbness, tingling, burning: none, just pain  Mechanical symptoms (locking, popping, catching): unsure  Bruising/edema/erythema: edema  Treatments tried: splint, ice, elevation, walker  Prior falls, injuries or trauma: none  Additional symptoms to report: none     Allergies: PCN    PCP: MD Pawan    Past Medical History:   Diagnosis Date     AC (acromioclavicular) arthritis 4/10/2018     Allergic rhinitis due to pollen     No Comments Provided     Dorsalgia     10/7/2010     Erythema nodosum      2009     Essential tremor     Possible benign     Hyperlipidemia     10/24/2013     Ill-defined and unknown cause of mortality (CODE)     characterized by chronic pain, positive BARBARA at a low titer of 1:160, negative HLAB27,     Migraine without status migrainosus, not intractable     No Comments Provided     Osteoarthritis of left glenohumeral joint 4/10/2018     Postconcussional syndrome     1/26/2016     Sarcoidosis     2007     Tendinitis of left rotator cuff 4/10/2018     Past Surgical History:   Procedure Laterality Date     ARTHROSCOPY SHOULDER      12/07,Right type 3 SLAP lesion repair of labrum - suprascapular ganglion removal with posterior approach - acromioplasty and distal clavicle resection     ARTHROSCOPY SHOULDER      2/2015     COLONOSCOPY  08/29/2011 8/2011,Normal--10 year followup     DILATION AND CURETTAGE      11/05, and ablation     LAPAROSCOPIC CHOLECYSTECTOMY      6/23/15,Cholecystectomy,Laparoscopic     left shoulder replacement       right total shoulder replacement       TONSILLECTOMY        and adenoidectomy age 12     Social History     Tobacco Use     Smoking status: Former Smoker     Quit date: 10/22/1991     Years since quittin.3     Smokeless tobacco: Never Used   Substance Use Topics     Alcohol use: Not Currently     Comment: Alcoholic Drinks/day: rarely     Current Outpatient Medications   Medication Sig Dispense Refill     acetaminophen (TYLENOL) 500 MG tablet Take 1,000 mg by mouth 3 times daily as needed       buPROPion (WELLBUTRIN XL) 300 MG 24 hr tablet TAKE 1 TABLET BY MOUTH ONCE DAILY 90 tablet 3     Cholecalciferol (VITAMIN D3) 1000 UNITS CAPS Take 1,000 Units by mouth daily       gabapentin (NEURONTIN) 600 MG tablet 1 tab  tablet 3     guaiFENesin-codeine (ROBITUSSIN AC) 100-10 MG/5ML solution Take 5-10 mLs by mouth every 4 hours as needed for cough 80 mL 0     lamoTRIgine (LAMICTAL) 100 MG tablet Take 1 tablet (100 mg) by mouth every morning 90 tablet 3     lamoTRIgine (LAMICTAL) 200 MG tablet Take 1 tablet (200 mg) by mouth At Bedtime 90 tablet 3     order for DME Equipment being ordered: CPAP supplies 1 each 0     venlafaxine (EFFEXOR-XR) 150 MG 24 hr capsule Take 1 capsule (150 mg) by mouth daily with food 90 capsule 3     Allergies   Allergen Reactions     Penicillins Rash     Past medical history, past surgical history, current medications and allergies reviewed and accurate to the best of my knowledge.      ROS:  Refer to HPI    /64 (BP Location: Left arm, Patient Position: Sitting, Cuff Size: Adult Large)   Pulse 77   Temp 97.7  F (36.5  C) (Tympanic)   Resp 18   SpO2 97%     EXAM:  General Appearance: Well appearing 66 year old female, appropriate appearance for age. No acute distress   Respiratory: normal chest wall and respirations.  Normal effort.  Clear to auscultation bilaterally, no wheezing, crackles or rhonchi.  No increased work of breathing.  No cough appreciated.  Cardiac: RRR with no murmurs  MSK  Right ANKLE PHYSICAL  EXAMINATION:  Inspection: no signs of edema, bony deformity or skin abnormalities noted    Palpation: Tenderness to palpation over deltoid ligament area and anterior talo fibular ligament area.     ROM: plantarflexion (20-25*), dorsiflexion (10*), inversion (20*), eversion (8-9*).     Stability testing:   Anterior drawer: negative    Syndesmotic Stress tests: Bump test/Squeeze tests are negative   Rivera test: negative    Talar tilt: negative, no sign of calcaneofibular ligament strain   Inversion for Deltoid Ligament: Gr 1 laxity to ligamentous stressing   Eversion for ATF Ligament: Gr 1 laxity to ligamentous stressing     Neurovascular Exam:   Pulses: Dorsalis pedis and Posterior tibial pulse 2+   Capillary refill intact < 3 seconds   Sensation intact, patient able to wiggle/move all toes    RIGHT FOOT : Inspection:  Edema to proximal dorsal surface of foot  Tender: cuboid, navicular  Non-tender: remainder of foot  Range of Motion: normal flexion and extension of toes    Dermatological: no rashes noted of exposed skin  Psychological: normal affect, alert, oriented, and pleasant.     Labs:  None     Xray:  XR right foot: no fracture

## 2022-03-02 NOTE — PATIENT INSTRUCTIONS
Please refer to your AVS for follow up and pain/symptoms management recommendations (I.e.: medications, helpful conservative treatment modalities, appropriate follow up if need to a specialist or family practice, etc.). Please return to urgent care if your symptoms change or worsen.     Discharge instructions:  -If you were prescribed a medication(s), please take this as prescribed/directed  -Monitor your symptoms, if changing/worsening, return to UC/ER or PCP for follow up    Orthopedic Injuries: you sustained an injury to right foot.   -If placed in a brace or other device (splint, walking boot, sling, etc.) please wear this as directed.   -Some orthopedic injuries require follow up with an orthopedist/sports medicine doctor, if this is needed for your injury, a referral was placed and be on the look out for the specialists office to call you to schedule an appointment for appropriate follow up.   -For pain control - the RICE protocol is recommended (Rest, Ice, Compression and Elevation), heat and/or ice may be helpful as well. We also recommend alternating Tylenol and Ibuprofen if you are able to take these medications. Alternate every 4 hours as needed. I.e.: Ibuprofen at 8am, Tylenol 12pm, Ibuprofen 4pm    -Daily maximum of Tylenol is 4000mg (recommend staying under 3000mg)

## 2022-03-02 NOTE — NURSING NOTE
"Chief Complaint   Patient presents with     Musculoskeletal Problem     broke right foot 3 weeks ago, removed splint because it was digging into her, not getting better.  has ortho appointment monday       Initial /64 (BP Location: Left arm, Patient Position: Sitting, Cuff Size: Adult Large)   Pulse 77   Temp 97.7  F (36.5  C) (Tympanic)   Resp 18   SpO2 97%  Estimated body mass index is 40.5 kg/m  as calculated from the following:    Height as of 1/11/22: 1.605 m (5' 3.19\").    Weight as of 11/26/18: 104.3 kg (230 lb).  Medication Reconciliation: complete    Mandi Chowdary LPN    "

## 2022-03-07 ENCOUNTER — HOSPITAL ENCOUNTER (OUTPATIENT)
Dept: GENERAL RADIOLOGY | Facility: OTHER | Age: 67
End: 2022-03-07
Attending: ORTHOPAEDIC SURGERY
Payer: COMMERCIAL

## 2022-03-07 ENCOUNTER — OFFICE VISIT (OUTPATIENT)
Dept: ORTHOPEDICS | Facility: OTHER | Age: 67
End: 2022-03-07
Attending: ORTHOPAEDIC SURGERY
Payer: COMMERCIAL

## 2022-03-07 VITALS
OXYGEN SATURATION: 97 % | RESPIRATION RATE: 20 BRPM | DIASTOLIC BLOOD PRESSURE: 78 MMHG | SYSTOLIC BLOOD PRESSURE: 130 MMHG | HEART RATE: 81 BPM

## 2022-03-07 DIAGNOSIS — S93.401A SPRAIN OF RIGHT ANKLE, UNSPECIFIED LIGAMENT, INITIAL ENCOUNTER: Primary | ICD-10-CM

## 2022-03-07 DIAGNOSIS — M25.571 RIGHT ANKLE PAIN, UNSPECIFIED CHRONICITY: ICD-10-CM

## 2022-03-07 DIAGNOSIS — M25.571 RIGHT ANKLE PAIN, UNSPECIFIED CHRONICITY: Primary | ICD-10-CM

## 2022-03-07 PROCEDURE — 99203 OFFICE O/P NEW LOW 30 MIN: CPT | Performed by: ORTHOPAEDIC SURGERY

## 2022-03-07 PROCEDURE — 73610 X-RAY EXAM OF ANKLE: CPT | Mod: RT

## 2022-03-07 ASSESSMENT — PAIN SCALES - GENERAL: PAINLEVEL: EXTREME PAIN (8)

## 2022-03-07 NOTE — PROGRESS NOTES
"Chief Complaint   Patient presents with     Consult     right ankle - s/p 4 weeks       Initial There were no vitals taken for this visit. Estimated body mass index is 40.5 kg/m  as calculated from the following:    Height as of 1/11/22: 1.605 m (5' 3.19\").    Weight as of 11/26/18: 104.3 kg (230 lb).  Medication Reconciliation: complete    Mandi Chowdary LPN    "

## 2022-03-07 NOTE — PROGRESS NOTES
Visit Date: 2022    HISTORY OF PRESENT ILLNESS:  This is a 66-year-old female who was on vacation and sprained her ankle approximately 4 weeks ago now.  She was down in [facility] and ended up having x-rays done.  Here x-rays were suspect for a possible avulsion fracture off the distal fibula.  She was referred for a surgical consultation on this by the  doctors down in [place].    PHYSICAL EXAMINATION:  On examination today, this is a 66-year-old female, in no acute distress, very pleasant on examination.  She is morbidly obese.  Her ankle was left in the boot today.  She has a significant amount of swelling laterally, less so medially.  Her ankle is completely benign otherwise.  Her foot is completely benign.  She just has some swelling laterally and less so even medially.  She is nontender to palpation medially, significant amount of swelling laterally.    IMAGING:  X-ray examination shows no significant widening of the mortise.  There is an old ossicle off the medial malleolus.  There is a newer avulsion fracture off of the distal fibula that is actually quite subtle.    IMPRESSION AND PLAN:  This is a 66-year-old female with a sprain of her right ankle.  We are going to continue the boot.  We will get her into physical therapy in approximately 3 weeks from now.    Mack Cohen MD        D: 2022   T: 2022   MT: BLAINE    Name:     CHIOMA MORRIS  MRN:      [id number]        Account:    [id number]   :      1955           Visit Date: 2022     Document: [id number]

## 2022-03-08 ENCOUNTER — OFFICE VISIT (OUTPATIENT)
Dept: FAMILY MEDICINE | Facility: OTHER | Age: 67
End: 2022-03-08
Attending: FAMILY MEDICINE
Payer: COMMERCIAL

## 2022-03-08 VITALS
RESPIRATION RATE: 18 BRPM | HEART RATE: 82 BPM | BODY MASS INDEX: 38.98 KG/M2 | OXYGEN SATURATION: 96 % | HEIGHT: 63 IN | DIASTOLIC BLOOD PRESSURE: 70 MMHG | TEMPERATURE: 98 F | WEIGHT: 220 LBS | SYSTOLIC BLOOD PRESSURE: 124 MMHG

## 2022-03-08 DIAGNOSIS — G47.33 OSA ON CPAP: ICD-10-CM

## 2022-03-08 DIAGNOSIS — E66.01 MORBID OBESITY (H): ICD-10-CM

## 2022-03-08 DIAGNOSIS — F33.2 SEVERE EPISODE OF RECURRENT MAJOR DEPRESSIVE DISORDER, WITHOUT PSYCHOTIC FEATURES (H): ICD-10-CM

## 2022-03-08 DIAGNOSIS — Z01.818 PREOP GENERAL PHYSICAL EXAM: Primary | ICD-10-CM

## 2022-03-08 PROCEDURE — 99213 OFFICE O/P EST LOW 20 MIN: CPT | Performed by: FAMILY MEDICINE

## 2022-03-08 ASSESSMENT — PAIN SCALES - GENERAL: PAINLEVEL: EXTREME PAIN (8)

## 2022-03-08 NOTE — NURSING NOTE
Patient is here for a pre-op exam for cataract on right eye.     No LMP recorded. Patient has had an ablation.  Medication Reconciliation: complete    FOOD SECURITY SCREENING QUESTIONS  Hunger Vital Signs:  Within the past 12 months we worried whether our food would run out before we got money to buy more. Never  Within the past 12 months the food we bought just didn't last and we didn't have money to get more. Never  Ashley Slater LPN 3/8/2022 10:05 AM       Advance care directive on file? yes  Advance care directive provided to patient? no       Ashley Slater LPN

## 2022-03-08 NOTE — PROGRESS NOTES
[facility]  [address]  [place] [address]  Phone: [phone]  Fax: [phone]  Primary Provider: Yodit Mandujano  Pre-op Performing Provider: YODIT MANDUJANO      PREOPERATIVE EVALUATION:  Today's date: 3/8/2022    Ivy Romero is a 66 year old female who presents for a preoperative evaluation.    Surgical Information:  Surgery/Procedure: right eye cataract   Surgery Location: [facility]   Surgeon: Dr Escalante   Surgery Date: 3/16/22  Time of Surgery: TBD  Where patient plans to recover: At home with family  Fax number for surgical facility:     Type of Anesthesia Anticipated: Local     Assessment & Plan     The proposed surgical procedure is considered LOW risk.    Preop general physical exam      Severe episode of recurrent major depressive disorder, without psychotic features (H)      Morbid obesity (H)      PRADIP on CPAP             Risks and Recommendations:  The patient has the following additional risks and recommendations for perioperative complications:   - No identified additional risk factors other than previously addressed    Medication Instructions:  Patient is to take all scheduled medications on the day of surgery    RECOMMENDATION:  APPROVAL GIVEN to proceed with proposed procedure, without further diagnostic evaluation.    Review of external notes as documented above             Subjective     HPI related to upcoming procedure:     66-year-old female presents for preop evaluation prior to cataract surgery scheduled for later this month.    Patient has a history of COVID-19 infection in the fall 2021.  She is fully vaccinated.    Recently sustained a right ankle sprain and is wearing a walking boot for another 3 weeks.    24-hour blood pressure monitor in January was reviewed with the patient today.  Average systolic in the low 130s diastolics in the 70s.        Preop Questions 3/2/2022   1. Have you ever had a heart  attack or stroke? No   2. Have you ever had surgery on your heart or blood vessels, such as a stent placement, a coronary artery bypass, or surgery on an artery in your head, neck, heart, or legs? No   3. Do you have chest pain with activity? No   4. Do you have a history of  heart failure? No   5. Do you currently have a cold, bronchitis or symptoms of other infection? No   6. Do you have a cough, shortness of breath, or wheezing? No   7. Do you or anyone in your family have previous history of blood clots? No   8. Do you or does anyone in your family have a serious bleeding problem such as prolonged bleeding following surgeries or cuts? No   9. Have you ever had problems with anemia or been told to take iron pills? No   10. Have you had any abnormal blood loss such as black, tarry or bloody stools, or abnormal vaginal bleeding? No   11. Have you ever had a blood transfusion? No   12. Are you willing to have a blood transfusion if it is medically needed before, during, or after your surgery? Yes   13. Have you or any of your relatives ever had problems with anesthesia? No   14. Do you have sleep apnea, excessive snoring or daytime drowsiness? YES -compliant with CPAP   14a. Do you have a CPAP machine? Yes   15. Do you have any artifical heart valves or other implanted medical devices like a pacemaker, defibrillator, or continuous glucose monitor? No   16. Do you have artificial joints? YES -    17. Are you allergic to latex? No       Health Care Directive:  Patient has a Health Care Directive on file      Preoperative Review of :      Status of Chronic Conditions:  DEPRESSION - Patient has a long history of Depression of moderate severity requiring medication for control with recent symptoms being stable..Current symptoms of depression include none.       Review of Systems  CONSTITUTIONAL: NEGATIVE for fever, chills, change in weight  ENT/MOUTH: NEGATIVE for ear, mouth and throat problems  RESP: NEGATIVE for  significant cough or SOB  CV: NEGATIVE for chest pain, palpitations or peripheral edema    Patient Active Problem List    Diagnosis Date Noted     Morbid obesity (H) 10/12/2021     Priority: Medium     History of 2019 novel coronavirus disease (COVID-19) 11/03/2020     Priority: Medium     Osteoarthritis of left glenohumeral joint 04/10/2018     Priority: Medium     PRADIP on CPAP 11/20/2017     Priority: Medium     Dyslipidemia 10/24/2013     Priority: Medium     Major depressive disorder, recurrent episode, severe (H) 08/10/2011     Priority: Medium     Sarcoidosis 01/01/2007     Priority: Medium      Past Medical History:   Diagnosis Date     AC (acromioclavicular) arthritis 4/10/2018     Allergic rhinitis due to pollen     No Comments Provided     Dorsalgia     10/7/2010     Erythema nodosum      2009     Essential tremor     Possible benign     Hyperlipidemia     10/24/2013     Ill-defined and unknown cause of mortality (CODE)     characterized by chronic pain, positive BARBARA at a low titer of 1:160, negative HLAB27,     Migraine without status migrainosus, not intractable     No Comments Provided     Osteoarthritis of left glenohumeral joint 4/10/2018     Postconcussional syndrome     1/26/2016     Sarcoidosis     2007     Tendinitis of left rotator cuff 4/10/2018     Past Surgical History:   Procedure Laterality Date     ARTHROSCOPY SHOULDER      12/07,Right type 3 SLAP lesion repair of labrum - suprascapular ganglion removal with posterior approach - acromioplasty and distal clavicle resection     ARTHROSCOPY SHOULDER      2/2015     COLONOSCOPY  08/29/2011 8/2011,Normal--10 year followup     DILATION AND CURETTAGE      11/05, and ablation     LAPAROSCOPIC CHOLECYSTECTOMY      6/23/15,Cholecystectomy,Laparoscopic     left shoulder replacement       right total shoulder replacement       TONSILLECTOMY       and adenoidectomy age 12     Current Outpatient Medications   Medication Sig Dispense Refill      acetaminophen (TYLENOL) 500 MG tablet Take 1,000 mg by mouth 3 times daily as needed       buPROPion (WELLBUTRIN XL) 300 MG 24 hr tablet TAKE 1 TABLET BY MOUTH ONCE DAILY 90 tablet 3     Cholecalciferol (VITAMIN D3) 1000 UNITS CAPS Take 1,000 Units by mouth daily       gabapentin (NEURONTIN) 600 MG tablet 1 tab  tablet 3     guaiFENesin-codeine (ROBITUSSIN AC) 100-10 MG/5ML solution Take 5-10 mLs by mouth every 4 hours as needed for cough 80 mL 0     lamoTRIgine (LAMICTAL) 100 MG tablet Take 1 tablet (100 mg) by mouth every morning 90 tablet 3     lamoTRIgine (LAMICTAL) 200 MG tablet Take 1 tablet (200 mg) by mouth At Bedtime 90 tablet 3     order for DME Equipment being ordered: CPAP supplies 1 each 0     venlafaxine (EFFEXOR-XR) 150 MG 24 hr capsule Take 1 capsule (150 mg) by mouth daily with food 90 capsule 3       Allergies   Allergen Reactions     Penicillins Rash        Social History     Tobacco Use     Smoking status: Former Smoker     Quit date: 10/22/1991     Years since quittin.3     Smokeless tobacco: Never Used   Substance Use Topics     Alcohol use: Not Currently     Comment: Alcoholic Drinks/day: rarely     Family History   Adopted: Yes   Problem Relation Age of Onset     Unknown/Adopted Other         Unknown,Unknown. She is ADOPTED     Prostate Cancer Father         metastic Prostate CA     History   Drug Use No     Comment: Drug use: No         Objective     There were no vitals taken for this visit.    Physical Exam  GENERAL APPEARANCE: healthy, alert and no distress  HENT: ear canals and TM's normal and nose and mouth without ulcers or lesions  RESP: lungs clear to auscultation - no rales, rhonchi or wheezes  CV: regular rate and rhythm, normal S1 S2, no S3 or S4 and no murmur, click or rub     Recent Labs   Lab Test 22  0756   HGB 12.3         POTASSIUM 4.2   CR 1.62*        Diagnostics:  No labs were ordered during this visit.   No EKG required for low risk surgery  (cataract, skin procedure, breast biopsy, etc).    Revised Cardiac Risk Index (RCRI):  The patient has the following serious cardiovascular risks for perioperative complications:   - No serious cardiac risks = 0 points     RCRI Interpretation: 0 points: Class I (very low risk - 0.4% complication rate)           Signed Electronically by: Yodit Villalta MD  Copy of this evaluation report is provided to requesting physician.

## 2022-03-14 ENCOUNTER — HOSPITAL ENCOUNTER (OUTPATIENT)
Dept: PHYSICAL THERAPY | Facility: OTHER | Age: 67
Setting detail: THERAPIES SERIES
Discharge: HOME | End: 2022-03-14
Attending: ORTHOPAEDIC SURGERY
Payer: COMMERCIAL

## 2022-03-14 DIAGNOSIS — S93.401A SPRAIN OF RIGHT ANKLE, UNSPECIFIED LIGAMENT, INITIAL ENCOUNTER: ICD-10-CM

## 2022-03-14 PROCEDURE — 97016 VASOPNEUMATIC DEVICE THERAPY: CPT | Mod: GP

## 2022-03-14 PROCEDURE — 97161 PT EVAL LOW COMPLEX 20 MIN: CPT | Mod: GP

## 2022-03-14 PROCEDURE — 97110 THERAPEUTIC EXERCISES: CPT | Mod: GP

## 2022-03-15 ENCOUNTER — TELEPHONE (OUTPATIENT)
Dept: FAMILY MEDICINE | Facility: OTHER | Age: 67
End: 2022-03-15
Payer: COMMERCIAL

## 2022-03-15 DIAGNOSIS — H26.9 CATARACT, UNSPECIFIED CATARACT TYPE, UNSPECIFIED LATERALITY: Primary | ICD-10-CM

## 2022-03-15 DIAGNOSIS — H25.9 SENILE CATARACT OF RIGHT EYE, UNSPECIFIED AGE-RELATED CATARACT TYPE: Primary | ICD-10-CM

## 2022-03-15 NOTE — TELEPHONE ENCOUNTER
Please call the patient.  She needs a referral for [facility].      Jeanie Duvall on 3/15/2022 at 8:31 AM

## 2022-03-15 NOTE — TELEPHONE ENCOUNTER
Patient called a 2nd time stating [facility] had not received the referral for her eye surgery which is scheduled for tomorrow. She said at the last visit she thought [facility] was going to send it in and they don't have it yet. She needs this sent asap so she can still have the surgery tomorrow    Call patient to let know when is done.       Abbey Cam on 3/15/2022 at 2:51 PM

## 2022-03-16 NOTE — TELEPHONE ENCOUNTER
Called and spoke with patient and informed referral signed and patient verbalized understanding.    Christal Yuan RN on 3/16/2022 at 9:26 AM

## 2022-03-22 ENCOUNTER — HOSPITAL ENCOUNTER (OUTPATIENT)
Dept: PHYSICAL THERAPY | Facility: OTHER | Age: 67
Setting detail: THERAPIES SERIES
Discharge: HOME | End: 2022-03-22
Attending: ORTHOPAEDIC SURGERY
Payer: COMMERCIAL

## 2022-03-22 PROCEDURE — 97140 MANUAL THERAPY 1/> REGIONS: CPT | Mod: GP

## 2022-03-22 PROCEDURE — 97016 VASOPNEUMATIC DEVICE THERAPY: CPT | Mod: GP

## 2022-03-22 PROCEDURE — 97110 THERAPEUTIC EXERCISES: CPT | Mod: GP

## 2022-03-23 NOTE — PROGRESS NOTES
03/14/22 1200   General Information   Type of Visit Initial OP Ortho PT Evaluation   Start of Care Date 03/14/22   Referring Physician Dr. oChen   Orders Evaluate and Treat   Date of Order 03/07/22   Certification Required? No   Medical Diagnosis R ankle sprain   Weight-Bearing Status - RLE weight-bearing as tolerated   General Information Comments Pt is a 66-year old woman with R ankle sprain. She had this occur while down in [place]. X-rays in [place] showed possible avulsion fracture. Follow-up X-rays with Dr. Cohen showed just a significant sprain. Pt to continue wearing her boot.    Body Part(s)   Body Part(s) Ankle/Foot   Presentation and Etiology   Pertinent history of current problem (include personal factors and/or comorbidities that impact the POC) anxiety, fibromyalgia   Impairments A. Pain;B. Decreased WB tolerance;C. Swelling;D. Decreased ROM;E. Decreased flexibility;F. Decreased strength and endurance;G. Impaired balance;H. Impaired gait   Functional Limitations perform activities of daily living;perform desired leisure / sports activities   Symptom Location entire ankle and foot   How/Where did it occur With a fall   Onset date of current episode/exacerbation 02/26/22   Chronicity New   Pain rating (0-10 point scale) Best (/10);Worst (/10)   Worst (/10) 8   Pain quality A. Sharp;B. Dull;C. Aching;E. Shooting   Frequency of pain/symptoms A. Constant   Pain/symptoms are: Worse during the day   Pain/symptoms exacerbated by A. Sitting;B. Walking;E. Rest;G. Certain positions   Pain/symptoms eased by A. Sitting   Progression of symptoms since onset: Improved   Current / Previous Interventions   Diagnostic Tests: X-ray   X-ray Results Results   X-ray results possible avulsion fracture   Prior Level of Function   Prior Level of Function-Mobility independent   Prior Level of Function-ADLs independent   Current Level of Function   Current Community Support Family/friend caregiver   Patient role/employment  history F. Retired   Living environment [facility]   Fall Risk Screen   Fall screen completed by PT   Have you fallen 2 or more times in the past year? Yes   Have you fallen and had an injury in the past year? Yes   Is patient a fall risk? Yes   Fall screen comments with balance activities   Abuse Screen (yes response referral indicated)   Feels Unsafe at Home or Work/School no   Feels Threatened by Someone no   Does Anyone Try to Keep You From Having Contact with Others or Doing Things Outside Your Home? no   Physical Signs of Abuse Present no   Ankle/Foot Objective Findings   Side (if bilateral, select both right and left) Right   Observation Pt in walking but today   Integumentary mild swelling present but mildly    Posture Normal   Gait/Locomotion antalgic in boot   Balance/ Proprioception (Single Leg Stance) not weight bearing without boot   Ankle/Foot ROM Comment Pt has difficult time with Dorsiflexion at this point. Passively with overpressure she can get to -7 but with intense stretching and pain sensation   Ankle/Foot Strength Comments Pt demonstrates fair strength but with pain in some motions   Right DF (Knee Ext) AROM -10   Right PF AROM 40   Right Calcanceal Inversion AROM WNL   Right Calcaneal Eversion AROM WNLs   Right Gastroc (in WB) Flexibility significant impairment   Right Soleus (in WB) Flexibility significant impairement   Planned Therapy Interventions   Planned Therapy Interventions ADL retraining;gait training;joint mobilization;manual therapy;neuromuscular re-education;ROM;strengthening;stretching   Planned Modality Interventions   Planned Modality Interventions Cryotherapy;Ultrasound;Hot packs;Electrical stimulation   Clinical Impression   Criteria for Skilled Therapeutic Interventions Met yes, treatment indicated   PT Diagnosis Impaired mobility   Influenced by the following impairments weakness, pain, weakness   Functional limitations due to impairments limited WB tolerance,     Clinical Presentation Stable/Uncomplicated   Clinical Decision Making (Complexity) Low complexity   Therapy Frequency 2 times/Week   Predicted Duration of Therapy Intervention (days/wks) 8 weeks   Risk & Benefits of therapy have been explained Yes   Patient, Family & other staff in agreement with plan of care Yes   Clinical Impression Comments Impaired    Education Assessment   Preferred Learning Style Pictures/video   ORTHO GOALS   PT Ortho Eval Goals 1;2;3;4   Ortho Goal 1   Goal Identifier Dorsiflexion   Goal Description Pt will achieve neutral ankle dorsiflexion for improve stair navigation and ambulation   Target Date 04/27/22   Ortho Goal 2   Goal Identifier Footwear   Goal Description Pt will be able to don and doff all footwear without assistance or use of tool   Target Date 04/27/22   Ortho Goal 3   Goal Identifier Ambulation   Goal Description Pt will be able to ambulate 500 in supportive shoe without increasing her pain above a 4 and without significant gait compensation   Target Date 05/04/22   Ortho Goal 4   Goal Identifier Standing tolerance   Goal Description Pt will be able to stand for 1 hour at home for various tasks such as cooking and cleaning without increasing her foot pain greater than 4   Target Date 05/04/22   Total Evaluation Time   PT Eval, Low Complexity Minutes (17339) 15

## 2022-03-24 ENCOUNTER — HOSPITAL ENCOUNTER (OUTPATIENT)
Dept: PHYSICAL THERAPY | Facility: OTHER | Age: 67
Setting detail: THERAPIES SERIES
Discharge: HOME | End: 2022-03-24
Attending: ORTHOPAEDIC SURGERY
Payer: COMMERCIAL

## 2022-03-24 PROCEDURE — 97110 THERAPEUTIC EXERCISES: CPT | Mod: GP

## 2022-03-24 PROCEDURE — 97016 VASOPNEUMATIC DEVICE THERAPY: CPT | Mod: GP

## 2022-03-24 PROCEDURE — 97140 MANUAL THERAPY 1/> REGIONS: CPT | Mod: GP

## 2022-03-29 ENCOUNTER — HOSPITAL ENCOUNTER (OUTPATIENT)
Dept: PHYSICAL THERAPY | Facility: OTHER | Age: 67
Setting detail: THERAPIES SERIES
Discharge: HOME | End: 2022-03-29
Attending: ORTHOPAEDIC SURGERY
Payer: COMMERCIAL

## 2022-03-29 PROCEDURE — 97016 VASOPNEUMATIC DEVICE THERAPY: CPT | Mod: GP

## 2022-03-29 PROCEDURE — 97110 THERAPEUTIC EXERCISES: CPT | Mod: GP

## 2022-03-29 PROCEDURE — 97140 MANUAL THERAPY 1/> REGIONS: CPT | Mod: GP

## 2022-04-04 ENCOUNTER — OFFICE VISIT (OUTPATIENT)
Dept: ORTHOPEDICS | Facility: OTHER | Age: 67
End: 2022-04-04
Attending: ORTHOPAEDIC SURGERY
Payer: COMMERCIAL

## 2022-04-04 ENCOUNTER — HOSPITAL ENCOUNTER (OUTPATIENT)
Dept: PHYSICAL THERAPY | Facility: OTHER | Age: 67
Setting detail: THERAPIES SERIES
Discharge: HOME | End: 2022-04-04
Attending: ORTHOPAEDIC SURGERY
Payer: COMMERCIAL

## 2022-04-04 DIAGNOSIS — M25.571 RIGHT ANKLE PAIN, UNSPECIFIED CHRONICITY: Primary | ICD-10-CM

## 2022-04-04 PROCEDURE — 97140 MANUAL THERAPY 1/> REGIONS: CPT | Mod: GP

## 2022-04-04 PROCEDURE — 99213 OFFICE O/P EST LOW 20 MIN: CPT | Performed by: ORTHOPAEDIC SURGERY

## 2022-04-04 PROCEDURE — 97110 THERAPEUTIC EXERCISES: CPT | Mod: GP

## 2022-04-04 NOTE — PROGRESS NOTES
Chief Complaint   Patient presents with     RECHECK     s/p 8 weeks right ankle sprain       Mandi Chowdary LPN

## 2022-04-05 NOTE — PROGRESS NOTES
Visit Date: 2022    She is now 8 weeks status post a trip and an ankle sprain and is doing quite well at this point.  She is working in physical therapy, and they are working on range of motion as well as stabilization of her ankle and should transition her into shoes very soon.  She is otherwise doing really well with this.    On examination, she is nontender to palpation over the lateral ankle.  She has good range of motion at this point and is able to dorsiflex 90 degrees.  No real issues.    IMPRESSION AND PLAN:  This is a 66-year-old female with a sprained ankle.  We are going to see her back on an as-needed basis.  She is to continue her physical therapy until they discharge her.    Mack Cohen MD        D: 2022   T: 2022   MT: [id number]    Name:     CHIOMA MORRIS  MRN:      [id number]        Account:    [id number]   :      1955           Visit Date: 2022     Document: [id number]

## 2022-04-06 ENCOUNTER — HOSPITAL ENCOUNTER (OUTPATIENT)
Dept: PHYSICAL THERAPY | Facility: OTHER | Age: 67
Setting detail: THERAPIES SERIES
Discharge: HOME | End: 2022-04-06
Attending: ORTHOPAEDIC SURGERY
Payer: COMMERCIAL

## 2022-04-06 PROCEDURE — 97110 THERAPEUTIC EXERCISES: CPT | Mod: GP

## 2022-04-06 PROCEDURE — 97140 MANUAL THERAPY 1/> REGIONS: CPT | Mod: GP

## 2022-04-11 ENCOUNTER — HOSPITAL ENCOUNTER (OUTPATIENT)
Dept: PHYSICAL THERAPY | Facility: OTHER | Age: 67
Setting detail: THERAPIES SERIES
Discharge: HOME | End: 2022-04-11
Attending: ORTHOPAEDIC SURGERY
Payer: COMMERCIAL

## 2022-04-11 PROCEDURE — 97110 THERAPEUTIC EXERCISES: CPT | Mod: GP

## 2022-04-11 PROCEDURE — 97140 MANUAL THERAPY 1/> REGIONS: CPT | Mod: GP

## 2022-04-21 ENCOUNTER — HOSPITAL ENCOUNTER (OUTPATIENT)
Dept: PHYSICAL THERAPY | Facility: OTHER | Age: 67
Setting detail: THERAPIES SERIES
Discharge: HOME | End: 2022-04-21
Attending: ORTHOPAEDIC SURGERY
Payer: COMMERCIAL

## 2022-04-21 PROCEDURE — 97140 MANUAL THERAPY 1/> REGIONS: CPT | Mod: GP

## 2022-04-21 PROCEDURE — 97110 THERAPEUTIC EXERCISES: CPT | Mod: GP

## 2022-04-25 ENCOUNTER — HOSPITAL ENCOUNTER (OUTPATIENT)
Dept: PHYSICAL THERAPY | Facility: OTHER | Age: 67
Setting detail: THERAPIES SERIES
Discharge: HOME | End: 2022-04-25
Attending: ORTHOPAEDIC SURGERY
Payer: COMMERCIAL

## 2022-04-25 PROCEDURE — 97140 MANUAL THERAPY 1/> REGIONS: CPT | Mod: GP

## 2022-04-25 PROCEDURE — 97110 THERAPEUTIC EXERCISES: CPT | Mod: GP

## 2022-04-28 ENCOUNTER — HOSPITAL ENCOUNTER (OUTPATIENT)
Dept: PHYSICAL THERAPY | Facility: OTHER | Age: 67
Setting detail: THERAPIES SERIES
Discharge: HOME | End: 2022-04-28
Attending: ORTHOPAEDIC SURGERY
Payer: COMMERCIAL

## 2022-04-28 PROCEDURE — 97112 NEUROMUSCULAR REEDUCATION: CPT | Mod: GP

## 2022-04-28 PROCEDURE — 97110 THERAPEUTIC EXERCISES: CPT | Mod: GP

## 2022-04-30 NOTE — PROGRESS NOTES
"[facility]    Outpatient Physical Therapy Progress Note  Patient: Ivy Romero  : 1955    Beginning/End Dates of Reporting Period:  3/14/22 to 22    Referring Provider: Dr Noel mD    Therapy Diagnosis: ankle pain, weakness, impaired mobility and ambulation     Client Self Report: Ivy notes that her foot and ankle is feeling better. She does feel like her calf and lower leg muscles \"feel like rocks\"    Goals:  Goal Identifier Dorsiflexion   Goal Description Pt will achieve neutral ankle dorsiflexion for improve stair navigation and ambulation   Target Date 22   Date Met  22   Progress (detail required for progress note):       Goal Identifier Footwear   Goal Description Pt will be able to don and doff all footwear without assistance or use of tool   Target Date 22   Date Met  22   Progress (detail required for progress note):       Goal Identifier Ambulation   Goal Description Pt will be able to ambulate 500 in supportive shoe without increasing her pain above a 4 and without significant gait compensation   Target Date 22   Date Met      Progress (detail required for progress note): Continues to have pain with increased ambulation but is improving     Goal Identifier Standing tolerance   Goal Description Pt will be able to stand for 1 hour at home for various tasks such as cooking and cleaning without increasing her foot pain greater than 4   Target Date 22   Date Met      Progress (detail required for progress note): Pt continues to have increased pain and uses breaks while at home.         Plan:  Continue therapy per current plan of care. We will continue to work through the rehab process with increased time spent in shoes/weight-bearing as well as LE strengthening and balance training.     Discharge:  No  "

## 2022-05-02 ENCOUNTER — TELEPHONE (OUTPATIENT)
Dept: FAMILY MEDICINE | Facility: OTHER | Age: 67
End: 2022-05-02
Payer: MEDICARE

## 2022-05-02 DIAGNOSIS — H26.9 CATARACT, UNSPECIFIED CATARACT TYPE, UNSPECIFIED LATERALITY: Primary | ICD-10-CM

## 2022-05-02 NOTE — TELEPHONE ENCOUNTER
TYP-patient is looking for insurance referrals to [facility] for surgery    Please call and advise    Thank You    Harriett Mendoza on 5/2/2022 at 1:48 PM

## 2022-05-02 NOTE — TELEPHONE ENCOUNTER
Patient states she got her cataract done and then had 2 follow up appt to check after. She is needing a referral for the visits after, referral pending listed as retro referral in notes.   Ashley Slater LPN .............5/2/2022     2:21 PM

## 2022-05-04 ENCOUNTER — HOSPITAL ENCOUNTER (OUTPATIENT)
Dept: PHYSICAL THERAPY | Facility: OTHER | Age: 67
Setting detail: THERAPIES SERIES
Discharge: HOME | End: 2022-05-04
Attending: FAMILY MEDICINE
Payer: MEDICARE

## 2022-05-04 PROCEDURE — 97010 HOT OR COLD PACKS THERAPY: CPT | Mod: GP

## 2022-05-04 PROCEDURE — 97110 THERAPEUTIC EXERCISES: CPT | Mod: GP

## 2022-05-04 PROCEDURE — 97140 MANUAL THERAPY 1/> REGIONS: CPT | Mod: GP

## 2022-05-04 PROCEDURE — 97112 NEUROMUSCULAR REEDUCATION: CPT | Mod: GP

## 2022-05-10 ENCOUNTER — HOSPITAL ENCOUNTER (OUTPATIENT)
Dept: PHYSICAL THERAPY | Facility: OTHER | Age: 67
Setting detail: THERAPIES SERIES
Discharge: HOME | End: 2022-05-10
Attending: ORTHOPAEDIC SURGERY
Payer: MEDICARE

## 2022-05-10 PROCEDURE — 97110 THERAPEUTIC EXERCISES: CPT | Mod: GP

## 2022-05-12 ENCOUNTER — HOSPITAL ENCOUNTER (OUTPATIENT)
Dept: PHYSICAL THERAPY | Facility: OTHER | Age: 67
Setting detail: THERAPIES SERIES
Discharge: HOME | End: 2022-05-12
Attending: ORTHOPAEDIC SURGERY
Payer: MEDICARE

## 2022-05-12 PROCEDURE — 97112 NEUROMUSCULAR REEDUCATION: CPT | Mod: GP

## 2022-05-12 PROCEDURE — 97140 MANUAL THERAPY 1/> REGIONS: CPT | Mod: GP

## 2022-05-12 PROCEDURE — 97110 THERAPEUTIC EXERCISES: CPT | Mod: GP

## 2022-05-17 ENCOUNTER — HOSPITAL ENCOUNTER (OUTPATIENT)
Dept: PHYSICAL THERAPY | Facility: OTHER | Age: 67
Setting detail: THERAPIES SERIES
Discharge: HOME | End: 2022-05-17
Attending: ORTHOPAEDIC SURGERY
Payer: MEDICARE

## 2022-05-17 PROCEDURE — 97112 NEUROMUSCULAR REEDUCATION: CPT | Mod: GP

## 2022-05-17 PROCEDURE — 97110 THERAPEUTIC EXERCISES: CPT | Mod: GP

## 2022-05-20 ENCOUNTER — HOSPITAL ENCOUNTER (OUTPATIENT)
Dept: PHYSICAL THERAPY | Facility: OTHER | Age: 67
Setting detail: THERAPIES SERIES
Discharge: HOME | End: 2022-05-20
Attending: ORTHOPAEDIC SURGERY
Payer: MEDICARE

## 2022-05-20 PROCEDURE — 97140 MANUAL THERAPY 1/> REGIONS: CPT | Mod: GP,CQ

## 2022-05-20 PROCEDURE — 97110 THERAPEUTIC EXERCISES: CPT | Mod: GP,CQ

## 2022-05-23 ENCOUNTER — HOSPITAL ENCOUNTER (OUTPATIENT)
Dept: PHYSICAL THERAPY | Facility: OTHER | Age: 67
Setting detail: THERAPIES SERIES
Discharge: HOME | End: 2022-05-23
Attending: ORTHOPAEDIC SURGERY
Payer: MEDICARE

## 2022-05-23 PROCEDURE — 97140 MANUAL THERAPY 1/> REGIONS: CPT | Mod: GP,CQ

## 2022-05-23 PROCEDURE — 97110 THERAPEUTIC EXERCISES: CPT | Mod: GP,CQ

## 2022-05-26 ENCOUNTER — TELEPHONE (OUTPATIENT)
Dept: FAMILY MEDICINE | Facility: OTHER | Age: 67
End: 2022-05-26
Payer: MEDICARE

## 2022-05-26 ENCOUNTER — HOSPITAL ENCOUNTER (OUTPATIENT)
Dept: PHYSICAL THERAPY | Facility: OTHER | Age: 67
Setting detail: THERAPIES SERIES
Discharge: HOME | End: 2022-05-26
Attending: ORTHOPAEDIC SURGERY
Payer: MEDICARE

## 2022-05-26 DIAGNOSIS — Z12.11 COLON CANCER SCREENING: Primary | ICD-10-CM

## 2022-05-26 PROCEDURE — 97112 NEUROMUSCULAR REEDUCATION: CPT | Mod: GP

## 2022-05-26 PROCEDURE — 97110 THERAPEUTIC EXERCISES: CPT | Mod: GP

## 2022-05-26 NOTE — TELEPHONE ENCOUNTER
Would like to get a colonoscopy, please send in an order and let pt know, thank you!    Janessa Briones on 5/26/2022 at 1:45 PM

## 2022-05-26 NOTE — TELEPHONE ENCOUNTER
Patient would prefer to have colonoscopy instead of the cologuard.  Ashley Slater LPN .............5/26/2022     2:33 PM

## 2022-05-31 NOTE — PROGRESS NOTES
[facility]    OUTPATIENT PHYSICAL THERAPY ORTHOPEDIC EVALUATION  PLAN OF TREATMENT FOR OUTPATIENT REHABILITATION  (COMPLETE FOR INITIAL CLAIMS ONLY)  Patient's Last Name, First Name, M.I.  YOB: 1955  Ivy Stevenson    Provider s Name:  [facility]   Medical Record No.  [id number]   Start of Care Date:      Onset Date:      Type:     _X__PT   ___OT   ___SLP Medical Diagnosis:        PT Diagnosis:  Impaired mobility   Visits from SOC:  1      _________________________________________________________________________________  Plan of Treatment/Functional Goals: strengthening, balance, ROM/stretching, manual therapy, cryotherapy, ultrasound, hot packs        Goals  Goal Identifier: Dorsiflexion  Goal Description: Pt will achieve neutral ankle dorsiflexion for improve stair navigation and ambulation  Target Date: 04/27/22    Goal Identifier: Footwear  Goal Description: Pt will be able to don and doff all footwear without assistance or use of tool  Target Date: 04/27/22    Goal Identifier: Ambulation  Goal Description: Pt will be able to ambulate 500 in supportive shoe without increasing her pain above a 4 and without significant gait compensation  Target Date: 05/04/22    Goal Identifier: Standing tolerance  Goal Description: Pt will be able to stand for 1 hour at home for various tasks such as cooking and cleaning without increasing her foot pain greater than 4  Target Date: 05/04/22       Therapy Frequency:   1-2x/week  Predicted Duration of Therapy Intervention:   8 weeks    Milton Silva PT                 I CERTIFY THE NEED FOR THESE SERVICES FURNISHED UNDER        THIS PLAN OF TREATMENT AND WHILE UNDER MY CARE     (Physician co-signature of this document indicates review and certification of the therapy plan).                       Certification Date  From:    5/1/22  Certification Date To:   7/14/22    Referring Provider:   Noel    Initial Assessment        See Epic Evaluation

## 2022-06-01 ENCOUNTER — HOSPITAL ENCOUNTER (OUTPATIENT)
Dept: PHYSICAL THERAPY | Facility: OTHER | Age: 67
Setting detail: THERAPIES SERIES
Discharge: HOME | End: 2022-06-01
Attending: ORTHOPAEDIC SURGERY
Payer: MEDICARE

## 2022-06-01 PROCEDURE — 97110 THERAPEUTIC EXERCISES: CPT | Mod: GP

## 2022-06-01 PROCEDURE — 97112 NEUROMUSCULAR REEDUCATION: CPT | Mod: GP

## 2022-06-06 ENCOUNTER — OFFICE VISIT (OUTPATIENT)
Dept: ORTHOPEDICS | Facility: OTHER | Age: 67
End: 2022-06-06
Attending: ORTHOPAEDIC SURGERY
Payer: MEDICARE

## 2022-06-06 DIAGNOSIS — M25.571 RIGHT ANKLE PAIN, UNSPECIFIED CHRONICITY: Primary | ICD-10-CM

## 2022-06-06 PROCEDURE — 99213 OFFICE O/P EST LOW 20 MIN: CPT | Performed by: ORTHOPAEDIC SURGERY

## 2022-06-06 PROCEDURE — G0463 HOSPITAL OUTPT CLINIC VISIT: HCPCS

## 2022-06-06 NOTE — PROGRESS NOTES
Chief Complaint   Patient presents with     RECHECK     S/p 4 months right ankle        Mandi Chowdary LPN     Patient discharged to home in stable condition. Written and verbal after care 
instructions along with RX  given. Patient verbalizes understanding of 
instruction.IV removed. Catheter intact and site benign. Pressure and 4x4 
applied to site. No bleeding noted. VSS upon discharge. Pt wheelchaired out by 
EMT Jameel

## 2022-06-06 NOTE — PROGRESS NOTES
Subjective:    4-month status post right ankle sprain still having swelling and pain from time to time in the ankle.    Objective:    She does still have some mild swelling on the lateral ankle.  She otherwise has full range of motion.  She is neuro and vascularly intact her ankle is stable.  She is morbidly obese    Assessment:    67-year-old female 4-month status post right ankle sprain.    Plan:    Follow-up as needed

## 2022-06-08 ENCOUNTER — HOSPITAL ENCOUNTER (OUTPATIENT)
Facility: OTHER | Age: 67
End: 2022-06-08
Attending: SURGERY | Admitting: SURGERY
Payer: MEDICARE

## 2022-07-01 ENCOUNTER — IMMUNIZATION (OUTPATIENT)
Dept: FAMILY MEDICINE | Facility: OTHER | Age: 67
End: 2022-07-01
Attending: FAMILY MEDICINE
Payer: MEDICARE

## 2022-07-01 PROCEDURE — 0054A COVID-19,PF,PFIZER (12+ YRS): CPT

## 2022-08-02 PROBLEM — Z00.00 HEALTHCARE MAINTENANCE: Status: ACTIVE | Noted: 2022-08-02

## 2022-08-02 RX ORDER — NALOXONE HYDROCHLORIDE 0.4 MG/ML
0.4 INJECTION, SOLUTION INTRAMUSCULAR; INTRAVENOUS; SUBCUTANEOUS
Status: CANCELLED | OUTPATIENT
Start: 2022-08-02

## 2022-08-02 RX ORDER — LIDOCAINE 40 MG/G
CREAM TOPICAL
Status: CANCELLED | OUTPATIENT
Start: 2022-08-02

## 2022-08-02 RX ORDER — SODIUM CHLORIDE, SODIUM LACTATE, POTASSIUM CHLORIDE, CALCIUM CHLORIDE 600; 310; 30; 20 MG/100ML; MG/100ML; MG/100ML; MG/100ML
INJECTION, SOLUTION INTRAVENOUS CONTINUOUS
Status: CANCELLED | OUTPATIENT
Start: 2022-08-02

## 2022-08-02 RX ORDER — NALOXONE HYDROCHLORIDE 0.4 MG/ML
0.2 INJECTION, SOLUTION INTRAMUSCULAR; INTRAVENOUS; SUBCUTANEOUS
Status: CANCELLED | OUTPATIENT
Start: 2022-08-02

## 2022-08-02 RX ORDER — FLUMAZENIL 0.1 MG/ML
0.2 INJECTION, SOLUTION INTRAVENOUS
Status: CANCELLED | OUTPATIENT
Start: 2022-08-02 | End: 2022-08-02

## 2022-08-02 RX ORDER — ONDANSETRON 2 MG/ML
4 INJECTION INTRAMUSCULAR; INTRAVENOUS
Status: CANCELLED | OUTPATIENT
Start: 2022-08-02

## 2022-08-08 ENCOUNTER — TELEPHONE (OUTPATIENT)
Dept: SURGERY | Facility: OTHER | Age: 67
End: 2022-08-08

## 2022-08-08 DIAGNOSIS — Z00.00 HEALTHCARE MAINTENANCE: Primary | ICD-10-CM

## 2022-08-08 NOTE — TELEPHONE ENCOUNTER
Ivy called and was unable to keep her prep down she will need to be rescheduled with an alternative prep please

## 2022-08-09 NOTE — TELEPHONE ENCOUNTER
Notified with new colonoscopy prep.  She will need to call her insurance to see if they will cover this.  She also needs to reschedule for another colonoscopy. Will notify  of this.  Cherelle Rose LPN..........8/9/2022  7:51 AM

## 2022-08-15 DIAGNOSIS — Z00.00 HEALTHCARE MAINTENANCE: Primary | ICD-10-CM

## 2022-08-15 RX ORDER — SODIUM, POTASSIUM,MAG SULFATES 17.5-3.13G
1 SOLUTION, RECONSTITUTED, ORAL ORAL 2 TIMES DAILY
Qty: 177 ML | Refills: 0 | Status: ON HOLD | OUTPATIENT
Start: 2022-08-15 | End: 2022-10-31

## 2022-09-17 ENCOUNTER — HEALTH MAINTENANCE LETTER (OUTPATIENT)
Age: 67
End: 2022-09-17

## 2022-10-19 ENCOUNTER — HOSPITAL ENCOUNTER (OUTPATIENT)
Dept: GENERAL RADIOLOGY | Facility: OTHER | Age: 67
Discharge: HOME | End: 2022-10-19
Attending: NURSE PRACTITIONER
Payer: MEDICARE

## 2022-10-19 ENCOUNTER — OFFICE VISIT (OUTPATIENT)
Dept: INTERNAL MEDICINE | Facility: OTHER | Age: 67
End: 2022-10-19
Attending: NURSE PRACTITIONER
Payer: MEDICARE

## 2022-10-19 VITALS
DIASTOLIC BLOOD PRESSURE: 88 MMHG | RESPIRATION RATE: 16 BRPM | OXYGEN SATURATION: 99 % | SYSTOLIC BLOOD PRESSURE: 134 MMHG | HEART RATE: 72 BPM | TEMPERATURE: 97.2 F

## 2022-10-19 DIAGNOSIS — R22.42 LUMP OF SKIN OF LOWER EXTREMITY, LEFT: ICD-10-CM

## 2022-10-19 DIAGNOSIS — N18.32 CHRONIC KIDNEY DISEASE, STAGE 3B (H): ICD-10-CM

## 2022-10-19 DIAGNOSIS — G89.29 CHRONIC PAIN OF RIGHT KNEE: Primary | ICD-10-CM

## 2022-10-19 DIAGNOSIS — M25.561 CHRONIC PAIN OF RIGHT KNEE: Primary | ICD-10-CM

## 2022-10-19 DIAGNOSIS — M25.561 CHRONIC PAIN OF RIGHT KNEE: ICD-10-CM

## 2022-10-19 DIAGNOSIS — G89.4 CHRONIC PAIN DISORDER: ICD-10-CM

## 2022-10-19 DIAGNOSIS — G89.29 CHRONIC PAIN OF RIGHT KNEE: ICD-10-CM

## 2022-10-19 PROCEDURE — G0463 HOSPITAL OUTPT CLINIC VISIT: HCPCS

## 2022-10-19 PROCEDURE — 99213 OFFICE O/P EST LOW 20 MIN: CPT | Performed by: NURSE PRACTITIONER

## 2022-10-19 PROCEDURE — G0463 HOSPITAL OUTPT CLINIC VISIT: HCPCS | Mod: 25

## 2022-10-19 PROCEDURE — 73562 X-RAY EXAM OF KNEE 3: CPT | Mod: RT

## 2022-10-19 PROCEDURE — 73650 X-RAY EXAM OF HEEL: CPT | Mod: LT

## 2022-10-19 ASSESSMENT — PAIN SCALES - GENERAL: PAINLEVEL: SEVERE PAIN (7)

## 2022-10-19 ASSESSMENT — PATIENT HEALTH QUESTIONNAIRE - PHQ9
SUM OF ALL RESPONSES TO PHQ QUESTIONS 1-9: 22
10. IF YOU CHECKED OFF ANY PROBLEMS, HOW DIFFICULT HAVE THESE PROBLEMS MADE IT FOR YOU TO DO YOUR WORK, TAKE CARE OF THINGS AT HOME, OR GET ALONG WITH OTHER PEOPLE: VERY DIFFICULT
SUM OF ALL RESPONSES TO PHQ QUESTIONS 1-9: 22

## 2022-10-19 ASSESSMENT — ENCOUNTER SYMPTOMS: ARTHRALGIAS: 1

## 2022-10-19 NOTE — PROGRESS NOTES
ICD-10-CM    1. Chronic pain of right knee  M25.561 XR Knee Right 3 Views    G89.29 Orthopedic  Referral      2. Lump of skin of lower extremity, left  R22.42 XR Calcaneus Left G/E 2 Views     Orthopedic  Referral     CANCELED: XR Calcaneus Right G/E 2 Views      3. Chronic kidney disease, stage 3b (H)  N18.32         Plan:  We will obtain x-rays of both the right knee and left heel.  I question whether she does not have a foreign body in the left heel soft tissue.  I also think she likely has medial compartment osteoarthritis.  Patient was not able to stay for results of x-rays.  She does have known history of stage III chronic kidney disease.  She has not been taking any medication for her symptoms.  She will be contacted with results of x-rays.  She has been referred to orthopedics.  We did discuss treatment for knee osteoarthritis     dSubjectjessica Haynes is a 67 year old, presenting for the following health issues:  Knee Pain      Patient is seen today for right knee pain that has been occurring for the past 3 years.  She states that when she was in [place] 3 years ago she was walking along the sidewalk and her knee gave out.  She fell on the ground.  She did not have any significant injury.  She states intermittently since that time her knee will give out.  It never locks up.  She has pain at rest and with ambulation especially along the medial aspect.  She has pain with walking up and down stairs.  There is no swelling of the knee.  She also reports that the left heel has a lump.  It has been present for several weeks now.  It seems to be getting a little more tender and bigger.  She does not recall a foreign body.  She has no history of neuropathy or diabetes.    Knee Pain  Associated symptoms include arthralgias.   History of Present Illness       Reason for visit:  Pronlem with right knee and left foot  Symptom onset:  More than a month  Symptoms include:  Aching in right knee and  lump on left foot  Symptom intensity:  Moderate  Symptom progression:  Worsening  Had these symptoms before:  No  What makes it worse:  Walking and kneeing down on it  What makes it better:  No    She eats 0-1 servings of fruits and vegetables daily.She consumes 2 sweetened beverage(s) daily.She exercises with enough effort to increase her heart rate 10 to 19 minutes per day.  She exercises with enough effort to increase her heart rate 4 days per week.   She is taking medications regularly.    Today's PHQ-9         PHQ-9 Total Score: 22    PHQ-9 Q9 Thoughts of better off dead/self-harm past 2 weeks :   Not at all    How difficult have these problems made it for you to do your work, take care of things at home, or get along with other people: Very difficult             Review of Systems   Musculoskeletal: Positive for arthralgias and gait problem.        See HPI            Objective    /88 (BP Location: Right arm, Patient Position: Sitting, Cuff Size: Adult Large)   Pulse 72   Temp 97.2  F (36.2  C) (Tympanic)   Resp 16   SpO2 99%   There is no height or weight on file to calculate BMI.  Physical Exam   Pleasant female no acute distress.  Affect normal.  Alert and oriented x4.  Right knee with crepitation.  She has medial joint line tenderness with extension.  Normal cruciate and collateral ligament testing.  Normal meniscal testing.  No bulging deformity erythema warmth or swelling.  No patellar pain.  No popliteal fossa pain.  Left heel has a round easily palpable lump that feels to be in the soft tissue.  Its not red or warm.  It is tender with palpation.

## 2022-10-19 NOTE — NURSING NOTE
"Chief Complaint   Patient presents with     Knee Pain       FOOD SECURITY SCREENING QUESTIONS  Hunger Vital Signs:  Within the past 12 months we worried whether our food would run out before we got money to buy more. Never  Within the past 12 months the food we bought just didn't last and we didn't have money to get more. Never  Nisa Pardo LPN 10/19/2022 3:34 PM      Initial /88 (BP Location: Right arm, Patient Position: Sitting, Cuff Size: Adult Large)   Pulse 72   Temp 97.2  F (36.2  C) (Tympanic)   Resp 16   SpO2 99%  Estimated body mass index is 38.97 kg/m  as calculated from the following:    Height as of 3/8/22: 1.6 m (5' 3\").    Weight as of 3/8/22: 99.8 kg (220 lb).  Medication Reconciliation: complete    Nisa Pardo LPN  "

## 2022-10-20 RX ORDER — GABAPENTIN 600 MG/1
TABLET ORAL
Qty: 270 TABLET | Refills: 3 | Status: SHIPPED | OUTPATIENT
Start: 2022-10-20 | End: 2023-11-24

## 2022-10-20 NOTE — TELEPHONE ENCOUNTER
[facility] sent Rx request for the following:      Requested Prescriptions   Pending Prescriptions Disp Refills     gabapentin (NEURONTIN) 600 MG tablet [Pharmacy Med Name: GABAPENTIN 600MG TABLETS] 270 tablet 3     Sig: TAKE 1 TABLET BY MOUTH THREE TIMES DAILY   Last Prescription Date:   11/19/21  Last Fill Qty/Refills:         270, R-3    Last Office Visit:              Yesterday (Jennifer Keene)  Future Office visit:           None    In clinical absence of patient's primary, Yodit Mandujano, patient is requesting that this message be sent to the covering provider for consideration please.    Kelly Hong RN .............. 10/20/2022  2:38 PM

## 2022-10-31 ENCOUNTER — ANESTHESIA (OUTPATIENT)
Dept: SURGERY | Facility: OTHER | Age: 67
End: 2022-10-31
Payer: MEDICARE

## 2022-10-31 ENCOUNTER — HOSPITAL ENCOUNTER (OUTPATIENT)
Facility: OTHER | Age: 67
Discharge: HOME | End: 2022-10-31
Attending: SURGERY | Admitting: SURGERY
Payer: MEDICARE

## 2022-10-31 ENCOUNTER — ANESTHESIA EVENT (OUTPATIENT)
Dept: SURGERY | Facility: OTHER | Age: 67
End: 2022-10-31
Payer: MEDICARE

## 2022-10-31 VITALS
HEART RATE: 75 BPM | WEIGHT: 215 LBS | OXYGEN SATURATION: 98 % | SYSTOLIC BLOOD PRESSURE: 130 MMHG | DIASTOLIC BLOOD PRESSURE: 78 MMHG | HEIGHT: 63 IN | BODY MASS INDEX: 38.09 KG/M2 | RESPIRATION RATE: 16 BRPM | TEMPERATURE: 97.4 F

## 2022-10-31 PROBLEM — K63.5 COLON POLYPS: Status: ACTIVE | Noted: 2022-10-31

## 2022-10-31 PROCEDURE — 258N000003 HC RX IP 258 OP 636: Performed by: SURGERY

## 2022-10-31 PROCEDURE — 45385 COLONOSCOPY W/LESION REMOVAL: CPT | Mod: PT | Performed by: SURGERY

## 2022-10-31 PROCEDURE — 45385 COLONOSCOPY W/LESION REMOVAL: CPT | Performed by: NURSE ANESTHETIST, CERTIFIED REGISTERED

## 2022-10-31 PROCEDURE — 45384 COLONOSCOPY W/LESION REMOVAL: CPT | Mod: PT | Performed by: SURGERY

## 2022-10-31 PROCEDURE — 45384 COLONOSCOPY W/LESION REMOVAL: CPT

## 2022-10-31 PROCEDURE — 88305 TISSUE EXAM BY PATHOLOGIST: CPT

## 2022-10-31 PROCEDURE — 250N000009 HC RX 250: Performed by: NURSE ANESTHETIST, CERTIFIED REGISTERED

## 2022-10-31 PROCEDURE — 250N000011 HC RX IP 250 OP 636: Performed by: NURSE ANESTHETIST, CERTIFIED REGISTERED

## 2022-10-31 PROCEDURE — 45380 COLONOSCOPY AND BIOPSY: CPT | Performed by: SURGERY

## 2022-10-31 RX ORDER — NALOXONE HYDROCHLORIDE 0.4 MG/ML
0.2 INJECTION, SOLUTION INTRAMUSCULAR; INTRAVENOUS; SUBCUTANEOUS
Status: DISCONTINUED | OUTPATIENT
Start: 2022-10-31 | End: 2022-10-31 | Stop reason: HOSPADM

## 2022-10-31 RX ORDER — LIDOCAINE HYDROCHLORIDE 20 MG/ML
INJECTION, SOLUTION INFILTRATION; PERINEURAL PRN
Status: DISCONTINUED | OUTPATIENT
Start: 2022-10-31 | End: 2022-10-31

## 2022-10-31 RX ORDER — PROPOFOL 10 MG/ML
INJECTION, EMULSION INTRAVENOUS PRN
Status: DISCONTINUED | OUTPATIENT
Start: 2022-10-31 | End: 2022-10-31

## 2022-10-31 RX ORDER — SODIUM CHLORIDE, SODIUM LACTATE, POTASSIUM CHLORIDE, CALCIUM CHLORIDE 600; 310; 30; 20 MG/100ML; MG/100ML; MG/100ML; MG/100ML
INJECTION, SOLUTION INTRAVENOUS CONTINUOUS
Status: DISCONTINUED | OUTPATIENT
Start: 2022-10-31 | End: 2022-10-31 | Stop reason: HOSPADM

## 2022-10-31 RX ORDER — PROPOFOL 10 MG/ML
INJECTION, EMULSION INTRAVENOUS CONTINUOUS PRN
Status: DISCONTINUED | OUTPATIENT
Start: 2022-10-31 | End: 2022-10-31

## 2022-10-31 RX ORDER — FLUMAZENIL 0.1 MG/ML
0.2 INJECTION, SOLUTION INTRAVENOUS
Status: DISCONTINUED | OUTPATIENT
Start: 2022-10-31 | End: 2022-10-31 | Stop reason: HOSPADM

## 2022-10-31 RX ORDER — NALOXONE HYDROCHLORIDE 0.4 MG/ML
0.4 INJECTION, SOLUTION INTRAMUSCULAR; INTRAVENOUS; SUBCUTANEOUS
Status: DISCONTINUED | OUTPATIENT
Start: 2022-10-31 | End: 2022-10-31 | Stop reason: HOSPADM

## 2022-10-31 RX ORDER — LIDOCAINE 40 MG/G
CREAM TOPICAL
Status: DISCONTINUED | OUTPATIENT
Start: 2022-10-31 | End: 2022-10-31 | Stop reason: HOSPADM

## 2022-10-31 RX ORDER — ONDANSETRON 2 MG/ML
4 INJECTION INTRAMUSCULAR; INTRAVENOUS
Status: DISCONTINUED | OUTPATIENT
Start: 2022-10-31 | End: 2022-10-31 | Stop reason: HOSPADM

## 2022-10-31 RX ADMIN — LIDOCAINE HYDROCHLORIDE 70 MG: 20 INJECTION, SOLUTION INFILTRATION; PERINEURAL at 12:17

## 2022-10-31 RX ADMIN — PROPOFOL 140 MCG/KG/MIN: 10 INJECTION, EMULSION INTRAVENOUS at 12:18

## 2022-10-31 RX ADMIN — PROPOFOL 90 MG: 10 INJECTION, EMULSION INTRAVENOUS at 12:17

## 2022-10-31 RX ADMIN — SODIUM CHLORIDE, POTASSIUM CHLORIDE, SODIUM LACTATE AND CALCIUM CHLORIDE 100 ML/HR: 600; 310; 30; 20 INJECTION, SOLUTION INTRAVENOUS at 11:55

## 2022-10-31 ASSESSMENT — ACTIVITIES OF DAILY LIVING (ADL): ADLS_ACUITY_SCORE: 35

## 2022-10-31 NOTE — H&P
History and Physical    CHIEF COMPLAINT / REASON FOR PROCEDURE:  Healthcare maintenance     PERTINENT HISTORY   Patient is a 67 year old female who presents today for colonoscopy for Healthcare maintenance .   Last colonoscopy 2011.  Patient has no complaints.    Past Medical History:   Diagnosis Date     AC (acromioclavicular) arthritis 4/10/2018     Allergic rhinitis due to pollen     No Comments Provided     Dorsalgia     10/7/2010     Erythema nodosum      2009     Essential tremor     Possible benign     Hyperlipidemia     10/24/2013     Ill-defined and unknown cause of mortality (CODE)     characterized by chronic pain, positive BARBARA at a low titer of 1:160, negative HLAB27,     Migraine without status migrainosus, not intractable     No Comments Provided     Osteoarthritis of left glenohumeral joint 4/10/2018     Postconcussional syndrome     1/26/2016     Sarcoidosis     2007     Tendinitis of left rotator cuff 4/10/2018     Past Surgical History:   Procedure Laterality Date     ARTHROSCOPY SHOULDER      12/07,Right type 3 SLAP lesion repair of labrum - suprascapular ganglion removal with posterior approach - acromioplasty and distal clavicle resection     ARTHROSCOPY SHOULDER      2/2015     COLONOSCOPY  08/29/2011 8/2011,Normal--10 year followup     DILATION AND CURETTAGE      11/05, and ablation     LAPAROSCOPIC CHOLECYSTECTOMY      6/23/15,Cholecystectomy,Laparoscopic     left shoulder replacement       right total shoulder replacement       TONSILLECTOMY       and adenoidectomy age 12       Bleeding tendencies:  No    ALLERGIES/SENSITIVITIES:   Allergies   Allergen Reactions     Penicillins Rash        CURRENT MEDICATIONS:    Prior to Admission medications    Medication Sig Start Date End Date Taking? Authorizing Provider   acetaminophen (TYLENOL) 500 MG tablet Take 1,000 mg by mouth 3 times daily as needed 7/22/15  Yes Reported, Patient   buPROPion (WELLBUTRIN XL) 300 MG 24 hr tablet TAKE 1 TABLET  BY MOUTH ONCE DAILY 11/19/21  Yes Yodit Mandujano MD   Cholecalciferol (VITAMIN D3) 1000 UNITS CAPS Take 1,000 Units by mouth daily   Yes Reported, Patient   gabapentin (NEURONTIN) 600 MG tablet TAKE 1 TABLET BY MOUTH THREE TIMES DAILY 10/20/22  Yes Elisabet Santoyo DO   lamoTRIgine (LAMICTAL) 100 MG tablet Take 1 tablet (100 mg) by mouth every morning 11/19/21  Yes Yodit Mandujano MD   lamoTRIgine (LAMICTAL) 200 MG tablet Take 1 tablet (200 mg) by mouth At Bedtime 11/19/21  Yes Yodit Mandujano MD   order for DME Equipment being ordered: CPAP supplies 11/26/18  Yes Olivier Glass MD   venlafaxine (EFFEXOR-XR) 150 MG 24 hr capsule Take 1 capsule (150 mg) by mouth daily with food 11/19/21  Yes Yodit Mandujano MD       Physical Exam:  There were no vitals taken for this visit.  EXAM:  Chest/Respiratory Exam: Normal - Clear to auscultation without rales, rhonchi, or wheezing.  Cardiovascular Exam: normal, regular rate and rhythm        PLAN: COLONOSCOPY .  Patient understands risks of bleeding, perforation, potential inability to reach cecum, aspiration and wishes to proceed. MAC needed for ASA III.

## 2022-10-31 NOTE — DISCHARGE INSTRUCTIONS
[facility]  Adult Discharge Orders & Instructions    ________________________________________________________________          For 12 hours after surgery  Get plenty of rest.  A responsible adult must stay with you for at least 12 hours after you leave the hospital.   You may feel lightheaded.  IF so, sit for a few minutes before standing.  Have someone help you get up.   You may have a slight fever. Call the doctor if your fever is over 101 F (38.3 C) (taken under the tongue) or lasts longer than 24 hours.  You may have a dry mouth, a sore throat, muscle aches or trouble sleeping.  These should go away after 24 hours.  Do not make important or legal decisions.  6.   Do not drive or use heavy equipment.  If you have weakness or tingling, don't drive or use heavy equipment until this feeling goes away.    To contact a doctor, call   [phone]_______________________    

## 2022-10-31 NOTE — OP NOTE
PROCEDURE NOTE    DATE OF SERVICE: 10/31/2022    SURGEON: Taz Meneses MD    PRE-OP DIAGNOSIS:    Healthcare maintenance         POST-OP DIAGNOSIS:  Same  Polyps at AC, SF and 10 cm    PROCEDURE:   Colonoscopy with snare polypectomy and hot biopsies    ANESTHESIA:  LAURA Medeiros CRNA    INDICATION FOR THE PROCEDURE: The patient is a 67 year old female in need of Healthcare maintenance  . The patient has no other complaints  . After explaining the risks to include bleeding, perforation, potential inability toreach the cecum, the patient wished to proceed.    PROCEDURE:After adequate sedation, the patient was in the left lateral decubitus position.  Rectal exam was performed.  There was normal tone and no palpable masses .  The colonoscope was introduced into the rectum and advanced to the cecum with Mild difficulty.  The patient's prep was good.  The terminal cecum was reached.  The cecum, ascending, transverse, descending and sigmoid colon was with small ( 0.4cm) polyps of AC and at 10 cm that were hot biopsied and destroyed. At SF a flat about 1 cm polyp was removed in pieces by snare as it was on a fold. It appeared completely removed .  The scope was retroflexed in the rectum.  The rectum was unremarkable  .  The scope was straightened and removed.  The patient tolerated the procedure well.     ESTIMATED BLOOD LOSS: none    COMPLICATIONS:  None    TISSUE REMOVED:  Yes    RECOMMEND:      Follow-up pending pathology      Taz Meneses MD FACS

## 2022-10-31 NOTE — OR NURSING
Ivy has been discharged to home at 1345 via  accompanied by , Fernando.     Written discharge instructions were provided to patient and , Fernando.  Prescriptions were not sent.      Patient and adult caring for them verbalize understanding of discharge instructions including no driving until tomorrow and no longer taking narcotic pain medications - no operating mechanical equipment and no making any important decisions.They understand reason for discharge, and necessary follow-up appointments.

## 2022-10-31 NOTE — ANESTHESIA PREPROCEDURE EVALUATION
Anesthesia Pre-Procedure Evaluation    Patient: Ivy Romero   MRN: [id number] : 1955        Procedure : Procedure(s):  COLONOSCOPY          Past Medical History:   Diagnosis Date     AC (acromioclavicular) arthritis 4/10/2018     Allergic rhinitis due to pollen     No Comments Provided     Dorsalgia     10/7/2010     Erythema nodosum      2009     Essential tremor     Possible benign     Hyperlipidemia     10/24/2013     Ill-defined and unknown cause of mortality (CODE)     characterized by chronic pain, positive BARBARA at a low titer of 1:160, negative HLAB27,     Migraine without status migrainosus, not intractable     No Comments Provided     Osteoarthritis of left glenohumeral joint 4/10/2018     Postconcussional syndrome     2016     Sarcoidosis     2007     Tendinitis of left rotator cuff 4/10/2018      Past Surgical History:   Procedure Laterality Date     ARTHROSCOPY SHOULDER      ,Right type 3 SLAP lesion repair of labrum - suprascapular ganglion removal with posterior approach - acromioplasty and distal clavicle resection     ARTHROSCOPY SHOULDER      2015     COLONOSCOPY  2011,Normal--10 year followup     DILATION AND CURETTAGE      , and ablation     LAPAROSCOPIC CHOLECYSTECTOMY      6/23/15,Cholecystectomy,Laparoscopic     left shoulder replacement       right total shoulder replacement       TONSILLECTOMY       and adenoidectomy age 12      Allergies   Allergen Reactions     Penicillins Rash      Social History     Tobacco Use     Smoking status: Former     Types: Cigarettes     Quit date: 10/22/1991     Years since quittin.0     Smokeless tobacco: Never   Substance Use Topics     Alcohol use: Not Currently     Comment: Alcoholic Drinks/day: rarely      Wt Readings from Last 1 Encounters:   22 99.8 kg (220 lb)        Anesthesia Evaluation   Pt has had prior anesthetic.         ROS/MED HX  ENT/Pulmonary:     (+) sleep apnea, uses CPAP,      Neurologic: Comment: Sarcoidosis      Cardiovascular:     (+) Dyslipidemia -----    METS/Exercise Tolerance: 4 - Raking leaves, gardening    Hematologic:       Musculoskeletal:       GI/Hepatic:       Renal/Genitourinary:     (+) renal disease,     Endo:     (+) Obesity,     Psychiatric/Substance Use:     (+) psychiatric history depression     Infectious Disease:       Malignancy:       Other:            Physical Exam    Airway        Mallampati: III   TM distance: > 3 FB   Neck ROM: full   Mouth opening: > 3 cm    Respiratory Devices and Support         Dental  no notable dental history         Cardiovascular   cardiovascular exam normal       Rhythm and rate: regular and normal     Pulmonary   pulmonary exam normal        breath sounds clear to auscultation           OUTSIDE LABS:  CBC:   Lab Results   Component Value Date    WBC 8.1 01/07/2022    WBC 6.5 01/09/2020    HGB 12.3 01/07/2022    HGB 12.5 01/09/2020    HCT 38.5 01/07/2022    HCT 40.2 01/09/2020     01/07/2022     01/09/2020     BMP:   Lab Results   Component Value Date     01/07/2022     01/09/2020    POTASSIUM 4.2 01/07/2022    POTASSIUM 4.6 01/09/2020    CHLORIDE 104 01/07/2022    CHLORIDE 104 01/09/2020    CO2 23 01/07/2022    CO2 23 01/09/2020    BUN 22 01/07/2022    BUN 23 01/09/2020    CR 1.62 (H) 01/07/2022    CR 1.46 (H) 01/09/2020    GLC 93 01/07/2022     (H) 01/09/2020     COAGS: No results found for: PTT, INR, FIBR  POC: No results found for: BGM, HCG, HCGS  HEPATIC:   Lab Results   Component Value Date    ALBUMIN 4.1 01/07/2022    PROTTOTAL 7.2 01/07/2022    ALT 17 01/07/2022    AST 20 01/07/2022    ALKPHOS 81 01/07/2022    BILITOTAL 0.4 01/07/2022     OTHER:   Lab Results   Component Value Date    LYRIC 9.6 01/07/2022    LIPASE 19.0 06/07/2015    TSH 3.82 01/07/2022    SED 37 (H) 11/05/2014       Anesthesia Plan    ASA Status:  3   NPO Status:  NPO Appropriate    Anesthesia Type: MAC.     - Reason for MAC:  straight local not clinically adequate   Induction: Intravenous.   Maintenance: Balanced.        Consents    Anesthesia Plan(s) and associated risks, benefits, and realistic alternatives discussed. Questions answered and patient/representative(s) expressed understanding.     - Discussed: Risks, Benefits and Alternatives for BOTH SEDATION and the PROCEDURE were discussed     - Discussed with:  Patient, Spouse         Postoperative Care            Comments:    Other Comments: Risks, benefits and alternatives discussed and would like to proceed. General anesthesia ok if indicated.             JENNA Muniz CRNA

## 2022-10-31 NOTE — ANESTHESIA POSTPROCEDURE EVALUATION
Patient: Ivy Romero    Procedure: Procedure(s):  COLONOSCOPY, WITH POLYPECTOMY       Anesthesia Type:  MAC    Note:  Disposition: Outpatient   Postop Pain Control: Uneventful            Sign Out: Well controlled pain   PONV: No   Neuro/Psych: Uneventful            Sign Out: Acceptable/Baseline neuro status   Airway/Respiratory: Uneventful            Sign Out: Acceptable/Baseline resp. status   CV/Hemodynamics: Uneventful            Sign Out: Acceptable CV status; No obvious hypovolemia; No obvious fluid overload   Other NRE: NONE   DID A NON-ROUTINE EVENT OCCUR? No           Last vitals:  Vitals Value Taken Time   /78 10/31/22 1330   Temp 97.4  F (36.3  C) 10/31/22 1330   Pulse 75 10/31/22 1330   Resp 16 10/31/22 1330   SpO2 98 % 10/31/22 1330       Electronically Signed By: JENNA ZUÑIGA CRNA  October 31, 2022  3:18 PM

## 2022-10-31 NOTE — ANESTHESIA CARE TRANSFER NOTE
Patient: Ivy Romero    Procedure: Procedure(s):  COLONOSCOPY, WITH POLYPECTOMY       Diagnosis: Encounter for screening colonoscopy [Z12.11]  Diagnosis Additional Information: No value filed.    Anesthesia Type:   MAC     Note:    Oropharynx: oropharynx clear of all foreign objects and spontaneously breathing  Level of Consciousness: drowsy  Oxygen Supplementation: room air    Independent Airway: airway patency satisfactory and stable  Dentition: dentition unchanged  Vital Signs Stable: post-procedure vital signs reviewed and stable  Report to RN Given: handoff report given  Patient transferred to: Phase II    Handoff Report: Identifed the Patient, Identified the Reponsible Provider, Reviewed the pertinent medical history, Discussed the surgical course, Reviewed Intra-OP anesthesia mangement and issues during anesthesia, Set expectations for post-procedure period and Allowed opportunity for questions and acknowledgement of understanding      Vitals:  Vitals Value Taken Time   BP     Temp     Pulse     Resp     SpO2         Electronically Signed By: JENNA ZUÑIGA CRNA  October 31, 2022  12:50 PM

## 2022-11-02 PROBLEM — Z86.0101 H/O ADENOMATOUS POLYP OF COLON: Status: ACTIVE | Noted: 2022-10-31

## 2022-11-02 PROBLEM — Z00.00 HEALTHCARE MAINTENANCE: Status: RESOLVED | Noted: 2022-08-02 | Resolved: 2022-11-02

## 2022-11-02 LAB
PATH REPORT.COMMENTS IMP SPEC: NORMAL
PATH REPORT.FINAL DX SPEC: NORMAL
PATH REPORT.RELEVANT HX SPEC: NORMAL
PHOTO IMAGE: NORMAL

## 2022-11-14 NOTE — PROGRESS NOTES
[facility]    Outpatient Physical Therapy Discharge Note  Patient: Ivy Romero  : 1955    Beginning/End Dates of Reporting Period:  3/14/22 to 22    Referring Provider: Dr. Cohen    Therapy Diagnosis: R ankle pain/sprain, ROM loss      Client Self Report: Continues to see improvement. She was out in the garden planting flowers. She does still get achy but this is improving. We discussed completing remaining appts and trialing HEP independently. She asked to have her chart left open in case she needed to come back.    Goals:  Goal Identifier Dorsiflexion   Goal Description Pt will achieve neutral ankle dorsiflexion for improve stair navigation and ambulation   Target Date 22   Date Met  22   Progress (detail required for progress note):       Goal Identifier Footwear   Goal Description Pt will be able to don and doff all footwear without assistance or use of tool   Target Date 22   Date Met  22   Progress (detail required for progress note):       Goal Identifier Ambulation   Goal Description Pt will be able to ambulate 500 in supportive shoe without increasing her pain above a 4 and without significant gait compensation   Target Date 22   Date Met      Progress (detail required for progress note): Continues to have pain with increased ambulation but is improving     Goal Identifier Standing tolerance   Goal Description Pt will be able to stand for 1 hour at home for various tasks such as cooking and cleaning without increasing her foot pain greater than 4   Target Date 22   Date Met      Progress (detail required for progress note): Pt continues to have increased pain and uses breaks while at home.         Plan:  Discharge from therapy. Ivy and CLEO had discussed independent HEP management and a return if she did not feel she was meeting her goals.  She did not contact PT for additional visits so it is assumed at this point that she his happy with her recovery. She will be discharged at this time.    Discharge:    Reason for Discharge: Patient has met all goals.    Equipment Issued: theraband    Discharge Plan: Patient to continue home program.

## 2022-11-21 ENCOUNTER — OFFICE VISIT (OUTPATIENT)
Dept: ORTHOPEDICS | Facility: OTHER | Age: 67
End: 2022-11-21
Attending: NURSE PRACTITIONER
Payer: MEDICARE

## 2022-11-21 VITALS — HEART RATE: 73 BPM | OXYGEN SATURATION: 95 %

## 2022-11-21 DIAGNOSIS — G89.29 CHRONIC PAIN OF RIGHT KNEE: ICD-10-CM

## 2022-11-21 DIAGNOSIS — R22.42 LUMP OF SKIN OF LOWER EXTREMITY, LEFT: ICD-10-CM

## 2022-11-21 DIAGNOSIS — M25.561 CHRONIC PAIN OF RIGHT KNEE: ICD-10-CM

## 2022-11-21 PROCEDURE — G0463 HOSPITAL OUTPT CLINIC VISIT: HCPCS

## 2022-11-21 PROCEDURE — 99213 OFFICE O/P EST LOW 20 MIN: CPT | Performed by: ORTHOPAEDIC SURGERY

## 2022-11-21 ASSESSMENT — PAIN SCALES - GENERAL: PAINLEVEL: MODERATE PAIN (5)

## 2022-11-21 NOTE — PROGRESS NOTES
Patient is here for consult on her right knee pain.  Jeri Talley LPN .....................11/21/2022 12:43 PM

## 2022-11-21 NOTE — PROGRESS NOTES
Visit Date: 2022    She comes in today for right knee pain.    HISTORY OF PRESENT ILLNESS:  Ivy Romero is a 67-year-old female with about 4 years of pain in her right knee after she was coming back from a trip in [place].  She has had ongoing pain in the inside of her knee.  Occasionally, it will catch and lock on her, and it will want to give out on her when this happens.  The pain is always on the medial side of her knee.    PHYSICAL EXAMINATION: Today, this is a 67-year-old female, in no acute distress, very pleasant on examination.  She is stable to varus and valgus stress.  Negative Lachman's.  Negative posterior drawer.  She is tender to palpation over medial joint line.  Negative elsewhere.  No significant effusion on the knee.  No changes in the skin.  She is otherwise neuro and vascularly intact in the right lower extremity.    IMPRESSION AND PLAN:  This is a 67-year-old female who likely has a meniscus tear of her right knee.  We are going to go ahead and obtain an MRI of her knee and I will call her with the results.    Mack Cohen MD        D: 2022   T: 2022   MT: [id number]    Name:     IVY MORRIS  MRN:      [id number]        Account:    [id number]   :      1955           Visit Date: 2022     Document: [id number]

## 2022-11-29 ENCOUNTER — HOSPITAL ENCOUNTER (OUTPATIENT)
Dept: MRI IMAGING | Facility: OTHER | Age: 67
Discharge: HOME | End: 2022-11-29
Attending: ORTHOPAEDIC SURGERY | Admitting: ORTHOPAEDIC SURGERY
Payer: MEDICARE

## 2022-11-29 DIAGNOSIS — G89.29 CHRONIC PAIN OF RIGHT KNEE: ICD-10-CM

## 2022-11-29 DIAGNOSIS — M79.672 LEFT FOOT PAIN: Primary | ICD-10-CM

## 2022-11-29 DIAGNOSIS — M25.561 CHRONIC PAIN OF RIGHT KNEE: ICD-10-CM

## 2022-11-29 PROCEDURE — G1010 CDSM STANSON: HCPCS

## 2022-12-05 ENCOUNTER — OFFICE VISIT (OUTPATIENT)
Dept: ORTHOPEDICS | Facility: OTHER | Age: 67
End: 2022-12-05
Attending: ORTHOPAEDIC SURGERY
Payer: MEDICARE

## 2022-12-05 DIAGNOSIS — M25.561 CHRONIC PAIN OF RIGHT KNEE: Primary | ICD-10-CM

## 2022-12-05 DIAGNOSIS — M17.11 PRIMARY OSTEOARTHRITIS OF RIGHT KNEE: ICD-10-CM

## 2022-12-05 DIAGNOSIS — G89.29 CHRONIC PAIN OF RIGHT KNEE: Primary | ICD-10-CM

## 2022-12-05 PROCEDURE — G0463 HOSPITAL OUTPT CLINIC VISIT: HCPCS | Mod: 25

## 2022-12-05 PROCEDURE — G0463 HOSPITAL OUTPT CLINIC VISIT: HCPCS

## 2022-12-05 PROCEDURE — 250N000009 HC RX 250: Performed by: ORTHOPAEDIC SURGERY

## 2022-12-05 PROCEDURE — 250N000011 HC RX IP 250 OP 636: Performed by: ORTHOPAEDIC SURGERY

## 2022-12-05 PROCEDURE — 20610 DRAIN/INJ JOINT/BURSA W/O US: CPT | Mod: RT | Performed by: ORTHOPAEDIC SURGERY

## 2022-12-05 RX ORDER — TRIAMCINOLONE ACETONIDE 40 MG/ML
40 INJECTION, SUSPENSION INTRA-ARTICULAR; INTRAMUSCULAR ONCE
Status: COMPLETED | OUTPATIENT
Start: 2022-12-05 | End: 2022-12-05

## 2022-12-05 RX ORDER — LIDOCAINE HYDROCHLORIDE 10 MG/ML
4 INJECTION, SOLUTION EPIDURAL; INFILTRATION; INTRACAUDAL; PERINEURAL ONCE
Status: COMPLETED | OUTPATIENT
Start: 2022-12-05 | End: 2022-12-05

## 2022-12-05 RX ADMIN — TRIAMCINOLONE ACETONIDE 40 MG: 40 INJECTION, SUSPENSION INTRA-ARTICULAR; INTRAMUSCULAR at 10:37

## 2022-12-05 RX ADMIN — LIDOCAINE HYDROCHLORIDE 4 ML: 10 INJECTION, SOLUTION INFILTRATION; PERINEURAL at 10:37

## 2022-12-05 NOTE — PROGRESS NOTES
Visit Date: 2022    She is status post MRI of her knee, which showed right knee tricompartmental osteoarthritis.  We asked her to return for an injection into the knee.    On exam, she has full range of motion of the knee.  She is stable to varus and valgus stress.  Negative Lachman's, negative posterior drawer.  Nontender to palpation about the knee.    Injection was given today.  She tolerated it well with good relief of her pain.  We are going to see her back on an as-needed basis.    Mack Cohen MD        D: 2022   T: 2022   MT: [id number]    Name:     CHIOMA MORRISSpencer  MRN:      [id number]        Account:    [id number]   :      1955           Visit Date: 2022     Document: [id number]

## 2022-12-05 NOTE — PROGRESS NOTES
Patient is here for follow up on her right knee pain.  Jeri Talley LPN .....................12/5/2022 10:31 AM

## 2022-12-06 ENCOUNTER — OFFICE VISIT (OUTPATIENT)
Dept: ORTHOPEDICS | Facility: OTHER | Age: 67
End: 2022-12-06
Attending: PODIATRIST
Payer: MEDICARE

## 2022-12-06 ENCOUNTER — HOSPITAL ENCOUNTER (OUTPATIENT)
Dept: GENERAL RADIOLOGY | Facility: OTHER | Age: 67
Discharge: HOME | End: 2022-12-06
Attending: PODIATRIST
Payer: MEDICARE

## 2022-12-06 VITALS — OXYGEN SATURATION: 98 % | HEART RATE: 80 BPM

## 2022-12-06 DIAGNOSIS — R22.42 LUMP OF SKIN OF LOWER EXTREMITY, LEFT: ICD-10-CM

## 2022-12-06 DIAGNOSIS — M79.672 LEFT FOOT PAIN: Primary | ICD-10-CM

## 2022-12-06 DIAGNOSIS — M79.672 LEFT FOOT PAIN: ICD-10-CM

## 2022-12-06 PROCEDURE — 99202 OFFICE O/P NEW SF 15 MIN: CPT | Performed by: PODIATRIST

## 2022-12-06 PROCEDURE — 73630 X-RAY EXAM OF FOOT: CPT | Mod: LT

## 2022-12-06 PROCEDURE — G0463 HOSPITAL OUTPT CLINIC VISIT: HCPCS

## 2022-12-06 ASSESSMENT — PAIN SCALES - GENERAL: PAINLEVEL: MODERATE PAIN (4)

## 2022-12-06 NOTE — PROGRESS NOTES
Patient is here for consult on her left foot.  Jeri Talley LPN .....................12/6/2022 9:53 AM

## 2022-12-06 NOTE — PROGRESS NOTES
Visit Date: 2022    HISTORY OF PRESENT ILLNESS:  Ivy is here to see me regarding a lump on the plantar lateral aspect of her heel.  Pain with pressure.  Here to have this evaluated and discuss options.    HISTORY REVIEW:  I have reviewed this patient's past medical history, family history, social history as well as medications and allergies.  Any changes/additions were appropriately charted in the patient's electronic medical record.      PHYSICAL EXAMINATION:   CONSTITUTIONAL:  The patient is alert and oriented x3, well appearing and in no apparent distress.  Affect is pleasant and answers questions appropriately.  VASCULAR:  Circulation is intact with palpable pedal pulses and adequate capillary fill time to all digits.  Hair growth is present and appropriate to mid foot and digits.  NEUROLOGIC:  Light touch sensation is intact to digits.  There is a negative Tinel sign.  There are no signs of apparent nerve entrapment of superficial peroneal or common peroneal nerves.  INTEGUMENT:  No abnormal dermatologic lesions are noted.  Skin has normal texture and turgor.      MUSCULOSKELETAL:  Mild cavovarus foot structure.  Tender callus on the plantar lateral aspect of the heel, looks like she gets some pinching here in shoes, and actually on history, she states she had a pair of inserts put in which caused some pinching at that area.  She took them out, but had this lesion and it has continued to cause pain.    ASSESSMENT:  Painful callus associated with foot structure and shoes.    PLAN:  I discussed condition and treatment.  Debridement of callus performed.  Discussed appropriate shoes going forward and at home debridement or cares, which she will do.    A 15-minute consultation.  Follow up with any ongoing concerns.    Alejandro Jordan DPM        D: 2022   T: 2022   MT: [id number]    Name:     IVY MORRIS  MRN:      [id number]        Account:    [id number]   :      1955            Visit Date: 12/06/2022     Document: [id number]

## 2022-12-16 DIAGNOSIS — F33.2 SEVERE EPISODE OF RECURRENT MAJOR DEPRESSIVE DISORDER, WITHOUT PSYCHOTIC FEATURES (H): ICD-10-CM

## 2022-12-16 NOTE — TELEPHONE ENCOUNTER
"[facility] sent Rx request for the following:      Requested Prescriptions   Pending Prescriptions Disp Refills     venlafaxine (EFFEXOR XR) 150 MG 24 hr capsule [Pharmacy Med Name: VENLAFAXINE 150MG ER CAPSULES] 90 capsule 3     Sig: TAKE 1 CAPSULE(150 MG) BY MOUTH DAILY WITH FOOD       Serotonin-Norepinephrine Reuptake Inhibitors  Failed - 12/16/2022  1:56 PM        Failed - PHQ-9 score of less than 5 in past 6 months     Please review last PHQ-9 score.           Failed - Normal serum creatinine on file in past 12 months     Recent Labs   Lab Test 01/07/22  0756   CR 1.62*       Ok to refill medication if creatinine is low          Failed - Recent (6 mo) or future (30 days) visit within the authorizing provider's specialty     Patient had office visit in the last 6 months or has a visit in the next 30 days with authorizing provider or within the authorizing provider's specialty.  See \"Patient Info\" tab in inbasket, or \"Choose Columns\" in Meds & Orders section of the refill encounter.            Passed - Blood pressure under 140/90 in past 12 months     BP Readings from Last 3 Encounters:   10/31/22 130/78   10/19/22 134/88   03/08/22 124/70                 Passed - Medication is active on med list        Passed - Patient is age 18 or older        Passed - No active pregnancy on record        Passed - No positive pregnancy test in past 12 months             Last Prescription Date:   11/19/2021  Last Fill Qty/Refills:         90, R-3    Last Office Visit:              03/08/2022   Future Office visit:           None    Noemi Cash RN  ....................  12/16/2022   2:44 PM            "

## 2022-12-20 RX ORDER — VENLAFAXINE HYDROCHLORIDE 150 MG/1
CAPSULE, EXTENDED RELEASE ORAL
Qty: 90 CAPSULE | Refills: 3 | Status: SHIPPED | OUTPATIENT
Start: 2022-12-20 | End: 2023-12-05

## 2022-12-27 DIAGNOSIS — F33.2 SEVERE EPISODE OF RECURRENT MAJOR DEPRESSIVE DISORDER, WITHOUT PSYCHOTIC FEATURES (H): ICD-10-CM

## 2022-12-28 RX ORDER — LAMOTRIGINE 200 MG/1
TABLET ORAL
Qty: 90 TABLET | Refills: 0 | Status: SHIPPED | OUTPATIENT
Start: 2022-12-28 | End: 2023-03-22

## 2022-12-28 RX ORDER — BUPROPION HYDROCHLORIDE 300 MG/1
TABLET ORAL
Qty: 90 TABLET | Refills: 0 | Status: SHIPPED | OUTPATIENT
Start: 2022-12-28 | End: 2023-03-22

## 2022-12-30 NOTE — TELEPHONE ENCOUNTER
Patient informed of provider's response. She was transferred to scheduling to schedule.     Cecily Rinaldi CMA on 12/30/2022 at 11:35 AM

## 2023-01-07 DIAGNOSIS — F33.2 SEVERE EPISODE OF RECURRENT MAJOR DEPRESSIVE DISORDER, WITHOUT PSYCHOTIC FEATURES (H): ICD-10-CM

## 2023-01-10 RX ORDER — LAMOTRIGINE 100 MG/1
TABLET ORAL
Qty: 90 TABLET | Refills: 4 | Status: SHIPPED | OUTPATIENT
Start: 2023-01-10 | End: 2024-02-12

## 2023-01-10 NOTE — TELEPHONE ENCOUNTER
[facility] [place] sent Rx request for the following:      Requested Prescriptions   Pending Prescriptions Disp Refills     lamoTRIgine (LAMICTAL) 100 MG tablet [Pharmacy Med Name: LAMOTRIGINE 100MG TABLETS] 90 tablet 3     Sig: TAKE 1 TABLET(100 MG) BY MOUTH EVERY MORNING     Last Prescription Date:   11/19/21  Last Fill Qty/Refills:         90, R-3    Last Office Visit:              10/19/22   Future Office visit:           2/3/23    Vika Zafar RN on 1/10/2023 at 4:02 PM

## 2023-02-03 ENCOUNTER — VIRTUAL VISIT (OUTPATIENT)
Dept: FAMILY MEDICINE | Facility: OTHER | Age: 68
End: 2023-02-03
Attending: FAMILY MEDICINE
Payer: MEDICARE

## 2023-02-03 ENCOUNTER — TELEPHONE (OUTPATIENT)
Dept: FAMILY MEDICINE | Facility: OTHER | Age: 68
End: 2023-02-03
Payer: MEDICARE

## 2023-02-03 DIAGNOSIS — U07.1 INFECTION DUE TO 2019 NOVEL CORONAVIRUS: Primary | ICD-10-CM

## 2023-02-03 DIAGNOSIS — N18.32 CHRONIC KIDNEY DISEASE, STAGE 3B (H): ICD-10-CM

## 2023-02-03 PROCEDURE — 99441 PR PHYSICIAN TELEPHONE EVALUATION 5-10 MIN: CPT | Mod: 95 | Performed by: FAMILY MEDICINE

## 2023-02-03 ASSESSMENT — ANXIETY QUESTIONNAIRES
2. NOT BEING ABLE TO STOP OR CONTROL WORRYING: NOT AT ALL
7. FEELING AFRAID AS IF SOMETHING AWFUL MIGHT HAPPEN: NOT AT ALL
5. BEING SO RESTLESS THAT IT IS HARD TO SIT STILL: NOT AT ALL
6. BECOMING EASILY ANNOYED OR IRRITABLE: NOT AT ALL
1. FEELING NERVOUS, ANXIOUS, OR ON EDGE: NOT AT ALL
GAD7 TOTAL SCORE: 0
GAD7 TOTAL SCORE: 0
IF YOU CHECKED OFF ANY PROBLEMS ON THIS QUESTIONNAIRE, HOW DIFFICULT HAVE THESE PROBLEMS MADE IT FOR YOU TO DO YOUR WORK, TAKE CARE OF THINGS AT HOME, OR GET ALONG WITH OTHER PEOPLE: NOT DIFFICULT AT ALL
3. WORRYING TOO MUCH ABOUT DIFFERENT THINGS: NOT AT ALL

## 2023-02-03 ASSESSMENT — PATIENT HEALTH QUESTIONNAIRE - PHQ9
SUM OF ALL RESPONSES TO PHQ QUESTIONS 1-9: 0
5. POOR APPETITE OR OVEREATING: NOT AT ALL

## 2023-02-03 NOTE — PROGRESS NOTES
Ivy is a 67 year old who is being evaluated via a billable telephone visit.      What phone number would you like to be contacted at? [phone]  How would you like to obtain your AVS? MyChart    Distant Location (provider location):  On-site    Assessment & Plan       ICD-10-CM    1. Infection due to 2019 novel coronavirus  U07.1 nirmatrelvir and ritonavir (PAXLOVID) therapy pack      2. Chronic kidney disease, stage 3b (H)  N18.32           COVID symptoms for 4 days.  Substantial enough to warrant treatment with Paxlovid.  We discussed side effects of medication.  She has minimal drug interactions with Paxlovid only reducing bupropion's effectiveness.  Other medications show no interaction.  Has CKD stage IIIb, warrants reduced Paxlovid dose.  GFR has been stable over the past 5 years between 34 and 39.  Should present to clinic for evaluation for shortness of breath or worsening condition       Guillermo Cedillo MD  [facility]    Subjective   Ivy is a 67 year old accompanied by her spouse, presenting for the following health issues:  Covid Concern      HPI       COVID-19 Symptom Review  How many days ago did these symptoms start? 1-31-23, tested positive on 2-1-23.    Are any of the following symptoms significant for you?    New or worsening difficulty breathing? No    Worsening cough? Yes, it's a dry cough.     Fever or chills? Yes, I felt feverish or had chills.    Headache: YES    Sore throat: YES    Chest pain: YES    Diarrhea: No    Body aches? YES    What treatments has patient tried? Acetaminophen and Cough syrup   Does patient live in a nursing home, group home, or shelter? No  Does patient have a way to get food/medications during quarantined? Yes, I have a friend or family member who can help me.    Review of Systems   As above      Objective           Vitals:  No vitals were obtained today due to virtual visit.    Physical Exam   healthy, alert and no distress  PSYCH: Alert  and oriented times 3; coherent speech, normal   rate and volume, able to articulate logical thoughts, able   to abstract reason, no tangential thoughts, no hallucinations   or delusions  Her affect is normal  RESP: No cough, no audible wheezing, able to talk in full sentences  Remainder of exam unable to be completed due to telephone visits            Phone call duration: 9 minutes

## 2023-02-03 NOTE — TELEPHONE ENCOUNTER
Patient called and requested a call back from TYP nurse regarding her medications.    Okay to leave detailed message.    Pepper Walton on 2/3/2023 at 9:34 AM

## 2023-02-03 NOTE — TELEPHONE ENCOUNTER
Was wondering about getting script for lamotrigine, notified a script was sent 1/10/23. States she will pick it up from pharmacy.  Ashley Slater LPN .............2/3/2023     10:29 AM

## 2023-02-07 ENCOUNTER — VIRTUAL VISIT (OUTPATIENT)
Dept: FAMILY MEDICINE | Facility: OTHER | Age: 68
End: 2023-02-07
Attending: FAMILY MEDICINE
Payer: MEDICARE

## 2023-02-07 ENCOUNTER — TELEPHONE (OUTPATIENT)
Dept: FAMILY MEDICINE | Facility: OTHER | Age: 68
End: 2023-02-07
Payer: MEDICARE

## 2023-02-07 DIAGNOSIS — U07.1 INFECTION DUE TO 2019 NOVEL CORONAVIRUS: Primary | ICD-10-CM

## 2023-02-07 PROCEDURE — 99441 PR PHYSICIAN TELEPHONE EVALUATION 5-10 MIN: CPT | Mod: 95 | Performed by: FAMILY MEDICINE

## 2023-02-07 RX ORDER — BENZONATATE 200 MG/1
200 CAPSULE ORAL 3 TIMES DAILY PRN
Qty: 30 CAPSULE | Refills: 1 | Status: SHIPPED | OUTPATIENT
Start: 2023-02-07 | End: 2023-03-28

## 2023-02-07 NOTE — TELEPHONE ENCOUNTER
Patient informed a telephone visit is needed. She was transferred to scheduling to schedule.     Cecily Rinaldi CMA on 2/7/2023 at 10:04 AM

## 2023-02-07 NOTE — TELEPHONE ENCOUNTER
TYP-patient has COVID and is looking for a cough medication      Please call and advise    Thank You    Harriett Mendoza on 2/7/2023 at 9:33 AM

## 2023-02-07 NOTE — PROGRESS NOTES
"Ivy is a 67 year old who is being evaluated via a billable telephone visit.      What phone number would you like to be contacted at? [phone]  How would you like to obtain your AVS? Chrishart  Distant Location (provider location):  On-site    Assessment & Plan     Infection due to 2019 novel coronavirus  She is already on Paxlovid and seems to be improving, but still has a dry cough.  Will treat cough symptomatically with tessalon perles.  If developing shortness of breath or any other concerning symptoms, recommend that she be seen.  - benzonatate (TESSALON) 200 MG capsule; Take 1 capsule (200 mg) by mouth 3 times daily as needed for cough         BMI:   Estimated body mass index is 38.09 kg/m  as calculated from the following:    Height as of 10/31/22: 1.6 m (5' 3\").    Weight as of 10/31/22: 97.5 kg (215 lb).           No follow-ups on file.    Ami Mix MD  [facility]    Subjective   Ivy is a 67 year old, presenting for the following health issues:  No chief complaint on file.    Ivy has had symptoms of covid since about 1/31/23.  She had been having a lot of cough.  Had a virutal visit with Jorgito Cedillo MD on 2/3/23 and was given prescription for Paxlovid at a reduced dose due to her chronic kidney disease.  She had covid once before the vaccine was available.  Symptoms have been improving somewhat since starting.  She still has sore throat and headache as well as dry cough.  She has theraflu cold and cough at home, but doesn't feel like it is helping her cough much.  Last night, she had 2 bad episodes of cough.  Her cough is no longer productive, but is more dry now.       HPI             Would like cough medicine    Review of Systems   Constitutional, HEENT, cardiovascular, pulmonary, GI, , musculoskeletal, neuro, skin, endocrine and psych systems are negative, except as otherwise noted.      Objective           Vitals:  No vitals were obtained today due " to virtual visit.    Physical Exam   healthy, alert and no distress  PSYCH: Alert and oriented times 3; coherent speech, normal   rate and volume, able to articulate logical thoughts, able   to abstract reason, no tangential thoughts, no hallucinations   or delusions  Her affect is normal  RESP: intermittent cough, no audible wheezing, able to talk in full sentences  Remainder of exam unable to be completed due to telephone visits                Phone call duration: 7 minutes

## 2023-02-07 NOTE — NURSING NOTE
Pt presents to clinic today for wanting cough medication from being covid positive. Patient already taking paxlovid.       FOOD SECURITY SCREENING QUESTIONS:    The next two questions are to help us understand your food security.  If you are feeling you need any assistance in this area, we have resources available to support you today.    Hunger Vital Signs:  Within the past 12 months we worried whether our food would run out before we got money to buy more. Never  Within the past 12 months the food we bought just didn't last and we didn't have money to get more. Never          Medication Reconciliation: complete  Fortino Bui LPN,LPN on 2/7/2023 at 1:42 PM

## 2023-03-22 NOTE — PROGRESS NOTES
Pre-Visit Planning   Next 5 appointments (look out 90 days)    Mar 28, 2023  9:00 AM  PHYSICAL with Yodit Starks MD  [facility] ([facility] ) [address]  [place] [address]  [phone]   Mar 30, 2023 11:00 AM  Return Visit with Alejandro Jordan DPM  [facility] ([facility] ) [address]  [place] [address]  [phone]   Apr 03, 2023 11:00 AM  Return Visit with Mack Cohen MD  [facility] ([facility] ) [address]  [place] [address]  [phone]        Appointment Notes for this encounter:   PX  / welcome to Medicare / wellness  Discuss possible sinus infection  Mammo scheduled for 3/28/23  Colonoscopy: 10/31/22    Questionnaires Reviewed/Assigned  No additional questionnaires are needed    Patient preferred phone number: [phone]      Contacted patient via phone/[facility]. Are there any additional questions or concerns you'd like to review with your provider during your visit? Yes: possible sinus infection- states right eye swells up and she puts ice on it and takes a Benadryl and it goes away. Patient states she is in [place] right now and they get back on Friday she states she will be seen sooner if she thinks she needs to otherwise will wait for appointment.    Visit is preventive. Reviewed purpose of preventive visit with patient.    Meds  Home Meds reviewed and updated  Refills pended    Entered patient-preferred pharmacy.     Current Outpatient Medications   Medication     acetaminophen (TYLENOL) 500 MG tablet     benzonatate (TESSALON) 200 MG capsule     buPROPion (WELLBUTRIN XL) 300 MG 24 hr tablet     Cholecalciferol (VITAMIN D3) 1000 UNITS CAPS     gabapentin (NEURONTIN) 600 MG tablet     lamoTRIgine (LAMICTAL) 100 MG tablet     lamoTRIgine (LAMICTAL)  200 MG tablet     Nirmatrelvir-Ritonavir (PAXLOVID, 150/100, PO)     order for DME     venlafaxine (EFFEXOR XR) 150 MG 24 hr capsule     No current facility-administered medications for this visit.     [facility]  Patient is active on [employer].    Questionnaire Review   Offered information on completing questionnaires via [employer].  Advised patient to arrive early in order to complete questionnaires.    Call Summary  Advised patient to call back at [phone] if needed.     Christal Yuan RN on 3/22/2023 at 3:46 PM

## 2023-03-28 ENCOUNTER — OFFICE VISIT (OUTPATIENT)
Dept: FAMILY MEDICINE | Facility: OTHER | Age: 68
End: 2023-03-28
Attending: FAMILY MEDICINE
Payer: MEDICARE

## 2023-03-28 ENCOUNTER — HOSPITAL ENCOUNTER (OUTPATIENT)
Dept: MAMMOGRAPHY | Facility: OTHER | Age: 68
Discharge: HOME | End: 2023-03-28
Attending: FAMILY MEDICINE
Payer: MEDICARE

## 2023-03-28 VITALS
TEMPERATURE: 97.3 F | DIASTOLIC BLOOD PRESSURE: 70 MMHG | HEIGHT: 63 IN | HEART RATE: 68 BPM | OXYGEN SATURATION: 97 % | RESPIRATION RATE: 20 BRPM | BODY MASS INDEX: 38.39 KG/M2 | SYSTOLIC BLOOD PRESSURE: 136 MMHG

## 2023-03-28 DIAGNOSIS — F33.2 SEVERE EPISODE OF RECURRENT MAJOR DEPRESSIVE DISORDER, WITHOUT PSYCHOTIC FEATURES (H): ICD-10-CM

## 2023-03-28 DIAGNOSIS — Z23 NEED FOR DIPHTHERIA-TETANUS-PERTUSSIS (TDAP) VACCINE: ICD-10-CM

## 2023-03-28 DIAGNOSIS — Z78.0 ASYMPTOMATIC MENOPAUSE: ICD-10-CM

## 2023-03-28 DIAGNOSIS — Z00.00 MEDICARE ANNUAL WELLNESS VISIT, INITIAL: Primary | ICD-10-CM

## 2023-03-28 DIAGNOSIS — N18.32 CHRONIC KIDNEY DISEASE, STAGE 3B (H): ICD-10-CM

## 2023-03-28 DIAGNOSIS — Z12.31 VISIT FOR SCREENING MAMMOGRAM: ICD-10-CM

## 2023-03-28 DIAGNOSIS — E78.5 DYSLIPIDEMIA: ICD-10-CM

## 2023-03-28 LAB
ALBUMIN SERPL BCG-MCNC: 3.9 G/DL (ref 3.5–5.2)
ALP SERPL-CCNC: 97 U/L (ref 35–104)
ALT SERPL W P-5'-P-CCNC: 20 U/L (ref 10–35)
ANION GAP SERPL CALCULATED.3IONS-SCNC: 8 MMOL/L (ref 7–15)
AST SERPL W P-5'-P-CCNC: 22 U/L (ref 10–35)
BASOPHILS # BLD AUTO: 0.1 10E3/UL (ref 0–0.2)
BASOPHILS NFR BLD AUTO: 1 %
BILIRUB SERPL-MCNC: 0.5 MG/DL
BUN SERPL-MCNC: 14.2 MG/DL (ref 8–23)
CALCIUM SERPL-MCNC: 9.1 MG/DL (ref 8.8–10.2)
CHLORIDE SERPL-SCNC: 106 MMOL/L (ref 98–107)
CHOLEST SERPL-MCNC: 247 MG/DL
CREAT SERPL-MCNC: 1.41 MG/DL (ref 0.51–0.95)
DEPRECATED HCO3 PLAS-SCNC: 27 MMOL/L (ref 22–29)
EOSINOPHIL # BLD AUTO: 0.5 10E3/UL (ref 0–0.7)
EOSINOPHIL NFR BLD AUTO: 8 %
ERYTHROCYTE [DISTWIDTH] IN BLOOD BY AUTOMATED COUNT: 14.5 % (ref 10–15)
GFR SERPL CREATININE-BSD FRML MDRD: 41 ML/MIN/1.73M2
GLUCOSE SERPL-MCNC: 105 MG/DL (ref 70–99)
HCT VFR BLD AUTO: 36 % (ref 35–47)
HDLC SERPL-MCNC: 51 MG/DL
HGB BLD-MCNC: 11.5 G/DL (ref 11.7–15.7)
HOLD SPECIMEN: NORMAL
IMM GRANULOCYTES # BLD: 0 10E3/UL
IMM GRANULOCYTES NFR BLD: 0 %
LDLC SERPL CALC-MCNC: 168 MG/DL
LYMPHOCYTES # BLD AUTO: 1.5 10E3/UL (ref 0.8–5.3)
LYMPHOCYTES NFR BLD AUTO: 23 %
MCH RBC QN AUTO: 31 PG (ref 26.5–33)
MCHC RBC AUTO-ENTMCNC: 31.9 G/DL (ref 31.5–36.5)
MCV RBC AUTO: 97 FL (ref 78–100)
MONOCYTES # BLD AUTO: 0.5 10E3/UL (ref 0–1.3)
MONOCYTES NFR BLD AUTO: 8 %
NEUTROPHILS # BLD AUTO: 3.8 10E3/UL (ref 1.6–8.3)
NEUTROPHILS NFR BLD AUTO: 60 %
NONHDLC SERPL-MCNC: 196 MG/DL
NRBC # BLD AUTO: 0 10E3/UL
NRBC BLD AUTO-RTO: 0 /100
PHOSPHATE SERPL-MCNC: 3.3 MG/DL (ref 2.5–4.5)
PLATELET # BLD AUTO: 250 10E3/UL (ref 150–450)
POTASSIUM SERPL-SCNC: 4.2 MMOL/L (ref 3.4–5.3)
PROT SERPL-MCNC: 7.3 G/DL (ref 6.4–8.3)
PTH-INTACT SERPL-MCNC: 36 PG/ML (ref 15–65)
RBC # BLD AUTO: 3.71 10E6/UL (ref 3.8–5.2)
SODIUM SERPL-SCNC: 141 MMOL/L (ref 136–145)
TRIGL SERPL-MCNC: 138 MG/DL
WBC # BLD AUTO: 6.4 10E3/UL (ref 4–11)

## 2023-03-28 PROCEDURE — 83970 ASSAY OF PARATHORMONE: CPT | Mod: ZL | Performed by: FAMILY MEDICINE

## 2023-03-28 PROCEDURE — 84100 ASSAY OF PHOSPHORUS: CPT | Mod: ZL | Performed by: FAMILY MEDICINE

## 2023-03-28 PROCEDURE — 77067 SCR MAMMO BI INCL CAD: CPT

## 2023-03-28 PROCEDURE — G0402 INITIAL PREVENTIVE EXAM: HCPCS | Performed by: FAMILY MEDICINE

## 2023-03-28 PROCEDURE — 36415 COLL VENOUS BLD VENIPUNCTURE: CPT | Mod: ZL | Performed by: FAMILY MEDICINE

## 2023-03-28 PROCEDURE — G0463 HOSPITAL OUTPT CLINIC VISIT: HCPCS | Mod: 25

## 2023-03-28 PROCEDURE — 80053 COMPREHEN METABOLIC PANEL: CPT | Mod: ZL | Performed by: FAMILY MEDICINE

## 2023-03-28 PROCEDURE — 82310 ASSAY OF CALCIUM: CPT | Mod: ZL | Performed by: FAMILY MEDICINE

## 2023-03-28 PROCEDURE — 90677 PCV20 VACCINE IM: CPT

## 2023-03-28 PROCEDURE — 80061 LIPID PANEL: CPT | Mod: ZL | Performed by: FAMILY MEDICINE

## 2023-03-28 PROCEDURE — 85025 COMPLETE CBC W/AUTO DIFF WBC: CPT | Mod: ZL | Performed by: FAMILY MEDICINE

## 2023-03-28 RX ORDER — LAMOTRIGINE 200 MG/1
TABLET ORAL
Qty: 90 TABLET | Refills: 3 | Status: SHIPPED | OUTPATIENT
Start: 2023-03-28 | End: 2024-02-12

## 2023-03-28 RX ORDER — BUPROPION HYDROCHLORIDE 300 MG/1
TABLET ORAL
Qty: 90 TABLET | Refills: 3 | Status: SHIPPED | OUTPATIENT
Start: 2023-03-28 | End: 2023-04-27

## 2023-03-28 ASSESSMENT — ENCOUNTER SYMPTOMS
CONSTIPATION: 0
WEAKNESS: 1
FEVER: 0
JOINT SWELLING: 0
FREQUENCY: 0
HEARTBURN: 0
EYE PAIN: 0
SHORTNESS OF BREATH: 0
COUGH: 1
SORE THROAT: 0
BREAST MASS: 0
HEMATOCHEZIA: 0
DIARRHEA: 0
DIZZINESS: 0
PALPITATIONS: 0
DYSURIA: 0
NERVOUS/ANXIOUS: 1
HEADACHES: 1
MYALGIAS: 0
CHILLS: 0
ABDOMINAL PAIN: 0
ARTHRALGIAS: 0
PARESTHESIAS: 0
NAUSEA: 0
HEMATURIA: 0

## 2023-03-28 ASSESSMENT — PAIN SCALES - GENERAL: PAINLEVEL: NO PAIN (0)

## 2023-03-28 ASSESSMENT — ANXIETY QUESTIONNAIRES
3. WORRYING TOO MUCH ABOUT DIFFERENT THINGS: NEARLY EVERY DAY
8. IF YOU CHECKED OFF ANY PROBLEMS, HOW DIFFICULT HAVE THESE MADE IT FOR YOU TO DO YOUR WORK, TAKE CARE OF THINGS AT HOME, OR GET ALONG WITH OTHER PEOPLE?: SOMEWHAT DIFFICULT
6. BECOMING EASILY ANNOYED OR IRRITABLE: NEARLY EVERY DAY
IF YOU CHECKED OFF ANY PROBLEMS ON THIS QUESTIONNAIRE, HOW DIFFICULT HAVE THESE PROBLEMS MADE IT FOR YOU TO DO YOUR WORK, TAKE CARE OF THINGS AT HOME, OR GET ALONG WITH OTHER PEOPLE: SOMEWHAT DIFFICULT
5. BEING SO RESTLESS THAT IT IS HARD TO SIT STILL: NOT AT ALL
7. FEELING AFRAID AS IF SOMETHING AWFUL MIGHT HAPPEN: NEARLY EVERY DAY
7. FEELING AFRAID AS IF SOMETHING AWFUL MIGHT HAPPEN: NEARLY EVERY DAY
2. NOT BEING ABLE TO STOP OR CONTROL WORRYING: NEARLY EVERY DAY
GAD7 TOTAL SCORE: 17
1. FEELING NERVOUS, ANXIOUS, OR ON EDGE: MORE THAN HALF THE DAYS
GAD7 TOTAL SCORE: 17
4. TROUBLE RELAXING: NEARLY EVERY DAY
GAD7 TOTAL SCORE: 17

## 2023-03-28 ASSESSMENT — PATIENT HEALTH QUESTIONNAIRE - PHQ9
10. IF YOU CHECKED OFF ANY PROBLEMS, HOW DIFFICULT HAVE THESE PROBLEMS MADE IT FOR YOU TO DO YOUR WORK, TAKE CARE OF THINGS AT HOME, OR GET ALONG WITH OTHER PEOPLE: VERY DIFFICULT
SUM OF ALL RESPONSES TO PHQ QUESTIONS 1-9: 22
SUM OF ALL RESPONSES TO PHQ QUESTIONS 1-9: 22

## 2023-03-28 ASSESSMENT — ACTIVITIES OF DAILY LIVING (ADL): CURRENT_FUNCTION: NO ASSISTANCE NEEDED

## 2023-03-28 NOTE — PROGRESS NOTES
"SUBJECTIVE:   Ivy is a 67 year old who presents for Preventive Visit.    66 yo female presents for medicare wellness exam.    Just returned from [place]. Got covid right before they left. Recovered    Plans knee replacement in the fall with Dr Cohen    Mood is stable on current medications.  She has been on Wellbutrin, Lamictal and Effexor for many years.  Previous under the care of psychiatry but transfer these prescriptions to me.  Would like to continue on these meds.    Patient has been advised of split billing requirements and indicates understanding: Yes  Are you in the first 12 months of your Medicare coverage?  No    Healthy Habits:     In general, how would you rate your overall health?  Good    Frequency of exercise:  1 day/week    Duration of exercise:  Less than 15 minutes    Do you usually eat at least 4 servings of fruit and vegetables a day, include whole grains    & fiber and avoid regularly eating high fat or \"junk\" foods?  No    Taking medications regularly:  Yes    Medication side effects:  None    Ability to successfully perform activities of daily living:  No assistance needed    Home Safety:  No safety concerns identified    Hearing Impairment:  No hearing concerns    In the past 6 months, have you been bothered by leaking of urine? Yes    In general, how would you rate your overall mental or emotional health?  Fair      PHQ-2 Total Score: 6    Additional concerns today:  No      Have you ever done Advance Care Planning? (For example, a Health Directive, POLST, or a discussion with a medical provider or your loved ones about your wishes): Yes, advance care planning is on file.       Fall risk  Fallen 2 or more times in the past year?: Yes  Any fall with injury in the past year?: No    Cognitive Screening   1) Repeat 3 items (Leader, Season, Table)    2) Clock draw: normal  3) 3 item recall: Recalls 3 objects  Results: 3 items recalled: COGNITIVE IMPAIRMENT LESS LIKELY    Mini-CogTM " Copyright JOHN Solis. Licensed by the author for use in [facility]; reprinted with permission (sherif@[facility]). All rights reserved.      Do you have sleep apnea, excessive snoring or daytime drowsiness?: yes    Reviewed and updated as needed this visit by clinical staff   Tobacco  Allergies  Meds      Soc Hx        Reviewed and updated as needed this visit by Provider                 Social History     Tobacco Use     Smoking status: Former     Types: Cigarettes     Quit date: 10/22/1991     Years since quittin.4     Passive exposure: Never     Smokeless tobacco: Never   Substance Use Topics     Alcohol use: Not Currently     Comment: rare         Alcohol Use 3/28/2023   Prescreen: >3 drinks/day or >7 drinks/week? No     Do you have a current opioid prescription? No  Do you use any other controlled substances or medications that are not prescribed by a provider? None          Depression Followup    How are you doing with your depression since your last visit? No change    Are you having other symptoms that might be associated with depression? No    Have you had a significant life event?  No     Are you feeling anxious or having panic attacks?   No    Do you have any concerns with your use of alcohol or other drugs? No    Social History     Tobacco Use     Smoking status: Former     Types: Cigarettes     Quit date: 10/22/1991     Years since quittin.4     Passive exposure: Never     Smokeless tobacco: Never   Vaping Use     Vaping Use: Never used   Substance Use Topics     Alcohol use: Not Currently     Comment: rare     Drug use: No     PHQ 10/19/2022 2/3/2023 3/28/2023   PHQ-9 Total Score 22 0 22   Q9: Thoughts of better off dead/self-harm past 2 weeks Not at all Not at all Not at all     DESI-7 SCORE 2022 2/3/2023 3/28/2023   Total Score 16 (severe anxiety) - 17 (severe anxiety)   Total Score 16 0 17     Last PHQ-9 3/28/2023   1.  Little interest or pleasure in doing things 2   2.   Feeling down, depressed, or hopeless 3   3.  Trouble falling or staying asleep, or sleeping too much 3   4.  Feeling tired or having little energy 3   5.  Poor appetite or overeating 3   6.  Feeling bad about yourself 3   7.  Trouble concentrating 2   8.  Moving slowly or restless 3   Q9: Thoughts of better off dead/self-harm past 2 weeks 0   PHQ-9 Total Score 22   Difficulty at work, home, or with people -     DESI-7  3/28/2023   1. Feeling nervous, anxious, or on edge 2   2. Not being able to stop or control worrying 3   3. Worrying too much about different things 3   4. Trouble relaxing 3   5. Being so restless that it is hard to sit still 0   6. Becoming easily annoyed or irritable 3   7. Feeling afraid, as if something awful might happen 3   DESI-7 Total Score 17   If you checked any problems, how difficult have they made it for you to do your work, take care of things at home, or get along with other people? Somewhat difficult       Suicide Assessment Five-step Evaluation and Treatment (SAFE-T)      Current providers sharing in care for this patient include:   Patient Care Team:  Yodit Mandujano MD as PCP - General (Family Practice)  Yodit Mandujano MD as Assigned PCP  Mack Cohen MD as Assigned Musculoskeletal Provider    The following health maintenance items are reviewed in Epic and correct as of today:  Health Maintenance   Topic Date Due     DEXA  Never done     MICROALBUMIN  Never done     PARATHYROID  Never done     PHOSPHORUS  Never done     HIV SCREENING  Never done     Pneumococcal Vaccine: 65+ Years (3) 05/24/2022     COVID-19 Vaccine (5 - Booster for Pfizer series) 08/26/2022     DTAP/TDAP/TD IMMUNIZATION (5 - Td or Tdap) 10/22/2022     BMP  01/07/2023     LIPID  01/07/2023     MEDICARE ANNUAL WELLNESS VISIT  01/11/2023     HEMOGLOBIN  01/07/2023     PHQ-9  09/28/2023     MAMMO SCREENING  01/11/2024     FALL RISK ASSESSMENT  03/28/2024     COLORECTAL CANCER  SCREENING  10/31/2025     ADVANCE CARE PLANNING  2027     HEPATITIS C SCREENING  Completed     DEPRESSION ACTION PLAN  Completed     INFLUENZA VACCINE  Completed     URINALYSIS  Completed     ALK PHOS  Completed     ZOSTER IMMUNIZATION  Completed     IPV IMMUNIZATION  Aged Out     MENINGITIS IMMUNIZATION  Aged Out     PAP  Discontinued     Labs reviewed in EPIC  BP Readings from Last 3 Encounters:   23 136/70   10/31/22 130/78   10/19/22 134/88    Wt Readings from Last 3 Encounters:   10/31/22 97.5 kg (215 lb)   22 99.8 kg (220 lb)   18 104.3 kg (230 lb)                  Patient Active Problem List   Diagnosis     Dyslipidemia     Major depressive disorder, recurrent episode, severe (H)     PRADIP on CPAP     Sarcoidosis     Osteoarthritis of left glenohumeral joint     History of 2019 novel coronavirus disease (COVID-19)     Morbid obesity (H)     Chronic kidney disease, stage 3b (H)     H/O adenomatous polyp of colon     Past Surgical History:   Procedure Laterality Date     ARTHROSCOPY SHOULDER      ,Right type 3 SLAP lesion repair of labrum - suprascapular ganglion removal with posterior approach - acromioplasty and distal clavicle resection     ARTHROSCOPY SHOULDER      2015     COLONOSCOPY  2011,Normal--10 year followup     COLONOSCOPY N/A 10/31/2022    F/U  adenomatous, advanced by size     DILATION AND CURETTAGE      , and ablation     LAPAROSCOPIC CHOLECYSTECTOMY      6/23/15,Cholecystectomy,Laparoscopic     left shoulder replacement       right total shoulder replacement       TONSILLECTOMY       and adenoidectomy age 12       Social History     Tobacco Use     Smoking status: Former     Types: Cigarettes     Quit date: 10/22/1991     Years since quittin.4     Passive exposure: Never     Smokeless tobacco: Never   Substance Use Topics     Alcohol use: Not Currently     Comment: rare     Family History   Adopted: Yes   Problem Relation Age of Onset  "    Unknown/Adopted Other         Unknown,Unknown. She is ADOPTED     Prostate Cancer Father         metastic Prostate CA         Pneumonia Vaccine:utd  Mammogram Screening: Mammogram Screening: Recommended mammography every 1-2 years with patient discussion and risk factor consideration    FHS-7:   Breast CA Risk Assessment (FHS-7) 1/11/2022   Did any of your first-degree relatives have breast or ovarian cancer? No   Did any of your relatives have bilateral breast cancer? No   Did any man in your family have breast cancer? No   Did any woman in your family have breast and ovarian cancer? No   Did any woman in your family have breast cancer before age 50 y? No   Do you have 2 or more relatives with breast and/or ovarian cancer? No   Do you have 2 or more relatives with breast and/or bowel cancer? No         Pertinent mammograms are reviewed under the imaging tab.    Review of Systems   Constitutional: Negative for chills and fever.   HENT: Negative for congestion, ear pain, hearing loss and sore throat.    Eyes: Negative for pain and visual disturbance.   Respiratory: Positive for cough. Negative for shortness of breath.    Cardiovascular: Negative for chest pain, palpitations and peripheral edema.   Gastrointestinal: Negative for abdominal pain, constipation, diarrhea, heartburn, hematochezia and nausea.   Breasts:  Negative for tenderness, breast mass and discharge.   Genitourinary: Negative for dysuria, frequency, genital sores, hematuria, pelvic pain, urgency, vaginal bleeding and vaginal discharge.   Musculoskeletal: Negative for arthralgias, joint swelling and myalgias.   Skin: Negative for rash.   Neurological: Positive for weakness and headaches. Negative for dizziness and paresthesias.   Psychiatric/Behavioral: Positive for mood changes. The patient is nervous/anxious.          OBJECTIVE:   /70   Pulse 68   Temp 97.3  F (36.3  C) (Tympanic)   Resp 20   Ht 1.594 m (5' 2.75\")   SpO2 97%   " "Breastfeeding No   BMI 38.39 kg/m   Estimated body mass index is 38.39 kg/m  as calculated from the following:    Height as of this encounter: 1.594 m (5' 2.75\").    Weight as of 10/31/22: 97.5 kg (215 lb).  Physical Exam  HENT:      Mouth/Throat:      Mouth: Mucous membranes are moist.      Pharynx: Oropharynx is clear. No oropharyngeal exudate.   Cardiovascular:      Rate and Rhythm: Normal rate and regular rhythm.      Heart sounds: No murmur heard.  Pulmonary:      Effort: Pulmonary effort is normal. No respiratory distress.      Breath sounds: No wheezing.   Abdominal:      General: Abdomen is flat. Bowel sounds are normal.      Palpations: Abdomen is soft.      Tenderness: There is no abdominal tenderness.   Musculoskeletal:      Cervical back: Normal range of motion. No rigidity.   Skin:     General: Skin is warm.      Findings: No rash.   Neurological:      General: No focal deficit present.      Mental Status: She is alert.   Psychiatric:         Mood and Affect: Mood normal.           Diagnostic Test Results:  Labs reviewed in Epic  Results for orders placed or performed during the hospital encounter of 03/28/23   MA Screen Bilateral w/Jonathon     Status: None    Narrative    BILATERAL FULL FIELD DIGITAL SCREENING MAMMOGRAM WITH TOMOSYNTHESIS    Performed on: 3/28/23    Compared to: 01/11/2022, 01/07/2020, 11/26/2018, 11/20/2017, 11/15/2016,   11/09/2015, 11/05/2014, and 11/04/2013    Technique:  This study was evaluated with the assistance of Computer-Aided   Detection.  Breast Tomosynthesis was used in interpretation.    Findings: The breasts have scattered areas of fibroglandular density.    There is no radiographic evidence of malignancy.     Impression    IMPRESSION: ACR BI-RADS Category 1: Negative    RECOMMENDED FOLLOW-UP: Annual routine screening mammogram    The results and recommendations of this examination will be communicated   to the patient.        Yony Schulte MD     Results for " orders placed or performed in visit on 03/28/23   Lipid Profile     Status: Abnormal   Result Value Ref Range    Cholesterol 247 (H) <200 mg/dL    Triglycerides 138 <150 mg/dL    Direct Measure HDL 51 >=50 mg/dL    LDL Cholesterol Calculated 168 (H) <=100 mg/dL    Non HDL Cholesterol 196 (H) <130 mg/dL    Narrative    Cholesterol  Desirable:  <200 mg/dL    Triglycerides  Normal:  Less than 150 mg/dL  Borderline High:  150-199 mg/dL  High:  200-499 mg/dL  Very High:  Greater than or equal to 500 mg/dL    Direct Measure HDL  Female:  Greater than or equal to 50 mg/dL   Male:  Greater than or equal to 40 mg/dL    LDL Cholesterol  Desirable:  <100mg/dL  Above Desirable:  100-129 mg/dL   Borderline High:  130-159 mg/dL   High:  160-189 mg/dL   Very High:  >= 190 mg/dL    Non HDL Cholesterol  Desirable:  130 mg/dL  Above Desirable:  130-159 mg/dL  Borderline High:  160-189 mg/dL  High:  190-219 mg/dL  Very High:  Greater than or equal to 220 mg/dL   Comprehensive Metabolic Panel     Status: Abnormal   Result Value Ref Range    Sodium 141 136 - 145 mmol/L    Potassium 4.2 3.4 - 5.3 mmol/L    Chloride 106 98 - 107 mmol/L    Carbon Dioxide (CO2) 27 22 - 29 mmol/L    Anion Gap 8 7 - 15 mmol/L    Urea Nitrogen 14.2 8.0 - 23.0 mg/dL    Creatinine 1.41 (H) 0.51 - 0.95 mg/dL    Calcium 9.1 8.8 - 10.2 mg/dL    Glucose 105 (H) 70 - 99 mg/dL    Alkaline Phosphatase 97 35 - 104 U/L    AST 22 10 - 35 U/L    ALT 20 10 - 35 U/L    Protein Total 7.3 6.4 - 8.3 g/dL    Albumin 3.9 3.5 - 5.2 g/dL    Bilirubin Total 0.5 <=1.2 mg/dL    GFR Estimate 41 (L) >60 mL/min/1.73m2   PTH, Intact     Status: Normal   Result Value Ref Range    Parathyroid Hormone Intact 36 15 - 65 pg/mL    Narrative    This result was obtained with the Roche Elecsys PTH STAT assay.   This reference range differs from PTH assays used in other [facility] laboratories.   Phosphorus     Status: Normal   Result Value Ref Range    Phosphorus 3.3 2.5 - 4.5 mg/dL   CBC  with platelets and differential     Status: Abnormal   Result Value Ref Range    WBC Count 6.4 4.0 - 11.0 10e3/uL    RBC Count 3.71 (L) 3.80 - 5.20 10e6/uL    Hemoglobin 11.5 (L) 11.7 - 15.7 g/dL    Hematocrit 36.0 35.0 - 47.0 %    MCV 97 78 - 100 fL    MCH 31.0 26.5 - 33.0 pg    MCHC 31.9 31.5 - 36.5 g/dL    RDW 14.5 10.0 - 15.0 %    Platelet Count 250 150 - 450 10e3/uL    % Neutrophils 60 %    % Lymphocytes 23 %    % Monocytes 8 %    % Eosinophils 8 %    % Basophils 1 %    % Immature Granulocytes 0 %    NRBCs per 100 WBC 0 <1 /100    Absolute Neutrophils 3.8 1.6 - 8.3 10e3/uL    Absolute Lymphocytes 1.5 0.8 - 5.3 10e3/uL    Absolute Monocytes 0.5 0.0 - 1.3 10e3/uL    Absolute Eosinophils 0.5 0.0 - 0.7 10e3/uL    Absolute Basophils 0.1 0.0 - 0.2 10e3/uL    Absolute Immature Granulocytes 0.0 <=0.4 10e3/uL    Absolute NRBCs 0.0 10e3/uL   Extra Tube     Status: None    Narrative    The following orders were created for panel order Extra Tube.  Procedure                               Abnormality         Status                     ---------                               -----------         ------                     Extra Serum Separator Tu...[[id number]]                      Final result                 Please view results for these tests on the individual orders.   Extra Serum Separator Tube (SST)     Status: None   Result Value Ref Range    Hold Specimen [facility]    CBC and Differential     Status: Abnormal    Narrative    The following orders were created for panel order CBC and Differential.  Procedure                               Abnormality         Status                     ---------                               -----------         ------                     CBC with platelets and d...[[id number]]  Abnormal            Final result                 Please view results for these tests on the individual orders.       ASSESSMENT / PLAN:       ICD-10-CM    1. Medicare annual wellness visit, initial  Z00.00 Albumin Random  Urine Quantitative with Creat Ratio      2. Need for diphtheria-tetanus-pertussis (Tdap) vaccine  Z23       3. Chronic kidney disease, stage 3b (H)  N18.32 Albumin Random Urine Quantitative with Creat Ratio     Comprehensive Metabolic Panel     PTH, Intact     Phosphorus     CBC and Differential     Comprehensive Metabolic Panel     PTH, Intact     Phosphorus     CBC and Differential     Albumin Random Urine Quantitative with Creat Ratio     CANCELED: Albumin Random Urine Quantitative with Creat Ratio      4. Severe episode of recurrent major depressive disorder, without psychotic features (H)  F33.2 buPROPion (WELLBUTRIN XL) 300 MG 24 hr tablet     lamoTRIgine (LAMICTAL) 200 MG tablet      5. Dyslipidemia  E78.5 Lipid Profile     Lipid Profile      6. Asymptomatic menopause  Z78.0 DEXA HIP/PELVIS/SPINE - Future          Patient has been advised of split billing requirements and indicates understanding: Yes  Patient is counseled on importance of regular self breast exams and mammograms every 1-2 years starting at age 40. Patient will proceed with pap smears every 3-5 years until age 65. Discussed importance of calcium and vitamin D supplementation and osteoporosis screening. Immunizations are updated based on CDC recommendations and patients desire. Reviewed importance of sunscreen, limit sun exposure and monitoring for changing moles with ABCDEs. Recommend seatbelt use and helmets with biking, skiing and ATV/Snowmobile use.    Recommend bone density scan.  Colon cancer screening is up-to-date.  Recommend annual mammogram.  Prevnar was provided today.  Recommend COVID booster in the fall.    Reviewed laboratory work, kidney function stable.  Avoid NSAIDs.  Aggressive control of blood pressure.    Mood is stable on current meds, refilled as requested.    COUNSELING:  Reviewed preventive health counseling, as reflected in patient instructions  Special attention given to:       Regular exercise       Healthy  "diet/nutrition       Vision screening       Hearing screening       Dental care       Bladder control       Fall risk prevention       Folic Acid       Osteoporosis prevention/bone health       Colon cancer screening       Hepatitis C screening       Advanced Planning       BMI:   Estimated body mass index is 38.39 kg/m  as calculated from the following:    Height as of this encounter: 1.594 m (5' 2.75\").    Weight as of 10/31/22: 97.5 kg (215 lb).         She reports that she quit smoking about 31 years ago. Her smoking use included cigarettes. She has never been exposed to tobacco smoke. She has never used smokeless tobacco.      Appropriate preventive services were discussed with this patient, including applicable screening as appropriate for cardiovascular disease, diabetes, osteopenia/osteoporosis, and glaucoma.  As appropriate for age/gender, discussed screening for colorectal cancer, prostate cancer, breast cancer, and cervical cancer. Checklist reviewing preventive services available has been given to the patient.    Reviewed patients plan of care and provided an AVS. The Basic Care Plan (routine screening as documented in Health Maintenance) for Ivy meets the Care Plan requirement. This Care Plan has been established and reviewed with the Patient.      Yodit Villalta MD  [facility]    Identified Health Risks:    I have reviewed Opioid Use Disorder and Substance Use Disorder risk factors and made any needed referrals.     Answers for HPI/ROS submitted by the patient on 3/28/2023  If you checked off any problems, how difficult have these problems made it for you to do your work, take care of things at home, or get along with other people?: Very difficult  PHQ9 TOTAL SCORE: 22  DESI 7 TOTAL SCORE: 17      "

## 2023-03-28 NOTE — NURSING NOTE
Patient is here for annual physical and medicare wellness.     No LMP recorded. Patient has had an ablation.  Medication Reconciliation: complete      Ashley Slater LPN 3/28/2023 8:57 AM       Advance care directive on file? yes  Advance care directive provided to patient? na       Ashley Slater LPN

## 2023-03-30 ENCOUNTER — OFFICE VISIT (OUTPATIENT)
Dept: ORTHOPEDICS | Facility: OTHER | Age: 68
End: 2023-03-30
Attending: PODIATRIST
Payer: MEDICARE

## 2023-03-30 DIAGNOSIS — M79.89 PALPABLE MASS OF SOFT TISSUE OF FOOT: Primary | ICD-10-CM

## 2023-03-30 PROCEDURE — 99213 OFFICE O/P EST LOW 20 MIN: CPT | Performed by: PODIATRIST

## 2023-03-30 PROCEDURE — G0463 HOSPITAL OUTPT CLINIC VISIT: HCPCS

## 2023-03-30 NOTE — PROGRESS NOTES
Patient is here for follow up on her left foot.    Heidy Medeiros LPN .......  3/30/2023  11:13 AM

## 2023-03-30 NOTE — PROGRESS NOTES
SUBJECTIVE:  Ivy is a patient familiar to me if seeing her sometime ago we debrided a IPK type lesion on the plantar lateral aspect of her heel she tries to keep a trim with a pumice stone or file of sorts but continues to recurrence quite tender she would like it excised.    ROS: Musculoskeletal and general review of systems are negative, per review of previous clinic questionnaire.  Denies SOB and calf pain.    EXAM:   PHYSICAL EXAMINATION:   CONSTITUTIONAL:  The patient is alert and oriented x 3, well appearing and in no apparent distress.  Affect is pleasant and answers questions appropriately.  VASCULAR:  Circulation is intact with palpable pedal pulses and adequate capillary fill time to all digits.  Hair growth is present and appropriate to mid foot and digits. Calf nontender.  NEUROLOGIC:  Light touch sensation is intact to digits.  There is a negative Tinel sign.    INTEGUMENT:  No abnormal dermatologic lesions are noted.  Skin has normal texture and turgor.    MUSCULOSKELETAL:   Large IPK lesion with clear core full-thickness plantar lateral heel.  Very tender to palpate.  No evidence of verrucous type lesion        ASSESSMENT: Painful plantar soft tissue mass full-thickness plantar lateral left heel    PLAN OF CARE: Discussed ongoing conservative cares.  At this point she would like to get excised.  We will do this with a full thickness excision of mass and a rotational flap closure.  I discussed this in detail including surgery recovery risks potential complications alternatives and benefits.  She will schedule this at her convenience.  She is aware she will require 2 weeks nonweightbearing given plantar incision.  Follow-up accordingly postop.  She will get a preop prior to proceeding.    Alejandro Jordan DPM

## 2023-04-03 ENCOUNTER — OFFICE VISIT (OUTPATIENT)
Dept: ORTHOPEDICS | Facility: OTHER | Age: 68
End: 2023-04-03
Attending: ORTHOPAEDIC SURGERY
Payer: MEDICARE

## 2023-04-03 DIAGNOSIS — M25.561 CHRONIC PAIN OF RIGHT KNEE: Primary | ICD-10-CM

## 2023-04-03 DIAGNOSIS — G89.29 CHRONIC PAIN OF RIGHT KNEE: Primary | ICD-10-CM

## 2023-04-03 PROCEDURE — 250N000011 HC RX IP 250 OP 636: Performed by: ORTHOPAEDIC SURGERY

## 2023-04-03 PROCEDURE — 20610 DRAIN/INJ JOINT/BURSA W/O US: CPT | Mod: RT | Performed by: ORTHOPAEDIC SURGERY

## 2023-04-03 PROCEDURE — G0463 HOSPITAL OUTPT CLINIC VISIT: HCPCS

## 2023-04-03 PROCEDURE — 250N000009 HC RX 250: Performed by: ORTHOPAEDIC SURGERY

## 2023-04-03 RX ORDER — TRIAMCINOLONE ACETONIDE 40 MG/ML
40 INJECTION, SUSPENSION INTRA-ARTICULAR; INTRAMUSCULAR ONCE
Status: COMPLETED | OUTPATIENT
Start: 2023-04-03 | End: 2023-04-03

## 2023-04-03 RX ORDER — LIDOCAINE HYDROCHLORIDE 10 MG/ML
4 INJECTION, SOLUTION EPIDURAL; INFILTRATION; INTRACAUDAL; PERINEURAL ONCE
Status: COMPLETED | OUTPATIENT
Start: 2023-04-03 | End: 2023-04-03

## 2023-04-03 RX ADMIN — LIDOCAINE HYDROCHLORIDE 4 ML: 10 INJECTION, SOLUTION INFILTRATION; PERINEURAL at 11:19

## 2023-04-03 RX ADMIN — TRIAMCINOLONE ACETONIDE 40 MG: 40 INJECTION, SUSPENSION INTRA-ARTICULAR; INTRAMUSCULAR at 11:19

## 2023-04-03 NOTE — PROGRESS NOTES
Visit Date: 2023    HISTORY OF PRESENT ILLNESS:  She has tricompartmental osteoarthritis of her right knee and had an injection back on 2022.  She at this point is interested in having another one now and she is planning on having a total knee arthroplasty performed in the fall.    PHYSICAL EXAMINATION:   She has full range of motion of her knee, some mild crepitus underneath the patella.  Stable to varus and valgus stress, otherwise negative Lachman's, negative posterior drawer.  Tender to palpation over medial joint line.    IMPRESSION AND PLAN:  We gave her an injection today.  She tolerated it well and had good relief of her pain.  We are going to see her back on an as-needed basis.    Mack Cohen MD        D: 2023   T: 2023   MT: MORALES    Name:     CHIOMA MORRIS  MRN:      [id number]        Account:    [id number]   :      1955           Visit Date: 2023     Document: [id number]

## 2023-04-03 NOTE — PROGRESS NOTES
Patient is here for follow up on her right knee pain.  Jeri Talley LPN .....................4/3/2023 11:08 AM

## 2023-04-27 ENCOUNTER — OFFICE VISIT (OUTPATIENT)
Dept: FAMILY MEDICINE | Facility: OTHER | Age: 68
End: 2023-04-27
Attending: FAMILY MEDICINE
Payer: MEDICARE

## 2023-04-27 VITALS
HEART RATE: 84 BPM | SYSTOLIC BLOOD PRESSURE: 128 MMHG | HEIGHT: 63 IN | OXYGEN SATURATION: 96 % | TEMPERATURE: 97.4 F | RESPIRATION RATE: 20 BRPM | DIASTOLIC BLOOD PRESSURE: 70 MMHG | BODY MASS INDEX: 38.09 KG/M2

## 2023-04-27 DIAGNOSIS — G47.33 OSA ON CPAP: ICD-10-CM

## 2023-04-27 DIAGNOSIS — E66.01 MORBID OBESITY (H): ICD-10-CM

## 2023-04-27 DIAGNOSIS — F33.2 SEVERE EPISODE OF RECURRENT MAJOR DEPRESSIVE DISORDER, WITHOUT PSYCHOTIC FEATURES (H): ICD-10-CM

## 2023-04-27 DIAGNOSIS — Z01.818 PREOPERATIVE EXAMINATION: Primary | ICD-10-CM

## 2023-04-27 PROBLEM — Z86.16 HISTORY OF 2019 NOVEL CORONAVIRUS DISEASE (COVID-19): Status: RESOLVED | Noted: 2020-11-03 | Resolved: 2023-04-27

## 2023-04-27 PROBLEM — N18.32 CHRONIC KIDNEY DISEASE, STAGE 3B (H): Status: RESOLVED | Noted: 2022-10-19 | Resolved: 2023-04-27

## 2023-04-27 PROCEDURE — 99214 OFFICE O/P EST MOD 30 MIN: CPT | Performed by: FAMILY MEDICINE

## 2023-04-27 PROCEDURE — G0463 HOSPITAL OUTPT CLINIC VISIT: HCPCS

## 2023-04-27 RX ORDER — BUPROPION HYDROCHLORIDE 150 MG/1
150 TABLET ORAL EVERY MORNING
Qty: 90 TABLET | Refills: 0 | Status: SHIPPED | OUTPATIENT
Start: 2023-04-27 | End: 2023-07-17

## 2023-04-27 RX ORDER — BUPROPION HYDROCHLORIDE 300 MG/1
TABLET ORAL
Qty: 90 TABLET | Refills: 0 | Status: SHIPPED | OUTPATIENT
Start: 2023-04-27 | End: 2023-07-17

## 2023-04-27 ASSESSMENT — PAIN SCALES - GENERAL: PAINLEVEL: NO PAIN (0)

## 2023-04-27 ASSESSMENT — PATIENT HEALTH QUESTIONNAIRE - PHQ9
SUM OF ALL RESPONSES TO PHQ QUESTIONS 1-9: 23
10. IF YOU CHECKED OFF ANY PROBLEMS, HOW DIFFICULT HAVE THESE PROBLEMS MADE IT FOR YOU TO DO YOUR WORK, TAKE CARE OF THINGS AT HOME, OR GET ALONG WITH OTHER PEOPLE: EXTREMELY DIFFICULT
SUM OF ALL RESPONSES TO PHQ QUESTIONS 1-9: 23

## 2023-04-27 NOTE — H&P (VIEW-ONLY)
[facility] AND \[facility]\""  [address]  [place] [address]  Phone: [phone]  Fax: [phone]  Primary Provider: Yodit Mandujano  Pre-op Performing Provider: YODIT MANDUJANO      PREOPERATIVE EVALUATION:  Today's date: 4/27/2023    Ivy Romero is a 67 year old female who presents for a preoperative evaluation.      Surgical Information:  Surgery/Procedure: EXCISION, MASS, foot with rotational flap closure  Surgery Location: [facility]  Surgeon: Dr Jordan   Surgery Date: 5/4/23  Time of Surgery: TBD  Where patient plans to recover: At home with family  Fax number for surgical facility: Note does not need to be faxed, will be available electronically in Epic.    Assessment & Plan     The proposed surgical procedure is considered LOW risk.    Preoperative examination      Morbid obesity (H)      Severe episode of recurrent major depressive disorder, without psychotic features (H)  Patient is very tearful today.  We discussed options in regards to her current medications.  Recommend increasing Wellbutrin to 4 and 50 mg daily.  She will recheck with me in 1 month.  Encouraged increased counseling sessions.  - buPROPion (WELLBUTRIN XL) 300 MG 24 hr tablet; 1 tab daily plus 150 mg daily, total dosage 450 mg daily  - buPROPion (WELLBUTRIN XL) 150 MG 24 hr tablet; Take 1 tablet (150 mg) by mouth every morning Take with 300 mg for total dosage of 450 mg daily    PRADIP on CPAP  Encouraged consistent use.  This will help with her weight, blood pressure and overall mood.              - No identified additional risk factors other than previously addressed    Antiplatelet or Anticoagulation Medication Instructions:   - Patient is on no antiplatelet or anticoagulation medications.    Additional Medication Instructions:  Patient is to take all scheduled medications on the day of surgery    RECOMMENDATION:  APPROVAL GIVEN to proceed with proposed procedure, without further  diagnostic evaluation.        Subjective     HPI related to upcoming procedure:     67-year-old female presents today for preoperative valuation prior to undergoing podiatry procedure as noted above.  Initial blood pressure was elevated.  She is quite anxious as we had requested that she be weighed.    24 hour BP monitor last year, sbp 120-130, dbp 70    Tearful and anxious about getting weighed today      Otherwise has been feeling well.  Recovered from COVID.  No recent fever chills cough or cold symptoms.  No history of bleeding disorder.    She has obstructive sleep apnea.  Only occasionally uses her CPAP.        4/27/2023    11:03 AM   Preop Questions   1. Have you ever had a heart attack or stroke? No   2. Have you ever had surgery on your heart or blood vessels, such as a stent placement, a coronary artery bypass, or surgery on an artery in your head, neck, heart, or legs? No   3. Do you have chest pain with activity? No   4. Do you have a history of  heart failure? No   5. Do you currently have a cold, bronchitis or symptoms of other infection? No   6. Do you have a cough, shortness of breath, or wheezing? No   7. Do you or anyone in your family have previous history of blood clots? No   8. Do you or does anyone in your family have a serious bleeding problem such as prolonged bleeding following surgeries or cuts? No   9. Have you ever had problems with anemia or been told to take iron pills? YES -    10. Have you had any abnormal blood loss such as black, tarry or bloody stools, or abnormal vaginal bleeding? No   11. Have you ever had a blood transfusion? No   12. Are you willing to have a blood transfusion if it is medically needed before, during, or after your surgery? Yes   13. Have you or any of your relatives ever had problems with anesthesia? No   14. Do you have sleep apnea, excessive snoring or daytime drowsiness? YES -    14a. Do you have a CPAP machine? Yes   15. Do you have any artifical heart  valves or other implanted medical devices like a pacemaker, defibrillator, or continuous glucose monitor? No   16. Do you have artificial joints? YES -    17. Are you allergic to latex? No       Health Care Directive:  Patient has a Health Care Directive on file      Preoperative Review of :   reviewed - no record of controlled substances prescribed.      Status of Chronic Conditions:  DEPRESSION - Patient has a long history of Depression of moderate severity requiring medication for control with recent symptoms being relapsing/remitting..Current symptoms of depression include anxiety.       Review of Systems  CONSTITUTIONAL: NEGATIVE for fever, chills, change in weight  INTEGUMENTARY/SKIN: NEGATIVE for worrisome rashes, moles or lesions  EYES: NEGATIVE for vision changes or irritation  ENT/MOUTH: NEGATIVE for ear, mouth and throat problems  RESP: NEGATIVE for significant cough or SOB  CV: NEGATIVE for chest pain, palpitations or peripheral edema  GI: NEGATIVE for nausea, abdominal pain, heartburn, or change in bowel habits  : NEGATIVE for frequency, dysuria, or hematuria  MUSCULOSKELETAL: NEGATIVE for significant arthralgias or myalgia  NEURO: NEGATIVE for weakness, dizziness or paresthesias  ENDOCRINE: NEGATIVE for temperature intolerance, skin/hair changes  HEME: NEGATIVE for bleeding problems  PSYCHIATRIC: NEGATIVE for changes in mood or affect    Patient Active Problem List    Diagnosis Date Noted     H/O adenomatous polyp of colon 10/31/2022     Priority: Medium     Chronic kidney disease, stage 3b (H) 10/19/2022     Priority: Medium     Morbid obesity (H) 10/12/2021     Priority: Medium     History of 2019 novel coronavirus disease (COVID-19) 11/03/2020     Priority: Medium     Osteoarthritis of left glenohumeral joint 04/10/2018     Priority: Medium     PRADIP on CPAP 11/20/2017     Priority: Medium     Dyslipidemia 10/24/2013     Priority: Medium     Major depressive disorder, recurrent episode, severe  (H) 08/10/2011     Priority: Medium     Sarcoidosis 01/01/2007     Priority: Medium      Past Medical History:   Diagnosis Date     AC (acromioclavicular) arthritis 4/10/2018     Allergic rhinitis due to pollen     No Comments Provided     Dorsalgia     10/7/2010     Erythema nodosum      2009     Essential tremor     Possible benign     Hyperlipidemia     10/24/2013     Ill-defined and unknown cause of mortality (CODE)     characterized by chronic pain, positive BARBARA at a low titer of 1:160, negative HLAB27,     Migraine without status migrainosus, not intractable     No Comments Provided     Osteoarthritis of left glenohumeral joint 4/10/2018     Postconcussional syndrome     1/26/2016     Sarcoidosis     2007     Tendinitis of left rotator cuff 4/10/2018     Past Surgical History:   Procedure Laterality Date     ARTHROSCOPY SHOULDER      12/07,Right type 3 SLAP lesion repair of labrum - suprascapular ganglion removal with posterior approach - acromioplasty and distal clavicle resection     ARTHROSCOPY SHOULDER      2/2015     COLONOSCOPY  08/29/2011 8/2011,Normal--10 year followup     COLONOSCOPY N/A 10/31/2022    F/U 2025 adenomatous, advanced by size     DILATION AND CURETTAGE      11/05, and ablation     LAPAROSCOPIC CHOLECYSTECTOMY      6/23/15,Cholecystectomy,Laparoscopic     left shoulder replacement       right total shoulder replacement       TONSILLECTOMY       and adenoidectomy age 12     Current Outpatient Medications   Medication Sig Dispense Refill     acetaminophen (TYLENOL) 500 MG tablet Take 1,000 mg by mouth 3 times daily as needed       buPROPion (WELLBUTRIN XL) 300 MG 24 hr tablet TAKE 1 TABLET BY MOUTH EVERY DAY 90 tablet 3     Cholecalciferol (VITAMIN D3) 1000 UNITS CAPS Take 1,000 Units by mouth daily       gabapentin (NEURONTIN) 600 MG tablet TAKE 1 TABLET BY MOUTH THREE TIMES DAILY 270 tablet 3     lamoTRIgine (LAMICTAL) 100 MG tablet TAKE 1 TABLET(100 MG) BY MOUTH EVERY MORNING 90  "tablet 4     lamoTRIgine (LAMICTAL) 200 MG tablet TAKE 1 TABLET(200 MG) BY MOUTH AT BEDTIME 90 tablet 3     order for DME Equipment being ordered: CPAP supplies 1 each 0     venlafaxine (EFFEXOR XR) 150 MG 24 hr capsule TAKE 1 CAPSULE(150 MG) BY MOUTH DAILY WITH FOOD 90 capsule 3       Allergies   Allergen Reactions     Penicillins Rash        Social History     Tobacco Use     Smoking status: Former     Types: Cigarettes     Quit date: 10/22/1991     Years since quittin.5     Passive exposure: Never     Smokeless tobacco: Never   Vaping Use     Vaping status: Never Used     Passive vaping exposure: Yes   Substance Use Topics     Alcohol use: Not Currently     Comment: rare     Family History   Adopted: Yes   Problem Relation Age of Onset     Unknown/Adopted Other         Unknown,Unknown. She is ADOPTED     Prostate Cancer Father         metastic Prostate CA     History   Drug Use No         Objective     BP (!) 150/82   Pulse 84   Temp 97.4  F (36.3  C) (Tympanic)   Resp 20   Ht 1.6 m (5' 3\")   SpO2 96%   Breastfeeding No   BMI 38.09 kg/m      Physical Exam    GENERAL APPEARANCE: healthy, alert and no distress     HENT: ear canals and TM's normal and nose and mouth without ulcers or lesions     NECK: no adenopathy, no asymmetry, masses, or scars and thyroid normal to palpation     RESP: lungs clear to auscultation - no rales, rhonchi or wheezes     CV: regular rates and rhythm, normal S1 S2, no S3 or S4 and no murmur, click or rub     ABDOMEN:  soft, nontender, no HSM or masses and bowel sounds normal     MS: extremities normal- no gross deformities noted, no evidence of inflammation in joints, FROM in all extremities.     SKIN: no suspicious lesions or rashes     NEURO: Normal strength and tone, sensory exam grossly normal, mentation intact and speech normal     PSYCH: mentation appears normal. and affect normal/bright     LYMPHATICS: No cervical adenopathy    Recent Labs   Lab Test 23  1000 " 01/07/22  0756   HGB 11.5* 12.3    265    137   POTASSIUM 4.2 4.2   CR 1.41* 1.62*        Diagnostics:  No labs were ordered during this visit.   No EKG required for low risk surgery (cataract, skin procedure, breast biopsy, etc).    Revised Cardiac Risk Index (RCRI):  The patient has the following serious cardiovascular risks for perioperative complications:   - No serious cardiac risks = 0 points     RCRI Interpretation: 0 points: Class I (very low risk - 0.4% complication rate)           Signed Electronically by: Yodit Villalta MD  Copy of this evaluation report is provided to requesting physician.      Answers for HPI/ROS submitted by the patient on 4/27/2023  If you checked off any problems, how difficult have these problems made it for you to do your work, take care of things at home, or get along with other people?: Extremely difficult  PHQ9 TOTAL SCORE: 23

## 2023-04-27 NOTE — NURSING NOTE
Patient is here for Pre-op for surgery on left foot with Dr Jordan at [facility] on 5/4/23.    No LMP recorded. Patient has had an ablation.  Medication Reconciliation: complete      Ashley Slater LPN 4/27/2023 11:12 AM       Advance care directive on file? yes  Advance care directive provided to patient? na       Ashley Slater LPN

## 2023-04-27 NOTE — PROGRESS NOTES
[facility] AND [facility]  [address]  [place] [address]  Phone: [phone]  Fax: [phone]  Primary Provider: Yodit Mandujano  Pre-op Performing Provider: YODIT MANDUJANO      PREOPERATIVE EVALUATION:  Today's date: 4/27/2023    Ivy Romero is a 67 year old female who presents for a preoperative evaluation.      Surgical Information:  Surgery/Procedure: EXCISION, MASS, foot with rotational flap closure  Surgery Location: [facility]  Surgeon: Dr Jordan   Surgery Date: 5/4/23  Time of Surgery: TBD  Where patient plans to recover: At home with family  Fax number for surgical facility: Note does not need to be faxed, will be available electronically in Epic.    Assessment & Plan     The proposed surgical procedure is considered LOW risk.    Preoperative examination      Morbid obesity (H)      Severe episode of recurrent major depressive disorder, without psychotic features (H)  Patient is very tearful today.  We discussed options in regards to her current medications.  Recommend increasing Wellbutrin to 4 and 50 mg daily.  She will recheck with me in 1 month.  Encouraged increased counseling sessions.  - buPROPion (WELLBUTRIN XL) 300 MG 24 hr tablet; 1 tab daily plus 150 mg daily, total dosage 450 mg daily  - buPROPion (WELLBUTRIN XL) 150 MG 24 hr tablet; Take 1 tablet (150 mg) by mouth every morning Take with 300 mg for total dosage of 450 mg daily    PRADIP on CPAP  Encouraged consistent use.  This will help with her weight, blood pressure and overall mood.              - No identified additional risk factors other than previously addressed    Antiplatelet or Anticoagulation Medication Instructions:   - Patient is on no antiplatelet or anticoagulation medications.    Additional Medication Instructions:  Patient is to take all scheduled medications on the day of surgery    RECOMMENDATION:  APPROVAL GIVEN to proceed with proposed procedure, without further  diagnostic evaluation.        Subjective     HPI related to upcoming procedure:     67-year-old female presents today for preoperative valuation prior to undergoing podiatry procedure as noted above.  Initial blood pressure was elevated.  She is quite anxious as we had requested that she be weighed.    24 hour BP monitor last year, sbp 120-130, dbp 70    Tearful and anxious about getting weighed today      Otherwise has been feeling well.  Recovered from COVID.  No recent fever chills cough or cold symptoms.  No history of bleeding disorder.    She has obstructive sleep apnea.  Only occasionally uses her CPAP.        4/27/2023    11:03 AM   Preop Questions   1. Have you ever had a heart attack or stroke? No   2. Have you ever had surgery on your heart or blood vessels, such as a stent placement, a coronary artery bypass, or surgery on an artery in your head, neck, heart, or legs? No   3. Do you have chest pain with activity? No   4. Do you have a history of  heart failure? No   5. Do you currently have a cold, bronchitis or symptoms of other infection? No   6. Do you have a cough, shortness of breath, or wheezing? No   7. Do you or anyone in your family have previous history of blood clots? No   8. Do you or does anyone in your family have a serious bleeding problem such as prolonged bleeding following surgeries or cuts? No   9. Have you ever had problems with anemia or been told to take iron pills? YES -    10. Have you had any abnormal blood loss such as black, tarry or bloody stools, or abnormal vaginal bleeding? No   11. Have you ever had a blood transfusion? No   12. Are you willing to have a blood transfusion if it is medically needed before, during, or after your surgery? Yes   13. Have you or any of your relatives ever had problems with anesthesia? No   14. Do you have sleep apnea, excessive snoring or daytime drowsiness? YES -    14a. Do you have a CPAP machine? Yes   15. Do you have any artifical heart  valves or other implanted medical devices like a pacemaker, defibrillator, or continuous glucose monitor? No   16. Do you have artificial joints? YES -    17. Are you allergic to latex? No       Health Care Directive:  Patient has a Health Care Directive on file      Preoperative Review of :   reviewed - no record of controlled substances prescribed.      Status of Chronic Conditions:  DEPRESSION - Patient has a long history of Depression of moderate severity requiring medication for control with recent symptoms being relapsing/remitting..Current symptoms of depression include anxiety.       Review of Systems  CONSTITUTIONAL: NEGATIVE for fever, chills, change in weight  INTEGUMENTARY/SKIN: NEGATIVE for worrisome rashes, moles or lesions  EYES: NEGATIVE for vision changes or irritation  ENT/MOUTH: NEGATIVE for ear, mouth and throat problems  RESP: NEGATIVE for significant cough or SOB  CV: NEGATIVE for chest pain, palpitations or peripheral edema  GI: NEGATIVE for nausea, abdominal pain, heartburn, or change in bowel habits  : NEGATIVE for frequency, dysuria, or hematuria  MUSCULOSKELETAL: NEGATIVE for significant arthralgias or myalgia  NEURO: NEGATIVE for weakness, dizziness or paresthesias  ENDOCRINE: NEGATIVE for temperature intolerance, skin/hair changes  HEME: NEGATIVE for bleeding problems  PSYCHIATRIC: NEGATIVE for changes in mood or affect    Patient Active Problem List    Diagnosis Date Noted     H/O adenomatous polyp of colon 10/31/2022     Priority: Medium     Chronic kidney disease, stage 3b (H) 10/19/2022     Priority: Medium     Morbid obesity (H) 10/12/2021     Priority: Medium     History of 2019 novel coronavirus disease (COVID-19) 11/03/2020     Priority: Medium     Osteoarthritis of left glenohumeral joint 04/10/2018     Priority: Medium     PRADIP on CPAP 11/20/2017     Priority: Medium     Dyslipidemia 10/24/2013     Priority: Medium     Major depressive disorder, recurrent episode, severe  (H) 08/10/2011     Priority: Medium     Sarcoidosis 01/01/2007     Priority: Medium      Past Medical History:   Diagnosis Date     AC (acromioclavicular) arthritis 4/10/2018     Allergic rhinitis due to pollen     No Comments Provided     Dorsalgia     10/7/2010     Erythema nodosum      2009     Essential tremor     Possible benign     Hyperlipidemia     10/24/2013     Ill-defined and unknown cause of mortality (CODE)     characterized by chronic pain, positive BARBARA at a low titer of 1:160, negative HLAB27,     Migraine without status migrainosus, not intractable     No Comments Provided     Osteoarthritis of left glenohumeral joint 4/10/2018     Postconcussional syndrome     1/26/2016     Sarcoidosis     2007     Tendinitis of left rotator cuff 4/10/2018     Past Surgical History:   Procedure Laterality Date     ARTHROSCOPY SHOULDER      12/07,Right type 3 SLAP lesion repair of labrum - suprascapular ganglion removal with posterior approach - acromioplasty and distal clavicle resection     ARTHROSCOPY SHOULDER      2/2015     COLONOSCOPY  08/29/2011 8/2011,Normal--10 year followup     COLONOSCOPY N/A 10/31/2022    F/U 2025 adenomatous, advanced by size     DILATION AND CURETTAGE      11/05, and ablation     LAPAROSCOPIC CHOLECYSTECTOMY      6/23/15,Cholecystectomy,Laparoscopic     left shoulder replacement       right total shoulder replacement       TONSILLECTOMY       and adenoidectomy age 12     Current Outpatient Medications   Medication Sig Dispense Refill     acetaminophen (TYLENOL) 500 MG tablet Take 1,000 mg by mouth 3 times daily as needed       buPROPion (WELLBUTRIN XL) 300 MG 24 hr tablet TAKE 1 TABLET BY MOUTH EVERY DAY 90 tablet 3     Cholecalciferol (VITAMIN D3) 1000 UNITS CAPS Take 1,000 Units by mouth daily       gabapentin (NEURONTIN) 600 MG tablet TAKE 1 TABLET BY MOUTH THREE TIMES DAILY 270 tablet 3     lamoTRIgine (LAMICTAL) 100 MG tablet TAKE 1 TABLET(100 MG) BY MOUTH EVERY MORNING 90  "tablet 4     lamoTRIgine (LAMICTAL) 200 MG tablet TAKE 1 TABLET(200 MG) BY MOUTH AT BEDTIME 90 tablet 3     order for DME Equipment being ordered: CPAP supplies 1 each 0     venlafaxine (EFFEXOR XR) 150 MG 24 hr capsule TAKE 1 CAPSULE(150 MG) BY MOUTH DAILY WITH FOOD 90 capsule 3       Allergies   Allergen Reactions     Penicillins Rash        Social History     Tobacco Use     Smoking status: Former     Types: Cigarettes     Quit date: 10/22/1991     Years since quittin.5     Passive exposure: Never     Smokeless tobacco: Never   Vaping Use     Vaping status: Never Used     Passive vaping exposure: Yes   Substance Use Topics     Alcohol use: Not Currently     Comment: rare     Family History   Adopted: Yes   Problem Relation Age of Onset     Unknown/Adopted Other         Unknown,Unknown. She is ADOPTED     Prostate Cancer Father         metastic Prostate CA     History   Drug Use No         Objective     BP (!) 150/82   Pulse 84   Temp 97.4  F (36.3  C) (Tympanic)   Resp 20   Ht 1.6 m (5' 3\")   SpO2 96%   Breastfeeding No   BMI 38.09 kg/m      Physical Exam    GENERAL APPEARANCE: healthy, alert and no distress     HENT: ear canals and TM's normal and nose and mouth without ulcers or lesions     NECK: no adenopathy, no asymmetry, masses, or scars and thyroid normal to palpation     RESP: lungs clear to auscultation - no rales, rhonchi or wheezes     CV: regular rates and rhythm, normal S1 S2, no S3 or S4 and no murmur, click or rub     ABDOMEN:  soft, nontender, no HSM or masses and bowel sounds normal     MS: extremities normal- no gross deformities noted, no evidence of inflammation in joints, FROM in all extremities.     SKIN: no suspicious lesions or rashes     NEURO: Normal strength and tone, sensory exam grossly normal, mentation intact and speech normal     PSYCH: mentation appears normal. and affect normal/bright     LYMPHATICS: No cervical adenopathy    Recent Labs   Lab Test 23  1000 " 01/07/22  0756   HGB 11.5* 12.3    265    137   POTASSIUM 4.2 4.2   CR 1.41* 1.62*        Diagnostics:  No labs were ordered during this visit.   No EKG required for low risk surgery (cataract, skin procedure, breast biopsy, etc).    Revised Cardiac Risk Index (RCRI):  The patient has the following serious cardiovascular risks for perioperative complications:   - No serious cardiac risks = 0 points     RCRI Interpretation: 0 points: Class I (very low risk - 0.4% complication rate)           Signed Electronically by: Yodit Villalta MD  Copy of this evaluation report is provided to requesting physician.      Answers for HPI/ROS submitted by the patient on 4/27/2023  If you checked off any problems, how difficult have these problems made it for you to do your work, take care of things at home, or get along with other people?: Extremely difficult  PHQ9 TOTAL SCORE: 23

## 2023-05-03 ENCOUNTER — ANESTHESIA EVENT (OUTPATIENT)
Dept: SURGERY | Facility: OTHER | Age: 68
End: 2023-05-03
Payer: MEDICARE

## 2023-05-04 ENCOUNTER — ANESTHESIA (OUTPATIENT)
Dept: SURGERY | Facility: OTHER | Age: 68
End: 2023-05-04
Payer: MEDICARE

## 2023-05-04 ENCOUNTER — HOSPITAL ENCOUNTER (OUTPATIENT)
Facility: OTHER | Age: 68
Discharge: HOME | End: 2023-05-04
Attending: PODIATRIST | Admitting: PODIATRIST
Payer: MEDICARE

## 2023-05-04 VITALS
DIASTOLIC BLOOD PRESSURE: 82 MMHG | OXYGEN SATURATION: 95 % | BODY MASS INDEX: 39.87 KG/M2 | TEMPERATURE: 98.7 F | WEIGHT: 225 LBS | HEART RATE: 72 BPM | SYSTOLIC BLOOD PRESSURE: 145 MMHG | HEIGHT: 63 IN

## 2023-05-04 DIAGNOSIS — G89.18 POST-OP PAIN: Primary | ICD-10-CM

## 2023-05-04 PROCEDURE — 88305 TISSUE EXAM BY PATHOLOGIST: CPT

## 2023-05-04 PROCEDURE — 999N000141 HC STATISTIC PRE-PROCEDURE NURSING ASSESSMENT: Performed by: PODIATRIST

## 2023-05-04 PROCEDURE — 28043 EXC FOOT/TOE TUM SC < 1.5 CM: CPT | Performed by: PODIATRIST

## 2023-05-04 PROCEDURE — 258N000003 HC RX IP 258 OP 636: Performed by: NURSE ANESTHETIST, CERTIFIED REGISTERED

## 2023-05-04 PROCEDURE — 14040 TIS TRNFR F/C/C/M/N/A/G/H/F: CPT | Performed by: PODIATRIST

## 2023-05-04 PROCEDURE — 250N000009 HC RX 250: Performed by: NURSE ANESTHETIST, CERTIFIED REGISTERED

## 2023-05-04 PROCEDURE — 250N000011 HC RX IP 250 OP 636: Performed by: PODIATRIST

## 2023-05-04 PROCEDURE — 710N000012 HC RECOVERY PHASE 2, PER MINUTE: Performed by: PODIATRIST

## 2023-05-04 PROCEDURE — 250N000009 HC RX 250: Performed by: PODIATRIST

## 2023-05-04 PROCEDURE — 250N000011 HC RX IP 250 OP 636: Performed by: NURSE ANESTHETIST, CERTIFIED REGISTERED

## 2023-05-04 PROCEDURE — 370N000017 HC ANESTHESIA TECHNICAL FEE, PER MIN: Performed by: PODIATRIST

## 2023-05-04 PROCEDURE — 28043 EXC FOOT/TOE TUM SC < 1.5 CM: CPT | Performed by: NURSE ANESTHETIST, CERTIFIED REGISTERED

## 2023-05-04 PROCEDURE — 360N000075 HC SURGERY LEVEL 2, PER MIN: Performed by: PODIATRIST

## 2023-05-04 PROCEDURE — 272N000001 HC OR GENERAL SUPPLY STERILE: Performed by: PODIATRIST

## 2023-05-04 RX ORDER — FENTANYL CITRATE 50 UG/ML
50 INJECTION, SOLUTION INTRAMUSCULAR; INTRAVENOUS EVERY 5 MIN PRN
Status: DISCONTINUED | OUTPATIENT
Start: 2023-05-04 | End: 2023-05-04 | Stop reason: HOSPADM

## 2023-05-04 RX ORDER — HYDROXYZINE HYDROCHLORIDE 10 MG/1
10 TABLET, FILM COATED ORAL
Status: DISCONTINUED | OUTPATIENT
Start: 2023-05-04 | End: 2023-05-04 | Stop reason: HOSPADM

## 2023-05-04 RX ORDER — IBUPROFEN 600 MG/1
600 TABLET, FILM COATED ORAL EVERY 6 HOURS
Qty: 12 TABLET | Refills: 0 | Status: SHIPPED | OUTPATIENT
Start: 2023-05-04 | End: 2023-05-07

## 2023-05-04 RX ORDER — PROPOFOL 10 MG/ML
INJECTION, EMULSION INTRAVENOUS PRN
Status: DISCONTINUED | OUTPATIENT
Start: 2023-05-04 | End: 2023-05-04

## 2023-05-04 RX ORDER — SODIUM CHLORIDE 9 MG/ML
INJECTION, SOLUTION INTRAVENOUS CONTINUOUS
Status: DISCONTINUED | OUTPATIENT
Start: 2023-05-04 | End: 2023-05-04 | Stop reason: HOSPADM

## 2023-05-04 RX ORDER — SODIUM CHLORIDE, SODIUM LACTATE, POTASSIUM CHLORIDE, CALCIUM CHLORIDE 600; 310; 30; 20 MG/100ML; MG/100ML; MG/100ML; MG/100ML
INJECTION, SOLUTION INTRAVENOUS CONTINUOUS
Status: DISCONTINUED | OUTPATIENT
Start: 2023-05-04 | End: 2023-05-04 | Stop reason: HOSPADM

## 2023-05-04 RX ORDER — OXYCODONE HYDROCHLORIDE 5 MG/1
10 TABLET ORAL
Status: DISCONTINUED | OUTPATIENT
Start: 2023-05-04 | End: 2023-05-04 | Stop reason: HOSPADM

## 2023-05-04 RX ORDER — LIDOCAINE HYDROCHLORIDE 20 MG/ML
INJECTION, SOLUTION INFILTRATION; PERINEURAL PRN
Status: DISCONTINUED | OUTPATIENT
Start: 2023-05-04 | End: 2023-05-04

## 2023-05-04 RX ORDER — PROPOFOL 10 MG/ML
INJECTION, EMULSION INTRAVENOUS CONTINUOUS PRN
Status: DISCONTINUED | OUTPATIENT
Start: 2023-05-04 | End: 2023-05-04

## 2023-05-04 RX ORDER — OXYCODONE HYDROCHLORIDE 5 MG/1
5 TABLET ORAL EVERY 6 HOURS PRN
Qty: 6 TABLET | Refills: 0 | Status: SHIPPED | OUTPATIENT
Start: 2023-05-04 | End: 2024-04-02

## 2023-05-04 RX ORDER — LIDOCAINE 40 MG/G
CREAM TOPICAL
Status: DISCONTINUED | OUTPATIENT
Start: 2023-05-04 | End: 2023-05-04 | Stop reason: HOSPADM

## 2023-05-04 RX ORDER — NALOXONE HYDROCHLORIDE 0.4 MG/ML
0.4 INJECTION, SOLUTION INTRAMUSCULAR; INTRAVENOUS; SUBCUTANEOUS
Status: DISCONTINUED | OUTPATIENT
Start: 2023-05-04 | End: 2023-05-04 | Stop reason: HOSPADM

## 2023-05-04 RX ORDER — HYDROMORPHONE HCL IN WATER/PF 6 MG/30 ML
0.2 PATIENT CONTROLLED ANALGESIA SYRINGE INTRAVENOUS EVERY 5 MIN PRN
Status: DISCONTINUED | OUTPATIENT
Start: 2023-05-04 | End: 2023-05-04 | Stop reason: HOSPADM

## 2023-05-04 RX ORDER — ONDANSETRON 4 MG/1
4 TABLET, ORALLY DISINTEGRATING ORAL EVERY 30 MIN PRN
Status: DISCONTINUED | OUTPATIENT
Start: 2023-05-04 | End: 2023-05-04 | Stop reason: HOSPADM

## 2023-05-04 RX ORDER — FENTANYL CITRATE 50 UG/ML
25 INJECTION, SOLUTION INTRAMUSCULAR; INTRAVENOUS EVERY 5 MIN PRN
Status: DISCONTINUED | OUTPATIENT
Start: 2023-05-04 | End: 2023-05-04 | Stop reason: HOSPADM

## 2023-05-04 RX ORDER — HYDROMORPHONE HYDROCHLORIDE 1 MG/ML
0.5 INJECTION, SOLUTION INTRAMUSCULAR; INTRAVENOUS; SUBCUTANEOUS EVERY 5 MIN PRN
Status: DISCONTINUED | OUTPATIENT
Start: 2023-05-04 | End: 2023-05-04 | Stop reason: HOSPADM

## 2023-05-04 RX ORDER — CLINDAMYCIN PHOSPHATE 900 MG/50ML
900 INJECTION, SOLUTION INTRAVENOUS SEE ADMIN INSTRUCTIONS
Status: DISCONTINUED | OUTPATIENT
Start: 2023-05-04 | End: 2023-05-04 | Stop reason: HOSPADM

## 2023-05-04 RX ORDER — ACETAMINOPHEN 500 MG
1000 TABLET ORAL EVERY 8 HOURS
Qty: 30 TABLET | Refills: 0 | Status: SHIPPED | OUTPATIENT
Start: 2023-05-04 | End: 2023-05-09

## 2023-05-04 RX ORDER — FENTANYL CITRATE 50 UG/ML
INJECTION, SOLUTION INTRAMUSCULAR; INTRAVENOUS PRN
Status: DISCONTINUED | OUTPATIENT
Start: 2023-05-04 | End: 2023-05-04

## 2023-05-04 RX ORDER — OXYCODONE HYDROCHLORIDE 5 MG/1
5 TABLET ORAL
Status: DISCONTINUED | OUTPATIENT
Start: 2023-05-04 | End: 2023-05-04 | Stop reason: HOSPADM

## 2023-05-04 RX ORDER — CLINDAMYCIN PHOSPHATE 900 MG/50ML
900 INJECTION, SOLUTION INTRAVENOUS
Status: COMPLETED | OUTPATIENT
Start: 2023-05-04 | End: 2023-05-04

## 2023-05-04 RX ORDER — ONDANSETRON 2 MG/ML
4 INJECTION INTRAMUSCULAR; INTRAVENOUS EVERY 30 MIN PRN
Status: DISCONTINUED | OUTPATIENT
Start: 2023-05-04 | End: 2023-05-04 | Stop reason: HOSPADM

## 2023-05-04 RX ORDER — NALOXONE HYDROCHLORIDE 0.4 MG/ML
0.2 INJECTION, SOLUTION INTRAMUSCULAR; INTRAVENOUS; SUBCUTANEOUS
Status: DISCONTINUED | OUTPATIENT
Start: 2023-05-04 | End: 2023-05-04 | Stop reason: HOSPADM

## 2023-05-04 RX ORDER — KETOROLAC TROMETHAMINE 30 MG/ML
INJECTION, SOLUTION INTRAMUSCULAR; INTRAVENOUS PRN
Status: DISCONTINUED | OUTPATIENT
Start: 2023-05-04 | End: 2023-05-04

## 2023-05-04 RX ADMIN — MIDAZOLAM HYDROCHLORIDE 2 MG: 1 INJECTION, SOLUTION INTRAMUSCULAR; INTRAVENOUS at 07:31

## 2023-05-04 RX ADMIN — PROPOFOL 50 MG: 10 INJECTION, EMULSION INTRAVENOUS at 07:31

## 2023-05-04 RX ADMIN — LIDOCAINE HYDROCHLORIDE 20 MG: 20 INJECTION, SOLUTION INFILTRATION; PERINEURAL at 07:31

## 2023-05-04 RX ADMIN — FENTANYL CITRATE 25 MCG: 50 INJECTION, SOLUTION INTRAMUSCULAR; INTRAVENOUS at 07:38

## 2023-05-04 RX ADMIN — KETOROLAC TROMETHAMINE 15 MG: 30 INJECTION, SOLUTION INTRAMUSCULAR at 07:40

## 2023-05-04 RX ADMIN — PROPOFOL 140 MCG/KG/MIN: 10 INJECTION, EMULSION INTRAVENOUS at 07:31

## 2023-05-04 RX ADMIN — SODIUM CHLORIDE 10 ML/HR: 9 INJECTION, SOLUTION INTRAVENOUS at 07:13

## 2023-05-04 RX ADMIN — CLINDAMYCIN PHOSPHATE 900 MG: 900 INJECTION, SOLUTION INTRAVENOUS at 07:14

## 2023-05-04 ASSESSMENT — ACTIVITIES OF DAILY LIVING (ADL)
ADLS_ACUITY_SCORE: 35
ADLS_ACUITY_SCORE: 35

## 2023-05-04 NOTE — ANESTHESIA PREPROCEDURE EVALUATION
Anesthesia Pre-Procedure Evaluation    Patient: Ivy Romero   MRN: [id number] : 1955        Procedure : Procedure(s):  EXCISION, MASS, foot with rotational flap closure          Past Medical History:   Diagnosis Date     AC (acromioclavicular) arthritis 4/10/2018     Allergic rhinitis due to pollen     No Comments Provided     Dorsalgia     10/7/2010     Erythema nodosum      2009     Essential tremor     Possible benign     Hyperlipidemia     10/24/2013     Ill-defined and unknown cause of mortality (CODE)     characterized by chronic pain, positive BARBARA at a low titer of 1:160, negative HLAB27,     Migraine without status migrainosus, not intractable     No Comments Provided     Osteoarthritis of left glenohumeral joint 4/10/2018     Postconcussional syndrome     2016     Sarcoidosis     2007     Tendinitis of left rotator cuff 4/10/2018      Past Surgical History:   Procedure Laterality Date     ARTHROSCOPY SHOULDER      ,Right type 3 SLAP lesion repair of labrum - suprascapular ganglion removal with posterior approach - acromioplasty and distal clavicle resection     ARTHROSCOPY SHOULDER      2015     COLONOSCOPY  2011,Normal--10 year followup     COLONOSCOPY N/A 10/31/2022    F/U  adenomatous, advanced by size     DILATION AND CURETTAGE      , and ablation     LAPAROSCOPIC CHOLECYSTECTOMY      6/23/15,Cholecystectomy,Laparoscopic     left shoulder replacement       right total shoulder replacement       TONSILLECTOMY       and adenoidectomy age 12      Allergies   Allergen Reactions     Penicillins Rash      Social History     Tobacco Use     Smoking status: Former     Types: Cigarettes     Quit date: 10/22/1991     Years since quittin.5     Passive exposure: Never     Smokeless tobacco: Never   Vaping Use     Vaping status: Never Used     Passive vaping exposure: Yes   Substance Use Topics     Alcohol use: Not Currently     Comment: rare      Wt  Readings from Last 1 Encounters:   05/04/23 102.1 kg (225 lb)        Anesthesia Evaluation   Pt has had prior anesthetic.     No history of anesthetic complications       ROS/MED HX  ENT/Pulmonary:     (+) sleep apnea, uses CPAP,     Neurologic:  - neg neurologic ROS     Cardiovascular:       METS/Exercise Tolerance: >4 METS    Hematologic:  - neg hematologic  ROS     Musculoskeletal:  - neg musculoskeletal ROS     GI/Hepatic:  - neg GI/hepatic ROS     Renal/Genitourinary: Comment: Elevated creatine     (+) renal disease,     Endo:     (+) Obesity,     Psychiatric/Substance Use:     (+) psychiatric history depression     Infectious Disease:       Malignancy:  - neg malignancy ROS     Other:  - neg other ROS          Physical Exam    Airway        Mallampati: II   TM distance: > 3 FB   Neck ROM: full   Mouth opening: > 3 cm    Respiratory Devices and Support         Dental       (+) Minor Abnormalities - some fillings, tiny chips      Cardiovascular   cardiovascular exam normal       Rhythm and rate: regular and normal     Pulmonary   pulmonary exam normal        breath sounds clear to auscultation           OUTSIDE LABS:  CBC:   Lab Results   Component Value Date    WBC 6.4 03/28/2023    WBC 8.1 01/07/2022    HGB 11.5 (L) 03/28/2023    HGB 12.3 01/07/2022    HCT 36.0 03/28/2023    HCT 38.5 01/07/2022     03/28/2023     01/07/2022     BMP:   Lab Results   Component Value Date     03/28/2023     01/07/2022    POTASSIUM 4.2 03/28/2023    POTASSIUM 4.2 01/07/2022    CHLORIDE 106 03/28/2023    CHLORIDE 104 01/07/2022    CO2 27 03/28/2023    CO2 23 01/07/2022    BUN 14.2 03/28/2023    BUN 22 01/07/2022    CR 1.41 (H) 03/28/2023    CR 1.62 (H) 01/07/2022     (H) 03/28/2023    GLC 93 01/07/2022     COAGS: No results found for: PTT, INR, FIBR  POC: No results found for: BGM, HCG, HCGS  HEPATIC:   Lab Results   Component Value Date    ALBUMIN 3.9 03/28/2023    PROTTOTAL 7.3 03/28/2023    ALT  20 03/28/2023    AST 22 03/28/2023    ALKPHOS 97 03/28/2023    BILITOTAL 0.5 03/28/2023     OTHER:   Lab Results   Component Value Date    LYRIC 9.1 03/28/2023    PHOS 3.3 03/28/2023    LIPASE 19.0 06/07/2015    TSH 3.82 01/07/2022    SED 37 (H) 11/05/2014       Anesthesia Plan    ASA Status:  2   NPO Status:  NPO Appropriate    Anesthesia Type: MAC (Proceed to general as needed.).   Induction: Intravenous.           Consents    Anesthesia Plan(s) and associated risks, benefits, and realistic alternatives discussed. Questions answered and patient/representative(s) expressed understanding.     - Discussed: Risks, Benefits and Alternatives for BOTH SEDATION and the PROCEDURE were discussed     - Discussed with:  Patient         Postoperative Care       PONV prophylaxis: Ondansetron (or other 5HT-3), Dexamethasone or Solumedrol     Comments:                JENNA ZUÑIGA CRNA

## 2023-05-04 NOTE — OR NURSING
Patient and responsible adult given discharge instructions with no questions regarding instructions. Jo Ann score 20. Pain level 0/10.  Discharged from unit via wheelchair . Patient discharged to home.

## 2023-05-04 NOTE — ANESTHESIA CARE TRANSFER NOTE
0  Patient: Ivy Romero    Procedure: Procedure(s):  EXCISION, MASS, foot with rotational flap closure       Diagnosis: Palpable mass of soft tissue of foot [M79.89]  Diagnosis Additional Information: No value filed.    Anesthesia Type:   MAC     Note:    Oropharynx: oropharynx clear of all foreign objects  Level of Consciousness: awake  Oxygen Supplementation: room air    Independent Airway: airway patency satisfactory and stable    Vital Signs Stable: post-procedure vital signs reviewed and stable  Report to RN Given: handoff report given  Patient transferred to: Phase II    Handoff Report: Identifed the Patient, Identified the Reponsible Provider, Reviewed the pertinent medical history, Discussed the surgical course, Reviewed Intra-OP anesthesia mangement and issues during anesthesia, Set expectations for post-procedure period and Allowed opportunity for questions and acknowledgement of understanding      Vitals:  Vitals Value Taken Time   /70 05/04/23 0815   Temp     Pulse 73 05/04/23 0815   Resp     SpO2 94 % 05/04/23 0820   Vitals shown include unvalidated device data.    Electronically Signed By: JENNA JOHNSON CRNA  May 4, 2023  8:21 AM

## 2023-05-04 NOTE — OP NOTE
Procedure Date: 05/04/2023    SURGEON:  Alejandro Jordan DPM    ASSISTANT:  Laura Chaudhary PA-C.  A skilled first assistant was necessary due to technical complexity of the procedure and for the patient's safety.  The assistant helped with positioning, retraction, visualization of the operative field and moreover helped to complete the procedure in a technically safe and efficient manner.      PREOPERATIVE DIAGNOSIS:  Left recalcitrant painful plantar soft tissue mass or tissue lesion.    POSTOPERATIVE DIAGNOSIS:  Left recalcitrant painful plantar soft tissue mass or tissue lesion.    PROCEDURE:    1.  Left plantar foot excision of soft tissue mass.  2.  Unilobed rotational flap closure, less than 10 square cm.      ANESTHESIA:  MAC with local.    HEMOSTASIS:  None.    ESTIMATED BLOOD LOSS:  10 mL    INDICATIONS FOR PROCEDURE:  The patient has plantar lateral heel lesion that has failed conservative cares, is measuring almost a centimeter in diameter.  It may very well be a very thick callus lesion or IPK type lesion, but has failed debridement and other cares, so elected to proceed with excision biopsy and flap closure after detailed discussion of surgery, recovery, risks, potential complications, alternatives and benefits.    DESCRIPTION OF PROCEDURE:  In the supine position, utilizing MAC anesthesia, local block performed.  Left lower extremity prepped and draped in the usual sterile fashion.  No exsanguination or tourniquet was utilized.    Attention directed to the plantar left heel the lesion was excised in full-thickness in circular fashion.  It was excised and removed from the operative field and sent for pathology.  There did not appear to be underlying fascial involvement.  It was a full-thickness dermal lesion without any obvious concerns clinically, but sent for pathology, regardless.  Unilobed flap then fashioned just posterior slightly lateral from this excision.  Flap was carefully raised and tissue  mobilized.  It was rotated medially and anteriorly into the defect from the excision.  The flap was closed and then the defect from the flap carefully mobilized and closed side to side, had excellent closure, good alignment of the flap without significant tension.  This was closed after flushed with saline.  Bulky dressing applied, will be toe weightbearing only and follow up next week.    Alejandro Jordan DPM        D: 2023   T: 2023   MT: aiyana    Name:     CHIOMA MORRIS  MRN:      [id number]        Account:        [id number]   :      1955           Procedure Date: 2023     Document: [id number]

## 2023-05-04 NOTE — INTERVAL H&P NOTE
"I have reviewed the surgical (or preoperative) H&P that is linked to this encounter, and examined the patient. There are no significant changes    Clinical Conditions Present on Arrival:  Clinically Significant Risk Factors Present on Admission                  # Obesity: Estimated body mass index is 39.86 kg/m  as calculated from the following:    Height as of this encounter: 1.6 m (5' 3\").    Weight as of this encounter: 102.1 kg (225 lb).       "

## 2023-05-04 NOTE — BRIEF OP NOTE
[facility]    Brief Operative Note    Pre-operative diagnosis: Palpable mass of soft tissue of foot [M79.89]  Post-operative diagnosis Same as pre-operative diagnosis    Procedure: Procedure(s):  EXCISION, MASS, foot with rotational flap closure  Surgeon: Surgeon(s) and Role:     * Alejandro Jordan DPM - Primary     * Laura Chaudhary PA-C - Assisting  Anesthesia: MAC with Local   Estimated Blood Loss: Minimal    Drains: None  Specimens:   ID Type Source Tests Collected by Time Destination   1 : plantar heel lesion left foot  Tissue Heel, Left SURGICAL PATHOLOGY EXAM Alejandro Jordan DPM 5/4/2023  7:43 AM      Findings:   None.  Complications: None.  Implants: * No implants in log *

## 2023-05-04 NOTE — ANESTHESIA POSTPROCEDURE EVALUATION
Patient: Ivy Romero    Procedure: Procedure(s):  EXCISION, MASS, foot with rotational flap closure       Anesthesia Type:  MAC    Note:  Disposition: Outpatient   Postop Pain Control: Uneventful            Sign Out: Well controlled pain   PONV: No   Neuro/Psych: Uneventful            Sign Out: Acceptable/Baseline neuro status   Airway/Respiratory: Uneventful            Sign Out: Acceptable/Baseline resp. status   CV/Hemodynamics: Uneventful            Sign Out: Acceptable CV status; No obvious hypovolemia; No obvious fluid overload   Other NRE: NONE   DID A NON-ROUTINE EVENT OCCUR?            Last vitals:  Vitals Value Taken Time   /82 05/04/23 0900   Temp     Pulse 72 05/04/23 0900   Resp     SpO2 95 % 05/04/23 0901   Vitals shown include unvalidated device data.    Electronically Signed By: JENNA JOHNSON CRNA  May 4, 2023  9:03 AM

## 2023-05-04 NOTE — DISCHARGE INSTRUCTIONS
[facility]  Adult Discharge Orders & Instructions      For 24 hours after surgery:  Get plenty of rest.  A responsible adult must stay with you for at least 24 hours after you leave the hospital.   You may feel lightheaded.  IF so, sit for a few minutes before standing.  Have someone help you get up.   You may have a slight fever. Call the doctor if your fever is over 101 F (38.3 C) (taken under the tongue) or lasts longer than 24 hours.  You may have a dry mouth, a sore throat, muscle aches or trouble sleeping.  These should go away after 24 hours.  Do not make important or legal decisions.  6.   Do not drive or use heavy equipment.  If you have weakness or tingling, don't drive or use heavy equipment until this feeling goes away.                                                                                                                                                                         To contact a doctor, call    [phone]______________     Surgeon Contact Information  If you have questions or concerns related to your procedure please contact your surgeon through [facility] at [phone].

## 2023-05-11 ENCOUNTER — OFFICE VISIT (OUTPATIENT)
Dept: ORTHOPEDICS | Facility: OTHER | Age: 68
End: 2023-05-11
Attending: PODIATRIST
Payer: MEDICARE

## 2023-05-11 DIAGNOSIS — Z09 POSTOPERATIVE FOLLOW-UP: Primary | ICD-10-CM

## 2023-05-11 PROCEDURE — 99024 POSTOP FOLLOW-UP VISIT: CPT | Performed by: PHYSICIAN ASSISTANT

## 2023-05-11 PROCEDURE — G0463 HOSPITAL OUTPT CLINIC VISIT: HCPCS

## 2023-05-11 NOTE — PROGRESS NOTES
Visit Date: 2023    HISTORY OF PRESENT ILLNESS: The patient comes in today for followup.  She underwent a left plantar foot excision of a soft tissue mass and unilobed rotational flap closure by Dr. Jordan on 2023.  The patient states that she is doing well.  She is not having too much discomfort with this.  She is currently nonweightbearing and with a postop shoe.    PHYSICAL EXAMINATION:  Today, the incision is clean and dry.  There are no signs of infection.  The sutures are intact.  She has minimal swelling.  She denies any numbness or tingling and denies any calf pain.    ASSESSMENT:  Status post left plantar foot excision of soft tissue mass and unilobed rotational flap closure.    PLAN:  At this time, we would like to have her continue keeping the weight off of that area for another 2 weeks.  She can shower at this time but maintain strict nonweightbearing, if we could.  She will follow up in 2 weeks.  At that time, we will take out the sutures.  She had no other questions.    Alejandro Jordan DPM    As Dictated by CASI LITTLEJOHN        D: 2023   T: 2023   MT: hu    Name:     ZARI CALVILLO CHIOMA K.  MRN:      [id number]        Account:    [id number]   :      1955           Visit Date: 2023     Document: [id number]

## 2023-05-11 NOTE — PROGRESS NOTES
Patient is here for follow up on her left foot.  Jeri Talley LPN .....................5/11/2023 11:54 AM

## 2023-05-25 ENCOUNTER — OFFICE VISIT (OUTPATIENT)
Dept: ORTHOPEDICS | Facility: OTHER | Age: 68
End: 2023-05-25
Attending: PODIATRIST
Payer: MEDICARE

## 2023-05-25 DIAGNOSIS — Z09 POSTOPERATIVE FOLLOW-UP: Primary | ICD-10-CM

## 2023-05-25 PROCEDURE — 99024 POSTOP FOLLOW-UP VISIT: CPT | Performed by: PODIATRIST

## 2023-05-25 PROCEDURE — G0463 HOSPITAL OUTPT CLINIC VISIT: HCPCS

## 2023-05-25 NOTE — PROGRESS NOTES
Patient is here for follow up on her left foot.  Jeri Talley LPN .....................5/25/2023 1:58 PM

## 2023-05-25 NOTE — PROGRESS NOTES
SUBJECTIVE:  Patient is here 3 weeks out from soft tissue mass excision flap closure.  She is doing well here for suture removal.    ROS: Musculoskeletal and general review of systems are negative, per review of previous clinic questionnaire.  Denies SOB and calf pain.    EXAM:   Surgical site well coapted suture removed callus tissue surrounding.  No concerns clinically    IMAGING: No imaging today    ASSESSMENT: Status post left foot surgery described    PLAN: Discussed pression treatment.  Patient will continue protect this for the next week or so in terms of direct weightbearing on this post posterior plantar lateral aspect heel.  We will see her back as needed at this point she will slowly progress as tolerated.    Alejandro Jordan DPM

## 2023-05-31 RX ORDER — TRIAMCINOLONE ACETONIDE 40 MG/ML
40 INJECTION, SUSPENSION INTRA-ARTICULAR; INTRAMUSCULAR ONCE
Status: CANCELLED | OUTPATIENT
Start: 2023-06-05

## 2023-05-31 RX ORDER — LIDOCAINE HYDROCHLORIDE 10 MG/ML
4 INJECTION, SOLUTION EPIDURAL; INFILTRATION; INTRACAUDAL; PERINEURAL ONCE
Status: CANCELLED | OUTPATIENT
Start: 2023-06-05

## 2023-06-02 ENCOUNTER — HOSPITAL ENCOUNTER (OUTPATIENT)
Dept: BONE DENSITY | Facility: OTHER | Age: 68
Discharge: HOME | End: 2023-06-02
Attending: FAMILY MEDICINE | Admitting: FAMILY MEDICINE
Payer: MEDICARE

## 2023-06-02 DIAGNOSIS — Z78.0 ASYMPTOMATIC MENOPAUSE: ICD-10-CM

## 2023-06-02 PROCEDURE — 77080 DXA BONE DENSITY AXIAL: CPT

## 2023-06-05 ENCOUNTER — OFFICE VISIT (OUTPATIENT)
Dept: ORTHOPEDICS | Facility: OTHER | Age: 68
End: 2023-06-05
Attending: ORTHOPAEDIC SURGERY
Payer: MEDICARE

## 2023-06-05 DIAGNOSIS — M25.571 RIGHT ANKLE PAIN, UNSPECIFIED CHRONICITY: Primary | ICD-10-CM

## 2023-06-05 DIAGNOSIS — M17.11 PRIMARY OSTEOARTHRITIS OF RIGHT KNEE: ICD-10-CM

## 2023-06-05 PROCEDURE — G0463 HOSPITAL OUTPT CLINIC VISIT: HCPCS

## 2023-06-05 PROCEDURE — 99213 OFFICE O/P EST LOW 20 MIN: CPT | Performed by: ORTHOPAEDIC SURGERY

## 2023-06-05 NOTE — PROGRESS NOTES
Visit Date: 2023    SUBJECTIVE:  She has tricompartmental osteoarthritis of her right knee and is very interested in having a knee replacement done.  Her last injection was on 2023 and she is thinking about maybe getting the total knee done at the late summer/early , but does not want to lock herself out for too long and have to have it in October when her  is going to be hunting.    PHYSICAL EXAMINATION:  On examination today, this is a 68-year-old female, in no acute distress.  Very pleasant on examination.  She has full range of motion of her right knee.  She is stable to varus and valgus stress.  She is markedly tender to palpation throughout the right knee.  Otherwise, neuro and vascularly intact.    IMPRESSION AND PLAN:  She is only 2 months out at this point from her last injection into the right knee.  We are going to hold off for a month here and I will lock her out from having an arthroplasty for at least 2 months, placing her total knee into September some time frame.  We will go ahead and get her on the schedule for that the next time we see her in July.    Mack Cohen MD        D: 2023   T: 2023   MT: [id number]    Name:     CHIOMA MORRIS  MRN:      [id number]        Account:    [id number]   :      1955           Visit Date: 2023     Document: [id number]

## 2023-06-05 NOTE — PROGRESS NOTES
Patient is here for follow up on her right knee pain.  Jeri Talley LPN .....................6/5/2023 11:15 AM

## 2023-06-23 ENCOUNTER — TELEPHONE (OUTPATIENT)
Dept: FAMILY MEDICINE | Facility: OTHER | Age: 68
End: 2023-06-23
Payer: MEDICARE

## 2023-06-23 NOTE — TELEPHONE ENCOUNTER
Sending to alternate provider to address 6/2/23 dexa results.  .Fortino Bui LPN on 6/23/2023 at 12:57 PM

## 2023-06-23 NOTE — TELEPHONE ENCOUNTER
Reason for call: Request for results.    Name of test or procedure: Bone Density    Date of test or procedure: 6/2/23    Location of test or procedure: [facility]    Preferred method for responding to this message: Telephone Call    Phone number patient can be reached at: Cell number on file:    Telephone Information:   Mobile [phone]       If we can't reach you directly, may we leave a detailed response at the number you provided?Yes

## 2023-06-23 NOTE — TELEPHONE ENCOUNTER
Please call with results, this shows osteopenia which is mildly decreased bone density.  For women with osteopenia, we typically recommend good dietary intake of calcium and vitamin D.  Women also may benefit from an over-the-counter calcium and vitamin D supplementation.  This typically comes in a 600-800 combination pill of both calcium and vitamin D, this is available over-the-counter.    Also, recommend weightbearing exercises (walking, etc.) for approximately 30 minutes a day on most days of the week (4-5 days a week at least).    Joseline Narayanan PA-C  6/23/2023  1:18 PM

## 2023-06-23 NOTE — TELEPHONE ENCOUNTER
Patient called back. Verified full name and . Notified of results.     Cecily Carey LPN on 2023 at 1:51 PM

## 2023-06-23 NOTE — TELEPHONE ENCOUNTER
Called and left message for patient to please return call. See results below.     Cecily Carey LPN on 6/23/2023 at 1:30 PM      trf off unit via wheelchair for discharge home.  Pt son at his side.  No c/o pain.  Ambulated without difficulty.  Verbalized understanding of d/c instructions.

## 2023-06-25 NOTE — NURSING NOTE
"Chief Complaint   Patient presents with     Clinic Care Coordination - Follow-up     follow up ongoing cough since last March       Initial /70 (BP Location: Right arm, Patient Position: Sitting, Cuff Size: Adult Large)   Pulse 88   Temp 97.2  F (36.2  C) (Tympanic)   Resp 20   LMP  (LMP Unknown)   Breastfeeding No  Estimated body mass index is 40.74 kg/m  as calculated from the following:    Height as of 1/7/20: 1.6 m (5' 3\").    Weight as of 11/26/18: 104.3 kg (230 lb).  Medication Reconciliation: complete    Cherelle Rose LPN     Follow up for a chronic cough since last March.  Cough is not better and she does have some shortness of breath.  Cherelle Rose LPN..........12/10/2020  10:14 AM    " Anxious

## 2023-07-17 ENCOUNTER — OFFICE VISIT (OUTPATIENT)
Dept: ORTHOPEDICS | Facility: OTHER | Age: 68
End: 2023-07-17
Attending: ORTHOPAEDIC SURGERY
Payer: MEDICARE

## 2023-07-17 ENCOUNTER — OFFICE VISIT (OUTPATIENT)
Dept: FAMILY MEDICINE | Facility: OTHER | Age: 68
End: 2023-07-17
Attending: PHYSICIAN ASSISTANT
Payer: MEDICARE

## 2023-07-17 VITALS
TEMPERATURE: 97.2 F | SYSTOLIC BLOOD PRESSURE: 130 MMHG | DIASTOLIC BLOOD PRESSURE: 70 MMHG | HEART RATE: 64 BPM | RESPIRATION RATE: 20 BRPM | OXYGEN SATURATION: 97 %

## 2023-07-17 DIAGNOSIS — M17.11 PRIMARY OSTEOARTHRITIS OF RIGHT KNEE: Primary | ICD-10-CM

## 2023-07-17 DIAGNOSIS — S81.801A WOUND OF RIGHT LOWER EXTREMITY, INITIAL ENCOUNTER: Primary | ICD-10-CM

## 2023-07-17 DIAGNOSIS — F33.2 SEVERE EPISODE OF RECURRENT MAJOR DEPRESSIVE DISORDER, WITHOUT PSYCHOTIC FEATURES (H): ICD-10-CM

## 2023-07-17 PROCEDURE — 99214 OFFICE O/P EST MOD 30 MIN: CPT | Performed by: PHYSICIAN ASSISTANT

## 2023-07-17 PROCEDURE — G0463 HOSPITAL OUTPT CLINIC VISIT: HCPCS | Mod: 25

## 2023-07-17 PROCEDURE — G0463 HOSPITAL OUTPT CLINIC VISIT: HCPCS | Mod: 25,27

## 2023-07-17 PROCEDURE — 99213 OFFICE O/P EST LOW 20 MIN: CPT | Mod: 24 | Performed by: ORTHOPAEDIC SURGERY

## 2023-07-17 RX ORDER — BUPROPION HYDROCHLORIDE 300 MG/1
TABLET ORAL
Qty: 90 TABLET | Refills: 3 | Status: ON HOLD | OUTPATIENT
Start: 2023-07-17 | End: 2023-09-11

## 2023-07-17 RX ORDER — BUPROPION HYDROCHLORIDE 150 MG/1
150 TABLET ORAL EVERY MORNING
Qty: 90 TABLET | Status: CANCELLED | OUTPATIENT
Start: 2023-07-17

## 2023-07-17 RX ORDER — BUPROPION HYDROCHLORIDE 150 MG/1
150 TABLET ORAL EVERY MORNING
Qty: 90 TABLET | Refills: 3 | Status: SHIPPED | OUTPATIENT
Start: 2023-07-17 | End: 2023-09-07

## 2023-07-17 ASSESSMENT — PATIENT HEALTH QUESTIONNAIRE - PHQ9
10. IF YOU CHECKED OFF ANY PROBLEMS, HOW DIFFICULT HAVE THESE PROBLEMS MADE IT FOR YOU TO DO YOUR WORK, TAKE CARE OF THINGS AT HOME, OR GET ALONG WITH OTHER PEOPLE: VERY DIFFICULT
SUM OF ALL RESPONSES TO PHQ QUESTIONS 1-9: 20
SUM OF ALL RESPONSES TO PHQ QUESTIONS 1-9: 20

## 2023-07-17 ASSESSMENT — ANXIETY QUESTIONNAIRES
2. NOT BEING ABLE TO STOP OR CONTROL WORRYING: NEARLY EVERY DAY
GAD7 TOTAL SCORE: 16
GAD7 TOTAL SCORE: 16
5. BEING SO RESTLESS THAT IT IS HARD TO SIT STILL: NOT AT ALL
4. TROUBLE RELAXING: NEARLY EVERY DAY
6. BECOMING EASILY ANNOYED OR IRRITABLE: MORE THAN HALF THE DAYS
7. FEELING AFRAID AS IF SOMETHING AWFUL MIGHT HAPPEN: MORE THAN HALF THE DAYS
3. WORRYING TOO MUCH ABOUT DIFFERENT THINGS: NEARLY EVERY DAY
1. FEELING NERVOUS, ANXIOUS, OR ON EDGE: NEARLY EVERY DAY
IF YOU CHECKED OFF ANY PROBLEMS ON THIS QUESTIONNAIRE, HOW DIFFICULT HAVE THESE PROBLEMS MADE IT FOR YOU TO DO YOUR WORK, TAKE CARE OF THINGS AT HOME, OR GET ALONG WITH OTHER PEOPLE: VERY DIFFICULT

## 2023-07-17 ASSESSMENT — PAIN SCALES - GENERAL: PAINLEVEL: EXTREME PAIN (8)

## 2023-07-17 NOTE — NURSING NOTE
"Patient presents to the clinic for medication management.    FOOD SECURITY SCREENING QUESTIONS:    The next two questions are to help us understand your food security.  If you are feeling you need any assistance in this area, we have resources available to support you today.    Hunger Vital Signs:  Within the past 12 months we worried whether our food would run out before we got money to buy more. Never  Within the past 12 months the food we bought just didn't last and we didn't have money to get more. Never    Advance Care Directive on file? yes  Advance Care Directive provided to patient? Declined.      Chief Complaint   Patient presents with     Recheck Medication       Initial /70 (BP Location: Right arm, Patient Position: Sitting, Cuff Size: Adult Large)   Pulse 64   Temp 97.2  F (36.2  C) (Tympanic)   Resp 20   LMP 10/01/2005 (Within Months)   SpO2 97%  Estimated body mass index is 39.86 kg/m  as calculated from the following:    Height as of 5/4/23: 1.6 m (5' 3\").    Weight as of 5/4/23: 102.1 kg (225 lb).  Medication Reconciliation: complete        Mandi Sales LPN       "

## 2023-07-17 NOTE — PROGRESS NOTES
Patient is here for follow up on her right knee pain.  Jeri Talley LPN .....................7/17/2023 10:06 AM

## 2023-07-17 NOTE — PROGRESS NOTES
Visit Date: 2023    HISTORY OF PRESENT ILLNESS:  A 68-year-old female with knee arthritis in the right knee.  She wants to get her right knee done and comes in today because she is interested in having an injection, but she wants to have her surgery without a doubt the beginning of September here and it now past the middle of July.  My limit is two months and so she cannot have an injection today.  This was expressed to her and she was a little bit disappointed.    PHYSICAL EXAMINATION:  On examination, she has full range of motion of her knee otherwise, and an antalgic gait on the right.  She is otherwise neuro and vascularly intact.    IMPRESSION AND PLAN:  A 68-year-old female who needs a right total knee arthroplasty.  We will go ahead and get her booked for that, and I will see her when she comes in for surgery.    Mack Cohen MD        D: 2023   T: 2023   MT: QUIQUE    Name:     ZARI KARLICHIOMA DOMINGUEZ  MRN:      [id number]        Account:    [id number]   :      1955           Visit Date: 2023     Document: [id number]

## 2023-07-17 NOTE — PROGRESS NOTES
Assessment & Plan     1. Severe episode of recurrent major depressive disorder, without psychotic features (H)  - buPROPion (WELLBUTRIN XL) 300 MG 24 hr tablet; 1 tab daily plus 150 mg daily, total dosage 450 mg daily  Dispense: 90 tablet; Refill: 3  - buPROPion (WELLBUTRIN XL) 150 MG 24 hr tablet; Take 1 tablet (150 mg) by mouth every morning Take with 300 mg for total dosage of 450 mg daily  Dispense: 90 tablet; Refill: 3  - DEPRESSION - Patient has a long history of depression of severe severity requiring medication for control.  Recent symptoms include: none.   Depression course: gradually improving.  Stable on current dosing of Wellbutrin, Gabapentin and Lamictal. She is in need of refills of Wellbutrin today. Discussed risks, benefits and side effects of medication at length with patient. Refills provided. Strict return precautions reviewed. Patient is in agreement and understanding of the above treatment plan. All questions and concerns were addressed and answered to patient's satisfaction. AVS reviewed with patient.     2. Wound of right lower extremity, initial encounter  - Recommend to discontinue hydrogen peroxide, I anticipate this may be delaying/slowing healing process. Recommend to keep covered with antibiotic ointment and a bandage if outside or performing activities where wound may become dirty/contaminated. Otherwise, if in safe environment (home, watching TV, etc.), may leave open to air.   - Monitor for fevers, chills, drainage or worsening conditions. Follow up if these or other concerns arise.      See Patient Instructions    Return if symptoms worsen or fail to improve.    Joseline Narayanan PA-C  [facility] is a 68 year old, presenting for the following health issues:  Recheck Medication        7/17/2023     9:10 AM   Additional Questions   Roomed by jessie alvares lpn   Accompanied by -         7/17/2023     9:10 AM   Patient Reported Additional  Medications   Patient reports taking the following new medications -     History of Present Illness       Reason for visit:  Medicine check    She eats 0-1 servings of fruits and vegetables daily.She consumes 3 sweetened beverage(s) daily.She exercises with enough effort to increase her heart rate 10 to 19 minutes per day.  She exercises with enough effort to increase her heart rate 3 or less days per week.   She is taking medications regularly.    Today's PHQ-9         PHQ-9 Total Score: 20    PHQ-9 Q9 Thoughts of better off dead/self-harm past 2 weeks :   Not at all    How difficult have these problems made it for you to do your work, take care of things at home, or get along with other people: Very difficult  Today's DESI-7 Score: 16     Depression and Anxiety Follow-Up    How are you doing with your depression since your last visit? Improved     How are you doing with your anxiety since your last visit?  Improved     Are you having other symptoms that might be associated with depression or anxiety? No    Have you had a significant life event? Grief or Loss     Do you have any concerns with your use of alcohol or other drugs? No    Social History     Tobacco Use     Smoking status: Former     Types: Cigarettes     Quit date: 10/22/1991     Years since quittin.7     Passive exposure: Never     Smokeless tobacco: Never   Vaping Use     Vaping Use: Never used   Substance Use Topics     Alcohol use: Not Currently     Comment: rare     Drug use: No         3/28/2023     8:32 AM 2023    11:02 AM 2023     9:02 AM   PHQ   PHQ-9 Total Score 22 23 20   Q9: Thoughts of better off dead/self-harm past 2 weeks Not at all Not at all Not at all         2/3/2023     2:21 PM 3/28/2023     8:34 AM 2023     9:03 AM   DESI-7 SCORE   Total Score  17 (severe anxiety) 16 (severe anxiety)   Total Score 0 17 16         2023     9:02 AM   Last PHQ-9   1.  Little interest or pleasure in doing things 3   2.  Feeling  down, depressed, or hopeless 2   3.  Trouble falling or staying asleep, or sleeping too much 3   4.  Feeling tired or having little energy 3   5.  Poor appetite or overeating 3   6.  Feeling bad about yourself 3   7.  Trouble concentrating 3   8.  Moving slowly or restless 0   Q9: Thoughts of better off dead/self-harm past 2 weeks 0   PHQ-9 Total Score 20         7/17/2023     9:03 AM   DESI-7    1. Feeling nervous, anxious, or on edge 3   2. Not being able to stop or control worrying 3   3. Worrying too much about different things 3   4. Trouble relaxing 3   5. Being so restless that it is hard to sit still 0   6. Becoming easily annoyed or irritable 2   7. Feeling afraid, as if something awful might happen 2   DESI-7 Total Score 16   If you checked any problems, how difficult have they made it for you to do your work, take care of things at home, or get along with other people? Very difficult     Concerned about possible wound infection of the right leg, has been present for the past month. She accidentally caught this on the edge of a bedframe. She has been using hydrogen peroxide, neosporin and bandage for treatment. Declines any fevers, chills, purulent drainage, worsening redness, etc. She is worried at the slow rate of healing.     Review of Systems   Constitutional, HEENT, cardiovascular, pulmonary, GI, , musculoskeletal, neuro, skin, endocrine and psych systems are negative, except as otherwise noted.      Objective    /70 (BP Location: Right arm, Patient Position: Sitting, Cuff Size: Adult Large)   Pulse 64   Temp 97.2  F (36.2  C) (Tympanic)   Resp 20   LMP 10/01/2005 (Within Months)   SpO2 97%   There is no height or weight on file to calculate BMI.  Physical Exam   GENERAL: healthy, alert and no distress  RESP: lungs clear to auscultation - no rales, rhonchi or wheezes  CV: regular rate and rhythm, normal S1 S2, no S3 or S4, no murmur, click or rub, no peripheral edema and peripheral pulses  strong  MS: no gross musculoskeletal defects noted, no edema  SKIN: 1 cm x 1 cm x superficial depth wound to mid right anterolateral lower leg. Very mild erythema present. There is no calor, fluctuance or drainage.   PSYCH: mentation appears normal, affect normal/bright

## 2023-07-20 ENCOUNTER — TELEPHONE (OUTPATIENT)
Dept: ORTHOPEDICS | Facility: OTHER | Age: 68
End: 2023-07-20
Payer: MEDICARE

## 2023-07-20 DIAGNOSIS — M17.11 PRIMARY OSTEOARTHRITIS OF RIGHT KNEE: Primary | ICD-10-CM

## 2023-09-05 ENCOUNTER — OFFICE VISIT (OUTPATIENT)
Dept: FAMILY MEDICINE | Facility: OTHER | Age: 68
End: 2023-09-05
Attending: FAMILY MEDICINE
Payer: MEDICARE

## 2023-09-05 VITALS
SYSTOLIC BLOOD PRESSURE: 134 MMHG | RESPIRATION RATE: 20 BRPM | HEART RATE: 76 BPM | OXYGEN SATURATION: 98 % | TEMPERATURE: 98 F | HEIGHT: 63 IN | BODY MASS INDEX: 39.87 KG/M2 | DIASTOLIC BLOOD PRESSURE: 70 MMHG | WEIGHT: 225 LBS

## 2023-09-05 DIAGNOSIS — G47.33 OSA ON CPAP: ICD-10-CM

## 2023-09-05 DIAGNOSIS — Z01.818 PREOPERATIVE EXAMINATION: Primary | ICD-10-CM

## 2023-09-05 DIAGNOSIS — N18.1 CHRONIC KIDNEY DISEASE, STAGE I: ICD-10-CM

## 2023-09-05 DIAGNOSIS — F33.3 SEVERE EPISODE OF RECURRENT MAJOR DEPRESSIVE DISORDER, WITH PSYCHOTIC FEATURES (H): ICD-10-CM

## 2023-09-05 LAB
ALBUMIN UR-MCNC: 20 MG/DL
ANION GAP SERPL CALCULATED.3IONS-SCNC: 9 MMOL/L (ref 7–15)
APPEARANCE UR: ABNORMAL
ATRIAL RATE - MUSE: 74 BPM
BASOPHILS # BLD AUTO: 0.1 10E3/UL (ref 0–0.2)
BASOPHILS NFR BLD AUTO: 1 %
BILIRUB UR QL STRIP: NEGATIVE
BUN SERPL-MCNC: 20.9 MG/DL (ref 8–23)
CALCIUM SERPL-MCNC: 9.4 MG/DL (ref 8.8–10.2)
CHLORIDE SERPL-SCNC: 104 MMOL/L (ref 98–107)
COLOR UR AUTO: YELLOW
CREAT SERPL-MCNC: 1.48 MG/DL (ref 0.51–0.95)
DEPRECATED HCO3 PLAS-SCNC: 26 MMOL/L (ref 22–29)
DIASTOLIC BLOOD PRESSURE - MUSE: NORMAL MMHG
EOSINOPHIL # BLD AUTO: 0.9 10E3/UL (ref 0–0.7)
EOSINOPHIL NFR BLD AUTO: 12 %
ERYTHROCYTE [DISTWIDTH] IN BLOOD BY AUTOMATED COUNT: 13.6 % (ref 10–15)
GFR SERPL CREATININE-BSD FRML MDRD: 39 ML/MIN/1.73M2
GLUCOSE SERPL-MCNC: 106 MG/DL (ref 70–99)
GLUCOSE UR STRIP-MCNC: NEGATIVE MG/DL
HCT VFR BLD AUTO: 38.3 % (ref 35–47)
HGB BLD-MCNC: 12.1 G/DL (ref 11.7–15.7)
HGB UR QL STRIP: NEGATIVE
IMM GRANULOCYTES # BLD: 0 10E3/UL
IMM GRANULOCYTES NFR BLD: 0 %
INTERPRETATION ECG - MUSE: NORMAL
KETONES UR STRIP-MCNC: NEGATIVE MG/DL
LEUKOCYTE ESTERASE UR QL STRIP: ABNORMAL
LYMPHOCYTES # BLD AUTO: 1.5 10E3/UL (ref 0.8–5.3)
LYMPHOCYTES NFR BLD AUTO: 22 %
MCH RBC QN AUTO: 30.5 PG (ref 26.5–33)
MCHC RBC AUTO-ENTMCNC: 31.6 G/DL (ref 31.5–36.5)
MCV RBC AUTO: 97 FL (ref 78–100)
MONOCYTES # BLD AUTO: 0.6 10E3/UL (ref 0–1.3)
MONOCYTES NFR BLD AUTO: 9 %
MUCOUS THREADS #/AREA URNS LPF: PRESENT /LPF
NEUTROPHILS # BLD AUTO: 3.8 10E3/UL (ref 1.6–8.3)
NEUTROPHILS NFR BLD AUTO: 56 %
NITRATE UR QL: NEGATIVE
NRBC # BLD AUTO: 0 10E3/UL
NRBC BLD AUTO-RTO: 0 /100
P AXIS - MUSE: 61 DEGREES
PH UR STRIP: 5.5 [PH] (ref 5–9)
PLATELET # BLD AUTO: 245 10E3/UL (ref 150–450)
POTASSIUM SERPL-SCNC: 4.3 MMOL/L (ref 3.4–5.3)
PR INTERVAL - MUSE: 266 MS
QRS DURATION - MUSE: 92 MS
QT - MUSE: 380 MS
QTC - MUSE: 421 MS
R AXIS - MUSE: -10 DEGREES
RBC # BLD AUTO: 3.97 10E6/UL (ref 3.8–5.2)
RBC URINE: 1 /HPF
RENAL TUB EPI: 1 /HPF
SODIUM SERPL-SCNC: 139 MMOL/L (ref 136–145)
SP GR UR STRIP: 1.02 (ref 1–1.03)
SQUAMOUS EPITHELIAL: 26 /HPF
SYSTOLIC BLOOD PRESSURE - MUSE: NORMAL MMHG
T AXIS - MUSE: 78 DEGREES
TRANSITIONAL EPI: 4 /HPF
UROBILINOGEN UR STRIP-MCNC: NORMAL MG/DL
VENTRICULAR RATE- MUSE: 74 BPM
WBC # BLD AUTO: 6.9 10E3/UL (ref 4–11)
WBC URINE: 11 /HPF

## 2023-09-05 PROCEDURE — 81001 URINALYSIS AUTO W/SCOPE: CPT | Mod: ZL | Performed by: FAMILY MEDICINE

## 2023-09-05 PROCEDURE — 93005 ELECTROCARDIOGRAM TRACING: CPT | Performed by: FAMILY MEDICINE

## 2023-09-05 PROCEDURE — 99214 OFFICE O/P EST MOD 30 MIN: CPT | Performed by: FAMILY MEDICINE

## 2023-09-05 PROCEDURE — G0463 HOSPITAL OUTPT CLINIC VISIT: HCPCS

## 2023-09-05 PROCEDURE — 87086 URINE CULTURE/COLONY COUNT: CPT | Mod: ZL,GZ | Performed by: FAMILY MEDICINE

## 2023-09-05 PROCEDURE — 80048 BASIC METABOLIC PNL TOTAL CA: CPT | Mod: ZL | Performed by: FAMILY MEDICINE

## 2023-09-05 PROCEDURE — 36415 COLL VENOUS BLD VENIPUNCTURE: CPT | Mod: ZL | Performed by: FAMILY MEDICINE

## 2023-09-05 PROCEDURE — G0463 HOSPITAL OUTPT CLINIC VISIT: HCPCS | Mod: 25,27

## 2023-09-05 PROCEDURE — 93010 ELECTROCARDIOGRAM REPORT: CPT | Performed by: STUDENT IN AN ORGANIZED HEALTH CARE EDUCATION/TRAINING PROGRAM

## 2023-09-05 PROCEDURE — 85025 COMPLETE CBC W/AUTO DIFF WBC: CPT | Mod: ZL | Performed by: FAMILY MEDICINE

## 2023-09-05 ASSESSMENT — ANXIETY QUESTIONNAIRES
3. WORRYING TOO MUCH ABOUT DIFFERENT THINGS: NEARLY EVERY DAY
5. BEING SO RESTLESS THAT IT IS HARD TO SIT STILL: MORE THAN HALF THE DAYS
GAD7 TOTAL SCORE: 17
2. NOT BEING ABLE TO STOP OR CONTROL WORRYING: NEARLY EVERY DAY
GAD7 TOTAL SCORE: 17
1. FEELING NERVOUS, ANXIOUS, OR ON EDGE: NEARLY EVERY DAY
7. FEELING AFRAID AS IF SOMETHING AWFUL MIGHT HAPPEN: SEVERAL DAYS
IF YOU CHECKED OFF ANY PROBLEMS ON THIS QUESTIONNAIRE, HOW DIFFICULT HAVE THESE PROBLEMS MADE IT FOR YOU TO DO YOUR WORK, TAKE CARE OF THINGS AT HOME, OR GET ALONG WITH OTHER PEOPLE: NOT DIFFICULT AT ALL
6. BECOMING EASILY ANNOYED OR IRRITABLE: MORE THAN HALF THE DAYS
4. TROUBLE RELAXING: NEARLY EVERY DAY

## 2023-09-05 ASSESSMENT — PATIENT HEALTH QUESTIONNAIRE - PHQ9
10. IF YOU CHECKED OFF ANY PROBLEMS, HOW DIFFICULT HAVE THESE PROBLEMS MADE IT FOR YOU TO DO YOUR WORK, TAKE CARE OF THINGS AT HOME, OR GET ALONG WITH OTHER PEOPLE: VERY DIFFICULT
SUM OF ALL RESPONSES TO PHQ QUESTIONS 1-9: 23
SUM OF ALL RESPONSES TO PHQ QUESTIONS 1-9: 23

## 2023-09-05 ASSESSMENT — PAIN SCALES - GENERAL: PAINLEVEL: SEVERE PAIN (6)

## 2023-09-05 NOTE — H&P (VIEW-ONLY)
[facility]  [address]  [place] [address]  Phone: [phone]  Fax: [phone]  Primary Provider: Yodit Mandujano  Pre-op Performing Provider: YODIT MANDUJANO      PREOPERATIVE EVALUATION:  Today's date: 9/5/2023    Ivy Romero is a 68 year old female who presents for a preoperative evaluation.    Surgical Information:  Surgery/Procedure: Right total Arthroplasty knee   Surgery Location: [facility]  Surgeon: Mack Cohen MD   Surgery Date: 9/11/23  Time of Surgery: TBD  Where patient plans to recover: At home with family  Fax number for surgical facility: Note does not need to be faxed, will be available electronically in Epic.    Assessment & Plan     The proposed surgical procedure is considered LOW risk.    Preoperative examination    - Basic Metabolic Panel; Future  - CBC and Differential; Future  - EKG 12-lead complete w/read - Clinics  - Basic Metabolic Panel  - CBC and Differential    PRADIP on CPAP      Severe episode of recurrent major depressive disorder, with psychotic features (H)      Chronic kidney disease, stage I    - UA Macroscopic with reflex to Microscopic and Culture; Future  - UA Macroscopic with reflex to Microscopic and Culture  - Urine Culture      Depression Screening Follow Up        9/5/2023    11:49 AM   PHQ   PHQ-9 Total Score 23   Q9: Thoughts of better off dead/self-harm past 2 weeks Several days   F/U: Thoughts of suicide or self-harm No   F/U: Safety concerns No       Risks and Recommendations:  The patient has the following additional risks and recommendations for perioperative complications:  Obstructive Sleep Apnea:       Antiplatelet or Anticoagulation Medication Instructions:   - Patient is on no antiplatelet or anticoagulation medications.    Additional Medication Instructions:  Patient is on no additional chronic medications    RECOMMENDATION:  APPROVAL GIVEN to proceed with proposed procedure,  without further diagnostic evaluation.    Migel Villalta MD      Subjective   6}    HPI related to upcoming procedure:     67 yo female presents for preoperative evaluation prior to rigth TKA scheduled for 9/11/2023.    Complex mood disorder, previously followed by psychiatry. NO recent change in medications.        9/5/2023    11:56 AM   Preop Questions   1. Have you ever had a heart attack or stroke? No   2. Have you ever had surgery on your heart or blood vessels, such as a stent placement, a coronary artery bypass, or surgery on an artery in your head, neck, heart, or legs? No   3. Do you have chest pain with activity? No   4. Do you have a history of  heart failure? No   5. Do you currently have a cold, bronchitis or symptoms of other infection? No   6. Do you have a cough, shortness of breath, or wheezing? No   7. Do you or anyone in your family have previous history of blood clots? UNKNOWN -    8. Do you or does anyone in your family have a serious bleeding problem such as prolonged bleeding following surgeries or cuts? UNKNOWN -    9. Have you ever had problems with anemia or been told to take iron pills? YES -    10. Have you had any abnormal blood loss such as black, tarry or bloody stools, or abnormal vaginal bleeding? No   11. Have you ever had a blood transfusion? No   12. Are you willing to have a blood transfusion if it is medically needed before, during, or after your surgery? Yes   13. Have you or any of your relatives ever had problems with anesthesia? No   14. Do you have sleep apnea, excessive snoring or daytime drowsiness? YES -    14a. Do you have a CPAP machine? Yes   15. Do you have any artifical heart valves or other implanted medical devices like a pacemaker, defibrillator, or continuous glucose monitor? No   16. Do you have artificial joints? YES -    17. Are you allergic to latex? No       Health Care Directive:  Patient has a Health Care Directive on file      Preoperative Review of  :   reviewed - no record of controlled substances prescribed.          Review of Systems  Constitutional, neuro, ENT, endocrine, pulmonary, cardiac, gastrointestinal, genitourinary, musculoskeletal, integument and psychiatric systems are negative, except as otherwise noted.    Patient Active Problem List    Diagnosis Date Noted    H/O adenomatous polyp of colon 10/31/2022     Priority: Medium    Morbid obesity (H) 10/12/2021     Priority: Medium    Osteoarthritis of left glenohumeral joint 04/10/2018     Priority: Medium    PRADIP on CPAP 11/20/2017     Priority: Medium    Dyslipidemia 10/24/2013     Priority: Medium    Major depressive disorder, recurrent episode, severe (H) 08/10/2011     Priority: Medium    Sarcoidosis 01/01/2007     Priority: Medium      Past Medical History:   Diagnosis Date    AC (acromioclavicular) arthritis 4/10/2018    Allergic rhinitis due to pollen     No Comments Provided    Dorsalgia     10/7/2010    Erythema nodosum      2009    Essential tremor     Possible benign    Hyperlipidemia     10/24/2013    Ill-defined and unknown cause of mortality (CODE)     characterized by chronic pain, positive BARBARA at a low titer of 1:160, negative HLAB27,    Migraine without status migrainosus, not intractable     No Comments Provided    Osteoarthritis of left glenohumeral joint 4/10/2018    Postconcussional syndrome     1/26/2016    Sarcoidosis     2007    Tendinitis of left rotator cuff 4/10/2018     Past Surgical History:   Procedure Laterality Date    ARTHROSCOPY SHOULDER      12/07,Right type 3 SLAP lesion repair of labrum - suprascapular ganglion removal with posterior approach - acromioplasty and distal clavicle resection    ARTHROSCOPY SHOULDER      2/2015    COLONOSCOPY  08/29/2011 8/2011,Normal--10 year followup    COLONOSCOPY N/A 10/31/2022    F/U 2025 adenomatous, advanced by size    DILATION AND CURETTAGE      11/05, and ablation    EXCISE MASS TOE Left 5/4/2023    Procedure:  "EXCISION, MASS, foot with rotational flap closure;  Surgeon: Alejandro Jordan DPM;  Location: [facility] OR    LAPAROSCOPIC CHOLECYSTECTOMY      6/23/15,Cholecystectomy,Laparoscopic    left shoulder replacement      right total shoulder replacement      TONSILLECTOMY       and adenoidectomy age 12     Current Outpatient Medications   Medication Sig Dispense Refill    buPROPion (WELLBUTRIN XL) 150 MG 24 hr tablet Take 1 tablet (150 mg) by mouth every morning Take with 300 mg for total dosage of 450 mg daily 90 tablet 3    buPROPion (WELLBUTRIN XL) 300 MG 24 hr tablet 1 tab daily plus 150 mg daily, total dosage 450 mg daily 90 tablet 3    Cholecalciferol (VITAMIN D3) 1000 UNITS CAPS Take 1,000 Units by mouth daily      gabapentin (NEURONTIN) 600 MG tablet TAKE 1 TABLET BY MOUTH THREE TIMES DAILY 270 tablet 3    lamoTRIgine (LAMICTAL) 100 MG tablet TAKE 1 TABLET(100 MG) BY MOUTH EVERY MORNING 90 tablet 4    lamoTRIgine (LAMICTAL) 200 MG tablet TAKE 1 TABLET(200 MG) BY MOUTH AT BEDTIME 90 tablet 3    order for DME Equipment being ordered: CPAP supplies 1 each 0    oxyCODONE (ROXICODONE) 5 MG tablet Take 1 tablet (5 mg) by mouth every 6 hours as needed for moderate to severe pain 6 tablet 0    venlafaxine (EFFEXOR XR) 150 MG 24 hr capsule TAKE 1 CAPSULE(150 MG) BY MOUTH DAILY WITH FOOD 90 capsule 3       Allergies   Allergen Reactions    Penicillins Rash        Social History     Tobacco Use    Smoking status: Former     Types: Cigarettes     Quit date: 10/22/1991     Years since quittin.8     Passive exposure: Never    Smokeless tobacco: Never   Substance Use Topics    Alcohol use: Not Currently     Comment: rare       History   Drug Use No         Objective     /78   Pulse 78   Temp 98  F (36.7  C) (Tympanic)   Resp 20   Ht 1.6 m (5' 3\")   Wt 102.1 kg (225 lb)   LMP 10/01/2005 (Within Months)   SpO2 98%   Breastfeeding No   BMI 39.86 kg/m      Physical Exam    GENERAL APPEARANCE: healthy, alert and no " distress     EYES: EOMI, PERRL     HENT: ear canals and TM's normal and nose and mouth without ulcers or lesions     NECK: no adenopathy, no asymmetry, masses, or scars and thyroid normal to palpation     RESP: lungs clear to auscultation - no rales, rhonchi or wheezes     CV: regular rates and rhythm, normal S1 S2, no S3 or S4 and no murmur, click or rub     ABDOMEN:  soft, nontender, no HSM or masses and bowel sounds normal     MS: extremities normal- no gross deformities noted, no evidence of inflammation in joints, FROM in all extremities.     SKIN: no suspicious lesions or rashes     NEURO: Normal strength and tone, sensory exam grossly normal, mentation intact and speech normal     PSYCH: mentation appears normal. and affect normal/bright     LYMPHATICS: No cervical adenopathy    Recent Labs   Lab Test 03/28/23  1000 01/07/22  0756   HGB 11.5* 12.3    265    137   POTASSIUM 4.2 4.2   CR 1.41* 1.62*        Diagnostics:  Recent Results (from the past 48 hour(s))   EKG 12-lead complete w/read - Clinics    Collection Time: 09/05/23 12:44 PM   Result Value Ref Range    Systolic Blood Pressure  mmHg    Diastolic Blood Pressure  mmHg    Ventricular Rate 74 BPM    Atrial Rate 74 BPM    ME Interval 266 ms    QRS Duration 92 ms     ms    QTc 421 ms    P Axis 61 degrees    R AXIS -10 degrees    T Axis 78 degrees    Interpretation ECG       Sinus rhythm with 1st degree A-V block  Otherwise normal ECG  No previous ECGs available  Confirmed by Eren Greene ([id number]) on 9/5/2023 1:33:29 PM     Basic Metabolic Panel    Collection Time: 09/05/23 12:54 PM   Result Value Ref Range    Sodium 139 136 - 145 mmol/L    Potassium 4.3 3.4 - 5.3 mmol/L    Chloride 104 98 - 107 mmol/L    Carbon Dioxide (CO2) 26 22 - 29 mmol/L    Anion Gap 9 7 - 15 mmol/L    Urea Nitrogen 20.9 8.0 - 23.0 mg/dL    Creatinine 1.48 (H) 0.51 - 0.95 mg/dL    Calcium 9.4 8.8 - 10.2 mg/dL    Glucose 106 (H) 70 - 99 mg/dL    GFR Estimate 39 (L)  >60 mL/min/1.73m2   UA Macroscopic with reflex to Microscopic and Culture    Collection Time: 09/05/23 12:54 PM    Specimen: Urine, Clean Catch   Result Value Ref Range    Color Urine Yellow Colorless, Straw, Light Yellow, Yellow    Appearance Urine Slightly Cloudy (A) Clear    Glucose Urine Negative Negative mg/dL    Bilirubin Urine Negative Negative    Ketones Urine Negative Negative mg/dL    Specific Gravity Urine 1.025 1.000 - 1.030    Blood Urine Negative Negative    pH Urine 5.5 5.0 - 9.0    Protein Albumin Urine 20 (A) Negative mg/dL    Urobilinogen Urine Normal Normal, 2.0 mg/dL    Nitrite Urine Negative Negative    Leukocyte Esterase Urine Large (A) Negative    Mucus Urine Present (A) None Seen /LPF    RBC Urine 1 <=2 /HPF    WBC Urine 11 (H) <=5 /HPF    Squamous Epithelials Urine 26 (H) <=1 /HPF    Transitional Epithelials Urine 4 (H) <=1 /HPF    Renal Tubular Epithelials Urine 1 (H) None Seen /HPF   CBC with platelets and differential    Collection Time: 09/05/23 12:54 PM   Result Value Ref Range    WBC Count 6.9 4.0 - 11.0 10e3/uL    RBC Count 3.97 3.80 - 5.20 10e6/uL    Hemoglobin 12.1 11.7 - 15.7 g/dL    Hematocrit 38.3 35.0 - 47.0 %    MCV 97 78 - 100 fL    MCH 30.5 26.5 - 33.0 pg    MCHC 31.6 31.5 - 36.5 g/dL    RDW 13.6 10.0 - 15.0 %    Platelet Count 245 150 - 450 10e3/uL    % Neutrophils 56 %    % Lymphocytes 22 %    % Monocytes 9 %    % Eosinophils 12 %    % Basophils 1 %    % Immature Granulocytes 0 %    NRBCs per 100 WBC 0 <1 /100    Absolute Neutrophils 3.8 1.6 - 8.3 10e3/uL    Absolute Lymphocytes 1.5 0.8 - 5.3 10e3/uL    Absolute Monocytes 0.6 0.0 - 1.3 10e3/uL    Absolute Eosinophils 0.9 (H) 0.0 - 0.7 10e3/uL    Absolute Basophils 0.1 0.0 - 0.2 10e3/uL    Absolute Immature Granulocytes 0.0 <=0.4 10e3/uL    Absolute NRBCs 0.0 10e3/uL      EKG: appears normal, NSR, normal axis, normal intervals, no acute ST/T changes c/w ischemia, no LVH by voltage criteria, unchanged from previous  tracings    Revised Cardiac Risk Index (RCRI):  The patient has the following serious cardiovascular risks for perioperative complications:   - No serious cardiac risks = 0 points     RCRI Interpretation: 0 points: Class I (very low risk - 0.4% complication rate)         Signed Electronically by: Yodit Villalta MD  Copy of this evaluation report is provided to requesting physician.    Answers submitted by the patient for this visit:  Patient Health Questionnaire (Submitted on 9/5/2023)  If you checked off any problems, how difficult have these problems made it for you to do your work, take care of things at home, or get along with other people?: Very difficult  PHQ9 TOTAL SCORE: 23  DESI-7 (Submitted on 9/5/2023)  DESI 7 TOTAL SCORE: 17

## 2023-09-05 NOTE — PROGRESS NOTES
[facility] AND [facility]  [address]  [place] [address]  Phone: [phone]  Fax: [phone]  Primary Provider: Yodit Mandujano  Pre-op Performing Provider: YODIT MANDUJANO      PREOPERATIVE EVALUATION:  Today's date: 9/5/2023    Ivy Romero is a 68 year old female who presents for a preoperative evaluation.    Surgical Information:  Surgery/Procedure: Right total Arthroplasty knee   Surgery Location: [facility]  Surgeon: Mack Cohen MD   Surgery Date: 9/11/23  Time of Surgery: TBD  Where patient plans to recover: At home with family  Fax number for surgical facility: Note does not need to be faxed, will be available electronically in Epic.    Assessment & Plan     The proposed surgical procedure is considered LOW risk.    Preoperative examination    - Basic Metabolic Panel; Future  - CBC and Differential; Future  - EKG 12-lead complete w/read - Clinics  - Basic Metabolic Panel  - CBC and Differential    PRADIP on CPAP      Severe episode of recurrent major depressive disorder, with psychotic features (H)      Chronic kidney disease, stage I    - UA Macroscopic with reflex to Microscopic and Culture; Future  - UA Macroscopic with reflex to Microscopic and Culture  - Urine Culture      Depression Screening Follow Up        9/5/2023    11:49 AM   PHQ   PHQ-9 Total Score 23   Q9: Thoughts of better off dead/self-harm past 2 weeks Several days   F/U: Thoughts of suicide or self-harm No   F/U: Safety concerns No       Risks and Recommendations:  The patient has the following additional risks and recommendations for perioperative complications:  Obstructive Sleep Apnea:       Antiplatelet or Anticoagulation Medication Instructions:   - Patient is on no antiplatelet or anticoagulation medications.    Additional Medication Instructions:  Patient is on no additional chronic medications    RECOMMENDATION:  APPROVAL GIVEN to proceed with proposed procedure,  without further diagnostic evaluation.    Migel Villalta MD      Subjective   6}    HPI related to upcoming procedure:     69 yo female presents for preoperative evaluation prior to rigth TKA scheduled for 9/11/2023.    Complex mood disorder, previously followed by psychiatry. NO recent change in medications.        9/5/2023    11:56 AM   Preop Questions   1. Have you ever had a heart attack or stroke? No   2. Have you ever had surgery on your heart or blood vessels, such as a stent placement, a coronary artery bypass, or surgery on an artery in your head, neck, heart, or legs? No   3. Do you have chest pain with activity? No   4. Do you have a history of  heart failure? No   5. Do you currently have a cold, bronchitis or symptoms of other infection? No   6. Do you have a cough, shortness of breath, or wheezing? No   7. Do you or anyone in your family have previous history of blood clots? UNKNOWN -    8. Do you or does anyone in your family have a serious bleeding problem such as prolonged bleeding following surgeries or cuts? UNKNOWN -    9. Have you ever had problems with anemia or been told to take iron pills? YES -    10. Have you had any abnormal blood loss such as black, tarry or bloody stools, or abnormal vaginal bleeding? No   11. Have you ever had a blood transfusion? No   12. Are you willing to have a blood transfusion if it is medically needed before, during, or after your surgery? Yes   13. Have you or any of your relatives ever had problems with anesthesia? No   14. Do you have sleep apnea, excessive snoring or daytime drowsiness? YES -    14a. Do you have a CPAP machine? Yes   15. Do you have any artifical heart valves or other implanted medical devices like a pacemaker, defibrillator, or continuous glucose monitor? No   16. Do you have artificial joints? YES -    17. Are you allergic to latex? No       Health Care Directive:  Patient has a Health Care Directive on file      Preoperative Review of  :   reviewed - no record of controlled substances prescribed.          Review of Systems  Constitutional, neuro, ENT, endocrine, pulmonary, cardiac, gastrointestinal, genitourinary, musculoskeletal, integument and psychiatric systems are negative, except as otherwise noted.    Patient Active Problem List    Diagnosis Date Noted    H/O adenomatous polyp of colon 10/31/2022     Priority: Medium    Morbid obesity (H) 10/12/2021     Priority: Medium    Osteoarthritis of left glenohumeral joint 04/10/2018     Priority: Medium    PRADIP on CPAP 11/20/2017     Priority: Medium    Dyslipidemia 10/24/2013     Priority: Medium    Major depressive disorder, recurrent episode, severe (H) 08/10/2011     Priority: Medium    Sarcoidosis 01/01/2007     Priority: Medium      Past Medical History:   Diagnosis Date    AC (acromioclavicular) arthritis 4/10/2018    Allergic rhinitis due to pollen     No Comments Provided    Dorsalgia     10/7/2010    Erythema nodosum      2009    Essential tremor     Possible benign    Hyperlipidemia     10/24/2013    Ill-defined and unknown cause of mortality (CODE)     characterized by chronic pain, positive BARBARA at a low titer of 1:160, negative HLAB27,    Migraine without status migrainosus, not intractable     No Comments Provided    Osteoarthritis of left glenohumeral joint 4/10/2018    Postconcussional syndrome     1/26/2016    Sarcoidosis     2007    Tendinitis of left rotator cuff 4/10/2018     Past Surgical History:   Procedure Laterality Date    ARTHROSCOPY SHOULDER      12/07,Right type 3 SLAP lesion repair of labrum - suprascapular ganglion removal with posterior approach - acromioplasty and distal clavicle resection    ARTHROSCOPY SHOULDER      2/2015    COLONOSCOPY  08/29/2011 8/2011,Normal--10 year followup    COLONOSCOPY N/A 10/31/2022    F/U 2025 adenomatous, advanced by size    DILATION AND CURETTAGE      11/05, and ablation    EXCISE MASS TOE Left 5/4/2023    Procedure:  "EXCISION, MASS, foot with rotational flap closure;  Surgeon: Alejandro Jordan DPM;  Location: [facility] OR    LAPAROSCOPIC CHOLECYSTECTOMY      6/23/15,Cholecystectomy,Laparoscopic    left shoulder replacement      right total shoulder replacement      TONSILLECTOMY       and adenoidectomy age 12     Current Outpatient Medications   Medication Sig Dispense Refill    buPROPion (WELLBUTRIN XL) 150 MG 24 hr tablet Take 1 tablet (150 mg) by mouth every morning Take with 300 mg for total dosage of 450 mg daily 90 tablet 3    buPROPion (WELLBUTRIN XL) 300 MG 24 hr tablet 1 tab daily plus 150 mg daily, total dosage 450 mg daily 90 tablet 3    Cholecalciferol (VITAMIN D3) 1000 UNITS CAPS Take 1,000 Units by mouth daily      gabapentin (NEURONTIN) 600 MG tablet TAKE 1 TABLET BY MOUTH THREE TIMES DAILY 270 tablet 3    lamoTRIgine (LAMICTAL) 100 MG tablet TAKE 1 TABLET(100 MG) BY MOUTH EVERY MORNING 90 tablet 4    lamoTRIgine (LAMICTAL) 200 MG tablet TAKE 1 TABLET(200 MG) BY MOUTH AT BEDTIME 90 tablet 3    order for DME Equipment being ordered: CPAP supplies 1 each 0    oxyCODONE (ROXICODONE) 5 MG tablet Take 1 tablet (5 mg) by mouth every 6 hours as needed for moderate to severe pain 6 tablet 0    venlafaxine (EFFEXOR XR) 150 MG 24 hr capsule TAKE 1 CAPSULE(150 MG) BY MOUTH DAILY WITH FOOD 90 capsule 3       Allergies   Allergen Reactions    Penicillins Rash        Social History     Tobacco Use    Smoking status: Former     Types: Cigarettes     Quit date: 10/22/1991     Years since quittin.8     Passive exposure: Never    Smokeless tobacco: Never   Substance Use Topics    Alcohol use: Not Currently     Comment: rare       History   Drug Use No         Objective     /78   Pulse 78   Temp 98  F (36.7  C) (Tympanic)   Resp 20   Ht 1.6 m (5' 3\")   Wt 102.1 kg (225 lb)   LMP 10/01/2005 (Within Months)   SpO2 98%   Breastfeeding No   BMI 39.86 kg/m      Physical Exam    GENERAL APPEARANCE: healthy, alert and no " distress     EYES: EOMI, PERRL     HENT: ear canals and TM's normal and nose and mouth without ulcers or lesions     NECK: no adenopathy, no asymmetry, masses, or scars and thyroid normal to palpation     RESP: lungs clear to auscultation - no rales, rhonchi or wheezes     CV: regular rates and rhythm, normal S1 S2, no S3 or S4 and no murmur, click or rub     ABDOMEN:  soft, nontender, no HSM or masses and bowel sounds normal     MS: extremities normal- no gross deformities noted, no evidence of inflammation in joints, FROM in all extremities.     SKIN: no suspicious lesions or rashes     NEURO: Normal strength and tone, sensory exam grossly normal, mentation intact and speech normal     PSYCH: mentation appears normal. and affect normal/bright     LYMPHATICS: No cervical adenopathy    Recent Labs   Lab Test 03/28/23  1000 01/07/22  0756   HGB 11.5* 12.3    265    137   POTASSIUM 4.2 4.2   CR 1.41* 1.62*        Diagnostics:  Recent Results (from the past 48 hour(s))   EKG 12-lead complete w/read - Clinics    Collection Time: 09/05/23 12:44 PM   Result Value Ref Range    Systolic Blood Pressure  mmHg    Diastolic Blood Pressure  mmHg    Ventricular Rate 74 BPM    Atrial Rate 74 BPM    CT Interval 266 ms    QRS Duration 92 ms     ms    QTc 421 ms    P Axis 61 degrees    R AXIS -10 degrees    T Axis 78 degrees    Interpretation ECG       Sinus rhythm with 1st degree A-V block  Otherwise normal ECG  No previous ECGs available  Confirmed by Eren Greene ([id number]) on 9/5/2023 1:33:29 PM     Basic Metabolic Panel    Collection Time: 09/05/23 12:54 PM   Result Value Ref Range    Sodium 139 136 - 145 mmol/L    Potassium 4.3 3.4 - 5.3 mmol/L    Chloride 104 98 - 107 mmol/L    Carbon Dioxide (CO2) 26 22 - 29 mmol/L    Anion Gap 9 7 - 15 mmol/L    Urea Nitrogen 20.9 8.0 - 23.0 mg/dL    Creatinine 1.48 (H) 0.51 - 0.95 mg/dL    Calcium 9.4 8.8 - 10.2 mg/dL    Glucose 106 (H) 70 - 99 mg/dL    GFR Estimate 39 (L)  >60 mL/min/1.73m2   UA Macroscopic with reflex to Microscopic and Culture    Collection Time: 09/05/23 12:54 PM    Specimen: Urine, Clean Catch   Result Value Ref Range    Color Urine Yellow Colorless, Straw, Light Yellow, Yellow    Appearance Urine Slightly Cloudy (A) Clear    Glucose Urine Negative Negative mg/dL    Bilirubin Urine Negative Negative    Ketones Urine Negative Negative mg/dL    Specific Gravity Urine 1.025 1.000 - 1.030    Blood Urine Negative Negative    pH Urine 5.5 5.0 - 9.0    Protein Albumin Urine 20 (A) Negative mg/dL    Urobilinogen Urine Normal Normal, 2.0 mg/dL    Nitrite Urine Negative Negative    Leukocyte Esterase Urine Large (A) Negative    Mucus Urine Present (A) None Seen /LPF    RBC Urine 1 <=2 /HPF    WBC Urine 11 (H) <=5 /HPF    Squamous Epithelials Urine 26 (H) <=1 /HPF    Transitional Epithelials Urine 4 (H) <=1 /HPF    Renal Tubular Epithelials Urine 1 (H) None Seen /HPF   CBC with platelets and differential    Collection Time: 09/05/23 12:54 PM   Result Value Ref Range    WBC Count 6.9 4.0 - 11.0 10e3/uL    RBC Count 3.97 3.80 - 5.20 10e6/uL    Hemoglobin 12.1 11.7 - 15.7 g/dL    Hematocrit 38.3 35.0 - 47.0 %    MCV 97 78 - 100 fL    MCH 30.5 26.5 - 33.0 pg    MCHC 31.6 31.5 - 36.5 g/dL    RDW 13.6 10.0 - 15.0 %    Platelet Count 245 150 - 450 10e3/uL    % Neutrophils 56 %    % Lymphocytes 22 %    % Monocytes 9 %    % Eosinophils 12 %    % Basophils 1 %    % Immature Granulocytes 0 %    NRBCs per 100 WBC 0 <1 /100    Absolute Neutrophils 3.8 1.6 - 8.3 10e3/uL    Absolute Lymphocytes 1.5 0.8 - 5.3 10e3/uL    Absolute Monocytes 0.6 0.0 - 1.3 10e3/uL    Absolute Eosinophils 0.9 (H) 0.0 - 0.7 10e3/uL    Absolute Basophils 0.1 0.0 - 0.2 10e3/uL    Absolute Immature Granulocytes 0.0 <=0.4 10e3/uL    Absolute NRBCs 0.0 10e3/uL      EKG: appears normal, NSR, normal axis, normal intervals, no acute ST/T changes c/w ischemia, no LVH by voltage criteria, unchanged from previous  tracings    Revised Cardiac Risk Index (RCRI):  The patient has the following serious cardiovascular risks for perioperative complications:   - No serious cardiac risks = 0 points     RCRI Interpretation: 0 points: Class I (very low risk - 0.4% complication rate)         Signed Electronically by: Yodit Villalta MD  Copy of this evaluation report is provided to requesting physician.    Answers submitted by the patient for this visit:  Patient Health Questionnaire (Submitted on 9/5/2023)  If you checked off any problems, how difficult have these problems made it for you to do your work, take care of things at home, or get along with other people?: Very difficult  PHQ9 TOTAL SCORE: 23  DESI-7 (Submitted on 9/5/2023)  DESI 7 TOTAL SCORE: 17

## 2023-09-05 NOTE — NURSING NOTE
Patient is here for a pre-op for Right TKA with Dr Cohen at [facility].     Patient's last menstrual period was 10/01/2005 (within months).  Medication Reconciliation: complete      Ashley Slater LPN 9/5/2023 11:52 AM       Advance care directive on file? yes  Advance care directive provided to patient? na       Ashley Slater LPN

## 2023-09-07 LAB — BACTERIA UR CULT: NORMAL

## 2023-09-08 ENCOUNTER — ANESTHESIA EVENT (OUTPATIENT)
Dept: SURGERY | Facility: OTHER | Age: 68
End: 2023-09-08
Payer: MEDICARE

## 2023-09-11 ENCOUNTER — APPOINTMENT (OUTPATIENT)
Dept: GENERAL RADIOLOGY | Facility: OTHER | Age: 68
End: 2023-09-11
Attending: ORTHOPAEDIC SURGERY
Payer: MEDICARE

## 2023-09-11 ENCOUNTER — HOSPITAL ENCOUNTER (OUTPATIENT)
Facility: OTHER | Age: 68
Discharge: HOME | End: 2023-09-12
Attending: ORTHOPAEDIC SURGERY | Admitting: ORTHOPAEDIC SURGERY
Payer: MEDICARE

## 2023-09-11 ENCOUNTER — APPOINTMENT (OUTPATIENT)
Dept: OCCUPATIONAL THERAPY | Facility: OTHER | Age: 68
End: 2023-09-11
Attending: ORTHOPAEDIC SURGERY
Payer: MEDICARE

## 2023-09-11 ENCOUNTER — ANESTHESIA (OUTPATIENT)
Dept: SURGERY | Facility: OTHER | Age: 68
End: 2023-09-11
Payer: MEDICARE

## 2023-09-11 DIAGNOSIS — M17.11 PRIMARY OSTEOARTHRITIS OF RIGHT KNEE: Primary | ICD-10-CM

## 2023-09-11 PROBLEM — Z96.651 S/P TOTAL KNEE ARTHROPLASTY, RIGHT: Status: ACTIVE | Noted: 2023-09-11

## 2023-09-11 PROBLEM — N18.32 CHRONIC KIDNEY DISEASE, STAGE 3B (H): Status: ACTIVE | Noted: 2022-10-19

## 2023-09-11 LAB — GLUCOSE BLDC GLUCOMTR-MCNC: 105 MG/DL (ref 70–99)

## 2023-09-11 PROCEDURE — 999N000141 HC STATISTIC PRE-PROCEDURE NURSING ASSESSMENT: Performed by: ORTHOPAEDIC SURGERY

## 2023-09-11 PROCEDURE — 710N000010 HC RECOVERY PHASE 1, LEVEL 2, PER MIN: Performed by: ORTHOPAEDIC SURGERY

## 2023-09-11 PROCEDURE — 250N000013 HC RX MED GY IP 250 OP 250 PS 637: Performed by: FAMILY MEDICINE

## 2023-09-11 PROCEDURE — 999N000065 XR KNEE PORT RIGHT 1/2 VIEWS: Mod: RT

## 2023-09-11 PROCEDURE — 250N000011 HC RX IP 250 OP 636: Performed by: NURSE ANESTHETIST, CERTIFIED REGISTERED

## 2023-09-11 PROCEDURE — 97165 OT EVAL LOW COMPLEX 30 MIN: CPT | Mod: GO | Performed by: OCCUPATIONAL THERAPIST

## 2023-09-11 PROCEDURE — 64450 NJX AA&/STRD OTHER PN/BRANCH: CPT | Mod: RT | Performed by: NURSE ANESTHETIST, CERTIFIED REGISTERED

## 2023-09-11 PROCEDURE — C1776 JOINT DEVICE (IMPLANTABLE): HCPCS | Performed by: ORTHOPAEDIC SURGERY

## 2023-09-11 PROCEDURE — 258N000003 HC RX IP 258 OP 636: Performed by: NURSE ANESTHETIST, CERTIFIED REGISTERED

## 2023-09-11 PROCEDURE — 82962 GLUCOSE BLOOD TEST: CPT | Mod: GZ

## 2023-09-11 PROCEDURE — 258N000001 HC RX 258: Performed by: ORTHOPAEDIC SURGERY

## 2023-09-11 PROCEDURE — 360N000077 HC SURGERY LEVEL 4, PER MIN: Performed by: ORTHOPAEDIC SURGERY

## 2023-09-11 PROCEDURE — 96375 TX/PRO/DX INJ NEW DRUG ADDON: CPT | Mod: XU

## 2023-09-11 PROCEDURE — 27447 TOTAL KNEE ARTHROPLASTY: CPT | Performed by: NURSE ANESTHETIST, CERTIFIED REGISTERED

## 2023-09-11 PROCEDURE — 64447 NJX AA&/STRD FEMORAL NRV IMG: CPT | Mod: RT | Performed by: NURSE ANESTHETIST, CERTIFIED REGISTERED

## 2023-09-11 PROCEDURE — 96376 TX/PRO/DX INJ SAME DRUG ADON: CPT | Mod: XU

## 2023-09-11 PROCEDURE — 250N000013 HC RX MED GY IP 250 OP 250 PS 637: Performed by: ORTHOPAEDIC SURGERY

## 2023-09-11 PROCEDURE — 250N000011 HC RX IP 250 OP 636: Performed by: ORTHOPAEDIC SURGERY

## 2023-09-11 PROCEDURE — 370N000017 HC ANESTHESIA TECHNICAL FEE, PER MIN: Performed by: ORTHOPAEDIC SURGERY

## 2023-09-11 PROCEDURE — 999N000157 HC STATISTIC RCP TIME EA 10 MIN

## 2023-09-11 PROCEDURE — 250N000009 HC RX 250: Performed by: NURSE ANESTHETIST, CERTIFIED REGISTERED

## 2023-09-11 PROCEDURE — 96374 THER/PROPH/DIAG INJ IV PUSH: CPT | Mod: XU

## 2023-09-11 PROCEDURE — 27447 TOTAL KNEE ARTHROPLASTY: CPT | Mod: RT | Performed by: ORTHOPAEDIC SURGERY

## 2023-09-11 PROCEDURE — 250N000011 HC RX IP 250 OP 636: Mod: JZ | Performed by: INTERNAL MEDICINE

## 2023-09-11 PROCEDURE — 272N000001 HC OR GENERAL SUPPLY STERILE: Performed by: ORTHOPAEDIC SURGERY

## 2023-09-11 PROCEDURE — 99213 OFFICE O/P EST LOW 20 MIN: CPT | Mod: 25 | Performed by: FAMILY MEDICINE

## 2023-09-11 DEVICE — TIBIAL COMPONENT
Type: IMPLANTABLE DEVICE | Site: KNEE | Status: FUNCTIONAL
Brand: TRIATHLON

## 2023-09-11 DEVICE — PATELLA
Type: IMPLANTABLE DEVICE | Site: KNEE | Status: FUNCTIONAL
Brand: TRIATHLON

## 2023-09-11 DEVICE — CRUCIATE RETAINING FEMORAL
Type: IMPLANTABLE DEVICE | Site: KNEE | Status: FUNCTIONAL
Brand: TRIATHLON

## 2023-09-11 RX ORDER — GLYCINE 1.5 G/100ML
SOLUTION IRRIGATION PRN
Status: DISCONTINUED | OUTPATIENT
Start: 2023-09-11 | End: 2023-09-11 | Stop reason: HOSPADM

## 2023-09-11 RX ORDER — CEFAZOLIN SODIUM/WATER 2 G/20 ML
2 SYRINGE (ML) INTRAVENOUS SEE ADMIN INSTRUCTIONS
Status: DISCONTINUED | OUTPATIENT
Start: 2023-09-11 | End: 2023-09-11 | Stop reason: HOSPADM

## 2023-09-11 RX ORDER — DEXAMETHASONE SODIUM PHOSPHATE 10 MG/ML
INJECTION, SOLUTION INTRAMUSCULAR; INTRAVENOUS
Status: COMPLETED | OUTPATIENT
Start: 2023-09-11 | End: 2023-09-11

## 2023-09-11 RX ORDER — HYDROMORPHONE HCL IN WATER/PF 6 MG/30 ML
0.2 PATIENT CONTROLLED ANALGESIA SYRINGE INTRAVENOUS
Status: DISCONTINUED | OUTPATIENT
Start: 2023-09-11 | End: 2023-09-12 | Stop reason: HOSPADM

## 2023-09-11 RX ORDER — POLYETHYLENE GLYCOL 3350 17 G/17G
17 POWDER, FOR SOLUTION ORAL DAILY
Status: DISCONTINUED | OUTPATIENT
Start: 2023-09-12 | End: 2023-09-12 | Stop reason: HOSPADM

## 2023-09-11 RX ORDER — LIDOCAINE 40 MG/G
CREAM TOPICAL
Status: DISCONTINUED | OUTPATIENT
Start: 2023-09-11 | End: 2023-09-11 | Stop reason: HOSPADM

## 2023-09-11 RX ORDER — VENLAFAXINE HYDROCHLORIDE 75 MG/1
150 CAPSULE, EXTENDED RELEASE ORAL
Status: DISCONTINUED | OUTPATIENT
Start: 2023-09-12 | End: 2023-09-12 | Stop reason: HOSPADM

## 2023-09-11 RX ORDER — LIDOCAINE 40 MG/G
CREAM TOPICAL
Status: DISCONTINUED | OUTPATIENT
Start: 2023-09-11 | End: 2023-09-12 | Stop reason: HOSPADM

## 2023-09-11 RX ORDER — NALOXONE HYDROCHLORIDE 0.4 MG/ML
0.2 INJECTION, SOLUTION INTRAMUSCULAR; INTRAVENOUS; SUBCUTANEOUS
Status: DISCONTINUED | OUTPATIENT
Start: 2023-09-11 | End: 2023-09-12 | Stop reason: HOSPADM

## 2023-09-11 RX ORDER — CEFAZOLIN SODIUM/WATER 2 G/20 ML
2 SYRINGE (ML) INTRAVENOUS EVERY 8 HOURS
Status: COMPLETED | OUTPATIENT
Start: 2023-09-11 | End: 2023-09-12

## 2023-09-11 RX ORDER — ONDANSETRON 4 MG/1
4 TABLET, ORALLY DISINTEGRATING ORAL EVERY 6 HOURS PRN
Status: DISCONTINUED | OUTPATIENT
Start: 2023-09-11 | End: 2023-09-12 | Stop reason: HOSPADM

## 2023-09-11 RX ORDER — ACETAMINOPHEN 325 MG/1
650 TABLET ORAL EVERY 4 HOURS PRN
Status: DISCONTINUED | OUTPATIENT
Start: 2023-09-14 | End: 2023-09-12 | Stop reason: HOSPADM

## 2023-09-11 RX ORDER — FENTANYL CITRATE 50 UG/ML
25-50 INJECTION, SOLUTION INTRAMUSCULAR; INTRAVENOUS
Status: DISCONTINUED | OUTPATIENT
Start: 2023-09-11 | End: 2023-09-11 | Stop reason: HOSPADM

## 2023-09-11 RX ORDER — SODIUM CHLORIDE, SODIUM LACTATE, POTASSIUM CHLORIDE, CALCIUM CHLORIDE 600; 310; 30; 20 MG/100ML; MG/100ML; MG/100ML; MG/100ML
INJECTION, SOLUTION INTRAVENOUS CONTINUOUS
Status: DISCONTINUED | OUTPATIENT
Start: 2023-09-11 | End: 2023-09-11 | Stop reason: HOSPADM

## 2023-09-11 RX ORDER — ACETAMINOPHEN 325 MG/1
975 TABLET ORAL EVERY 8 HOURS
Status: DISCONTINUED | OUTPATIENT
Start: 2023-09-11 | End: 2023-09-12 | Stop reason: HOSPADM

## 2023-09-11 RX ORDER — AMOXICILLIN 250 MG
1 CAPSULE ORAL 2 TIMES DAILY
Status: DISCONTINUED | OUTPATIENT
Start: 2023-09-11 | End: 2023-09-12 | Stop reason: HOSPADM

## 2023-09-11 RX ORDER — LAMOTRIGINE 100 MG/1
200 TABLET ORAL AT BEDTIME
Status: DISCONTINUED | OUTPATIENT
Start: 2023-09-11 | End: 2023-09-12 | Stop reason: HOSPADM

## 2023-09-11 RX ORDER — BISACODYL 10 MG
10 SUPPOSITORY, RECTAL RECTAL DAILY PRN
Status: DISCONTINUED | OUTPATIENT
Start: 2023-09-11 | End: 2023-09-12 | Stop reason: HOSPADM

## 2023-09-11 RX ORDER — ONDANSETRON 2 MG/ML
4 INJECTION INTRAMUSCULAR; INTRAVENOUS EVERY 30 MIN PRN
Status: DISCONTINUED | OUTPATIENT
Start: 2023-09-11 | End: 2023-09-11 | Stop reason: HOSPADM

## 2023-09-11 RX ORDER — NALOXONE HYDROCHLORIDE 0.4 MG/ML
0.4 INJECTION, SOLUTION INTRAMUSCULAR; INTRAVENOUS; SUBCUTANEOUS
Status: DISCONTINUED | OUTPATIENT
Start: 2023-09-11 | End: 2023-09-12 | Stop reason: HOSPADM

## 2023-09-11 RX ORDER — ASPIRIN 325 MG
325 TABLET, DELAYED RELEASE (ENTERIC COATED) ORAL DAILY
Status: DISCONTINUED | OUTPATIENT
Start: 2023-09-12 | End: 2023-09-12 | Stop reason: HOSPADM

## 2023-09-11 RX ORDER — KETOROLAC TROMETHAMINE 15 MG/ML
15 INJECTION, SOLUTION INTRAMUSCULAR; INTRAVENOUS EVERY 6 HOURS PRN
Status: DISCONTINUED | OUTPATIENT
Start: 2023-09-11 | End: 2023-09-12 | Stop reason: HOSPADM

## 2023-09-11 RX ORDER — LIDOCAINE HYDROCHLORIDE 20 MG/ML
INJECTION, SOLUTION INFILTRATION; PERINEURAL PRN
Status: DISCONTINUED | OUTPATIENT
Start: 2023-09-11 | End: 2023-09-11

## 2023-09-11 RX ORDER — CEFAZOLIN SODIUM/WATER 2 G/20 ML
2 SYRINGE (ML) INTRAVENOUS
Status: DISCONTINUED | OUTPATIENT
Start: 2023-09-11 | End: 2023-09-11 | Stop reason: HOSPADM

## 2023-09-11 RX ORDER — HYDROMORPHONE HCL IN WATER/PF 6 MG/30 ML
0.2 PATIENT CONTROLLED ANALGESIA SYRINGE INTRAVENOUS EVERY 5 MIN PRN
Status: DISCONTINUED | OUTPATIENT
Start: 2023-09-11 | End: 2023-09-11 | Stop reason: HOSPADM

## 2023-09-11 RX ORDER — FLUMAZENIL 0.1 MG/ML
0.2 INJECTION, SOLUTION INTRAVENOUS
Status: DISCONTINUED | OUTPATIENT
Start: 2023-09-11 | End: 2023-09-11 | Stop reason: HOSPADM

## 2023-09-11 RX ORDER — OXYCODONE HYDROCHLORIDE 5 MG/1
5 TABLET ORAL EVERY 4 HOURS PRN
Status: DISCONTINUED | OUTPATIENT
Start: 2023-09-11 | End: 2023-09-12 | Stop reason: HOSPADM

## 2023-09-11 RX ORDER — ONDANSETRON 2 MG/ML
4 INJECTION INTRAMUSCULAR; INTRAVENOUS EVERY 6 HOURS PRN
Status: DISCONTINUED | OUTPATIENT
Start: 2023-09-11 | End: 2023-09-12 | Stop reason: HOSPADM

## 2023-09-11 RX ORDER — ONDANSETRON 2 MG/ML
INJECTION INTRAMUSCULAR; INTRAVENOUS PRN
Status: DISCONTINUED | OUTPATIENT
Start: 2023-09-11 | End: 2023-09-11

## 2023-09-11 RX ORDER — FENTANYL CITRATE 50 UG/ML
25 INJECTION, SOLUTION INTRAMUSCULAR; INTRAVENOUS EVERY 5 MIN PRN
Status: DISCONTINUED | OUTPATIENT
Start: 2023-09-11 | End: 2023-09-11 | Stop reason: HOSPADM

## 2023-09-11 RX ORDER — NALOXONE HYDROCHLORIDE 0.4 MG/ML
0.4 INJECTION, SOLUTION INTRAMUSCULAR; INTRAVENOUS; SUBCUTANEOUS
Status: DISCONTINUED | OUTPATIENT
Start: 2023-09-11 | End: 2023-09-11 | Stop reason: HOSPADM

## 2023-09-11 RX ORDER — HYDROMORPHONE HCL IN WATER/PF 6 MG/30 ML
0.4 PATIENT CONTROLLED ANALGESIA SYRINGE INTRAVENOUS EVERY 5 MIN PRN
Status: DISCONTINUED | OUTPATIENT
Start: 2023-09-11 | End: 2023-09-11 | Stop reason: HOSPADM

## 2023-09-11 RX ORDER — LAMOTRIGINE 100 MG/1
100 TABLET ORAL
Status: DISCONTINUED | OUTPATIENT
Start: 2023-09-12 | End: 2023-09-12 | Stop reason: HOSPADM

## 2023-09-11 RX ORDER — NALOXONE HYDROCHLORIDE 0.4 MG/ML
0.2 INJECTION, SOLUTION INTRAMUSCULAR; INTRAVENOUS; SUBCUTANEOUS
Status: DISCONTINUED | OUTPATIENT
Start: 2023-09-11 | End: 2023-09-11 | Stop reason: HOSPADM

## 2023-09-11 RX ORDER — PROPOFOL 10 MG/ML
INJECTION, EMULSION INTRAVENOUS CONTINUOUS PRN
Status: DISCONTINUED | OUTPATIENT
Start: 2023-09-11 | End: 2023-09-11

## 2023-09-11 RX ORDER — BUPROPION HYDROCHLORIDE 300 MG/1
300 TABLET ORAL EVERY MORNING
COMMUNITY

## 2023-09-11 RX ORDER — BUPIVACAINE HYDROCHLORIDE 5 MG/ML
INJECTION, SOLUTION EPIDURAL; INTRACAUDAL
Status: COMPLETED | OUTPATIENT
Start: 2023-09-11 | End: 2023-09-11

## 2023-09-11 RX ORDER — FENTANYL CITRATE 50 UG/ML
50 INJECTION, SOLUTION INTRAMUSCULAR; INTRAVENOUS EVERY 5 MIN PRN
Status: DISCONTINUED | OUTPATIENT
Start: 2023-09-11 | End: 2023-09-11 | Stop reason: HOSPADM

## 2023-09-11 RX ORDER — BUPROPION HYDROCHLORIDE 150 MG/1
300 TABLET ORAL EVERY MORNING
Status: DISCONTINUED | OUTPATIENT
Start: 2023-09-12 | End: 2023-09-12 | Stop reason: HOSPADM

## 2023-09-11 RX ORDER — PROCHLORPERAZINE MALEATE 5 MG
5 TABLET ORAL EVERY 6 HOURS PRN
Status: DISCONTINUED | OUTPATIENT
Start: 2023-09-11 | End: 2023-09-12 | Stop reason: HOSPADM

## 2023-09-11 RX ORDER — OXYCODONE HYDROCHLORIDE 5 MG/1
5-10 TABLET ORAL EVERY 4 HOURS PRN
Qty: 26 TABLET | Refills: 0 | Status: SHIPPED | OUTPATIENT
Start: 2023-09-11 | End: 2024-04-02

## 2023-09-11 RX ORDER — ONDANSETRON 4 MG/1
4 TABLET, ORALLY DISINTEGRATING ORAL EVERY 30 MIN PRN
Status: DISCONTINUED | OUTPATIENT
Start: 2023-09-11 | End: 2023-09-11 | Stop reason: HOSPADM

## 2023-09-11 RX ORDER — SODIUM CHLORIDE, SODIUM LACTATE, POTASSIUM CHLORIDE, CALCIUM CHLORIDE 600; 310; 30; 20 MG/100ML; MG/100ML; MG/100ML; MG/100ML
INJECTION, SOLUTION INTRAVENOUS CONTINUOUS
Status: DISCONTINUED | OUTPATIENT
Start: 2023-09-11 | End: 2023-09-12 | Stop reason: HOSPADM

## 2023-09-11 RX ORDER — DEXAMETHASONE SODIUM PHOSPHATE 4 MG/ML
INJECTION, SOLUTION INTRA-ARTICULAR; INTRALESIONAL; INTRAMUSCULAR; INTRAVENOUS; SOFT TISSUE PRN
Status: DISCONTINUED | OUTPATIENT
Start: 2023-09-11 | End: 2023-09-11

## 2023-09-11 RX ORDER — HYDROMORPHONE HCL IN WATER/PF 6 MG/30 ML
0.1 PATIENT CONTROLLED ANALGESIA SYRINGE INTRAVENOUS
Status: DISCONTINUED | OUTPATIENT
Start: 2023-09-11 | End: 2023-09-12 | Stop reason: HOSPADM

## 2023-09-11 RX ORDER — ASPIRIN 325 MG
325 TABLET, DELAYED RELEASE (ENTERIC COATED) ORAL DAILY
Qty: 30 TABLET | Refills: 0 | Status: SHIPPED | OUTPATIENT
Start: 2023-09-11

## 2023-09-11 RX ORDER — ACETAMINOPHEN 325 MG/1
650 TABLET ORAL EVERY 4 HOURS PRN
Qty: 100 TABLET | Refills: 0 | Status: SHIPPED | OUTPATIENT
Start: 2023-09-11

## 2023-09-11 RX ORDER — GABAPENTIN 600 MG/1
600 TABLET ORAL 3 TIMES DAILY
Status: DISCONTINUED | OUTPATIENT
Start: 2023-09-11 | End: 2023-09-12 | Stop reason: HOSPADM

## 2023-09-11 RX ORDER — TRANEXAMIC ACID 650 MG/1
1950 TABLET ORAL ONCE
Status: COMPLETED | OUTPATIENT
Start: 2023-09-11 | End: 2023-09-11

## 2023-09-11 RX ADMIN — KETOROLAC TROMETHAMINE 15 MG: 15 INJECTION, SOLUTION INTRAMUSCULAR; INTRAVENOUS at 17:58

## 2023-09-11 RX ADMIN — SODIUM CHLORIDE, POTASSIUM CHLORIDE, SODIUM LACTATE AND CALCIUM CHLORIDE: 600; 310; 30; 20 INJECTION, SOLUTION INTRAVENOUS at 09:26

## 2023-09-11 RX ADMIN — SODIUM CHLORIDE, POTASSIUM CHLORIDE, SODIUM LACTATE AND CALCIUM CHLORIDE: 600; 310; 30; 20 INJECTION, SOLUTION INTRAVENOUS at 12:43

## 2023-09-11 RX ADMIN — OXYCODONE HYDROCHLORIDE 5 MG: 5 TABLET ORAL at 13:49

## 2023-09-11 RX ADMIN — FENTANYL CITRATE 50 MCG: 50 INJECTION, SOLUTION INTRAMUSCULAR; INTRAVENOUS at 12:40

## 2023-09-11 RX ADMIN — DEXAMETHASONE SODIUM PHOSPHATE 5 MG: 10 INJECTION, SOLUTION INTRAMUSCULAR; INTRAVENOUS at 10:00

## 2023-09-11 RX ADMIN — Medication 2 G: at 10:07

## 2023-09-11 RX ADMIN — SENNOSIDES AND DOCUSATE SODIUM 1 TABLET: 50; 8.6 TABLET ORAL at 22:15

## 2023-09-11 RX ADMIN — DEXAMETHASONE SODIUM PHOSPHATE 4 MG: 4 INJECTION, SOLUTION INTRA-ARTICULAR; INTRALESIONAL; INTRAMUSCULAR; INTRAVENOUS; SOFT TISSUE at 10:37

## 2023-09-11 RX ADMIN — MIDAZOLAM 2 MG: 1 INJECTION INTRAMUSCULAR; INTRAVENOUS at 09:48

## 2023-09-11 RX ADMIN — LIDOCAINE HYDROCHLORIDE 60 MG: 20 INJECTION, SOLUTION INFILTRATION; PERINEURAL at 10:30

## 2023-09-11 RX ADMIN — LAMOTRIGINE 200 MG: 100 TABLET ORAL at 22:15

## 2023-09-11 RX ADMIN — SENNOSIDES AND DOCUSATE SODIUM 1 TABLET: 50; 8.6 TABLET ORAL at 15:29

## 2023-09-11 RX ADMIN — Medication 2 G: at 17:24

## 2023-09-11 RX ADMIN — FENTANYL CITRATE 50 MCG: 50 INJECTION, SOLUTION INTRAMUSCULAR; INTRAVENOUS at 12:30

## 2023-09-11 RX ADMIN — TRANEXAMIC ACID 1950 MG: 650 TABLET ORAL at 09:05

## 2023-09-11 RX ADMIN — DEXAMETHASONE SODIUM PHOSPHATE 5 MG: 10 INJECTION, SOLUTION INTRAMUSCULAR; INTRAVENOUS at 10:03

## 2023-09-11 RX ADMIN — ACETAMINOPHEN 975 MG: 325 TABLET, FILM COATED ORAL at 22:15

## 2023-09-11 RX ADMIN — BUPIVACAINE HYDROCHLORIDE 15 ML: 5 INJECTION, SOLUTION EPIDURAL; INTRACAUDAL; PERINEURAL at 10:00

## 2023-09-11 RX ADMIN — GABAPENTIN 600 MG: 600 TABLET, FILM COATED ORAL at 22:15

## 2023-09-11 RX ADMIN — BUPIVACAINE HYDROCHLORIDE 15 ML: 5 INJECTION, SOLUTION EPIDURAL; INTRACAUDAL at 10:03

## 2023-09-11 RX ADMIN — ACETAMINOPHEN 975 MG: 325 TABLET, FILM COATED ORAL at 13:49

## 2023-09-11 RX ADMIN — MEPIVACAINE HYDROCHLORIDE 2.5 ML: 20 INJECTION, SOLUTION INFILTRATION at 10:27

## 2023-09-11 RX ADMIN — ONDANSETRON HYDROCHLORIDE 4 MG: 2 SOLUTION INTRAMUSCULAR; INTRAVENOUS at 10:37

## 2023-09-11 RX ADMIN — PROPOFOL 75 MCG/KG/MIN: 10 INJECTION, EMULSION INTRAVENOUS at 10:30

## 2023-09-11 ASSESSMENT — ACTIVITIES OF DAILY LIVING (ADL)
WALKING_OR_CLIMBING_STAIRS_DIFFICULTY: YES
DIFFICULTY_EATING/SWALLOWING: NO
HEARING_DIFFICULTY_OR_DEAF: NO
FALL_HISTORY_WITHIN_LAST_SIX_MONTHS: NO
ADLS_ACUITY_SCORE: 35
CHANGE_IN_FUNCTIONAL_STATUS_SINCE_ONSET_OF_CURRENT_ILLNESS/INJURY: NO
ADLS_ACUITY_SCORE: 26
ADLS_ACUITY_SCORE: 35
CONCENTRATING,_REMEMBERING_OR_MAKING_DECISIONS_DIFFICULTY: NO
DRESSING/BATHING_DIFFICULTY: NO
ADLS_ACUITY_SCORE: 26
ADLS_ACUITY_SCORE: 26
DIFFICULTY_COMMUNICATING: NO
ADLS_ACUITY_SCORE: 24
WEAR_GLASSES_OR_BLIND: NO
ADLS_ACUITY_SCORE: 26
WALKING_OR_CLIMBING_STAIRS: TRANSFERRING DIFFICULTY, ASSISTANCE 1 PERSON
ADLS_ACUITY_SCORE: 35
DOING_ERRANDS_INDEPENDENTLY_DIFFICULTY: YES

## 2023-09-11 NOTE — OR NURSING
Prior to the start of the procedure and with procedural staff participation, I verbally confirmed the patient s identity using two indicators, relevant allergies, that the procedure was appropriate and matched the consent or emergent situation, and that the correct equipment/implants were available. Immediately prior to starting the procedure I conducted the Time Out with the procedural staff and re-confirmed the patient s name, procedure, and site/side. (The Joint Commission universal protocol was followed.)  Yes    Elisabet Chaney RN on 9/11/2023 at 9:51 AM

## 2023-09-11 NOTE — ANESTHESIA POSTPROCEDURE EVALUATION
Patient: Ivy Romero    Procedure: Procedure(s):  Arthroplasty knee       Anesthesia Type:  Spinal    Note:  Disposition: Inpatient   Postop Pain Control: Uneventful            Sign Out: Well controlled pain   PONV: No   Neuro/Psych: Uneventful            Sign Out: Acceptable/Baseline neuro status   Airway/Respiratory: Uneventful            Sign Out: Acceptable/Baseline resp. status   CV/Hemodynamics: Uneventful            Sign Out: Acceptable CV status; No obvious hypovolemia; No obvious fluid overload   Other NRE: NONE   DID A NON-ROUTINE EVENT OCCUR? No           Last vitals:  Vitals Value Taken Time   /74 09/11/23 1245   Temp 97.1  F (36.2  C) 09/11/23 1240   Pulse 75 09/11/23 1247   Resp 13 09/11/23 1248   SpO2 84 % 09/11/23 1248   Vitals shown include unvalidated device data.    Electronically Signed By: JENNA NAVA CRNA  September 11, 2023  12:57 PM

## 2023-09-11 NOTE — PROVIDER NOTIFICATION
09/11/23 1340   Valuables   Patient Belongings remains with patient   Patient Belongings Remaining with Patient clothing;cell phone/electronics;medical device;walker   Did you bring any home meds/supplements to the hospital?  No     A               Admission:  I am responsible for any personal items that are not sent to the safe or pharmacy.  [facility] is not responsible for loss, theft or damage of any property in my possession.    Signature:  _________________________________ Date: _______  Time: _____                                              Staff Signature:  ____________________________ Date: ________  Time: _____      2nd Staff person, if patient is unable/unwilling to sign:    Signature: ________________________________ Date: ________  Time: _____     Discharge:  [facility] has returned all of my personal belongings:    Signature: _________________________________ Date: ________  Time: _____                                          Staff Signature:  ____________________________ Date: ________  Time: _____

## 2023-09-11 NOTE — PROGRESS NOTES
"NS ADMISSION NOTE    Patient admitted to room 318 at approximately 1300 via bed from surgery. Patient was accompanied by spouse.     Verbal SBAR report received from [facility] prior to patient arrival.     Patient trasferred to bed  Patient alert and oriented X 3. Pain is controlled with current analgesics.  Medication(s) being used: acetaminophen.  . Admission vital signs: Blood pressure (!) 144/85, pulse 91, temperature 98.3  F (36.8  C), temperature source Tympanic, resp. rate 16, height 1.6 m (5' 2.99\"), weight 102.1 kg (225 lb), last menstrual period 10/01/2005, SpO2 94 %, not currently breastfeeding. Patient were oriented to plan of care, call light, bed controls, tv, telephone, bathroom, and visiting hours.     Risk Assessment    The following safety risks were identified during admission: fall. Yellow risk band applied: YES.     Skin Initial Assessment    This writer admitted this patient and completed a full skin assessment and Tera score in the Adult PCS flowsheet. Appropriate interventions initiated as needed.       Tera Risk Assessment  Sensory Perception: 3-->slightly limited  Moisture: 3-->occasionally moist  Activity: 2-->chairfast  Mobility: 3-->slightly limited  Nutrition: 3-->adequate  Friction and Shear: 2-->potential problem  Tera Score: 16  Sensory/Activity/Mobility Interventions: Encourage activity/OOB  Friction/Shear Interventions: HOB 30 degrees or less  Mattress: Standard gel/foam mattress (IsoFlex, Atmos Air, etc.)  Bed Frame: Standard width and length    Education    Patient has a [place] to Observation order: No  Observation education completed and documented: N/A      Cassidy Sumner RN      "

## 2023-09-11 NOTE — BRIEF OP NOTE
[facility]    Brief Operative Note    Pre-operative diagnosis: DJD (degenerative joint disease) [M19.90]  Post-operative diagnosis Same as pre-operative diagnosis    Procedure: Procedure(s):  Arthroplasty knee  Surgeon: Surgeon(s) and Role:     * Mack Cohen MD - Primary     * Jaime Yuan PA - Assisting  Anesthesia: Spinal with Block   Estimated Blood Loss: Minimal    Drains: None  Specimens: * No specimens in log *  Findings:   None.  Complications: None.  Implants:   Implant Name Type Inv. Item Serial No.  Lot No. LRB No. Used Action   ASYMMETRIC Patella    BLANCA [id number] Right 1 Implanted   TIBIAL BEARING INSER    BLANCA [id number] Right 1 Implanted   IMP COMP FEM STRK TRIATHLN CR BD W/PA RT 4 5517-F-402 - [id number] Total Joint Component/Insert IMP COMP FEM STRK TRIATHLN CR BD W/PA RT 4 5517-F-402  BLANCA [employer] [id number] Right 1 Implanted   IMP BASEPLATE TIBIAL STRK TRIATHLN KNEE SZ 4 5536-B-400 - [id number] Total Joint Component/Insert IMP BASEPLATE TIBIAL STRK TRIATHLN KNEE SZ 4 5536-B-400  BLANCA [employer] [id number] Right 1 Implanted

## 2023-09-11 NOTE — ANESTHESIA PROCEDURE NOTES
"Intrathecal injection Procedure Note    Pre-Procedure   Staff -        CRNA: Amy Arce APRN CRNA       Other Anesthesia Staff: Nate Freed       Performed By: JOSIAS and CRNA       Location: OR       Procedure Start/Stop Times: 9/11/2023 10:25 AM and 9/11/2023 10:27 AM       Pre-Anesthestic Checklist: patient identified, IV checked, risks and benefits discussed, informed consent, monitors and equipment checked, pre-op evaluation, at physician/surgeon's request and post-op pain management  Timeout:       Correct Patient: Yes        Correct Procedure: Yes        Correct Site: Yes        Correct Position: Yes   Procedure Documentation  Procedure: intrathecal injection       Diagnosis: Right knee arthroscopy       Patient Position: sitting       Skin prep: Chloraprep       Insertion Site: L3-4. (midline approach).       Needle Gauge: 25.        Needle Length (Inches): 3.5        Spinal Needle Type: Shreya tip       Introducer used       Introducer: 20 G       # of attempts: 2 and  # of redirects:  2    Assessment/Narrative         Paresthesias: No.       CSF fluid: clear.    Medication(s) Administered   Medication Administration Time: 9/11/2023 10:25 AM     Comments:  Free flow of CSF after spinal placed      FOR [facility] ([facility]) ONLY:   Pain Team Contact information: please page the Pain Team Via [employer]. Search \"Pain\". During daytime hours, please page the attending first. At night please page the resident first.      "

## 2023-09-11 NOTE — PROGRESS NOTES
Incentive spirometry instruct done. Patient instructed to use 10 times per hour.    Home CPAP setup at bedside and ready for use.    Nate Allison RT

## 2023-09-11 NOTE — ANESTHESIA CARE TRANSFER NOTE
Patient: Ivy Romero    Procedure: Procedure(s):  Arthroplasty knee       Diagnosis: DJD (degenerative joint disease) [M19.90]  Diagnosis Additional Information: No value filed.    Anesthesia Type:   Spinal     Note:    Oropharynx: oropharynx clear of all foreign objects  Level of Consciousness: awake  Oxygen Supplementation: room air    Independent Airway: airway patency satisfactory and stable    Vital Signs Stable: post-procedure vital signs reviewed and stable  Report to RN Given: handoff report given  Patient transferred to: PACU    Handoff Report: Identifed the Patient, Identified the Reponsible Provider, Reviewed the pertinent medical history, Discussed the surgical course, Reviewed Intra-OP anesthesia mangement and issues during anesthesia, Set expectations for post-procedure period and Allowed opportunity for questions and acknowledgement of understanding      Vitals:  Vitals Value Taken Time   BP     Temp     Pulse     Resp 67 09/11/23 1204   SpO2 96 % 09/11/23 1204   Vitals shown include unvalidated device data.    Electronically Signed By: JENNA JOHNSON CRNA  September 11, 2023  12:06 PM

## 2023-09-11 NOTE — ANESTHESIA PROCEDURE NOTES
Adductor canal Procedure Note    Pre-Procedure   Staff -        CRNA: Amy Arce APRN CRNA       Other Anesthesia Staff: Nate Freed       Performed By: CRNA       Location: pre-op       Procedure Start/Stop Times: 9/11/2023 10:00 AM and 9/11/2023 10:03 AM       Pre-Anesthestic Checklist: patient identified, IV checked, site marked, risks and benefits discussed, informed consent, monitors and equipment checked, pre-op evaluation, at physician/surgeon's request and post-op pain management  Timeout:       Correct Patient: Yes        Correct Procedure: Yes        Correct Site: Yes        Correct Position: Yes        Correct Laterality: Yes        Site Marked: Yes  Procedure Documentation  Procedure: Adductor canal       Diagnosis: RIGHT TOTALT KNEE ARTHROPLASTY       Laterality: right       Patient Position: supine       Patient Prep/Sterile Barriers: sterile gloves, mask       Skin prep: Chloraprep       Needle Type: insulated       Needle Gauge: 22.        Ultrasound guided       1. Ultrasound was used to identify targeted nerve, plexus, vascular marker, or fascial plane and place a needle adjacent to it in real-time.       2. Ultrasound was used to visualize the spread of anesthetic in close proximity to the above referenced structure.       3. A permanent image is entered into the patient's record.       4. The visualized anatomic structures appeared normal.       5. There were no apparent abnormal pathologic findings.    Assessment/Narrative         The placement was negative for: blood aspirated, painful injection and site bleeding       Paresthesias: No.       Bolus given via needle..        Secured via.        Insertion/Infusion Method: Single Shot       Complications: none       Injection made incrementally with aspirations every 5 mL.    Medication(s) Administered   Bupivacaine 0.5% PF (Infiltration) - Infiltration   15 mL - 9/11/2023 10:03:00 AM  Dexamethasone 10 mg/mL PF (Perineural) -  "Perineural   5 mg - 9/11/2023 10:03:00 AM  Medication Administration Time: 9/11/2023 10:00 AM     Comments:  5 ml normal saline added to block      FOR [facility] ([facility] ONLY:   Pain Team Contact information: please page the Pain Team Via [employer]. Search \"Pain\". During daytime hours, please page the attending first. At night please page the resident first.      "

## 2023-09-11 NOTE — OP NOTE
Preop Dx: Right knee osteoarthritis    Postop Dx: Right knee osteoarthritis    Procedure: Right total knee arthroplasty    Surgeon: Mack Cohen MD    Assistant: CASI Maldonado/RN    Anesthesia: General with regional    Indications: Patient is a 68-year-old female with end-stage osteoarthritis of her right knee.  She can longer tolerate her knee in this fashion and having exhausted all conservative measures for treatment wished to proceed with a total knee arthroplasty.  All the risks and benefits were discussed with her and she did wish to proceed.    Implants:   Marco triathlon press-fit femoral component, size 4  Marco triathlon press-fit tibial component, size 4  Marco triathlon polyethylene insert, size 4 x 9 mm  [place] triathlon press-fit patellar component, asymmetric 38 mm    Description: Patient was taken to the operating room if adequate notes anesthesia was positioned, prepped and draped in usual sterile fashion in the operative table.  A universal protocol timeout was initiated once all in the room were in agreement and incision was made extending over the patella.  This came all the way down to the medial border of the tibial tubercle.  This was carried down all the way to the extensor mechanism.  Flaps were raised medial and lateral.  A median parapatellar incision was made and the infrapatellar fat pad was excised.  The anterior half of the menisci were excised.  The drill was used to gain access to the femoral canal.  An intramedullary guide was then used and we removed 10 mm of bone from the distal femur.  We then brought the knee into extreme flexion and utilizing the posterior condylar axis measured the epicondylar axis to be approximately 3 degrees of external rotation.  We drilled our guide holes for the 4-in-1 cutting block.  In sizing the femur utilizing the cutting block we chose a size 4 cutting block and this was estimated to notch the anterior femur.  We then upsized to a size  5 cutting block which also was estimated to notch the anterior femur.  I removed the cutting block and freehanded the distal femoral cut flexing the femoral component.  Once this was accomplished the size 4 cutting block was applied and anterior posterior and chamfer cuts were then made.  The tibial jig was then used to remove 4 mm off of the medial side of the tibia making a nice flat cut across the proximal tibia.  Remaining soft tissues were excised and we trialed a size 4 femoral component with a size 4 tibial component and a 9 mm trial polyethylene insert.  The knee was extremely stable and came out to full extension.  The femoral lugs were drilled and the tibia was then instrumented.  We then brought the knee into extension and everted the patella.  This measured 20 mm in thickness and so we removed approximately 1 cm of cartilage and bone off of the undersurface of the patella.  This was sized for a 38 mm asymmetric patellar component and the lugs were then drilled.  The knee was copiously irrigated all remaining soft tissues that could have impinged were removed.  The knee was brought into flexion and the tibial component was impacted into place.  The femoral component was then impacted into place.  The tibial insert was impacted into place.  We brought the knee into extension and it was very stable and then we pressed the patellar component into the undersurface of the patella.  The wound was copiously irrigated with Irrisept as well as Pulsavac and #1 Vicryl was used to repair the median parapatellar incision 2-0 Vicryl was used in the subcutaneous skin and staples were used to close the skin.  An Aquacel dressing was then applied to the knee and the patient was taken in stable condition to postanesthesia care unit.

## 2023-09-11 NOTE — PHARMACY-ADMISSION MEDICATION HISTORY
Pharmacist Admission Medication History    Admission medication history is complete. The information provided in this note is only as accurate as the sources available at the time of the update.    Medication reconciliation/reorder completed by provider prior to medication history? Yes    Information Source(s): Patient via in-person interview  -Surescripts    Pertinent Information: pt with recent dispenses of both 150 mg and 300 mg Bupropion tablets, but reports she is currently only taking 300 mg/day.    -Oxycodone: pt reports she still has 3 tablets on hand from a procedure done in early may.    Changes made to PTA medication list:  Added: None  Deleted: None  Changed:   Bupropion directions (pt no longer taking 150 mg tab)  Gabapentin: provided note on how pt reports taking- 2 tabs QAM + 1QPM (left original directions unchanged, however)      Medication Affordability: no concerns noted       Allergies reviewed with patient and updates made in EHR: yes- no changes made at this time      Medication History Completed By: Fredis Love RPH 9/11/2023 2:36 PM    Prior to Admission medications    Medication Sig Last Dose Taking? Auth Provider Long Term End Date   acetaminophen (TYLENOL) 325 MG tablet Take 2 tablets (650 mg) by mouth every 4 hours as needed for other (mild pain)  Yes Mack Cohen MD No    aspirin (ASA) 325 MG EC tablet Take 1 tablet (325 mg) by mouth daily  Yes Mack Cohen MD     buPROPion (WELLBUTRIN XL) 300 MG 24 hr tablet Take 300 mg by mouth every morning 9/11/2023 at 0815 Yes Unknown, Entered By History No    Cholecalciferol (VITAMIN D3) 1000 UNITS CAPS Take 1,000 Units by mouth daily 9/11/2023 at 0815 Yes Reported, Patient     gabapentin (NEURONTIN) 600 MG tablet TAKE 1 TABLET BY MOUTH THREE TIMES DAILY 9/11/2023 at 0815 Yes Elisabet Santoyo, DO Yes    lamoTRIgine (LAMICTAL) 100 MG tablet TAKE 1 TABLET(100 MG) BY MOUTH EVERY MORNING 9/11/2023 at 0815 Yes Yodit Mandujano MD  Yes    lamoTRIgine (LAMICTAL) 200 MG tablet TAKE 1 TABLET(200 MG) BY MOUTH AT BEDTIME 9/10/2023 at PM Yes Yodit Mandujano MD Yes    order for DME Equipment being ordered: CPAP supplies Unknown Yes Olivier Glass MD     oxyCODONE (ROXICODONE) 5 MG tablet Take 1-2 tablets (5-10 mg) by mouth every 4 hours as needed for moderate to severe pain  Yes Mack Cohen MD     oxyCODONE (ROXICODONE) 5 MG tablet Take 1 tablet (5 mg) by mouth every 6 hours as needed for moderate to severe pain 9/5/2023 Yes Alejandro Jordan DPM     venlafaxine (EFFEXOR XR) 150 MG 24 hr capsule TAKE 1 CAPSULE(150 MG) BY MOUTH DAILY WITH FOOD 9/11/2023 at 0815 Yes Yodit Mandujano MD Yes

## 2023-09-11 NOTE — ANESTHESIA PREPROCEDURE EVALUATION
Anesthesia Pre-Procedure Evaluation    Patient: Ivy Romero   MRN: [id number] : 1955        Procedure : Procedure(s):  Arthroplasty knee          Past Medical History:   Diagnosis Date    AC (acromioclavicular) arthritis 4/10/2018    Allergic rhinitis due to pollen     No Comments Provided    Dorsalgia     10/7/2010    Erythema nodosum      2009    Essential tremor     Possible benign    Hyperlipidemia     10/24/2013    Ill-defined and unknown cause of mortality (CODE)     characterized by chronic pain, positive BARBARA at a low titer of 1:160, negative HLAB27,    Migraine without status migrainosus, not intractable     No Comments Provided    Osteoarthritis of left glenohumeral joint 4/10/2018    Postconcussional syndrome     2016    Sarcoidosis     2007    Tendinitis of left rotator cuff 4/10/2018      Past Surgical History:   Procedure Laterality Date    ARTHROSCOPY SHOULDER      ,Right type 3 SLAP lesion repair of labrum - suprascapular ganglion removal with posterior approach - acromioplasty and distal clavicle resection    ARTHROSCOPY SHOULDER      2015    COLONOSCOPY  2011,Normal--10 year followup    COLONOSCOPY N/A 10/31/2022    F/U  adenomatous, advanced by size    DILATION AND CURETTAGE      , and ablation    EXCISE MASS TOE Left 2023    Procedure: EXCISION, MASS, foot with rotational flap closure;  Surgeon: Alejandro Jordan DPM;  Location: [facility] OR    LAPAROSCOPIC CHOLECYSTECTOMY  2015    6/23/15,Cholecystectomy,Laparoscopic    left shoulder replacement  2019    right total shoulder replacement  2009    TONSILLECTOMY       and adenoidectomy age 12      Allergies   Allergen Reactions    Penicillins Rash      Social History     Tobacco Use    Smoking status: Former     Types: Cigarettes     Quit date: 10/22/1991     Years since quittin.9     Passive exposure: Never    Smokeless tobacco: Never   Substance Use Topics    Alcohol use: Not Currently      Comment: rare      Wt Readings from Last 1 Encounters:   09/05/23 102.1 kg (225 lb)        Anesthesia Evaluation   Pt has had prior anesthetic.     No history of anesthetic complications       ROS/MED HX  ENT/Pulmonary:     (+) sleep apnea, uses CPAP,         allergic rhinitis,                            Neurologic:  - neg neurologic ROS     Cardiovascular:     (+) Dyslipidemia - -   -  - -                                      METS/Exercise Tolerance: >4 METS    Hematologic:  - neg hematologic  ROS     Musculoskeletal:   (+)  arthritis,             GI/Hepatic:  - neg GI/hepatic ROS     Renal/Genitourinary:  - neg Renal ROS     Endo:     (+)               Obesity,       Psychiatric/Substance Use:     (+) psychiatric history depression       Infectious Disease:  - neg infectious disease ROS     Malignancy:       Other:  - neg other ROS          Physical Exam    Airway        Mallampati: II   TM distance: > 3 FB   Neck ROM: full   Mouth opening: > 3 cm    Respiratory Devices and Support         Dental       (+) Minor Abnormalities - some fillings, tiny chips      Cardiovascular   cardiovascular exam normal       Rhythm and rate: regular and normal     Pulmonary   pulmonary exam normal        breath sounds clear to auscultation           OUTSIDE LABS:  CBC:   Lab Results   Component Value Date    WBC 6.9 09/05/2023    WBC 6.4 03/28/2023    HGB 12.1 09/05/2023    HGB 11.5 (L) 03/28/2023    HCT 38.3 09/05/2023    HCT 36.0 03/28/2023     09/05/2023     03/28/2023     BMP:   Lab Results   Component Value Date     09/05/2023     03/28/2023    POTASSIUM 4.3 09/05/2023    POTASSIUM 4.2 03/28/2023    CHLORIDE 104 09/05/2023    CHLORIDE 106 03/28/2023    CO2 26 09/05/2023    CO2 27 03/28/2023    BUN 20.9 09/05/2023    BUN 14.2 03/28/2023    CR 1.48 (H) 09/05/2023    CR 1.41 (H) 03/28/2023     (H) 09/05/2023     (H) 03/28/2023     COAGS: No results found for: PTT, INR, FIBR  POC: No  results found for: BGM, HCG, HCGS  HEPATIC:   Lab Results   Component Value Date    ALBUMIN 3.9 03/28/2023    PROTTOTAL 7.3 03/28/2023    ALT 20 03/28/2023    AST 22 03/28/2023    ALKPHOS 97 03/28/2023    BILITOTAL 0.5 03/28/2023     OTHER:   Lab Results   Component Value Date    LYRIC 9.4 09/05/2023    PHOS 3.3 03/28/2023    LIPASE 19.0 06/07/2015    TSH 3.82 01/07/2022    SED 37 (H) 11/05/2014       Anesthesia Plan    ASA Status:  2    NPO Status:  NPO Appropriate    Anesthesia Type: Spinal.   Induction: Propofol.   Maintenance: Balanced.        Consents    Anesthesia Plan(s) and associated risks, benefits, and realistic alternatives discussed. Questions answered and patient/representative(s) expressed understanding.     - Discussed: Risks, Benefits and Alternatives for BOTH SEDATION and the PROCEDURE were discussed     - Discussed with:  Patient      - Extended Intubation/Ventilatory Support Discussed: No.      - Patient is DNR/DNI Status: No     Use of blood products discussed: No .     Postoperative Care    Pain management: IV analgesics, Peripheral nerve block (Single Shot), Multi-modal analgesia.   PONV prophylaxis: Ondansetron (or other 5HT-3), Dexamethasone or Solumedrol     Comments:                JENNA NAVA CRNA

## 2023-09-11 NOTE — PROGRESS NOTES
"[facility]    Medicine Progress Note - Hospitalist Service    Date of Admission:  9/11/2023    Assessment & Plan     Principal Problem:    S/P total knee arthroplasty, right    Assessment: POD #0    Plan: Patient doing well.  PT/OT to eval and treat.      Active Problems:    Major depressive disorder, recurrent episode, severe (H)    Assessment: stable, chronic    Plan: monitor, continue home medications    Chronic kidney disease, stage 3b (H)    Assessment: stable, chronic    Plan: monitor, continue home medications           Diet: Advance Diet as Tolerated: Regular Diet Adult  Discharge Instruction - Regular Diet Adult    DVT Prophylaxis: ASA 325mg po qday per ortho  Arechiga Catheter: Not present  Lines: None     Cardiac Monitoring: None  Code Status: Full Code      Clinically Significant Risk Factors Present on Admission                       # Obesity: Estimated body mass index is 39.87 kg/m  as calculated from the following:    Height as of this encounter: 1.6 m (5' 2.99\").    Weight as of this encounter: 102.1 kg (225 lb).              Disposition Plan      Expected Discharge Date: 09/12/2023                  Nate Sumner MD  Hospitalist Service  [facility]  Securely message with Vocera (more info)  Text page via [facility] Paging/Directory   ______________________________________________________________________    Interval History   Patient reports pain is 6 out of 10.  Over anterior knee.  It is tolerable and has improved with medication administration.  She reports numbness is nearly resolved.    She denies any recent or remote chest pain.  No shortness of breath.  No nausea or vomiting.  No new issues.    Physical Exam   Vital Signs: Temp: 98.3  F (36.8  C) Temp src: Tympanic BP: (!) 144/85 Pulse: 91   Resp: 16 SpO2: 94 % O2 Device: None (Room air) Oxygen Delivery: 3 LPM  Weight: 225 lbs 0 oz    Constitutional: awake, alert, cooperative, no apparent distress, and " appears stated age  Respiratory: Clear toascultation bilaterally.  No increased respiratory effort.  Cardiovascular: Regularrate and rhythm.  No murmurs rubs or gallops heard.  DP/PT 2+ and bilaterally symmetric  GI: Abdomen Soft, non-tender.  No masses, rebound or guarding.   Musculoskeletal: No synovitis.  Muscle strength age and body habitus appropriateas well as equal and even.  Able to plantaflex and dorsiflex on surgical side without weakness.  Neurologic: sensation in tact distal to surgical site    Medical Decision Making       30 MINUTES SPENT BY ME on the date of service doing chart review, history, exam, documentation & further activities per the note.      Data         Imaging results reviewed over the past 24 hrs:   Recent Results (from the past 24 hour(s))   XR Knee Port Right 1/2 Views    Narrative    XR KNEE PORT RIGHT 1/2 VIEWS, 9/11/2023 12:24 PM    History: Female, age 68 years; Post-Op Total Knee    Comparison: 10/19/2022    Technique: Two views were obtained.    FINDINGS: The metallic arthroplasty device is satisfactorily  positioned. Surrounding bone and overlying soft tissues appear grossly  intact.      Impression    IMPRESSION:   Status post metallic arthroplasty. No acute complication.    VERONICA RAY MD         SYSTEM ID:  [id number]

## 2023-09-11 NOTE — ANESTHESIA PROCEDURE NOTES
IPACK Procedure Note    Pre-Procedure   Staff -        CRNA: Amy Arce APRN CRNA       Other Anesthesia Staff: Nate Freed       Performed By: CRNA       Location: pre-op       Procedure Start/Stop Times: 9/11/2023 9:53 AM and 9/11/2023 10:00 AM       Pre-Anesthestic Checklist: patient identified, IV checked, site marked, risks and benefits discussed, informed consent, monitors and equipment checked, pre-op evaluation, at physician/surgeon's request and post-op pain management  Timeout:       Correct Patient: Yes        Correct Procedure: Yes        Correct Site: Yes        Correct Position: Yes        Correct Laterality: Yes        Site Marked: Yes  Procedure Documentation  Procedure: IPACK       Diagnosis: RIGHT TOTAL KNEE       Laterality: right       Patient Position: supine       Patient Prep/Sterile Barriers: sterile gloves, mask       Skin prep: Chloraprep       Needle Type: insulated       Needle Gauge: 22.        Needle Length (Inches): 6        Ultrasound guided       1. Ultrasound was used to identify targeted nerve, plexus, vascular marker, or fascial plane and place a needle adjacent to it in real-time.       2. Ultrasound was used to visualize the spread of anesthetic in close proximity to the above referenced structure.       3. A permanent image is entered into the patient's record.       4. The visualized anatomic structures appeared normal.       5. There were no apparent abnormal pathologic findings.    Assessment/Narrative         The placement was negative for: blood aspirated, painful injection and site bleeding       Paresthesias: No.       Bolus given via needle..        Secured via.        Insertion/Infusion Method: Single Shot       Complications: none       Injection made incrementally with aspirations every 5 mL.    Medication(s) Administered   Bupivacaine 0.5% PF (Infiltration) - Infiltration   15 mL - 9/11/2023 10:00:00 AM  Dexamethasone 10 mg/mL PF (Perineural) -  "Perineural   5 mg - 9/11/2023 10:00:00 AM  Medication Administration Time: 9/11/2023 9:53 AM     Comments:  5 ml normal saline added to block      FOR [facility] ONLY:   Pain Team Contact information: please page the Pain Team Via [employer]. Search \"Pain\". During daytime hours, please page the attending first. At night please page the resident first.      "

## 2023-09-11 NOTE — OR NURSING
PACU Transfer Note    Ivy Romero was transferred to [facility] via bed.  Equipment used for transport:  bed.  Accompanied by:  Tiera RN and Ryanne RN  Prescriptions were: n/a0    PACU Respiratory Event Documentation     1) Episodes of Apnea greater than or equal to 10 seconds: 0    2) Bradypnea - less than 8 breaths per minute: 0    3) Pain score on 0 to 10 scale: 4      4) Pain-sedation mismatch (yes or no): no    5) Repeated 02 desaturation less than 90% (yes or no): no    Anesthesia notified? (yes or no): N/a    Any of the above events occuring repeatedly in separate 30 minute intervals may be considered recurrent PACU respiratory events.    Patient stable and meets phase 1 discharge criteria for transport from PACU.

## 2023-09-12 ENCOUNTER — APPOINTMENT (OUTPATIENT)
Dept: PHYSICAL THERAPY | Facility: OTHER | Age: 68
End: 2023-09-12
Attending: ORTHOPAEDIC SURGERY
Payer: MEDICARE

## 2023-09-12 ENCOUNTER — APPOINTMENT (OUTPATIENT)
Dept: OCCUPATIONAL THERAPY | Facility: OTHER | Age: 68
End: 2023-09-12
Attending: ORTHOPAEDIC SURGERY
Payer: MEDICARE

## 2023-09-12 VITALS
RESPIRATION RATE: 16 BRPM | HEART RATE: 78 BPM | OXYGEN SATURATION: 97 % | DIASTOLIC BLOOD PRESSURE: 67 MMHG | HEIGHT: 63 IN | WEIGHT: 225 LBS | TEMPERATURE: 97.9 F | BODY MASS INDEX: 39.87 KG/M2 | SYSTOLIC BLOOD PRESSURE: 118 MMHG

## 2023-09-12 LAB
GLUCOSE BLDC GLUCOMTR-MCNC: 141 MG/DL (ref 70–99)
HGB BLD-MCNC: 11.2 G/DL (ref 11.7–15.7)
HOLD SPECIMEN: NORMAL

## 2023-09-12 PROCEDURE — 96376 TX/PRO/DX INJ SAME DRUG ADON: CPT

## 2023-09-12 PROCEDURE — 82962 GLUCOSE BLOOD TEST: CPT

## 2023-09-12 PROCEDURE — 250N000013 HC RX MED GY IP 250 OP 250 PS 637: Performed by: FAMILY MEDICINE

## 2023-09-12 PROCEDURE — 999N000104 HC STATISTIC NO CHARGE

## 2023-09-12 PROCEDURE — 97110 THERAPEUTIC EXERCISES: CPT | Mod: GP

## 2023-09-12 PROCEDURE — 99212 OFFICE O/P EST SF 10 MIN: CPT | Mod: 24 | Performed by: FAMILY MEDICINE

## 2023-09-12 PROCEDURE — 36415 COLL VENOUS BLD VENIPUNCTURE: CPT | Performed by: ORTHOPAEDIC SURGERY

## 2023-09-12 PROCEDURE — 250N000011 HC RX IP 250 OP 636: Performed by: ORTHOPAEDIC SURGERY

## 2023-09-12 PROCEDURE — 85018 HEMOGLOBIN: CPT | Performed by: ORTHOPAEDIC SURGERY

## 2023-09-12 PROCEDURE — 97535 SELF CARE MNGMENT TRAINING: CPT | Mod: GO | Performed by: OCCUPATIONAL THERAPIST

## 2023-09-12 PROCEDURE — 250N000013 HC RX MED GY IP 250 OP 250 PS 637: Performed by: ORTHOPAEDIC SURGERY

## 2023-09-12 PROCEDURE — 97161 PT EVAL LOW COMPLEX 20 MIN: CPT | Mod: GP

## 2023-09-12 PROCEDURE — 97116 GAIT TRAINING THERAPY: CPT | Mod: GP

## 2023-09-12 RX ORDER — OXYCODONE HYDROCHLORIDE 5 MG/1
5 TABLET ORAL
Status: DISCONTINUED | OUTPATIENT
Start: 2023-09-12 | End: 2023-09-12

## 2023-09-12 RX ORDER — ONDANSETRON 4 MG/1
4 TABLET, ORALLY DISINTEGRATING ORAL EVERY 30 MIN PRN
Status: DISCONTINUED | OUTPATIENT
Start: 2023-09-12 | End: 2023-09-12

## 2023-09-12 RX ORDER — OXYCODONE HYDROCHLORIDE 5 MG/1
10 TABLET ORAL
Status: DISCONTINUED | OUTPATIENT
Start: 2023-09-12 | End: 2023-09-12

## 2023-09-12 RX ORDER — ONDANSETRON 2 MG/ML
4 INJECTION INTRAMUSCULAR; INTRAVENOUS EVERY 30 MIN PRN
Status: DISCONTINUED | OUTPATIENT
Start: 2023-09-12 | End: 2023-09-12

## 2023-09-12 RX ADMIN — OXYCODONE HYDROCHLORIDE 5 MG: 5 TABLET ORAL at 01:40

## 2023-09-12 RX ADMIN — Medication 2 G: at 01:41

## 2023-09-12 RX ADMIN — GABAPENTIN 600 MG: 600 TABLET, FILM COATED ORAL at 06:03

## 2023-09-12 RX ADMIN — ASPIRIN 325 MG: 325 TABLET, COATED ORAL at 09:41

## 2023-09-12 RX ADMIN — BUPROPION HYDROCHLORIDE 300 MG: 150 TABLET, EXTENDED RELEASE ORAL at 08:03

## 2023-09-12 RX ADMIN — OXYCODONE HYDROCHLORIDE 5 MG: 5 TABLET ORAL at 09:41

## 2023-09-12 RX ADMIN — LAMOTRIGINE 100 MG: 100 TABLET ORAL at 08:03

## 2023-09-12 RX ADMIN — POLYETHYLENE GLYCOL 3350 17 G: 17 POWDER, FOR SOLUTION ORAL at 09:41

## 2023-09-12 RX ADMIN — VENLAFAXINE HYDROCHLORIDE 150 MG: 75 CAPSULE, EXTENDED RELEASE ORAL at 08:03

## 2023-09-12 RX ADMIN — ACETAMINOPHEN 975 MG: 325 TABLET, FILM COATED ORAL at 06:03

## 2023-09-12 ASSESSMENT — ACTIVITIES OF DAILY LIVING (ADL)
ADLS_ACUITY_SCORE: 25

## 2023-09-12 NOTE — PROGRESS NOTES
09/11/23 1536   Appointment Info   Signing Clinician's Name / Credentials (OT) Kelly Gr, OTR/L   Living Environment   People in Home spouse   Current Living Arrangements house   Home Accessibility stairs to enter home   Number of Stairs, Main Entrance 3   Stair Railings, Main Entrance railings safe and in good condition   Self-Care   Usual Activity Tolerance good   Current Activity Tolerance fair   Equipment Currently Used at Home walker, rolling   Cognitive Status Examination   Orientation Status orientation to person, place and time   Affect/Mental Status (Cognitive) WFL   Follows Commands WFL   Pain Assessment   Patient Currently in Pain Yes, see Vital Sign flowsheet   Range of Motion Comprehensive   General Range of Motion no range of motion deficits identified   Coordination   Upper Extremity Coordination No deficits were identified   Bed Mobility   Bed Mobility sit-supine   Sit-Supine [place] (Bed Mobility) moderate assist (50% patient effort)   Clinical Impression   Criteria for Skilled Therapeutic Interventions Met (OT) Yes, treatment indicated   OT Diagnosis right TKA   Influenced by the following impairments pain, post surgical precautions   OT Problem List-Impairments impacting ADL activity tolerance impaired;post-surgical precautions   Assessment of Occupational Performance 1-3 Performance Deficits   Identified Performance Deficits mobility and self care   Planned Therapy Interventions (OT) ADL retraining;progressive activity/exercise   Clinical Decision Making Complexity (OT) low complexity   Anticipated Equipment Needs Upon Discharge (OT) tub bench   Risk & Benefits of therapy have been explained risks/benefits reviewed   OT Total Evaluation Time   OT Eval, Low Complexity Minutes (20184) 15   OT Goals   Therapy Frequency (OT) Daily   OT Predicted Duration/Target Date for Goal Attainment 09/12/23   OT Goals Upper Body Dressing;Lower Body Dressing;Upper Body Bathing;Lower Body Bathing;Toilet  Transfer/Toileting   OT: Upper Body Dressing Supervision/stand-by assist   OT: Lower Body Dressing Minimal assist   OT: Upper Body Bathing Supervision/stand-by assist   OT: Lower Body Bathing Minimal assist   OT: Toilet Transfer/Toileting Supervision/stand-by assist   OT Discharge Planning   OT Plan cont OT   OT Discharge Recommendation (DC Rec) home with assist   OT Rationale for DC Rec Pt will likely have no further OT needs at time of D/C but will continue to assess   OT Brief overview of current status Pt up to bathroom on day of surger with assist of 2 and FWW, will progress and assess ADL's int he AM   Total Session Time   Total Session Time (sum of timed and untimed services) 15

## 2023-09-12 NOTE — PHARMACY - DISCHARGE MEDICATION RECONCILIATION AND EDUCATION
Pharmacy:  Discharge Counseling and Medication Reconciliation    Ivy Romero  [address] [facility] [id number] [phone] (home)   68 year old female  PCP: Yodit Mandujano    Allergies: Penicillins    Discharge Counseling:    Pharmacist met with patient (and/or family) today to review the medication portion of the After Visit Summary (with an emphasis on NEW medications) and to address patient's questions/concerns.    Summary of Education:  Oxycodone: Discussed PRN use for pain, side effects of drowsiness/dizziness and constipation. Discussed abstaining from alcohol and driving while on medication. Also discussed keeping out of reach of pets/kids.   ASA: Discussed daily use for DVT prevention, reviewed common signs of DVT  APAP: discussed PRN use for pain, patient questioned if she could use home APAP, informed patient that would be OK as long as the APAP is plain (not combo excedrin, ibu/APAP, etc)    Materials Provided:  MedCounselor sheets printed from Clinical Pharmacology on: Oxycodone, ASA, APAP    Discharge Medication Reconciliation:    It has been determined that the patient has an adequate supply of medications available or which can be obtained from the patient's preferred pharmacy, which HE/SHE has confirmed as: [facility]    Thank you for the consult.    Aliyah Mckeon RPH........September 12, 2023 12:27 PM

## 2023-09-12 NOTE — DISCHARGE SUMMARY
"[facility]  Hospitalist Discharge Summary      Date of Admission:  9/11/2023  Date of Discharge:  9/12/2023  Discharging Provider: Nate Sumner MD  Discharge Service: Hospitalist Service    Discharge Diagnoses   Right total knee arthroplasty    Clinically Significant Risk Factors     # Obesity: Estimated body mass index is 39.87 kg/m  as calculated from the following:    Height as of this encounter: 1.6 m (5' 2.99\").    Weight as of this encounter: 102.1 kg (225 lb).       Follow-ups Needed After Discharge   Follow-up Appointments     Follow Up Care      Follow-up with your Surgeon Team in 2 weeks for wound check.            Unresulted Labs Ordered in the Past 30 Days of this Admission       No orders found for last 31 day(s).        These results will be followed up by NA    Discharge Disposition   Discharged to home  Condition at discharge: Good    Hospital Course   Patient is a 68-year-old female who presented to the floor after undergoing a right total knee arthroplasty.  Patient had previously exhausted all nonsurgical management options and had significant debility from chronic osteoarthritis of the right knee.    She had an uncomplicated operation with no perioperative issues.  Patient's mobility and pain control were adequate on the morning of discharge.  She was previously on aspirin 325 mg daily which was continued and felt to be adequate for DVT prophylaxis by orthopedics.    I did advise patient of common signs and symptoms of DVT and pulmonary embolus and need for emergent follow-up.  Also discussed need to follow-up urgently should she develop fevers chills or concerning erythema or drainage from or near the surgical site.        Consultations This Hospital Stay   HOSPITALIST IP CONSULT  PHYSICAL THERAPY ADULT IP CONSULT  OCCUPATIONAL THERAPY ADULT IP CONSULT    Code Status   Full Code    Time Spent on this Encounter   I, Nate Sumner MD, personally saw the patient today " "and spent greater than 30 minutes discharging this patient.       Nate Sumner MD  [facility]  [address]  [place] [address]  Phone: [phone]  Fax: [phone]  ______________________________________________________________________    Physical Exam   Vital Signs: Temp: 97.9  F (36.6  C) Temp src: Tympanic BP: 118/67 Pulse: 78   Resp: 16 SpO2: 97 % O2 Device: None (Room air)    Weight: 225 lbs 0 oz  Constitutional: awake, alert, cooperative, no apparent distress, and appears stated age  Respiratory: No increased work of breathing, good air exchange, clear to auscultation bilaterally, no crackles or wheezing  Cardiovascular: Regular rate and rhythm.  No murmurs rubs or gallops heard.   GI: Abdomen Soft, non-tender.  No masses, rebound or guarding.   Musculoskeletal: No synovitis of nonoperative joints.  Good ROM of the operative right knee.  Skin:  No erythema or drainage at surgical site  Neuropsychiatric: Alert and Orientedx3       Primary Care Physician   Yodit Villalta    Discharge Orders      Reason for your hospital stay    S/P R TKA     When to call - Contact Surgeon Team    You may experience symptoms that require follow-up before your scheduled appointment. Refer to the \"Stoplight Tool\" for instructions on when to contact your Surgeon Team if you are concerned about pain control, blood clots, constipation, or if you are unable to urinate.     When to call - Reach out to Urgent Care    If you are not able to reach your Surgeon Team and you need immediate care, go to the Orthopedic Walk-in Clinic or Urgent Care at your Surgeon's office.  Do NOT go to the Emergency Room unless you have shortness of breath, chest pain, or other signs of a medical emergency.     When to call - Reasons to Call 911    Call 911 immediately if you experience sudden-onset chest pain, arm weakness/numbness, slurred speech, or shortness of breath     Discharge Instruction - " Breathing exercises    Perform breathing exercises using your Incentive Spirometer 10 times per hour while awake for 2 weeks.     Symptoms - Fever Management    A low grade fever can be expected after surgery.  Use acetaminophen (TYLENOL) as needed for fever management.  Contact your Surgeon Team if you have a fever greater than 101.5 F, chills, and/or night sweats.     Symptoms - Constipation management    Constipation (hard, dry bowel movements) is expected after surgery due to the combination of being less active, the anesthetic, and the opioid pain medication.  You can do the following to help reduce constipation:  ~  FLUIDS:  Drink clear liquids (water or Gatorade), or juice (apple/prune).  ~  DIET:  Eat a fiber rich diet.    ~  ACTIVITY:  Get up and move around several times a day.  Increase your activity as you are able.  MEDICATIONS:  Reduce the risk of constipation by starting medications before you are constipated.  You can take Miralax   (1 packet as directed) and/or a stool softener (Senokot 1-2 tablets 1-2 times a day).  If you already have constipation and these medications are not working, you can get magnesium citrate and use as directed.  If you continue to have constipation you can try an over the counter suppository or enema.  Call your Surgeon Team if it has been greater than 3 days since your last bowel movement.     Symptoms - Reduced Urine Output    Changes in the amount of fluids you drank before and after surgery may result in problems urinating.  It is important to stay well-hydrated after surgery and drink plenty of water. If it has been greater than 8 hours since you have urinated despite drinking plenty of water, call your Surgeon Team.     Activity - Exercises to prevent blood clots    Unless otherwise directed by your Surgeon team, perform the following exercises at least three times per day for the first four weeks after surgery to prevent blood clots in your legs: 1) Point and flex  your feet (Ankle Pumps), 2) Move your ankle around in big circles, 3) Wiggle your toes, 4) Walk, even for short distances, several times a day, will help decrease the risk of blood clots.     Comfort and Pain Management - Pain after Surgery    Pain after surgery is normal and expected.  You will have some amount of pain for several weeks after surgery.  Your pain will improve with time.  There are several things you can do to help reduce your pain including: rest, ice, elevation, and using pain medications as needed. Contact your Surgeon Team if you have pain that persists or worsens after surgery despite rest, ice, elevation, and taking your medication(s) as prescribed. Contact your Surgeon Team if you have new numbness, tingling, or weakness in your operative extremity.     Comfort and Pain Management - Swelling after Surgery    Swelling and/or bruising of the surgical extremity is common and may persist for several months after surgery. In addition to frequent icing and elevation, gentle compressive support with an ACE wrap or tubigrip may help with swelling. Apply compression regularly, removing at least twice daily to perform skin checks. Contact your Surgeon Team if your swelling increases and is NOT associated with an increase in your activity level, or if your swelling increases and is associated with redness and pain.     Comfort and Pain Management - Cold therapy    Ice can be used to control swelling and discomfort after surgery. Place a thin towel over your operative site and apply the ice pack overtop. Leave ice pack in place for 20 minutes, then remove for 20 minutes. Repeat this 20 minutes on/20 minutes off routine as often as tolerated.     Medication Instructions - Acetaminophen (TYLENOL) Instructions    You were discharged with acetaminophen (TYLENOL) for pain management after surgery. Acetaminophen most effectively manages pain symptoms when it is taken on a schedule without missing doses (every  four, six, or eight hours). Your Provider will prescribe a safe daily dose between 3000 - 4000 mg.  Do NOT exceed this daily dose. Most patients use acetaminophen for pain control for the first four weeks after surgery.  You can wean from this medication as your pain decreases.     Medication Instructions - NSAID Instructions    You were discharged with an anti-inflammatory medication for pain management to use in combination with acetaminophen (TYLENOL) and the narcotic pain medication.  Take this medication exactly as directed.  You should only take one anti-inflammatory at a time.  Some common anti-inflammatories include: ibuprofen (ADVIL, MOTRIN), naproxen (ALEVE, NAPROSYN), celecoxib (CELEBREX), meloxicam (MOBIC), ketorolac (TORADOL).  Take this medication with food and water.     Medication Instructions - Opioids - Tapering Instructions    In the first three days following surgery, your symptoms may warrant use of the narcotic pain medication every four to six hours as prescribed. This is normal. As your pain symptoms improve, focus your efforts on decreasing (tapering) use of narcotic medications. The most successful tapering strategy is to first, decrease the number of tablets you take every 4-6 hours to the minimum prescribed. Then, increase the amount of time between doses.  For example:  First, taper to   or 1 tablet every 4-6 hours.  Then, taper to   or 1 tablet every 6-8 hours.  Then, taper to   or 1 tablet every 8-10 hours.  Then, taper to   or 1 tablet every 10-12 hours.  Then, taper to   or 1 tablet at bedtime.  The bedtime dose can help with comfort during sleep and is typically the last dose to be discontinued after surgery.     Follow Up Care    Follow-up with your Surgeon Team in 2 weeks for wound check.     Medication instructions -  Anticoagulation - aspirin    Take the aspirin as prescribed for a total of four weeks after surgery.  This is given to help minimize your risk of blood clot.  "    Comfort and Pain Management - LOWER Extremity Elevation    Swelling is expected for several months after surgery. This type of swelling is usually associated with gravity and activity, and can be improved with elevation.   The best way to do this is to get your \"toes above your nose\" by laying down and placing several pillows lengthwise under your calf and heel. When elevating your leg keep your knee completely straight. Perform this elevation as often as possible especially for the first two weeks after surgery.     Medication Instructions - Opioid Instructions (Greater than or equal to 65 years)    You were discharged with an opioid medication (hydromorphone, oxycodone, hydrocodone, or tramadol). This medication should only be taken for breakthrough pain that is not controlled with acetaminophen (TYLENOL). If you rate your pain less than 3 you do not need this medication.  Pain rating 0-3:  You do not need this medication  Pain rating 4-6:  Take 1/2 tablet every 4-6 hours as needed  Pain rating 7-10:  Take 1 tablet every 4-6 hours as needed  Do not exceed 4 tablets per day     Activity - Total Knee Arthroplasty     Return to Driving    Return to driving - Driving is NOT permitted until directed by your provider. Under no circumstance are you permitted to drive while using narcotic pain medications.     Dressing / Wound Care - Wound    You have a clean dressing on your surgical wound. Dressing change instructions as follows: dressing will be removed at your follow-up appointment. Contact your Surgeon Team if you have increased redness, warmth around the surgical wound, and/or drainage from the surgical wound.     Dressing / Wound Care - NO Tub Bathing    Tub bathing, swimming, or any other activities that will cause your incision to be submerged in water should be avoided until allowed by your Surgeon.     Weight bearing as tolerated    Weight bearing as tolerated on your operative extremity.     NO Precautions "    No precautions directed by your Provider.  You may perform range of motion activities as tolerated.     Dressing Wound Care - Shower with wound/dressing NOT covered    You do not need to cover your dressing in the shower, you may allow water and soap to run over top of the surgical dressing You may shower 2 days after surgery.  You are strictly prohibited from soaking or submerging the surgical wound underwater.     Crutches DME    DME Documentation: Describe the reason for need to support medical necessity: Impaired gait status post knee surgery. I, the undersigned, certify that the above prescribed supplies are medically necessary for this patient and is both reasonable and necessary in reference to accepted standards of medical practice in the treatment of this patient's condition and is not prescribed as a convenience.     Cane DME    DME Documentation: Describe the reason for need to support medical necessity: Impaired gait status post knee surgery. I, the undersigned, certify that the above prescribed supplies are medically necessary for this patient and is both reasonable and necessary in reference to accepted standards of medical practice in the treatment of this patient's condition and is not prescribed as a convenience.     Walker DME    DME Documentation: Describe the reason for need to support medical necessity: Impaired gait status post knee surgery. I, the undersigned, certify that the above prescribed supplies are medically necessary for this patient and is both reasonable and necessary in reference to accepted standards of medical practice in the treatment of this patient's condition and is not prescribed as a convenience.     Discharge Instruction - Regular Diet Adult    Return to your pre-surgery diet unless instructed otherwise       Significant Results and Procedures   Most Recent 3 CBC's:  Recent Labs   Lab Test 09/12/23  0549 09/05/23  1254 03/28/23  1000 01/07/22  0756   WBC  --  6.9 6.4  8.1   HGB 11.2* 12.1 11.5* 12.3   MCV  --  97 97 95   PLT  --  245 250 265     Most Recent 3 BMP's:  Recent Labs   Lab Test 09/12/23  0618 09/11/23  0938 09/05/23  1254 03/28/23  1000 03/28/23  1000 01/07/22  0756   NA  --   --  139  --  141 137   POTASSIUM  --   --  4.3  --  4.2 4.2   CHLORIDE  --   --  104  --  106 104   CO2  --   --  26  --  27 23   BUN  --   --  20.9  --  14.2 22   CR  --   --  1.48*  --  1.41* 1.62*   ANIONGAP  --   --  9  --  8 10   LYRIC  --   --  9.4  --  9.1 9.6   * 105* 106*   < > 105* 93    < > = values in this interval not displayed.   ,   Results for orders placed or performed during the hospital encounter of 09/11/23   XR Knee Port Right 1/2 Views    Narrative    XR KNEE PORT RIGHT 1/2 VIEWS, 9/11/2023 12:24 PM    History: Female, age 68 years; Post-Op Total Knee    Comparison: 10/19/2022    Technique: Two views were obtained.    FINDINGS: The metallic arthroplasty device is satisfactorily  positioned. Surrounding bone and overlying soft tissues appear grossly  intact.      Impression    IMPRESSION:   Status post metallic arthroplasty. No acute complication.    VERONICA RAY MD         SYSTEM ID:  [id number]       Discharge Medications   Current Discharge Medication List        START taking these medications    Details   acetaminophen (TYLENOL) 325 MG tablet Take 2 tablets (650 mg) by mouth every 4 hours as needed for other (mild pain)  Qty: 100 tablet, Refills: 0    Associated Diagnoses: Primary osteoarthritis of right knee      aspirin (ASA) 325 MG EC tablet Take 1 tablet (325 mg) by mouth daily  Qty: 30 tablet, Refills: 0    Associated Diagnoses: Primary osteoarthritis of right knee      !! oxyCODONE (ROXICODONE) 5 MG tablet Take 1-2 tablets (5-10 mg) by mouth every 4 hours as needed for moderate to severe pain  Qty: 26 tablet, Refills: 0    Associated Diagnoses: Primary osteoarthritis of right knee       !! - Potential duplicate medications found. Please discuss with  provider.        CONTINUE these medications which have NOT CHANGED    Details   buPROPion (WELLBUTRIN XL) 300 MG 24 hr tablet Take 300 mg by mouth every morning      Cholecalciferol (VITAMIN D3) 1000 UNITS CAPS Take 1,000 Units by mouth daily      gabapentin (NEURONTIN) 600 MG tablet TAKE 1 TABLET BY MOUTH THREE TIMES DAILY  Qty: 270 tablet, Refills: 3    Associated Diagnoses: Chronic pain disorder      !! lamoTRIgine (LAMICTAL) 100 MG tablet TAKE 1 TABLET(100 MG) BY MOUTH EVERY MORNING  Qty: 90 tablet, Refills: 4    Associated Diagnoses: Severe episode of recurrent major depressive disorder, without psychotic features (H)      !! lamoTRIgine (LAMICTAL) 200 MG tablet TAKE 1 TABLET(200 MG) BY MOUTH AT BEDTIME  Qty: 90 tablet, Refills: 3    Associated Diagnoses: Severe episode of recurrent major depressive disorder, without psychotic features (H)      order for DME Equipment being ordered: CPAP supplies  Qty: 1 each, Refills: 0    Associated Diagnoses: PRADIP on CPAP      !! oxyCODONE (ROXICODONE) 5 MG tablet Take 1 tablet (5 mg) by mouth every 6 hours as needed for moderate to severe pain  Qty: 6 tablet, Refills: 0    Associated Diagnoses: Post-op pain      venlafaxine (EFFEXOR XR) 150 MG 24 hr capsule TAKE 1 CAPSULE(150 MG) BY MOUTH DAILY WITH FOOD  Qty: 90 capsule, Refills: 3    Associated Diagnoses: Severe episode of recurrent major depressive disorder, without psychotic features (H)       !! - Potential duplicate medications found. Please discuss with provider.        Allergies   Allergies   Allergen Reactions    Penicillins Rash

## 2023-09-12 NOTE — PROGRESS NOTES
SAFETY CHECKLIST  ID Bands and Risk clasps correct and in place (DNR, Fall risk, Allergy, Latex, Limb):  Yes  All Lines Reconciled and labeled correctly: Yes  Whiteboard updated:Yes  Environmental interventions: Yes  Verify Tele #:  n/a

## 2023-09-12 NOTE — PROGRESS NOTES
SAFETY CHECKLIST  ID Bands and Risk clasps correct and in place (DNR, Fall risk, Allergy, Latex, Limb):  Yes  All Lines Reconciled and labeled correctly: Yes  Whiteboard updated:Yes  Environmental interventions: Yes  Verify Tele #: N/A    Heidy Medeiros RN .......  9/11/2023  7:25 PM

## 2023-09-12 NOTE — PLAN OF CARE
Pt alert and oriented x 4. Pt is an assist of 1 person with gait belt and walker, she is getting up to the bathroom and in her room. Right knee is elevated and has been icing off and on. Edema in her right lower extremity. VS stable post surgery. Pain has lessened through out the shift. Currently managing with Tylenol. Last narcotic given at 0140 (Oxycodone).     Heidy Medeiros RN .......  9/12/2023  7:00 AM      Goal Outcome Evaluation:      Plan of Care Reviewed With: patient    Overall Patient Progress: improving  Problem: Plan of Care - These are the overarching goals to be used throughout the patient stay.    Goal: Plan of Care Review  Description: The Plan of Care Review/Shift note should be completed every shift.  The Outcome Evaluation is a brief statement about your assessment that the patient is improving, declining, or no change.  This information will be displayed automatically on your shift note.  Outcome: Progressing  Flowsheets (Taken 9/12/2023 0213)  Outcome Evaluation: VS per surgical schedule, Stand by assist of 1 person with gait belt and walker  Plan of Care Reviewed With: patient  Overall Patient Progress: improving

## 2023-09-12 NOTE — PROGRESS NOTES
[facility] DISCHARGE NOTE    Patient discharged to home at 1:36 PM via wheel chair. Accompanied by spouse and staff. Discharge instructions reviewed with patient and spouse, opportunity offered to ask questions. Prescriptions sent to patients preferred pharmacy. All belongings sent with patient.    Brenda Vasquez RN

## 2023-09-13 ENCOUNTER — PATIENT OUTREACH (OUTPATIENT)
Dept: FAMILY MEDICINE | Facility: OTHER | Age: 68
End: 2023-09-13
Payer: MEDICARE

## 2023-09-13 ENCOUNTER — TELEPHONE (OUTPATIENT)
Dept: FAMILY MEDICINE | Facility: OTHER | Age: 68
End: 2023-09-13
Payer: MEDICARE

## 2023-09-13 NOTE — PROGRESS NOTES
09/12/23 0624   Appointment Info   Signing Clinician's Name / Credentials (OT) Kelly Gr, OTR/L   Interventions   Interventions Quick Adds Self-Care/Home Management   Self-Care/Home Management   Self-Care/Home Mgmt/ADL, Compensatory, Meal Prep Minutes (74482) 60   [place] Level (Grooming Training) stand-by assist   Assistance (Grooming Training) supervision   [place] Level (Bathing Training) minimum assist (75% patient effort)   Assistance (Bathing Training) 1 person assist   [place] Level (Upper Body Dressing Training) stand-by assist   Lower Body Dressing Training Assistance minimum assist (75% patient effort)   [place] Level (Toilet Training) stand-by assist   OT Discharge Planning   OT Plan d/c home today   OT Discharge Recommendation (DC Rec) home with assist   OT Rationale for DC Rec Pt has no further OT needs, has assist from spouse for self cares as needed and has outpatient PT lined up   OT Brief overview of current status see above, pt has progressed very well on POD #1, no further OT needed   Total Session Time   Timed Code Treatment Minutes 60   Total Session Time (sum of timed and untimed services) 60

## 2023-09-13 NOTE — TELEPHONE ENCOUNTER
Patient states she received a call from care coordination, that she is needing to see Migel Villalta MD and surgeon in 2 weeks. [place] she need to see Migel Villalta MD?   Ashley Slater LPN .............9/13/2023     1:28 PM

## 2023-09-13 NOTE — TELEPHONE ENCOUNTER
Reason for call: Patient wanting a work in appointment.    Is the appointment for a Hospital Follow up?  yes     (If yes - Unable to find an appointment with any provider during the time frame needed. Nurse/Provider - Can this patient be worked into a schedule with PCP or team member?)    Patient is having the following symptoms:  had a knee replacement and discharged 9/12/23.     The patient is requesting an appointment with  [facility]    Was an appointment offered for this call? No    If Yes, what is the date of the appointment?  NA     Preferred method for responding to this message: Telephone Call    Phone number patient can be reached at? Cell number on file:    Telephone Information:   Mobile [phone]       If we can't reach you directly, may we leave a detailed response at the number you provided? Yes    Can this message wait until your PCP/provider returns if unavailable today? Yes    Jerri Sanchez on 9/13/2023 at 8:25 AM

## 2023-09-13 NOTE — TELEPHONE ENCOUNTER
Transitional Care Management Phone Call and 30 day medication reconciliation    Summary of hospitalization:  [facility] discharge summary reviewed  HOSPITAL DISCHARGE DIAGNOSIS:   Right total knee arthroplasty     Diagnostic Tests/Treatments reviewed.  Follow up needed:   Follow-up Appointments     Follow Up Care      Follow-up with your Surgeon Team in 2 weeks for wound check.      Post Discharge Medication Reconciliation: discharge medications reconciled, continue medications without change.  Medications reviewed by: by myself    Problems taking medications regularly:  None  Problems adhering to non-medication therapy:  None    Other Healthcare Providers Involved in Patient s Care:       Physical therapy  Update since discharge: stable.   Plan of care communicated with patient  Just a friendly reminder that you appointment is   Next 5 appointments (look out 90 days)      Sep 25, 2023  2:00 PM  Return Visit with Mack Cohen MD  [facility] ([facility] ) [address]  [place] [address]  [phone]        .  We encourage you to keep this appointment.    Please remember to bring all of your pills in their bottles (including any vitamins or over the counter pills) with you to your appointment.    The patient indicates understanding of these issues and agrees with the plan of care.   Yes    Was the patient contacted within the 2 business days or other approved timeframe?  Yes  Was the Medication reconciliation and management done since the patient was discharged? Yes    Christal Yuan RN  9/13/2023 8:15 AM

## 2023-09-14 NOTE — TELEPHONE ENCOUNTER
Can [facility] call patient and offer appt 9/26 in a same day spot for hospital/ surgery follow up.   Ashley Slater LPN .............9/14/2023     9:13 AM

## 2023-09-15 NOTE — PROGRESS NOTES
09/12/23 6843   Appointment Info   Signing Clinician's Name / Credentials (PT) Norberto Menendeznatty MPT   Living Environment   People in Home spouse   Current Living Arrangements house   Home Accessibility stairs to enter home   Number of Stairs, Main Entrance 3   Stair Railings, Main Entrance railings safe and in good condition   Transportation Anticipated family or friend will provide   Self-Care   Usual Activity Tolerance good   Current Activity Tolerance fair   Equipment Currently Used at Home walker, rolling   Fall history within last six months no   General Information   Referring Physician Noel   Patient/Family Therapy Goals Statement (PT) return home   Existing Precautions/Restrictions fall  (s/p right TKA)   Weight-Bearing Status - LLE full weight-bearing   Weight-Bearing Status - RLE weight-bearing as tolerated   Cognition   Affect/Mental Status (Cognition) WFL   Orientation Status (Cognition) oriented x 4   Follows Commands (Cognition) WFL   Pain Assessment   Patient Currently in Pain Yes, see Vital Sign flowsheet   Integumentary/Edema   Integumentary/Edema Comments surgical wound right knee   Posture    Posture Not impaired   Range of Motion (ROM)   Range of Motion ROM is WFL   ROM Comment right knee (5-85) degrees   Strength (Manual Muscle Testing)   Strength (Manual Muscle Testing) strength is WFL   Strength Comments however, patient fatigues with activity   Bed Mobility   Impairments Contributing to Impaired Bed Mobility pain;decreased strength   Comment, (Bed Mobility) minimal assist for right LE support with supine<>sit   Transfers   Comment, (Transfers) CGA to SBA with Fww   Gait/Stairs (Locomotion)   [place] Level (Gait) supervision   Assistive Device (Gait) walker, front-wheeled   Distance in Feet 150   Pattern (Gait) step-to   Balance   Balance Comments good with Fww   Sensory Examination   Sensory Perception WFL   Coordination   Coordination no deficits were identified   Muscle Tone   Muscle  Tone no deficits were identified   Clinical Impression   Criteria for Skilled Therapeutic Intervention Evaluation only   PT Diagnosis (PT) impaired mobility   Influenced by the following impairments pain and fatigue   Functional limitations due to impairments activity/gait tolerance and stability   Clinical Presentation (PT Evaluation Complexity) Stable/Uncomplicated   Clinical Decision Making (Complexity) low complexity   Planned Therapy Interventions (PT) bed mobility training;gait training;transfer training;home exercise program   Anticipated Equipment Needs at Discharge (PT) walker, rolling   Risk & Benefits of therapy have been explained evaluation/treatment results reviewed;patient   PT Total Evaluation Time   PT Eval, Low Complexity Minutes (82129) 15   Plan of Care Review   Plan of Care Reviewed With patient   Physical Therapy Goals   PT Frequency One time eval and treatment only   PT Discharge Planning   PT Plan patient discharging   PT Discharge Recommendation (DC Rec) home with assist;home with outpatient physical therapy   PT Rationale for DC Rec to promote strength, stability and safe mobility   PT Brief overview of current status pleasant patient with fair pain control; good spousal support; SBA for mobilities using a Fww; good right knee AAROM

## 2023-09-21 ENCOUNTER — THERAPY VISIT (OUTPATIENT)
Dept: PHYSICAL THERAPY | Facility: OTHER | Age: 68
End: 2023-09-21
Attending: ORTHOPAEDIC SURGERY
Payer: MEDICARE

## 2023-09-21 DIAGNOSIS — M25.461 PAIN AND SWELLING OF RIGHT KNEE: ICD-10-CM

## 2023-09-21 DIAGNOSIS — M25.661 DECREASED ROM OF RIGHT KNEE: ICD-10-CM

## 2023-09-21 DIAGNOSIS — G89.18 ACUTE POSTOPERATIVE PAIN OF RIGHT KNEE: ICD-10-CM

## 2023-09-21 DIAGNOSIS — M25.561 ACUTE POSTOPERATIVE PAIN OF RIGHT KNEE: ICD-10-CM

## 2023-09-21 DIAGNOSIS — M17.11 PRIMARY OSTEOARTHRITIS OF RIGHT KNEE: Primary | ICD-10-CM

## 2023-09-21 DIAGNOSIS — M25.561 PAIN AND SWELLING OF RIGHT KNEE: ICD-10-CM

## 2023-09-21 PROCEDURE — 97016 VASOPNEUMATIC DEVICE THERAPY: CPT | Mod: GP

## 2023-09-21 PROCEDURE — 97161 PT EVAL LOW COMPLEX 20 MIN: CPT | Mod: GP

## 2023-09-21 PROCEDURE — 97110 THERAPEUTIC EXERCISES: CPT | Mod: GP

## 2023-09-21 NOTE — PROGRESS NOTES
PHYSICAL THERAPY EVALUATION  Type of Visit: Evaluation    See electronic medical record for Abuse and Falls Screening details.    Subjective patient is a 68-year-old  female referred following right total knee arthroplasty performed on 9/11/2023 by Dr. Mack Cohen MD.  She reports a long history of right knee osteoarthritis.  She reports pain 10/10 and does become tearful throughout today subjective portion.  She is using Tylenol mostly for pain but does have oxycodone but is using it sparingly and does not like to take it.  She has been icing but does not appear to be elevating high enough for relief.  Sleeping in recliner chair with the feet on a bench and slightly elevated on a pillow.      Presenting condition or subjective complaint: Left total knee arthroplasty  Date of onset: 09/11/23    Dates & types of surgery:  Right total knee arthroplasty 9/11/2023.      Prior Level of Function  Transfers: Independent  Ambulation: Independent  ADL: Independent  IADL: Driving, Housekeeping, Laundry, Meal preparation    Living Environment  Social support: With a significant other or spouse   Type of home: House   Stairs to enter the home: Yes 4 Is there a railing: Yes   Ramp: No   Stairs inside the home: Yes 12 Is there a railing: Yes   Help at home: None  Equipment owned: Walker; Straight Cane     Employment: No Retired    Pain assessment: Pain present  Location: Right knee/Rating: 10/10     Objective   KNEE EVALUATION  PAIN: Pain Level at Rest: 3/10 (lowest pain)  Pain Level with Use: 10/10 (currently at rest)  Pain Location: Right knee  Pain Quality: Aching and Dull  Pain Frequency: constant  INTEGUMENTARY (edema, incisions):  Midline incision covered no drainage, ecchymosis at the distal right calf and ankle , moderate swelling at the knee and into the calf.  GAIT:  Weightbearing Status: WBAT  Assistive Device(s): Cane (single end)  Gait Deviations: Antalgic  Step to gait pattern without knee flexion on the  right or pushoff with single end cane.  Patient was instructed to utilize a walker.  ROM:  AROM right knee supine: 15-70 degrees  STRENGTH:  Decreased  TREATMENT: Instruction in home exercise program to include exercises, icing and elevating.  Exercise performed in supine: Heel slides with sliding board, quad sets, ankle pumps.  Vasopneumatic device: Right knee elevated 10 minutes for swelling.      Assessment & Plan   CLINICAL IMPRESSIONS  Medical Diagnosis: Primary osteoarthritis of right knee    Treatment Diagnosis: Primary osteoarthritis of right knee, Pain and swelling of right knee, decreased ROM right knee   Impression/Assessment: Patient is a 68 year old female with right knee pain and swelling following a right total knee arthroplasty.  The following significant findings have been identified: Pain, Decreased ROM/flexibility, Decreased joint mobility, Decreased strength, Edema, Impaired gait, and Decreased activity tolerance. These impairments interfere with their ability to perform self care tasks, recreational activities, household chores, driving , household mobility, and community mobility as compared to previous level of function.     Clinical Decision Making (Complexity):  Clinical Presentation: Stable/Uncomplicated  Clinical Presentation Rationale: based on medical and personal factors listed in PT evaluation  Clinical Decision Making (Complexity): Low complexity    PLAN OF CARE  Treatment Interventions:  Modalities: Vasoneumatic Device  Interventions: Gait Training, Manual Therapy, Therapeutic Exercise    Long Term Goals     PT Goal 1  Goal Identifier: HEP  Goal Description: Patient will be compliant with home exercise program 6 days a week for success status post right 20 arthroplasty  Target Date: 10/05/23  PT Goal 2  Goal Identifier: ROM  Goal Description: Patient will have right knee flexion to 120 degrees for adequate range of motion to ambulate on the stairs.  Target Date: 12/14/23  PT Goal  3  Goal Identifier: Gait  Goal Description: Patient will ambulate without need of assistive device with a heel-to-toe gait pattern safely to be a community ambulator.  Target Date: 11/16/23  PT Goal 4  Goal Identifier: Pain/Sleep  Goal Description: Patient was able to sleep without waking more than 1 time a night secondary to right knee pain symptoms  Target Date: 11/16/23      Frequency of Treatment: 2 times a week  Duration of Treatment: 12 weeks    Education Assessment:   Learner/Method: Patient;Listening;Demonstration    Risks and benefits of evaluation/treatment have been explained.   Patient/Family/caregiver agrees with Plan of Care.     Evaluation Time:     PT Eval, Low Complexity Minutes (17776): 15     Signing Clinician: JUDITH Hartley [facility]                                                                                   OUTPATIENT PHYSICAL THERAPY      PLAN OF TREATMENT FOR OUTPATIENT REHABILITATION   Patient's Last Name, First Name, M.Ivy Blevins YOB: 1955   Provider's Name   [facility]   Medical Record No.  [id number]     Onset Date: 09/11/23  Start of Care Date: 09/21/23     Medical Diagnosis:  Primary osteoarthritis of right knee      PT Treatment Diagnosis:  Primary osteoarthritis of right knee, Pain and swelling of right knee, decreased ROM right knee Plan of Treatment  Frequency/Duration: 2 times a week/ 12 weeks    Certification date from 09/21/23 to 12/14/23         See note for plan of treatment details and functional goals     Elena Paul, PT                         I CERTIFY THE NEED FOR THESE SERVICES FURNISHED UNDER        THIS PLAN OF TREATMENT AND WHILE UNDER MY CARE     (Physician attestation of this document indicates review and certification of the therapy plan).                Referring Provider:  Mack Cohen      Initial Assessment  See Epic Evaluation- Start of  Care Date: 09/21/23

## 2023-09-25 ENCOUNTER — OFFICE VISIT (OUTPATIENT)
Dept: ORTHOPEDICS | Facility: OTHER | Age: 68
End: 2023-09-25
Attending: ORTHOPAEDIC SURGERY
Payer: MEDICARE

## 2023-09-25 DIAGNOSIS — M17.11 PRIMARY OSTEOARTHRITIS OF RIGHT KNEE: Primary | ICD-10-CM

## 2023-09-25 PROCEDURE — G0463 HOSPITAL OUTPT CLINIC VISIT: HCPCS

## 2023-09-25 PROCEDURE — 99495 TRANSJ CARE MGMT MOD F2F 14D: CPT | Performed by: ORTHOPAEDIC SURGERY

## 2023-09-25 PROCEDURE — 99024 POSTOP FOLLOW-UP VISIT: CPT | Performed by: ORTHOPAEDIC SURGERY

## 2023-09-25 NOTE — PROGRESS NOTES
Subjective:    68-year-old female status post right total knee arthroplasty doing well.  She has very little complaints with her knee    Objective:    On examination she has approximately 70 degrees of flexion and her knee is completely benign.  No evidence for infection    Assessment:    68-year-old female doing well status post right total knee arthroplasty 2 weeks out now    Plan:    She is doing very well at this point her staples were removed and her Steri-Strips were applied.  We will see her back in approximately 4 weeks with an x-ray.  She is to continue her physical therapy.

## 2023-09-26 ENCOUNTER — THERAPY VISIT (OUTPATIENT)
Dept: PHYSICAL THERAPY | Facility: OTHER | Age: 68
End: 2023-09-26
Attending: ORTHOPAEDIC SURGERY
Payer: MEDICARE

## 2023-09-26 ENCOUNTER — TELEPHONE (OUTPATIENT)
Dept: FAMILY MEDICINE | Facility: OTHER | Age: 68
End: 2023-09-26

## 2023-09-26 DIAGNOSIS — M25.661 DECREASED ROM OF RIGHT KNEE: ICD-10-CM

## 2023-09-26 DIAGNOSIS — M25.461 PAIN AND SWELLING OF RIGHT KNEE: ICD-10-CM

## 2023-09-26 DIAGNOSIS — M17.11 PRIMARY OSTEOARTHRITIS OF RIGHT KNEE: Primary | ICD-10-CM

## 2023-09-26 DIAGNOSIS — R35.89 POLYURIA: Primary | ICD-10-CM

## 2023-09-26 DIAGNOSIS — M25.561 PAIN AND SWELLING OF RIGHT KNEE: ICD-10-CM

## 2023-09-26 PROCEDURE — 97110 THERAPEUTIC EXERCISES: CPT | Mod: GP

## 2023-09-26 PROCEDURE — 97016 VASOPNEUMATIC DEVICE THERAPY: CPT | Mod: GP

## 2023-09-26 NOTE — TELEPHONE ENCOUNTER
Patient states she was prescribed oxycodone for her knee. But every time she takes it she has to go to the bathroom. She stopped taking it and is not having to urinate so often every couple hours or so.   She is wondering if she can have something else for the pain.     Offered to send message to ask provider but Notified she may need to talk to surgeon or have appt.   Ashley Slater LPN .............9/26/2023     12:14 PM

## 2023-09-26 NOTE — TELEPHONE ENCOUNTER
The pain medicine patient is taking since her knee surgery is causing her to have to urinate frequently. She is wondering if there is something else she can take or what can be done. Please call.    Nancy Ruiz on 9/26/2023 at 12:02 PM

## 2023-09-26 NOTE — TELEPHONE ENCOUNTER
That would be a very unusual side effect of pain medication.  Perhaps she has a UTI.  I placed an order for urinalysis and she should come in for lab only.  If she needs something different for pain control, she should contact her orthopedic surgeon or schedule appointment with me in clinic.    Migel Villalta MD

## 2023-09-29 ENCOUNTER — THERAPY VISIT (OUTPATIENT)
Dept: PHYSICAL THERAPY | Facility: OTHER | Age: 68
End: 2023-09-29
Attending: ORTHOPAEDIC SURGERY
Payer: MEDICARE

## 2023-09-29 DIAGNOSIS — M17.11 PRIMARY OSTEOARTHRITIS OF RIGHT KNEE: Primary | ICD-10-CM

## 2023-09-29 DIAGNOSIS — M25.461 PAIN AND SWELLING OF RIGHT KNEE: ICD-10-CM

## 2023-09-29 DIAGNOSIS — M25.661 DECREASED ROM OF RIGHT KNEE: ICD-10-CM

## 2023-09-29 DIAGNOSIS — M25.561 PAIN AND SWELLING OF RIGHT KNEE: ICD-10-CM

## 2023-09-29 PROCEDURE — 97110 THERAPEUTIC EXERCISES: CPT | Mod: GP

## 2023-09-29 PROCEDURE — 97016 VASOPNEUMATIC DEVICE THERAPY: CPT | Mod: GP

## 2023-10-03 ENCOUNTER — THERAPY VISIT (OUTPATIENT)
Dept: PHYSICAL THERAPY | Facility: OTHER | Age: 68
End: 2023-10-03
Attending: ORTHOPAEDIC SURGERY
Payer: MEDICARE

## 2023-10-03 DIAGNOSIS — M25.661 DECREASED ROM OF RIGHT KNEE: ICD-10-CM

## 2023-10-03 DIAGNOSIS — M25.461 PAIN AND SWELLING OF RIGHT KNEE: ICD-10-CM

## 2023-10-03 DIAGNOSIS — M25.561 PAIN AND SWELLING OF RIGHT KNEE: ICD-10-CM

## 2023-10-03 DIAGNOSIS — M17.11 PRIMARY OSTEOARTHRITIS OF RIGHT KNEE: Primary | ICD-10-CM

## 2023-10-03 PROCEDURE — 97110 THERAPEUTIC EXERCISES: CPT | Mod: GP

## 2023-10-03 PROCEDURE — 97016 VASOPNEUMATIC DEVICE THERAPY: CPT | Mod: GP

## 2023-10-06 ENCOUNTER — THERAPY VISIT (OUTPATIENT)
Dept: PHYSICAL THERAPY | Facility: OTHER | Age: 68
End: 2023-10-06
Attending: ORTHOPAEDIC SURGERY
Payer: MEDICARE

## 2023-10-06 DIAGNOSIS — M25.461 PAIN AND SWELLING OF RIGHT KNEE: ICD-10-CM

## 2023-10-06 DIAGNOSIS — M25.661 DECREASED ROM OF RIGHT KNEE: ICD-10-CM

## 2023-10-06 DIAGNOSIS — M25.561 PAIN AND SWELLING OF RIGHT KNEE: ICD-10-CM

## 2023-10-06 DIAGNOSIS — M17.11 PRIMARY OSTEOARTHRITIS OF RIGHT KNEE: Primary | ICD-10-CM

## 2023-10-06 PROCEDURE — 97110 THERAPEUTIC EXERCISES: CPT | Mod: GP

## 2023-10-06 PROCEDURE — 97016 VASOPNEUMATIC DEVICE THERAPY: CPT | Mod: GP

## 2023-10-10 ENCOUNTER — THERAPY VISIT (OUTPATIENT)
Dept: PHYSICAL THERAPY | Facility: OTHER | Age: 68
End: 2023-10-10
Attending: ORTHOPAEDIC SURGERY
Payer: MEDICARE

## 2023-10-10 DIAGNOSIS — M25.661 DECREASED ROM OF RIGHT KNEE: ICD-10-CM

## 2023-10-10 DIAGNOSIS — M17.11 PRIMARY OSTEOARTHRITIS OF RIGHT KNEE: Primary | ICD-10-CM

## 2023-10-10 DIAGNOSIS — M25.461 PAIN AND SWELLING OF RIGHT KNEE: ICD-10-CM

## 2023-10-10 DIAGNOSIS — M25.561 PAIN AND SWELLING OF RIGHT KNEE: ICD-10-CM

## 2023-10-10 PROCEDURE — 97110 THERAPEUTIC EXERCISES: CPT | Mod: GP

## 2023-10-10 PROCEDURE — 97016 VASOPNEUMATIC DEVICE THERAPY: CPT | Mod: GP

## 2023-10-13 ENCOUNTER — THERAPY VISIT (OUTPATIENT)
Dept: PHYSICAL THERAPY | Facility: OTHER | Age: 68
End: 2023-10-13
Attending: ORTHOPAEDIC SURGERY
Payer: MEDICARE

## 2023-10-13 DIAGNOSIS — M25.561 PAIN AND SWELLING OF RIGHT KNEE: ICD-10-CM

## 2023-10-13 DIAGNOSIS — M25.461 PAIN AND SWELLING OF RIGHT KNEE: ICD-10-CM

## 2023-10-13 DIAGNOSIS — M25.661 DECREASED ROM OF RIGHT KNEE: ICD-10-CM

## 2023-10-13 DIAGNOSIS — M17.11 PRIMARY OSTEOARTHRITIS OF RIGHT KNEE: Primary | ICD-10-CM

## 2023-10-13 PROCEDURE — 97110 THERAPEUTIC EXERCISES: CPT | Mod: GP

## 2023-10-17 ENCOUNTER — THERAPY VISIT (OUTPATIENT)
Dept: PHYSICAL THERAPY | Facility: OTHER | Age: 68
End: 2023-10-17
Attending: ORTHOPAEDIC SURGERY
Payer: MEDICARE

## 2023-10-17 DIAGNOSIS — M25.461 PAIN AND SWELLING OF RIGHT KNEE: ICD-10-CM

## 2023-10-17 DIAGNOSIS — M17.11 PRIMARY OSTEOARTHRITIS OF RIGHT KNEE: Primary | ICD-10-CM

## 2023-10-17 DIAGNOSIS — M25.661 DECREASED ROM OF RIGHT KNEE: ICD-10-CM

## 2023-10-17 DIAGNOSIS — M25.561 PAIN AND SWELLING OF RIGHT KNEE: ICD-10-CM

## 2023-10-17 PROCEDURE — 97110 THERAPEUTIC EXERCISES: CPT | Mod: GP

## 2023-10-19 DIAGNOSIS — Z96.651 STATUS POST TOTAL RIGHT KNEE REPLACEMENT: Primary | ICD-10-CM

## 2023-10-23 ENCOUNTER — OFFICE VISIT (OUTPATIENT)
Dept: ORTHOPEDICS | Facility: OTHER | Age: 68
End: 2023-10-23
Attending: ORTHOPAEDIC SURGERY
Payer: MEDICARE

## 2023-10-23 ENCOUNTER — HOSPITAL ENCOUNTER (OUTPATIENT)
Dept: GENERAL RADIOLOGY | Facility: OTHER | Age: 68
Discharge: HOME | End: 2023-10-23
Attending: ORTHOPAEDIC SURGERY
Payer: MEDICARE

## 2023-10-23 DIAGNOSIS — Z96.651 STATUS POST TOTAL RIGHT KNEE REPLACEMENT: ICD-10-CM

## 2023-10-23 DIAGNOSIS — Z96.651 STATUS POST TOTAL RIGHT KNEE REPLACEMENT: Primary | ICD-10-CM

## 2023-10-23 PROCEDURE — G0463 HOSPITAL OUTPT CLINIC VISIT: HCPCS

## 2023-10-23 PROCEDURE — 99024 POSTOP FOLLOW-UP VISIT: CPT | Performed by: ORTHOPAEDIC SURGERY

## 2023-10-23 PROCEDURE — 99495 TRANSJ CARE MGMT MOD F2F 14D: CPT | Performed by: ORTHOPAEDIC SURGERY

## 2023-10-23 PROCEDURE — 73562 X-RAY EXAM OF KNEE 3: CPT | Mod: RT

## 2023-10-23 NOTE — PROGRESS NOTES
Subjective:    68-year-old female status post right total knee arthroplasty doing very well at this point.  She did have an issue with some pain previously but that has seemed to resolve at this point.    Objective:    On examination today this is a 68-year-old female in no acute distress.  Very pleasant on examination.  Her wound is completely benign.  There is no evidence for infection.  No drainage.  She is stable to varus and valgus stress.    Assessment:    68-year-old female doing very well 6 weeks status post total knee arthroplasty right knee.    Plan:    Continue PT at this time follow-up 6 weeks

## 2023-10-24 ENCOUNTER — THERAPY VISIT (OUTPATIENT)
Dept: PHYSICAL THERAPY | Facility: OTHER | Age: 68
End: 2023-10-24
Attending: ORTHOPAEDIC SURGERY
Payer: MEDICARE

## 2023-10-24 DIAGNOSIS — M25.461 PAIN AND SWELLING OF RIGHT KNEE: ICD-10-CM

## 2023-10-24 DIAGNOSIS — M25.561 PAIN AND SWELLING OF RIGHT KNEE: ICD-10-CM

## 2023-10-24 DIAGNOSIS — M17.11 PRIMARY OSTEOARTHRITIS OF RIGHT KNEE: Primary | ICD-10-CM

## 2023-10-24 DIAGNOSIS — M25.661 DECREASED ROM OF RIGHT KNEE: ICD-10-CM

## 2023-10-24 PROCEDURE — 97110 THERAPEUTIC EXERCISES: CPT | Mod: GP

## 2023-10-27 ENCOUNTER — THERAPY VISIT (OUTPATIENT)
Dept: PHYSICAL THERAPY | Facility: OTHER | Age: 68
End: 2023-10-27
Attending: ORTHOPAEDIC SURGERY
Payer: MEDICARE

## 2023-10-27 DIAGNOSIS — M25.661 DECREASED ROM OF RIGHT KNEE: ICD-10-CM

## 2023-10-27 DIAGNOSIS — M25.461 PAIN AND SWELLING OF RIGHT KNEE: ICD-10-CM

## 2023-10-27 DIAGNOSIS — M17.11 PRIMARY OSTEOARTHRITIS OF RIGHT KNEE: Primary | ICD-10-CM

## 2023-10-27 DIAGNOSIS — M25.561 PAIN AND SWELLING OF RIGHT KNEE: ICD-10-CM

## 2023-10-27 PROCEDURE — 97110 THERAPEUTIC EXERCISES: CPT | Mod: GP

## 2023-10-27 NOTE — PROGRESS NOTES
10/27/23 0500   Appointment Info   Signing clinician's name / credentials Elena Paul DPT   Total/Authorized Visits 10   Visits Used 10/10   Medical Diagnosis Primary osteoarthritis of right knee   PT Tx Diagnosis Primary osteoarthritis of right knee, Pain and swelling of right knee, decreased ROM right knee   Other pertinent information R TKA 9/11/23   Progress Note/Certification   Start of Care Date 09/21/23   Onset of illness/injury or Date of Surgery 09/11/23   Therapy Frequency 2 times a week   Predicted Duration 12 weeks   Certification date from 09/21/23   Certification date to 12/14/23   PT Goal 1   Goal Identifier HEP   Goal Description Patient will be compliant with home exercise program 6 days a week for success status post right 20 arthroplasty   Target Date 10/05/23   Date Met 10/27/23   PT Goal 2   Goal Identifier ROM   Goal Description Patient will have right knee flexion to 120 degrees for adequate range of motion to ambulate on the stairs.   Target Date 12/14/23   Goal Progress Progressing, variable   PT Goal 3   Goal Identifier Gait   Goal Description Patient will ambulate without need of assistive device with a heel-to-toe gait pattern safely to be a community ambulator.   Target Date 11/16/23   Goal Progress Progressing-ambulates without assitive device in PT gym   PT Goal 4   Goal Identifier Pain/Sleep   Goal Description Patient was able to sleep without waking more than 1 time a night secondary to right knee pain symptoms   Target Date 11/16/23   Subjective Report   Subjective Report Returns s/p R TKA, had one pain free day then next day felt tight and sore again. Feels tight again.   Objective Measure 1   Objective Measure Pain   Objective Measure 2   Objective Measure ROM   Details Seated AROM: R Flex 94 degrees   Objective Measure 3   Objective Measure Gait   Details Improving, able to ambulate in department without FWW   Therapeutic Procedure/Exercise   Ther Proc 1 ROM, HEP,  "Strength   Ther Proc 1 - Details FWW. NuStep: sea#8-#6 lvl 2 x 10 mins (for ROM).  Leg Press: seat 23 (seat full upright) Dbl #90 x30. Standing: standing calf raises x 20, gastroc stretch R x2 (30 secs), mini-squat at railing x 15, 6\" fwd step up R x15.  Seated heel slide with sliding disc x 10.  Knee Flex stretch on 6\" step on/off x15 (long holds encouraged).  HELD today (to perform as part of HEP)- Supine: AROM heel slide x 10, PROM heel slide x 3,  knee extension stretch with towel roll at heel x 2 (30 secs) SAQ x 10.   Skilled Intervention Instruction in exercise, HEP, and reps. Verbal cues for relaxtion, positioning, assist for ROM   Patient Response/Progress Favorable,improving pain and function. Sllightly more stiff today.   Education   Learner/Method Patient;Listening;Demonstration   Plan   Home program Heel slides (supine and seated), quad sets, , knee flexion and extension stretching, mini-squats, SAQ, keyana raises   Plan for next session Continue per protocol, ROM, strengthening, continue to work on gait mechanics and progressing to full gait capabilities without assistive device.         PLAN  Continue therapy per current plan of care.    Beginning/End Dates of Progress Note Reporting Period:    to 10/27/2023    Referring Provider:  Mack Cohen   "

## 2023-11-03 ENCOUNTER — THERAPY VISIT (OUTPATIENT)
Dept: PHYSICAL THERAPY | Facility: OTHER | Age: 68
End: 2023-11-03
Attending: ORTHOPAEDIC SURGERY
Payer: MEDICARE

## 2023-11-03 DIAGNOSIS — M25.661 DECREASED ROM OF RIGHT KNEE: ICD-10-CM

## 2023-11-03 DIAGNOSIS — M25.461 PAIN AND SWELLING OF RIGHT KNEE: ICD-10-CM

## 2023-11-03 DIAGNOSIS — M25.561 PAIN AND SWELLING OF RIGHT KNEE: ICD-10-CM

## 2023-11-03 DIAGNOSIS — M17.11 PRIMARY OSTEOARTHRITIS OF RIGHT KNEE: Primary | ICD-10-CM

## 2023-11-03 PROCEDURE — 97110 THERAPEUTIC EXERCISES: CPT | Mod: GP

## 2023-11-07 ENCOUNTER — THERAPY VISIT (OUTPATIENT)
Dept: PHYSICAL THERAPY | Facility: OTHER | Age: 68
End: 2023-11-07
Attending: ORTHOPAEDIC SURGERY
Payer: MEDICARE

## 2023-11-07 DIAGNOSIS — M17.11 PRIMARY OSTEOARTHRITIS OF RIGHT KNEE: Primary | ICD-10-CM

## 2023-11-07 DIAGNOSIS — M25.461 PAIN AND SWELLING OF RIGHT KNEE: ICD-10-CM

## 2023-11-07 DIAGNOSIS — M25.561 PAIN AND SWELLING OF RIGHT KNEE: ICD-10-CM

## 2023-11-07 DIAGNOSIS — M25.661 DECREASED ROM OF RIGHT KNEE: ICD-10-CM

## 2023-11-07 PROCEDURE — 97110 THERAPEUTIC EXERCISES: CPT | Mod: GP

## 2023-11-14 ENCOUNTER — THERAPY VISIT (OUTPATIENT)
Dept: PHYSICAL THERAPY | Facility: OTHER | Age: 68
End: 2023-11-14
Attending: ORTHOPAEDIC SURGERY
Payer: MEDICARE

## 2023-11-14 DIAGNOSIS — M17.11 PRIMARY OSTEOARTHRITIS OF RIGHT KNEE: Primary | ICD-10-CM

## 2023-11-14 PROCEDURE — 97110 THERAPEUTIC EXERCISES: CPT | Mod: GP,CQ

## 2023-11-15 DIAGNOSIS — G89.4 CHRONIC PAIN DISORDER: ICD-10-CM

## 2023-11-17 NOTE — TELEPHONE ENCOUNTER
[facility] sent Rx request for the following:      Requested Prescriptions   Pending Prescriptions Disp Refills    gabapentin (NEURONTIN) 600 MG tablet [Pharmacy Med Name: GABAPENTIN 600MG TABLETS] 270 tablet 3     Sig: TAKE 1 TABLET BY MOUTH THREE TIMES DAILY       There is no refill protocol information for this order          Last Prescription Date:   10/20/22  Last Fill Qty/Refills:         270, R-3    Last Office Visit:              9/5/23   Future Office visit:           none    Routing refill request to provider for review/approval because:  Drug not on the [facility] refill protocol     Roseann Polk RN on 11/17/2023 at 12:02 PM

## 2023-11-21 ENCOUNTER — THERAPY VISIT (OUTPATIENT)
Dept: PHYSICAL THERAPY | Facility: OTHER | Age: 68
End: 2023-11-21
Attending: ORTHOPAEDIC SURGERY
Payer: MEDICARE

## 2023-11-21 DIAGNOSIS — M17.11 PRIMARY OSTEOARTHRITIS OF RIGHT KNEE: Primary | ICD-10-CM

## 2023-11-21 DIAGNOSIS — M25.661 DECREASED ROM OF RIGHT KNEE: ICD-10-CM

## 2023-11-21 DIAGNOSIS — M25.461 PAIN AND SWELLING OF RIGHT KNEE: ICD-10-CM

## 2023-11-21 DIAGNOSIS — M25.561 PAIN AND SWELLING OF RIGHT KNEE: ICD-10-CM

## 2023-11-21 PROCEDURE — 97110 THERAPEUTIC EXERCISES: CPT | Mod: GP

## 2023-11-24 RX ORDER — GABAPENTIN 600 MG/1
TABLET ORAL
Qty: 270 TABLET | Refills: 3 | Status: SHIPPED | OUTPATIENT
Start: 2023-11-24 | End: 2024-04-02

## 2023-11-28 ENCOUNTER — THERAPY VISIT (OUTPATIENT)
Dept: PHYSICAL THERAPY | Facility: OTHER | Age: 68
End: 2023-11-28
Attending: ORTHOPAEDIC SURGERY
Payer: MEDICARE

## 2023-11-28 DIAGNOSIS — M17.11 PRIMARY OSTEOARTHRITIS OF RIGHT KNEE: Primary | ICD-10-CM

## 2023-11-28 DIAGNOSIS — M25.661 DECREASED ROM OF RIGHT KNEE: ICD-10-CM

## 2023-11-28 DIAGNOSIS — M25.461 PAIN AND SWELLING OF RIGHT KNEE: ICD-10-CM

## 2023-11-28 DIAGNOSIS — M25.561 PAIN AND SWELLING OF RIGHT KNEE: ICD-10-CM

## 2023-11-28 PROCEDURE — 97110 THERAPEUTIC EXERCISES: CPT | Mod: GP

## 2023-11-29 DIAGNOSIS — F33.2 SEVERE EPISODE OF RECURRENT MAJOR DEPRESSIVE DISORDER, WITHOUT PSYCHOTIC FEATURES (H): ICD-10-CM

## 2023-12-01 ENCOUNTER — THERAPY VISIT (OUTPATIENT)
Dept: PHYSICAL THERAPY | Facility: OTHER | Age: 68
End: 2023-12-01
Attending: ORTHOPAEDIC SURGERY
Payer: MEDICARE

## 2023-12-01 DIAGNOSIS — M25.661 DECREASED ROM OF RIGHT KNEE: ICD-10-CM

## 2023-12-01 DIAGNOSIS — M17.11 PRIMARY OSTEOARTHRITIS OF RIGHT KNEE: Primary | ICD-10-CM

## 2023-12-01 DIAGNOSIS — M25.461 PAIN AND SWELLING OF RIGHT KNEE: ICD-10-CM

## 2023-12-01 DIAGNOSIS — M25.561 PAIN AND SWELLING OF RIGHT KNEE: ICD-10-CM

## 2023-12-01 PROCEDURE — 97110 THERAPEUTIC EXERCISES: CPT | Mod: GP

## 2023-12-04 ENCOUNTER — OFFICE VISIT (OUTPATIENT)
Dept: ORTHOPEDICS | Facility: OTHER | Age: 68
End: 2023-12-04
Attending: ORTHOPAEDIC SURGERY
Payer: MEDICARE

## 2023-12-04 DIAGNOSIS — Z96.651 STATUS POST TOTAL RIGHT KNEE REPLACEMENT: Primary | ICD-10-CM

## 2023-12-04 PROCEDURE — 99495 TRANSJ CARE MGMT MOD F2F 14D: CPT | Performed by: ORTHOPAEDIC SURGERY

## 2023-12-04 PROCEDURE — 99024 POSTOP FOLLOW-UP VISIT: CPT | Performed by: ORTHOPAEDIC SURGERY

## 2023-12-04 PROCEDURE — G0463 HOSPITAL OUTPT CLINIC VISIT: HCPCS

## 2023-12-04 NOTE — PROGRESS NOTES
Subjective:    68-year-old female is now 12 weeks status post right total knee arthroplasty.  She is doing exceptionally well.  She has very few complaints with her right total knee other than it hurts once in a great while.  She is very happy with her range of motion otherwise    Objective:    On examination today this is a 68-year-old female in no acute distress.  Very pleasant on examination.  She has full range of motion of her knee.  She stable to varus and valgus stress and nontender to palpation about the knee.    Assessment:    68-year-old female status post successful right total knee arthroplasty    Plan:    We will see her back in 9 months with an x-ray of her knee.

## 2023-12-05 ENCOUNTER — THERAPY VISIT (OUTPATIENT)
Dept: PHYSICAL THERAPY | Facility: OTHER | Age: 68
End: 2023-12-05
Attending: ORTHOPAEDIC SURGERY
Payer: MEDICARE

## 2023-12-05 DIAGNOSIS — M25.661 DECREASED ROM OF RIGHT KNEE: ICD-10-CM

## 2023-12-05 DIAGNOSIS — M25.561 PAIN AND SWELLING OF RIGHT KNEE: ICD-10-CM

## 2023-12-05 DIAGNOSIS — M25.461 PAIN AND SWELLING OF RIGHT KNEE: ICD-10-CM

## 2023-12-05 DIAGNOSIS — M17.11 PRIMARY OSTEOARTHRITIS OF RIGHT KNEE: Primary | ICD-10-CM

## 2023-12-05 PROCEDURE — 97110 THERAPEUTIC EXERCISES: CPT | Mod: GP

## 2023-12-05 RX ORDER — VENLAFAXINE HYDROCHLORIDE 150 MG/1
CAPSULE, EXTENDED RELEASE ORAL
Qty: 90 CAPSULE | Refills: 3 | Status: SHIPPED | OUTPATIENT
Start: 2023-12-05

## 2023-12-05 NOTE — TELEPHONE ENCOUNTER
[facility] sent Rx request for the following:      Requested Prescriptions   Pending Prescriptions Disp Refills    venlafaxine (EFFEXOR XR) 150 MG 24 hr capsule [Pharmacy Med Name: VENLAFAXINE ER 150MG CAPSULES] 90 capsule 3     Sig: TAKE 1 CAPSULE(150 MG) BY MOUTH DAILY WITH FOOD       Serotonin-Norepinephrine Reuptake Inhibitors  Failed - 12/5/2023  8:44 AM        Failed - PHQ-9 score of less than 5 in past 6 months     Please review last PHQ-9 score.           Failed - Normal serum creatinine on file in past 12 months     Recent Labs   Lab Test 09/05/23  1254   CR 1.48*       Ok to refill medication if creatinine is low            Last Prescription Date:   12/20/22  Last Fill Qty/Refills:         90, R-3    Last Office Visit:              9/5/23   Future Office visit:           4/2/24    Routing refill request to provider for review/approval because:  Labs out of range    RYLAN HERNANDEZ RN on 12/5/2023 at 8:46 AM

## 2023-12-08 ENCOUNTER — THERAPY VISIT (OUTPATIENT)
Dept: PHYSICAL THERAPY | Facility: OTHER | Age: 68
End: 2023-12-08
Attending: ORTHOPAEDIC SURGERY
Payer: MEDICARE

## 2023-12-08 DIAGNOSIS — M17.11 PRIMARY OSTEOARTHRITIS OF RIGHT KNEE: Primary | ICD-10-CM

## 2023-12-08 PROCEDURE — 97110 THERAPEUTIC EXERCISES: CPT | Mod: GP,CQ

## 2023-12-11 ENCOUNTER — THERAPY VISIT (OUTPATIENT)
Dept: PHYSICAL THERAPY | Facility: OTHER | Age: 68
End: 2023-12-11
Attending: ORTHOPAEDIC SURGERY
Payer: MEDICARE

## 2023-12-11 DIAGNOSIS — M17.11 PRIMARY OSTEOARTHRITIS OF RIGHT KNEE: Primary | ICD-10-CM

## 2023-12-11 PROCEDURE — 97110 THERAPEUTIC EXERCISES: CPT | Mod: GP,CQ

## 2023-12-20 ENCOUNTER — THERAPY VISIT (OUTPATIENT)
Dept: PHYSICAL THERAPY | Facility: OTHER | Age: 68
End: 2023-12-20
Attending: ORTHOPAEDIC SURGERY
Payer: MEDICARE

## 2023-12-20 DIAGNOSIS — M17.11 PRIMARY OSTEOARTHRITIS OF RIGHT KNEE: Primary | ICD-10-CM

## 2023-12-20 PROCEDURE — 97110 THERAPEUTIC EXERCISES: CPT | Mod: GP,CQ

## 2023-12-22 ENCOUNTER — THERAPY VISIT (OUTPATIENT)
Dept: PHYSICAL THERAPY | Facility: OTHER | Age: 68
End: 2023-12-22
Attending: ORTHOPAEDIC SURGERY
Payer: MEDICARE

## 2023-12-22 DIAGNOSIS — M25.461 PAIN AND SWELLING OF RIGHT KNEE: ICD-10-CM

## 2023-12-22 DIAGNOSIS — M25.661 DECREASED ROM OF RIGHT KNEE: ICD-10-CM

## 2023-12-22 DIAGNOSIS — M25.561 PAIN AND SWELLING OF RIGHT KNEE: ICD-10-CM

## 2023-12-22 DIAGNOSIS — M17.11 PRIMARY OSTEOARTHRITIS OF RIGHT KNEE: Primary | ICD-10-CM

## 2023-12-22 PROCEDURE — 97110 THERAPEUTIC EXERCISES: CPT | Mod: GP

## 2023-12-27 ENCOUNTER — THERAPY VISIT (OUTPATIENT)
Dept: PHYSICAL THERAPY | Facility: OTHER | Age: 68
End: 2023-12-27
Attending: ORTHOPAEDIC SURGERY
Payer: MEDICARE

## 2023-12-27 DIAGNOSIS — M25.561 PAIN AND SWELLING OF RIGHT KNEE: ICD-10-CM

## 2023-12-27 DIAGNOSIS — M25.461 PAIN AND SWELLING OF RIGHT KNEE: ICD-10-CM

## 2023-12-27 DIAGNOSIS — M25.661 DECREASED ROM OF RIGHT KNEE: ICD-10-CM

## 2023-12-27 DIAGNOSIS — M17.11 PRIMARY OSTEOARTHRITIS OF RIGHT KNEE: Primary | ICD-10-CM

## 2023-12-27 PROCEDURE — 97110 THERAPEUTIC EXERCISES: CPT | Mod: GP

## 2023-12-29 ENCOUNTER — THERAPY VISIT (OUTPATIENT)
Dept: PHYSICAL THERAPY | Facility: OTHER | Age: 68
End: 2023-12-29
Attending: ORTHOPAEDIC SURGERY
Payer: MEDICARE

## 2023-12-29 DIAGNOSIS — M17.11 PRIMARY OSTEOARTHRITIS OF RIGHT KNEE: Primary | ICD-10-CM

## 2023-12-29 PROCEDURE — 97110 THERAPEUTIC EXERCISES: CPT | Mod: GP,CQ

## 2024-01-05 ENCOUNTER — THERAPY VISIT (OUTPATIENT)
Dept: PHYSICAL THERAPY | Facility: OTHER | Age: 69
End: 2024-01-05
Attending: ORTHOPAEDIC SURGERY
Payer: MEDICARE

## 2024-01-05 DIAGNOSIS — M17.11 PRIMARY OSTEOARTHRITIS OF RIGHT KNEE: Primary | ICD-10-CM

## 2024-01-05 DIAGNOSIS — M25.661 DECREASED ROM OF RIGHT KNEE: ICD-10-CM

## 2024-01-05 PROCEDURE — 97110 THERAPEUTIC EXERCISES: CPT | Mod: GP

## 2024-01-05 NOTE — PROGRESS NOTES
01/05/24 0500   Appointment Info   Signing clinician's name / credentials Elena Paul DPT   Total/Authorized Visits 24   Visits Used 4/10   Medical Diagnosis Primary osteoarthritis of right knee   PT Tx Diagnosis Primary osteoarthritis of right knee, Pain and swelling of right knee, decreased ROM right knee   Other pertinent information R TKA 9/11/23   Progress Note/Certification   Start of Care Date 09/21/23   Onset of illness/injury or Date of Surgery 09/11/23   Therapy Frequency 2 times a week   Predicted Duration 12 weeks   Certification date from 09/21/23   Certification date to 12/14/23   Supervision   PT Assistant Visit Number 4   PT Goal 1   Goal Identifier HEP   Goal Description Patient will be compliant with home exercise program 6 days a week for success status post right 20 arthroplasty   Target Date 10/05/23   Date Met 10/27/23   PT Goal 2   Goal Identifier ROM   Goal Description Patient will have right knee flexion to 120 degrees for adequate range of motion to ambulate on the stairs.   Goal Progress Progressing, variable   Target Date 12/14/23   PT Goal 3   Goal Identifier Gait   Goal Description Patient will ambulate without need of assistive device with a heel-to-toe gait pattern safely to be a community ambulator.   Goal Progress Met   Target Date 11/16/23   Date Met 01/05/24   PT Goal 4   Goal Identifier Pain/Sleep   Goal Description Patient was able to sleep without waking more than 1 time a night secondary to right knee pain symptoms   Target Date 11/16/23   Date Met 01/05/24   Subjective Report   Subjective Report Reports stiff today at the right knee, possibly due to the weather. Has continued to work on HEP for R TKA, no quesions.   Objective Measure 1   Objective Measure Pain   Details 1/10   Objective Measure 2   Objective Measure ROM   Details R Knee Flex 108 degrees supine, previously to 112   Objective Measure 3   Objective Measure Gait   Details Ambulates without assistive  device safely   Therapeutic Procedure/Exercise   Therapeutic Procedures: strength, endurance, ROM, flexibillity minutes (89479) 40   Ther Proc 1 ROM, HEP, Strength   Ther Proc 1 - Details NuStep Pro: seat #7-#4 lvl 5 x 9 mins. Standing: gastroc stretch x2.  Knee Flex and Ext stretch on stairs (flex to 110 R knee). Stairs: ascending/descending reciprocal x5. Supine: heel slides x15. AROM 3-108 degrees R knee. PROM stretch in knee ext in supine x4 (to tolerance).   Skilled Intervention Instruction in exercise, HEP, and reps. Verbal cues   Patient Response/Progress More stiff today, limits exercise and ROM.   Education   Learner/Method Patient;Listening;Demonstration   Plan   Home program Heel slides (supine and seated), quad sets, , knee flexion and extension stretching, mini-squats, SAQ, keyana raises   Plan for next session Pt to continue with HEP to gain further ROM and strength s/p R TKA. Call with quesions regarding HEP if needed.   Total Session Time   Timed Code Treatment Minutes 40   Total Treatment Time (sum of timed and untimed services) 40         DISCHARGE  Reason for Discharge: Has met her goals with the exception of the range of motion goal, she can continue independently to work on gaining range of motion at the right knee following her right total knee arthroplasty.    Discharge Plan: Patient to continue home program.    Referring Provider:  Mack Cohen

## 2024-01-29 ENCOUNTER — PATIENT OUTREACH (OUTPATIENT)
Dept: GASTROENTEROLOGY | Facility: CLINIC | Age: 69
End: 2024-01-29
Payer: MEDICARE

## 2024-02-08 DIAGNOSIS — F33.2 SEVERE EPISODE OF RECURRENT MAJOR DEPRESSIVE DISORDER, WITHOUT PSYCHOTIC FEATURES (H): ICD-10-CM

## 2024-02-09 NOTE — TELEPHONE ENCOUNTER
[facility] sent Rx request for the following:      Requested Prescriptions   Pending Prescriptions Disp Refills    lamoTRIgine (LAMICTAL) 100 MG tablet [Pharmacy Med Name: LAMOTRIGINE 100MG TABLETS] 90 tablet 4     Sig: TAKE 1 TABLET(100 MG) BY MOUTH EVERY MORNING       Anti-Seizure Meds Protocol  Failed - 2/9/2024  2:53 PM        Failed - Review Authorizing provider's last note.      Refer to last progress notes: confirm request is for original authorizing provider (cannot be through other providers).          Failed - Normal CBC on file in past 26 months     Recent Labs   Lab Test 09/12/23  0549 09/05/23  1254 08/10/18  1346 11/20/17  1356   WBC  --  6.9   < >  --    GICHWBC  --   --   --  10.7   RBC  --  3.97   < >  --    GICHRBC  --   --   --  3.94*   HGB 11.2* 12.1   < > 12.2   HCT  --  38.3   < > 37.7   PLT  --  245   < > 323    < > = values in this interval not displayed.              Last Prescription Date:   1/10/23  Last Fill Qty/Refills:         90, R-4            lamoTRIgine (LAMICTAL) 200 MG tablet [Pharmacy Med Name: LAMOTRIGINE 200MG TABLETS] 90 tablet 3     Sig: TAKE 1 TABLET(200 MG) BY MOUTH AT BEDTIME       Anti-Seizure Meds Protocol  Failed - 2/9/2024  2:53 PM        Failed - Review Authorizing provider's last note.      Refer to last progress notes: confirm request is for original authorizing provider (cannot be through other providers).          Failed - Normal CBC on file in past 26 months     Recent Labs   Lab Test 09/12/23  0549 09/05/23  1254 08/10/18  1346 11/20/17  1356   WBC  --  6.9   < >  --    GICHWBC  --   --   --  10.7   RBC  --  3.97   < >  --    GICHRBC  --   --   --  3.94*   HGB 11.2* 12.1   < > 12.2   HCT  --  38.3   < > 37.7   PLT  --  245   < > 323    < > = values in this interval not displayed.                Last Prescription Date:   3/28/23  Last Fill Qty/Refills:         90, R-3    Last Office Visit:              9/5/23   Future Office visit:            4/2/24    Routing refill request to provider for review/approval because:  Drug not on the [facility] refill protocol     Roseann Polk RN on 2/9/2024 at 2:58 PM

## 2024-02-12 RX ORDER — LAMOTRIGINE 100 MG/1
TABLET ORAL
Qty: 90 TABLET | Refills: 0 | Status: SHIPPED | OUTPATIENT
Start: 2024-02-12 | End: 2024-04-02

## 2024-02-12 RX ORDER — LAMOTRIGINE 200 MG/1
TABLET ORAL
Qty: 90 TABLET | Refills: 0 | Status: SHIPPED | OUTPATIENT
Start: 2024-02-12 | End: 2024-04-02

## 2024-04-02 ENCOUNTER — OFFICE VISIT (OUTPATIENT)
Dept: FAMILY MEDICINE | Facility: OTHER | Age: 69
End: 2024-04-02
Attending: FAMILY MEDICINE
Payer: MEDICARE

## 2024-04-02 ENCOUNTER — HOSPITAL ENCOUNTER (OUTPATIENT)
Dept: MAMMOGRAPHY | Facility: OTHER | Age: 69
Discharge: HOME | End: 2024-04-02
Attending: FAMILY MEDICINE
Payer: MEDICARE

## 2024-04-02 VITALS
OXYGEN SATURATION: 98 % | BODY MASS INDEX: 39.79 KG/M2 | DIASTOLIC BLOOD PRESSURE: 78 MMHG | TEMPERATURE: 98.4 F | HEIGHT: 63 IN | RESPIRATION RATE: 18 BRPM | HEART RATE: 77 BPM | SYSTOLIC BLOOD PRESSURE: 132 MMHG

## 2024-04-02 DIAGNOSIS — G62.9 PERIPHERAL POLYNEUROPATHY: ICD-10-CM

## 2024-04-02 DIAGNOSIS — G89.4 CHRONIC PAIN DISORDER: ICD-10-CM

## 2024-04-02 DIAGNOSIS — E66.01 MORBID OBESITY (H): ICD-10-CM

## 2024-04-02 DIAGNOSIS — Z00.00 MEDICARE ANNUAL WELLNESS VISIT, SUBSEQUENT: Primary | ICD-10-CM

## 2024-04-02 DIAGNOSIS — Z12.31 VISIT FOR SCREENING MAMMOGRAM: ICD-10-CM

## 2024-04-02 DIAGNOSIS — N18.32 CHRONIC KIDNEY DISEASE, STAGE 3B (H): ICD-10-CM

## 2024-04-02 DIAGNOSIS — F33.2 SEVERE EPISODE OF RECURRENT MAJOR DEPRESSIVE DISORDER, WITHOUT PSYCHOTIC FEATURES (H): ICD-10-CM

## 2024-04-02 LAB
ALBUMIN SERPL BCG-MCNC: 4.3 G/DL (ref 3.5–5.2)
ALP SERPL-CCNC: 116 U/L (ref 40–150)
ALT SERPL W P-5'-P-CCNC: 17 U/L (ref 0–50)
ANION GAP SERPL CALCULATED.3IONS-SCNC: 10 MMOL/L (ref 7–15)
AST SERPL W P-5'-P-CCNC: 22 U/L (ref 0–45)
BASOPHILS # BLD AUTO: 0.1 10E3/UL (ref 0–0.2)
BASOPHILS NFR BLD AUTO: 1 %
BILIRUB SERPL-MCNC: 0.3 MG/DL
BUN SERPL-MCNC: 17.1 MG/DL (ref 8–23)
CALCIUM SERPL-MCNC: 9.7 MG/DL (ref 8.8–10.2)
CHLORIDE SERPL-SCNC: 105 MMOL/L (ref 98–107)
CHOLEST SERPL-MCNC: 219 MG/DL
CREAT SERPL-MCNC: 1.49 MG/DL (ref 0.51–0.95)
DEPRECATED HCO3 PLAS-SCNC: 26 MMOL/L (ref 22–29)
EGFRCR SERPLBLD CKD-EPI 2021: 38 ML/MIN/1.73M2
EOSINOPHIL # BLD AUTO: 0.4 10E3/UL (ref 0–0.7)
EOSINOPHIL NFR BLD AUTO: 6 %
ERYTHROCYTE [DISTWIDTH] IN BLOOD BY AUTOMATED COUNT: 14 % (ref 10–15)
FASTING STATUS PATIENT QL REPORTED: NO
GLUCOSE SERPL-MCNC: 104 MG/DL (ref 70–99)
HCT VFR BLD AUTO: 39.7 % (ref 35–47)
HDLC SERPL-MCNC: 56 MG/DL
HGB BLD-MCNC: 12.4 G/DL (ref 11.7–15.7)
IMM GRANULOCYTES # BLD: 0 10E3/UL
IMM GRANULOCYTES NFR BLD: 0 %
LDLC SERPL CALC-MCNC: 142 MG/DL
LYMPHOCYTES # BLD AUTO: 1.5 10E3/UL (ref 0.8–5.3)
LYMPHOCYTES NFR BLD AUTO: 24 %
MCH RBC QN AUTO: 30 PG (ref 26.5–33)
MCHC RBC AUTO-ENTMCNC: 31.2 G/DL (ref 31.5–36.5)
MCV RBC AUTO: 96 FL (ref 78–100)
MONOCYTES # BLD AUTO: 0.4 10E3/UL (ref 0–1.3)
MONOCYTES NFR BLD AUTO: 7 %
NEUTROPHILS # BLD AUTO: 4 10E3/UL (ref 1.6–8.3)
NEUTROPHILS NFR BLD AUTO: 63 %
NONHDLC SERPL-MCNC: 163 MG/DL
NRBC # BLD AUTO: 0 10E3/UL
NRBC BLD AUTO-RTO: 0 /100
PLATELET # BLD AUTO: 270 10E3/UL (ref 150–450)
POTASSIUM SERPL-SCNC: 4.6 MMOL/L (ref 3.4–5.3)
PROT SERPL-MCNC: 7.7 G/DL (ref 6.4–8.3)
RBC # BLD AUTO: 4.13 10E6/UL (ref 3.8–5.2)
SODIUM SERPL-SCNC: 141 MMOL/L (ref 135–145)
TRIGL SERPL-MCNC: 104 MG/DL
WBC # BLD AUTO: 6.4 10E3/UL (ref 4–11)

## 2024-04-02 PROCEDURE — 80061 LIPID PANEL: CPT | Mod: ZL | Performed by: FAMILY MEDICINE

## 2024-04-02 PROCEDURE — 85025 COMPLETE CBC W/AUTO DIFF WBC: CPT | Mod: ZL | Performed by: FAMILY MEDICINE

## 2024-04-02 PROCEDURE — 99213 OFFICE O/P EST LOW 20 MIN: CPT | Mod: 25 | Performed by: FAMILY MEDICINE

## 2024-04-02 PROCEDURE — 36415 COLL VENOUS BLD VENIPUNCTURE: CPT | Mod: ZL | Performed by: FAMILY MEDICINE

## 2024-04-02 PROCEDURE — G0439 PPPS, SUBSEQ VISIT: HCPCS | Performed by: FAMILY MEDICINE

## 2024-04-02 PROCEDURE — G0463 HOSPITAL OUTPT CLINIC VISIT: HCPCS | Mod: 25

## 2024-04-02 PROCEDURE — 77063 BREAST TOMOSYNTHESIS BI: CPT

## 2024-04-02 PROCEDURE — G0463 HOSPITAL OUTPT CLINIC VISIT: HCPCS

## 2024-04-02 PROCEDURE — 80053 COMPREHEN METABOLIC PANEL: CPT | Mod: ZL | Performed by: FAMILY MEDICINE

## 2024-04-02 RX ORDER — LAMOTRIGINE 100 MG/1
TABLET ORAL
Qty: 90 TABLET | Refills: 3 | Status: SHIPPED | OUTPATIENT
Start: 2024-04-02

## 2024-04-02 RX ORDER — LAMOTRIGINE 200 MG/1
TABLET ORAL
Qty: 90 TABLET | Refills: 3 | Status: SHIPPED | OUTPATIENT
Start: 2024-04-02

## 2024-04-02 SDOH — HEALTH STABILITY: PHYSICAL HEALTH: ON AVERAGE, HOW MANY DAYS PER WEEK DO YOU ENGAGE IN MODERATE TO STRENUOUS EXERCISE (LIKE A BRISK WALK)?: 2 DAYS

## 2024-04-02 ASSESSMENT — ANXIETY QUESTIONNAIRES
6. BECOMING EASILY ANNOYED OR IRRITABLE: MORE THAN HALF THE DAYS
7. FEELING AFRAID AS IF SOMETHING AWFUL MIGHT HAPPEN: MORE THAN HALF THE DAYS
7. FEELING AFRAID AS IF SOMETHING AWFUL MIGHT HAPPEN: MORE THAN HALF THE DAYS
5. BEING SO RESTLESS THAT IT IS HARD TO SIT STILL: NOT AT ALL
GAD7 TOTAL SCORE: 12
GAD7 TOTAL SCORE: 12
3. WORRYING TOO MUCH ABOUT DIFFERENT THINGS: NEARLY EVERY DAY
2. NOT BEING ABLE TO STOP OR CONTROL WORRYING: NEARLY EVERY DAY
4. TROUBLE RELAXING: NOT AT ALL
IF YOU CHECKED OFF ANY PROBLEMS ON THIS QUESTIONNAIRE, HOW DIFFICULT HAVE THESE PROBLEMS MADE IT FOR YOU TO DO YOUR WORK, TAKE CARE OF THINGS AT HOME, OR GET ALONG WITH OTHER PEOPLE: SOMEWHAT DIFFICULT
8. IF YOU CHECKED OFF ANY PROBLEMS, HOW DIFFICULT HAVE THESE MADE IT FOR YOU TO DO YOUR WORK, TAKE CARE OF THINGS AT HOME, OR GET ALONG WITH OTHER PEOPLE?: SOMEWHAT DIFFICULT
GAD7 TOTAL SCORE: 12
1. FEELING NERVOUS, ANXIOUS, OR ON EDGE: MORE THAN HALF THE DAYS

## 2024-04-02 ASSESSMENT — SOCIAL DETERMINANTS OF HEALTH (SDOH): HOW OFTEN DO YOU GET TOGETHER WITH FRIENDS OR RELATIVES?: MORE THAN THREE TIMES A WEEK

## 2024-04-02 ASSESSMENT — PAIN SCALES - GENERAL: PAINLEVEL: SEVERE PAIN (7)

## 2024-04-02 NOTE — PROGRESS NOTES
Preventive Care Visit  [facility] AND [facility]  Yodit Villalta MD, Family Medicine  Apr 2, 2024      Assessment & Plan     Medicare annual wellness visit, subsequent  Patient is counseled on importance of regular self breast exams and mammograms every 1-2 years starting at age 40. Patient will proceed with pap smears every 3-5 years until age 65. Discussed importance of calcium and vitamin D supplementation and osteoporosis screening. Immunizations are updated based on CDC recommendations and patients desire. Reviewed importance of sunscreen, limit sun exposure and monitoring for changing moles with ABCDEs. Recommend seatbelt use and helmets with biking, skiing and ATV/Snowmobile use.    Preventive cares are up-to-date.  Recommend annual mammogram.  Severe episode of recurrent major depressive disorder, without psychotic features (H)  Mood has been stable on Lamictal, Wellbutrin and Effexor.  Previously followed by psychiatry but has transferred these prescriptions to myself.  - lamoTRIgine (LAMICTAL) 100 MG tablet; TAKE 1 TABLET(100 MG) BY MOUTH EVERY MORNING  - lamoTRIgine (LAMICTAL) 200 MG tablet; TAKE 1 TABLET(200 MG) BY MOUTH AT BEDTIME    Morbid obesity (H)  Encouraged increased exercise diet modification    Chronic kidney disease, stage 3b (H)  Creatinine is stable.  Longstanding chronic kidney disease of unclear etiology.  Renal artery ultrasound in January 2022 was normal.  24-hour blood pressure monitor was also normal.  Avoid NSAIDs.  Continue annual labs.  Consider referral to nephrology if worsens.  - Comprehensive Metabolic Panel; Future  - Lipid Panel; Future  - CBC with Platelets & Differential; Future  - Albumin Random Urine Quantitative with Creat Ratio; Future  - Comprehensive Metabolic Panel  - Lipid Panel  - CBC with Platelets & Differential  - Albumin Random Urine Quantitative with Creat Ratio        Peripheral polyneuropathy  Well-controlled on gabapentin.      Patient has been  "advised of split billing requirements and indicates understanding: Yes          BMI  Estimated body mass index is 39.79 kg/m  as calculated from the following:    Height as of this encounter: 1.601 m (5' 3.05\").    Weight as of 9/11/23: 102.1 kg (225 lb).       Counseling  Appropriate preventive services were discussed with this patient, including applicable screening as appropriate for fall prevention, nutrition, physical activity, Tobacco-use cessation, weight loss and cognition.  Checklist reviewing preventive services available has been given to the patient.  Reviewed patient's diet, addressing concerns and/or questions.   She is at risk for lack of exercise and has been provided with information to increase physical activity for the benefit of her well-being.   Discussed possible causes of fatigue. Patient reported safety concerns were addressed today.The patient was provided with written information regarding signs of hearing loss.   The patient's PHQ-9 score is consistent with severe depression. She was provided with information regarding depression.         There are no Patient Instructions on file for this visit.    No follow-ups on file.    Jurgen Haynes is a 68 year old, presenting for the following:  Medicare Visit    Health Care Directive  Patient has a Health Care Directive on file  Advance care planning document is on file and is current.    HPI      67 yo female presents for medicare wellness exam.    Fell onto right knee in February, causing increased pain and decreased ROM.  s/p right TKA in 9/2023.  Completed physical therapy.      6/2023 DEXA -1.5     Patient's mood has been stable.  She was wintering down in [place] and this definitely improves her mood.  She is sleeping well at night.              4/2/2024   General Health   How would you rate your overall physical health? (!) FAIR   Feel stress (tense, anxious, or unable to sleep) To some extent   (!) STRESS CONCERN      4/2/2024 "   Nutrition   Diet: Regular (no restrictions)         2024   Exercise   Days per week of moderate/strenous exercise 2 days   (!) EXERCISE CONCERN      2024   Social Factors   Frequency of gathering with friends or relatives More than three times a week   Worry food won't last until get money to buy more No   Food not last or not have enough money for food? No   Do you have housing?  Yes   Are you worried about losing your housing? No   Lack of transportation? No   Unable to get utilities (heat,electricity)? No         2024   Activities of Daily Living- Home Safety   Needs help with the following daily activites None of the above   Safety concerns in the home Throw rugs in the hallway    No grab bars in the bathroom         2024   Dental   Dentist two times every year? Yes         2024   Hearing Screening   Hearing concerns? (!) I NEED TO ASK PEOPLE TO SPEAK UP OR REPEAT THEMSELVES.    (!) IT'S HARD TO FOLLOW A CONVERSATION IN A NOISY RESTAURANT OR CROWDED ROOM.    (!) TROUBLE UNDERSTANDING SOFT OR WHISPERED SPEECH.         2024   Driving Risk Screening   Patient/family members have concerns about driving No         2024   General Alertness/Fatigue Screening   Have you been more tired than usual lately? (!) YES         2024   Urinary Incontinence Screening   Bothered by leaking urine in past 6 months No         2024   TB Screening   Were you born outside of the [place]? No       Today's PHQ-9 Score:       2024    10:48 AM   PHQ-9 SCORE   PHQ-9 Total Score MyChart 21 (Severe depression)   PHQ-9 Total Score 21         2024   Substance Use   Alcohol more than 3/day or more than 7/wk Not Applicable   Do you have a current opioid prescription? No   How severe/bad is pain from 1 to 10? 8/10   Do you use any other substances recreationally? No     Social History     Tobacco Use    Smoking status: Former     Types: Cigarettes     Quit date: 10/22/1991     Years since quittin.4      Passive exposure: Never    Smokeless tobacco: Never   Vaping Use    Vaping Use: Never used   Substance Use Topics    Alcohol use: Not Currently     Comment: rare    Drug use: No           4/2/2024   LAST FHS-7 RESULTS   1st degree relative breast or ovarian cancer No   Any relative bilateral breast cancer No   Any male have breast cancer No   Any ONE woman have BOTH breast AND ovarian cancer No   Any woman with breast cancer before 50yrs No   2 or more relatives with breast AND/OR ovarian cancer No   2 or more relatives with breast AND/OR bowel cancer No        Mammogram Screening - Mammogram every 1-2 years updated in Health Maintenance based on mutual decision making    ASCVD Risk   The 10-year ASCVD risk score (Jeff BENSON, et al., 2019) is: 7.5%    Values used to calculate the score:      Age: 68 years      Sex: Female      Is Non- : No      Diabetic: No      Tobacco smoker: No      Systolic Blood Pressure: 132 mmHg      Is BP treated: No      HDL Cholesterol: 51 mg/dL      Total Cholesterol: 247 mg/dL            Reviewed and updated as needed this visit by Provider                    Past Medical History:   Diagnosis Date    AC (acromioclavicular) arthritis 4/10/2018    Allergic rhinitis due to pollen     No Comments Provided    Dorsalgia     10/7/2010    Erythema nodosum      2009    Essential tremor     Possible benign    Hyperlipidemia     10/24/2013    Ill-defined and unknown cause of mortality (CODE)     characterized by chronic pain, positive BARBARA at a low titer of 1:160, negative HLAB27,    Migraine without status migrainosus, not intractable     No Comments Provided    Osteoarthritis of left glenohumeral joint 4/10/2018    Postconcussional syndrome     1/26/2016    Sarcoidosis     2007    Tendinitis of left rotator cuff 4/10/2018     Past Surgical History:   Procedure Laterality Date    ARTHROPLASTY KNEE Right 9/11/2023    Procedure: Arthroplasty knee;  Surgeon: Noel  Mack BOURNE MD;  Location: [facility] OR    ARTHROSCOPY SHOULDER      12/07,Right type 3 SLAP lesion repair of labrum - suprascapular ganglion removal with posterior approach - acromioplasty and distal clavicle resection    ARTHROSCOPY SHOULDER      2/2015    COLONOSCOPY  08/29/2011 8/2011,Normal--10 year followup    COLONOSCOPY N/A 10/31/2022    F/U 2025 adenomatous, advanced by size    DILATION AND CURETTAGE      11/05, and ablation    EXCISE MASS TOE Left 05/04/2023    Procedure: EXCISION, MASS, foot with rotational flap closure;  Surgeon: Alejandro Jordan DPM;  Location: [facility] OR    LAPAROSCOPIC CHOLECYSTECTOMY  2015    6/23/15,Cholecystectomy,Laparoscopic    left shoulder replacement  2019    right total shoulder replacement  2009    TONSILLECTOMY       and adenoidectomy age 12     Current providers sharing in care for this patient include:  Patient Care Team:  Yodit Mandujano MD as PCP - General (Family Medicine)  Yodit Mandujano MD as Assigned PCP  Mack Cohen MD as Assigned Musculoskeletal Provider  Alejandro Jordan DPM as MD (Podiatry)  Mack Cohen MD as MD (Orthopaedic Surgery)    The following health maintenance items are reviewed in Epic and correct as of today:  Health Maintenance   Topic Date Due    MICROALBUMIN  Never done    RSV VACCINE (Pregnancy & 60+) (1 - 1-dose 60+ series) Never done    LIPID  03/28/2024    MEDICARE ANNUAL WELLNESS VISIT  03/28/2024    DEPRESSION 12 MO INDEX REPEAT PHQ-9  04/16/2024    BMP  09/05/2024    HEMOGLOBIN  09/12/2024    PHQ-9  10/02/2024    MAMMO SCREENING  03/28/2025    FALL RISK ASSESSMENT  04/02/2025    COLORECTAL CANCER SCREENING  10/31/2025    GLUCOSE  09/12/2026    ADVANCE CARE PLANNING  03/30/2028    DTAP/TDAP/TD IMMUNIZATION (6 - Td or Tdap) 03/28/2033    DEXA  06/02/2038    PARATHYROID  Completed    PHOSPHORUS  Completed    HEPATITIS C SCREENING  Completed    DEPRESSION ACTION PLAN  Completed    INFLUENZA VACCINE  Completed     "Pneumococcal Vaccine: 65+ Years  Completed    URINALYSIS  Completed    ALK PHOS  Completed    ZOSTER IMMUNIZATION  Completed    COVID-19 Vaccine  Completed    IPV IMMUNIZATION  Aged Out    HPV IMMUNIZATION  Aged Out    MENINGITIS IMMUNIZATION  Aged Out    RSV MONOCLONAL ANTIBODY  Aged Out    PAP  Discontinued         Review of Systems  Constitutional, neuro, ENT, endocrine, pulmonary, cardiac, gastrointestinal, genitourinary, musculoskeletal, integument and psychiatric systems are negative, except as otherwise noted.     Objective    Exam  /78   Pulse 77   Temp 98.4  F (36.9  C) (Tympanic)   Resp 18   Ht 1.601 m (5' 3.05\")   LMP 04/02/2004 (Approximate)   SpO2 98%   Breastfeeding No   BMI 39.79 kg/m     Estimated body mass index is 39.79 kg/m  as calculated from the following:    Height as of this encounter: 1.601 m (5' 3.05\").    Weight as of 9/11/23: 102.1 kg (225 lb).    Physical Exam  GENERAL: alert and no distress  EYES: Eyes grossly normal to inspection, PERRL and conjunctivae and sclerae normal  HENT: ear canals and TM's normal, nose and mouth without ulcers or lesions  NECK: no adenopathy, no asymmetry, masses, or scars  RESP: lungs clear to auscultation - no rales, rhonchi or wheezes  CV: regular rate and rhythm, normal S1 S2, no S3 or S4, no murmur, click or rub, no peripheral edema  ABDOMEN: soft, nontender, no hepatosplenomegaly, no masses and bowel sounds normal  MS: no gross musculoskeletal defects noted, no edema  SKIN: no suspicious lesions or rashes  NEURO: Normal strength and tone, mentation intact and speech normal  PSYCH: mentation appears normal, affect normal/bright        4/2/2024   Mini Cog   Clock Draw Score 2 Normal   3 Item Recall 3 objects recalled   Mini Cog Total Score 5              Signed Electronically by: Yodit Villalta MD    Answers submitted by the patient for this visit:  Patient Health Questionnaire (Submitted on 4/2/2024)  If you checked off any problems, " how difficult have these problems made it for you to do your work, take care of things at home, or get along with other people?: Somewhat difficult  PHQ9 TOTAL SCORE: 21  DESI-7 (Submitted on 4/2/2024)  DESI 7 TOTAL SCORE: 12

## 2024-04-02 NOTE — NURSING NOTE
Patient is here for annual medicare wellness. No concerns at this time.     Patient's last menstrual period was 04/02/2004 (approximate).  Medication Reconciliation: complete    Ashley Slater LPN 4/2/2024 11:06 AM       Advance care directive on file? yes  Advance care directive provided to patient? Na        Ashley Slater LPN

## 2024-04-05 RX ORDER — GABAPENTIN 600 MG/1
600 TABLET ORAL 2 TIMES DAILY
COMMUNITY
Start: 2024-04-02 | End: 2024-06-14

## 2024-04-15 ENCOUNTER — OFFICE VISIT (OUTPATIENT)
Dept: ORTHOPEDICS | Facility: OTHER | Age: 69
End: 2024-04-15
Attending: ORTHOPAEDIC SURGERY
Payer: MEDICARE

## 2024-04-15 DIAGNOSIS — Z96.651 STATUS POST TOTAL RIGHT KNEE REPLACEMENT: Primary | ICD-10-CM

## 2024-04-15 PROCEDURE — G0463 HOSPITAL OUTPT CLINIC VISIT: HCPCS

## 2024-04-15 PROCEDURE — 99213 OFFICE O/P EST LOW 20 MIN: CPT | Performed by: ORTHOPAEDIC SURGERY

## 2024-04-15 NOTE — PROGRESS NOTES
Subjective:    68-year-old female now 7-month status post right total knee arthroplasty.  She fell in February 2024 approximately 2 months ago and has had ongoing right knee pain that kind of waxes and wanes.  She saw Dr. Villalta who told her to come back and see me.  X-rays are done today    Objective:    On examination today she has full extension and full flexion of the knee to about 120 degrees.  She is neurovascularly intact.  No signs for infection.  She stable to varus and valgus stress and nontender to palpation about the knee except for anteriorly where she is probably resolving from a bruise.    Assessment:    68-year-old female with a sprain and probable contusion to her right total knee arthroplasty    Plan:    X-rays look excellent.  No evidence for fracture malposition or loosening of the components.  Will have her follow-up at the 1 year evon.

## 2024-06-14 DIAGNOSIS — G89.4 CHRONIC PAIN DISORDER: ICD-10-CM

## 2024-06-14 RX ORDER — GABAPENTIN 600 MG/1
600 TABLET ORAL 2 TIMES DAILY
Qty: 90 TABLET | Refills: 4 | Status: SHIPPED | OUTPATIENT
Start: 2024-06-14

## 2024-06-14 NOTE — TELEPHONE ENCOUNTER
Pt would like a new prescription for Gabapentin with a change in dosage.  Pt will be out this weekend.  Please call.    Antonio Klein on 6/14/2024 at 10:42 AM

## 2024-06-14 NOTE — TELEPHONE ENCOUNTER
Patient is requesting an rx for gabapentin 600mg BID. Will Tate up and send to provider. Patient is aware that provider is out of the office and is okay with waiting.     RYLAN HERNANDEZ RN on 6/14/2024 at 1:58 PM

## 2024-07-11 ENCOUNTER — OFFICE VISIT (OUTPATIENT)
Dept: FAMILY MEDICINE | Facility: OTHER | Age: 69
End: 2024-07-11
Attending: PHYSICIAN ASSISTANT
Payer: MEDICARE

## 2024-07-11 VITALS
DIASTOLIC BLOOD PRESSURE: 73 MMHG | RESPIRATION RATE: 17 BRPM | TEMPERATURE: 98.3 F | OXYGEN SATURATION: 98 % | HEART RATE: 84 BPM | SYSTOLIC BLOOD PRESSURE: 128 MMHG

## 2024-07-11 DIAGNOSIS — T14.8XXA HEMATOMA OF SKIN: ICD-10-CM

## 2024-07-11 DIAGNOSIS — E66.01 MORBID OBESITY (H): ICD-10-CM

## 2024-07-11 DIAGNOSIS — I87.2 CHRONIC VENOUS STASIS DERMATITIS: Primary | ICD-10-CM

## 2024-07-11 PROCEDURE — 99213 OFFICE O/P EST LOW 20 MIN: CPT | Performed by: PHYSICIAN ASSISTANT

## 2024-07-11 PROCEDURE — G0463 HOSPITAL OUTPT CLINIC VISIT: HCPCS

## 2024-07-11 ASSESSMENT — PATIENT HEALTH QUESTIONNAIRE - PHQ9
10. IF YOU CHECKED OFF ANY PROBLEMS, HOW DIFFICULT HAVE THESE PROBLEMS MADE IT FOR YOU TO DO YOUR WORK, TAKE CARE OF THINGS AT HOME, OR GET ALONG WITH OTHER PEOPLE: VERY DIFFICULT
SUM OF ALL RESPONSES TO PHQ QUESTIONS 1-9: 19
SUM OF ALL RESPONSES TO PHQ QUESTIONS 1-9: 19

## 2024-07-11 NOTE — NURSING NOTE
"Chief Complaint   Patient presents with    Derm Problem     Patient has some pocket under skin of right front calf. Also discoloration in right lower leg.  Initial /73   Pulse 84   Temp 98.3  F (36.8  C) (Tympanic)   Resp 17   SpO2 98%  Estimated body mass index is 39.79 kg/m  as calculated from the following:    Height as of 4/2/24: 1.601 m (5' 3.05\").    Weight as of 9/11/23: 102.1 kg (225 lb).  Medication Review: complete    The next two questions are to help us understand your food security.  If you are feeling you need any assistance in this area, we have resources available to support you today.          4/2/2024   SDOH- Food Insecurity   Within the past 12 months, did you worry that your food would run out before you got money to buy more? N   Within the past 12 months, did the food you bought just not last and you didn t have money to get more? N            Health Care Directive:  Patient has a Health Care Directive on file      Sherri Ye MA      "

## 2024-07-11 NOTE — PROGRESS NOTES
"  Assessment & Plan   Problem List Items Addressed This Visit          Digestive    Morbid obesity (H)     Other Visit Diagnoses       Chronic venous stasis dermatitis    -  Primary    Hematoma of skin               Morbid obesity: Encourage good diet, exercise, weight loss, and decreasing salt in her diet to help reduce the future risk of heart disease.    Chronic basis stasis dermatitis: Encouraged using knee-high compression stockings during the daytime along with long car trips and airplane rides.  Encouraged elevating her feet up to the level of her heart 10 to 15 minutes at a time several times daily.  Low-salt diet along with losing weight is recommended.    Hematoma of the skin: Encouraged warm compresses a few times daily to help with the discomfort.  Encouraged compression stockings.  Return as needed for recheck if symptoms or not improving or worsening.       BMI  Estimated body mass index is 39.79 kg/m  as calculated from the following:    Height as of 4/2/24: 1.601 m (5' 3.05\").    Weight as of 9/11/23: 102.1 kg (225 lb).   Weight management plan: Discussed healthy diet and exercise guidelines    See Patient Instructions    No follow-ups on file.      Jurgen Haynes is a 69 year old, presenting for the following health issues:  Derm Problem        7/11/2024     2:02 PM   Additional Questions   Roomed by Sherri CHURCH CMA     History of Present Illness       Reason for visit:  Right leg injury  Symptom onset:  More than a month  Symptoms include:  Pain around an older punceration wound  Symptom intensity:  Moderate  Symptom progression:  Staying the same  Had these symptoms before:  No  What makes it worse:  Yes  What makes it better:  Reading and sitting in the sun    She eats 0-1 servings of fruits and vegetables daily.She consumes 3 sweetened beverage(s) daily.She exercises with enough effort to increase her heart rate 9 or less minutes per day.  She exercises with enough effort to increase her heart " rate 4 days per week.   She is taking medications regularly.     Patient had an injury in her right lower lateral leg approximately 2 weeks ago.  Had an open wound that bled a lot.  It then healed and has a darkened area where healed.  Tender since then.  Fluctuant.  Has a bump.  Hurts at times.    Patient has a few scattered brown spots on her right lower anterior legs.      Review of Systems  Constitutional, HEENT, cardiovascular, pulmonary, gi and gu systems are negative, except as otherwise noted.      Objective    /73   Pulse 84   Temp 98.3  F (36.8  C) (Tympanic)   Resp 17   SpO2 98%   There is no height or weight on file to calculate BMI.  Physical Exam  Vitals and nursing note reviewed.   Constitutional:       Appearance: Normal appearance.   HENT:      Head: Normocephalic and atraumatic.   Eyes:      Extraocular Movements: Extraocular movements intact.      Conjunctiva/sclera: Conjunctivae normal.   Cardiovascular:      Rate and Rhythm: Normal rate and regular rhythm.      Heart sounds: Normal heart sounds.   Pulmonary:      Effort: Pulmonary effort is normal.      Breath sounds: Normal breath sounds.   Musculoskeletal:         General: Normal range of motion.      Cervical back: Normal range of motion.      Comments: 1+ pedal edema bilaterally.  Mildly tender darkened hematoma appreciated the right lateral lower extremity approximately 1 x 1 cm in diameter.   Skin:     General: Skin is warm and dry.   Neurological:      General: No focal deficit present.      Mental Status: She is alert and oriented to person, place, and time.   Psychiatric:         Mood and Affect: Mood normal.         Behavior: Behavior normal.            No results found for any visits on 07/11/24.        Signed Electronically by: Jeanie Caal PA-C

## 2024-08-29 ENCOUNTER — OFFICE VISIT (OUTPATIENT)
Dept: FAMILY MEDICINE | Facility: OTHER | Age: 69
End: 2024-08-29
Attending: FAMILY MEDICINE
Payer: MEDICARE

## 2024-08-29 VITALS
OXYGEN SATURATION: 97 % | HEIGHT: 64 IN | BODY MASS INDEX: 39.23 KG/M2 | TEMPERATURE: 98.6 F | HEART RATE: 86 BPM | RESPIRATION RATE: 16 BRPM | DIASTOLIC BLOOD PRESSURE: 72 MMHG | SYSTOLIC BLOOD PRESSURE: 138 MMHG

## 2024-08-29 DIAGNOSIS — I87.2 VENOUS STASIS DERMATITIS OF RIGHT LOWER EXTREMITY: Primary | ICD-10-CM

## 2024-08-29 DIAGNOSIS — I83.811 VARICOSE VEINS OF RIGHT LOWER EXTREMITY WITH PAIN: ICD-10-CM

## 2024-08-29 PROCEDURE — G0463 HOSPITAL OUTPT CLINIC VISIT: HCPCS

## 2024-08-29 PROCEDURE — 99213 OFFICE O/P EST LOW 20 MIN: CPT | Performed by: FAMILY MEDICINE

## 2024-08-29 ASSESSMENT — PAIN SCALES - GENERAL: PAINLEVEL: NO PAIN (0)

## 2024-08-29 ASSESSMENT — PATIENT HEALTH QUESTIONNAIRE - PHQ9
SUM OF ALL RESPONSES TO PHQ QUESTIONS 1-9: 21
SUM OF ALL RESPONSES TO PHQ QUESTIONS 1-9: 21
10. IF YOU CHECKED OFF ANY PROBLEMS, HOW DIFFICULT HAVE THESE PROBLEMS MADE IT FOR YOU TO DO YOUR WORK, TAKE CARE OF THINGS AT HOME, OR GET ALONG WITH OTHER PEOPLE: VERY DIFFICULT

## 2024-08-29 NOTE — NURSING NOTE
"Chief Complaint   Patient presents with    Derm Problem     Dark colored area lower right leg       Initial /72   Pulse 86   Temp 98.6  F (37  C) (Tympanic)   Resp 16   Ht 1.613 m (5' 3.5\")   LMP 08/29/2009 (Approximate)   SpO2 97%   Breastfeeding No   BMI 39.23 kg/m   Estimated body mass index is 39.23 kg/m  as calculated from the following:    Height as of this encounter: 1.613 m (5' 3.5\").    Weight as of 9/11/23: 102.1 kg (225 lb).  Medication Review: complete    The next two questions are to help us understand your food security.  If you are feeling you need any assistance in this area, we have resources available to support you today.          4/2/2024   SDOH- Food Insecurity   Within the past 12 months, did you worry that your food would run out before you got money to buy more? N   Within the past 12 months, did the food you bought just not last and you didn t have money to get more? N            Health Care Directive:  Patient has a Health Care Directive on file      Norma J. Gosselin, LPN      "

## 2024-08-29 NOTE — PROGRESS NOTES
"  Assessment & Plan     Venous stasis dermatitis of right lower extremity  Reassurance provided that the skin changes are consistent with venous stasis and hemosiderin deposit.  Continue compression stockings, elevation.  - US Lower Extremity Venous Duplex Right; Future    Varicose veins of right lower extremity with pain  2 tender nodules.  Proceed with venous Doppler ultrasound.  Compression, elevation and heat  - US Lower Extremity Venous Duplex Right; Future                No follow-ups on file.    Jurgen Haynes is a 69 year old, presenting for the following health issues:  Derm Problem (Dark colored area lower right leg)    69-year-old female presents with right lower extremity swelling and discoloration.  She was seen by medical provider who told her it is venous stasis/varicose veins.  She has been using compression stockings and that helped the swelling.  She has areas of hyperpigmentation on the right lower leg, lateral aspect.  Status post right total knee arthroplasty less than 1 year ago.  No history of DVT.    History of Present Illness       Reason for visit:  Right ankle/leg problem She is missing 7 dose(s) of medications per week.                 Review of Systems  Constitutional, HEENT, cardiovascular, pulmonary, gi and gu systems are negative, except as otherwise noted.      Objective    /72   Pulse 86   Temp 98.6  F (37  C) (Tympanic)   Resp 16   Ht 1.613 m (5' 3.5\")   LMP 08/29/2009 (Approximate)   SpO2 97%   Breastfeeding No   BMI 39.23 kg/m    Body mass index is 39.23 kg/m .  Physical Exam   Examination right lower extremity reveals hyperpigmentation, she has 2 isolated tender nodules.  Mild varicosities.  No significant swelling.  Calf is nontender  Dorsalis pedis pulse present            Signed Electronically by: Yodit Villalta MD    "

## 2024-08-30 ENCOUNTER — HOSPITAL ENCOUNTER (OUTPATIENT)
Dept: ULTRASOUND IMAGING | Facility: OTHER | Age: 69
Discharge: HOME | End: 2024-08-30
Attending: FAMILY MEDICINE | Admitting: FAMILY MEDICINE
Payer: MEDICARE

## 2024-08-30 DIAGNOSIS — I87.2 VENOUS STASIS DERMATITIS OF RIGHT LOWER EXTREMITY: ICD-10-CM

## 2024-08-30 DIAGNOSIS — I83.811 VARICOSE VEINS OF RIGHT LOWER EXTREMITY WITH PAIN: ICD-10-CM

## 2024-08-30 PROCEDURE — 93971 EXTREMITY STUDY: CPT | Mod: RT

## 2024-09-03 ENCOUNTER — MYC MEDICAL ADVICE (OUTPATIENT)
Dept: FAMILY MEDICINE | Facility: OTHER | Age: 69
End: 2024-09-03
Payer: MEDICARE

## 2024-09-03 NOTE — TELEPHONE ENCOUNTER
Per comment from 4/2/24 labs:    Labs are stable.  Kidney function remains slightly impaired.  Recommend avoidance of anti-inflammatory medications.     MD Opal Coburn RN on 9/3/2024 at 2:41 PM

## 2024-09-05 ENCOUNTER — MYC MEDICAL ADVICE (OUTPATIENT)
Dept: FAMILY MEDICINE | Facility: OTHER | Age: 69
End: 2024-09-05
Payer: MEDICARE

## 2024-09-06 NOTE — TELEPHONE ENCOUNTER
Wanting an explanation of US of RLE.     US done on 8/30/24:  FINDINGS:     Interrogation of the deep venous structures from the right common  femoral vein through the veins of the proximal calf demonstrate normal  grayscale appearance, compressibility and augmentable color Doppler  flow.    Routing to provider to review and respond.  Opal Whitten RN on 9/6/2024 at 8:22 AM

## 2024-09-06 NOTE — TELEPHONE ENCOUNTER
My Thoughts:  There is nothing you need to do about the discolored areas as the ultrasound shows that this is likely due to changes in veins/varicose veins.  If the veins are causing you pain, you could be seen for next steps on vein mapping, but this does require 3 months of wearing compression stockings before any further appointments can be made for additional vein work-up.      Merna Collado RN on 9/6/2024 at 10:49 AM

## 2024-09-23 DIAGNOSIS — F33.2 SEVERE EPISODE OF RECURRENT MAJOR DEPRESSIVE DISORDER, WITHOUT PSYCHOTIC FEATURES (H): ICD-10-CM

## 2024-09-25 RX ORDER — VENLAFAXINE HYDROCHLORIDE 150 MG/1
CAPSULE, EXTENDED RELEASE ORAL
Qty: 90 CAPSULE | Refills: 3 | OUTPATIENT
Start: 2024-09-25

## 2024-09-25 NOTE — TELEPHONE ENCOUNTER
[facility] sent Rx request for the following:      Requested Prescriptions   Pending Prescriptions Disp Refills    venlafaxine (EFFEXOR XR) 150 MG 24 hr capsule [Pharmacy Med Name: VENLAFAXINE ER 150MG CAPSULES] 90 capsule 3     Sig: TAKE 1 CAPSULE(150 MG) BY MOUTH DAILY WITH FOOD       Serotonin-Norepinephrine Reuptake Inhibitors  Failed - 9/23/2024  5:57 PM        Failed - PHQ-9 score of less than 5 in past 6 months     Please review last PHQ-9 score.          Last Prescription Date:   12/5/23  Last Fill Qty/Refills:         90, R-3    Last Office Visit:              4/2/24   Future Office visit:           10/10/24    Refill request too soon.    Roseann Polk RN on 9/25/2024 at 2:22 PM

## 2024-09-27 DIAGNOSIS — Z96.651 STATUS POST TOTAL RIGHT KNEE REPLACEMENT: Primary | ICD-10-CM

## 2024-09-30 ENCOUNTER — HOSPITAL ENCOUNTER (OUTPATIENT)
Dept: GENERAL RADIOLOGY | Facility: OTHER | Age: 69
Discharge: HOME | End: 2024-09-30
Attending: ORTHOPAEDIC SURGERY
Payer: MEDICARE

## 2024-09-30 ENCOUNTER — OFFICE VISIT (OUTPATIENT)
Dept: ORTHOPEDICS | Facility: OTHER | Age: 69
End: 2024-09-30
Attending: ORTHOPAEDIC SURGERY
Payer: MEDICARE

## 2024-09-30 DIAGNOSIS — Z96.651 STATUS POST TOTAL RIGHT KNEE REPLACEMENT: ICD-10-CM

## 2024-09-30 DIAGNOSIS — Z96.651 STATUS POST TOTAL RIGHT KNEE REPLACEMENT: Primary | ICD-10-CM

## 2024-09-30 PROCEDURE — 73560 X-RAY EXAM OF KNEE 1 OR 2: CPT | Mod: LT

## 2024-09-30 PROCEDURE — G0463 HOSPITAL OUTPT CLINIC VISIT: HCPCS

## 2024-09-30 PROCEDURE — 99213 OFFICE O/P EST LOW 20 MIN: CPT | Performed by: ORTHOPAEDIC SURGERY

## 2024-09-30 PROCEDURE — G0463 HOSPITAL OUTPT CLINIC VISIT: HCPCS | Mod: 25

## 2024-09-30 NOTE — PROGRESS NOTES
Subjective:    69-year-old female who is now 1 year status post right total knee arthroplasty.  This is a press-fit Marco total knee arthroplasty.  She has no concerns with her knee    Objective:    On examination she has full range of motion of her knee.  She is stable to varus and valgus stress.  Wound is completely benign    Assessment:    69-year-old female doing well 1 year status post right total knee arthroplasty    Plan:    See her back in approximately 2 years with an x-ray of the right knee.

## 2024-10-08 DIAGNOSIS — F33.2 SEVERE EPISODE OF RECURRENT MAJOR DEPRESSIVE DISORDER, WITHOUT PSYCHOTIC FEATURES (H): Primary | ICD-10-CM

## 2024-10-10 NOTE — TELEPHONE ENCOUNTER
[facility] sent Rx request for the following:      Requested Prescriptions   Pending Prescriptions Disp Refills    buPROPion (WELLBUTRIN XL) 300 MG 24 hr tablet 90 tablet 0     Sig: Take 1 tablet (300 mg) by mouth every morning.       Rx Protocol Bupropion Failed - 10/8/2024  4:38 PM        Failed - Medication is indicated for associated diagnosis     Medication is associated with one or more of the following diagnoses:  Anxiety  Bipolar Disorder  Depression  Smoking Cessation  Adjustment disorder with mixed anxiety and depressed mood  Adjustment disorder with anxiety  Tobacco use disorder  Tobacco abuse         Last Prescription Date:   Historical  Last Fill Qty/Refills:         , R-    Last Office Visit:              4/2/24   Future Office visit:           none    Routing refill request to provider for review/approval because:  Drug not on the [facility] refill protocol     Roseann Polk RN on 10/10/2024 at 3:51 PM

## 2024-10-11 RX ORDER — BUPROPION HYDROCHLORIDE 300 MG/1
300 TABLET ORAL EVERY MORNING
Qty: 90 TABLET | Refills: 0 | Status: SHIPPED | OUTPATIENT
Start: 2024-10-11

## 2024-10-11 NOTE — TELEPHONE ENCOUNTER
Bupropion 300 mg listed as historical on current med list.    Called patient and patient verified she is taking Wellbutrin  mg one tablet every morning.    Patient states she has enough tablets to get through until Dr. Villalta's return on Tuesday 10/15.  Christal Yuan RN on 10/11/2024 at 1:23 PM

## 2024-10-29 NOTE — TELEPHONE ENCOUNTER
Patient is needing a referral to have cataract surgery scheduled for tomorrow at [place] [facility], is needing a AVL referral for insurance.   Ashley Slater LPN .............3/15/2022     8:04 AM     Render Risk Assessment In Note?: no Additional Notes: Patient consent was obtained to proceed with the visit and recommended plan of care after\\ndiscussion of all risks and benefits, including the risks of COVID-19 exposure. Detail Level: Simple

## 2024-11-05 DIAGNOSIS — G89.4 CHRONIC PAIN DISORDER: ICD-10-CM

## 2024-11-06 RX ORDER — GABAPENTIN 600 MG/1
600 TABLET ORAL 3 TIMES DAILY
Qty: 90 TABLET | Refills: 4 | Status: SHIPPED | OUTPATIENT
Start: 2024-11-06

## 2024-11-06 NOTE — TELEPHONE ENCOUNTER
[facility] sent Rx request for the following:      Requested Prescriptions   Pending Prescriptions Disp Refills    gabapentin (NEURONTIN) 600 MG tablet [Pharmacy Med Name: GABAPENTIN 600MG TABLETS] 90 tablet 4     Sig: TAKE 1 TABLET BY MOUTH THREE TIMES DAILY       There is no refill protocol information for this order          Last Prescription Date:   6/14/24  Last Fill Qty/Refills:         90, R-4    Last Office Visit:              4/2/24   Future Office visit:           none    Routing refill request to provider for review/approval because:  Drug not on the [facility] refill protocol     Roseann Polk RN on 11/6/2024 at 3:45 PM

## 2024-12-10 DIAGNOSIS — F33.2 SEVERE EPISODE OF RECURRENT MAJOR DEPRESSIVE DISORDER, WITHOUT PSYCHOTIC FEATURES (H): ICD-10-CM

## 2024-12-10 RX ORDER — VENLAFAXINE HYDROCHLORIDE 150 MG/1
CAPSULE, EXTENDED RELEASE ORAL
Qty: 90 CAPSULE | Refills: 3 | Status: SHIPPED | OUTPATIENT
Start: 2024-12-10

## 2024-12-10 RX ORDER — BUPROPION HYDROCHLORIDE 300 MG/1
300 TABLET ORAL EVERY MORNING
Qty: 90 TABLET | Refills: 0 | Status: SHIPPED | OUTPATIENT
Start: 2024-12-10

## 2024-12-10 NOTE — TELEPHONE ENCOUNTER
[facility] sent Rx request for the following:      Requested Prescriptions   Pending Prescriptions Disp Refills    venlafaxine (EFFEXOR XR) 150 MG 24 hr capsule [Pharmacy Med Name: VENLAFAXINE ER 150MG CAPSULES] 90 capsule 3     Sig: TAKE 1 CAPSULE(150 MG) BY MOUTH DAILY WITH FOOD       Serotonin-Norepinephrine Reuptake Inhibitors  Failed - 12/10/2024  3:23 PM        Failed - PHQ-9 score of less than 5 in past 6 months     Please review last PHQ-9 score.           Last Prescription Date:   12/5/23  Last Fill Qty/Refills:         90, R-3             buPROPion (WELLBUTRIN XL) 300 MG 24 hr tablet [Pharmacy Med Name: BUPROPION XL 300MG TABLETS] 90 tablet 0     Sig: TAKE 1 TABLET(300 MG) BY MOUTH EVERY MORNING       Rx Protocol Bupropion Failed - 12/10/2024  3:23 PM        Failed - PHQ-9 score of less than 5 in past 6 months     Please review last PHQ-9 score.          Last Prescription Date:   10/11/24  Last Fill Qty/Refills:         90, R-0    Last Office Visit:              4/2/24   Future Office visit:           none    Routing refill request to provider for review/approval because:  Drug not on the [facility] refill protocol     Roseann Polk RN on 12/10/2024 at 3:25 PM

## 2025-03-09 ENCOUNTER — HEALTH MAINTENANCE LETTER (OUTPATIENT)
Age: 70
End: 2025-03-09

## 2025-03-14 DIAGNOSIS — F33.2 SEVERE EPISODE OF RECURRENT MAJOR DEPRESSIVE DISORDER, WITHOUT PSYCHOTIC FEATURES (H): ICD-10-CM

## 2025-03-14 NOTE — TELEPHONE ENCOUNTER
[facility] sent Rx request for the following:      Requested Prescriptions   Pending Prescriptions Disp Refills    buPROPion (WELLBUTRIN XL) 300 MG 24 hr tablet 90 tablet 0     Sig: Take 1 tablet (300 mg) by mouth every morning.       Rx Protocol Bupropion Failed - 3/14/2025 11:56 AM        Failed - PHQ-9 score of less than 5 in past 6 months     Please review last PHQ-9 score.      Severe episode of recurrent major depressive disorder, without psychotic features (H) [F33.2]     Last Prescription Date:   12/10/24  Last Fill Qty/Refills:         90, R-0    Last Office Visit:              4/2/24 (dx discussed)   Future Office visit:            None    Unable to complete prescription refill per RN Medication Refill Policy.     Milton Cardenas RN on 3/14/2025 at 11:57 AM

## 2025-03-17 RX ORDER — BUPROPION HYDROCHLORIDE 300 MG/1
300 TABLET ORAL EVERY MORNING
Qty: 90 TABLET | Refills: 0 | Status: SHIPPED | OUTPATIENT
Start: 2025-03-17

## 2025-03-21 DIAGNOSIS — F33.2 SEVERE EPISODE OF RECURRENT MAJOR DEPRESSIVE DISORDER, WITHOUT PSYCHOTIC FEATURES (H): ICD-10-CM

## 2025-03-24 NOTE — TELEPHONE ENCOUNTER
[facility] sent Rx request for the following:      Requested Prescriptions   Pending Prescriptions Disp Refills    lamoTRIgine (LAMICTAL) 200 MG tablet [Pharmacy Med Name: LAMOTRIGINE 200MG TABLETS] 90 tablet 3     Sig: TAKE 1 TABLET(200 MG) BY MOUTH AT BEDTIME       Anti-Seizure Meds Protocol  Failed - 3/24/2025  7:39 AM        Failed - PHQ-9 score less than 5 in past 6 months.     Please review last PHQ-9 score.       4/2/2024    10:48 AM 7/11/2024     1:49 PM 8/29/2024    10:04 AM   PHQ-9 SCORE   PHQ-9 Total Score MyChart 21 (Severe depression) 19 (Moderately severe depression) 21 (Severe depression)   PHQ-9 Total Score 21 19 21             Failed - Review Authorizing provider's last note.      Refer to last progress notes: confirm request is for original authorizing provider (cannot be through other providers).          Failed - Has GFR on file in past 12 months and most recent value is normal     Severe episode of recurrent major depressive disorder, without psychotic features (H) [F33.2]        Last Prescription Date:   4/2/24  Last Fill Qty/Refills:         90, R-3    Last Office Visit:              4/2/24 (px)   Future Office visit:            None    Unable to complete prescription refill per RN Medication Refill Policy.     Pt due for annual. Routing to provider for refill consideration. Routing to Unit scheduling pool, to assist Pt in scheduling appointment.     Milton Cardenas RN on 3/24/2025 at 7:40 AM

## 2025-03-25 RX ORDER — LAMOTRIGINE 200 MG/1
TABLET ORAL
Qty: 90 TABLET | Refills: 0 | Status: SHIPPED | OUTPATIENT
Start: 2025-03-25

## 2025-03-31 ENCOUNTER — ANCILLARY ORDERS (OUTPATIENT)
Dept: FAMILY MEDICINE | Facility: OTHER | Age: 70
End: 2025-03-31
Payer: MEDICARE

## 2025-03-31 DIAGNOSIS — Z12.31 VISIT FOR SCREENING MAMMOGRAM: Primary | ICD-10-CM

## 2025-04-08 ENCOUNTER — HOSPITAL ENCOUNTER (OUTPATIENT)
Dept: GENERAL RADIOLOGY | Facility: OTHER | Age: 70
Discharge: HOME | End: 2025-04-08
Attending: FAMILY MEDICINE
Payer: MEDICARE

## 2025-04-08 ENCOUNTER — OFFICE VISIT (OUTPATIENT)
Dept: FAMILY MEDICINE | Facility: OTHER | Age: 70
End: 2025-04-08
Attending: NURSE PRACTITIONER
Payer: MEDICARE

## 2025-04-08 VITALS
TEMPERATURE: 97.1 F | OXYGEN SATURATION: 95 % | SYSTOLIC BLOOD PRESSURE: 144 MMHG | HEART RATE: 86 BPM | RESPIRATION RATE: 20 BRPM | DIASTOLIC BLOOD PRESSURE: 73 MMHG

## 2025-04-08 DIAGNOSIS — R05.1 ACUTE COUGH: ICD-10-CM

## 2025-04-08 DIAGNOSIS — R05.1 ACUTE COUGH: Primary | ICD-10-CM

## 2025-04-08 DIAGNOSIS — J20.8 ACUTE BRONCHITIS DUE TO OTHER SPECIFIED ORGANISMS: ICD-10-CM

## 2025-04-08 PROCEDURE — G0463 HOSPITAL OUTPT CLINIC VISIT: HCPCS | Mod: 25

## 2025-04-08 PROCEDURE — 71046 X-RAY EXAM CHEST 2 VIEWS: CPT

## 2025-04-08 RX ORDER — AZITHROMYCIN 250 MG/1
TABLET, FILM COATED ORAL
Qty: 6 TABLET | Refills: 0 | Status: SHIPPED | OUTPATIENT
Start: 2025-04-08 | End: 2025-04-13

## 2025-04-08 RX ORDER — CODEINE PHOSPHATE AND GUAIFENESIN 10; 100 MG/5ML; MG/5ML
1-2 SOLUTION ORAL EVERY 4 HOURS PRN
Qty: 118 ML | Refills: 2 | Status: SHIPPED | OUTPATIENT
Start: 2025-04-08

## 2025-04-08 ASSESSMENT — ENCOUNTER SYMPTOMS: COUGH: 1

## 2025-04-08 ASSESSMENT — PAIN SCALES - GENERAL: PAINLEVEL_OUTOF10: MODERATE PAIN (5)

## 2025-04-08 NOTE — NURSING NOTE
"Chief Complaint   Patient presents with    Cough     Productive with blood. X8 days   Patient presents to the rapid clinic today for concerns of a productive cough X8 days.     Initial BP (!) 145/69 (BP Location: Right arm, Patient Position: Sitting, Cuff Size: Adult Regular)   Pulse 86   Temp 97.1  F (36.2  C) (Temporal)   Resp 20   SpO2 95%  Estimated body mass index is 39.23 kg/m  as calculated from the following:    Height as of 8/29/24: 1.613 m (5' 3.5\").    Weight as of 9/11/23: 102.1 kg (225 lb).  Medication Review: complete    The next two questions are to help us understand your food security.  If you are feeling you need any assistance in this area, we have resources available to support you today.          4/8/2025   SDOH- Food Insecurity   Within the past 12 months, did you worry that your food would run out before you got money to buy more? N   Within the past 12 months, did the food you bought just not last and you didn t have money to get more? N         Health Care Directive:  Patient has a Health Care Directive on file      Omar Gu      "

## 2025-04-08 NOTE — PROGRESS NOTES
Patient arrives here for cough.  SUBJECTIVE:   Ivy Romero is a 69 year old female who presents to clinic today for the following health issues:    Cough      Also had an episode of spitting up blood with the coughing.  Initially fevers chills.  But that is since resolved.  The cough has not really worsened.  He has been going on for about 8 days.  She is not taking any medications for it.  No fevers no chills.        Review of Systems   Respiratory:  Positive for cough.         OBJECTIVE:     BP (!) 144/73 (BP Location: Right arm, Patient Position: Sitting, Cuff Size: Adult Regular)   Pulse 86   Temp 97.1  F (36.2  C) (Temporal)   Resp 20   SpO2 95%   There is no height or weight on file to calculate BMI.  Physical Exam  Constitutional:       Appearance: Normal appearance.   HENT:      Head: Atraumatic.      Nose: No congestion or rhinorrhea.   Cardiovascular:      Rate and Rhythm: Normal rate.   Pulmonary:      Effort: Pulmonary effort is normal. No respiratory distress.      Breath sounds: Normal breath sounds. No wheezing, rhonchi or rales.   Neurological:      Mental Status: She is alert.   Psychiatric:         Mood and Affect: Mood normal.         Thought Content: Thought content normal.         Diagnostic Test Results:  No results found for this or any previous visit (from the past 24 hours).    ASSESSMENT/PLAN:         (R05.1) Acute cough  (primary encounter diagnosis)  Comment: Chest x-ray was unremarkable.  Likely an acute viral bronchitis.  Will start Robitussin with codeine.  If she does not get improvement later in the week or by the weekend start Zithromax  Plan: XR Chest 2 Views, azithromycin (ZITHROMAX) 250         MG tablet, guaiFENesin-codeine (ROBITUSSIN AC)         100-10 MG/5ML solution, CANCELED: XR Chest 2         Views            (J20.8) Acute bronchitis due to other specified organisms  Comment:   Plan:       Shashank Bellamy MD  [facility] AND hospitals

## 2025-04-10 ENCOUNTER — HOSPITAL ENCOUNTER (EMERGENCY)
Facility: OTHER | Age: 70
Discharge: HOME | End: 2025-04-10
Attending: FAMILY MEDICINE
Payer: MEDICARE

## 2025-04-10 ENCOUNTER — APPOINTMENT (OUTPATIENT)
Dept: GENERAL RADIOLOGY | Facility: OTHER | Age: 70
End: 2025-04-10
Attending: FAMILY MEDICINE
Payer: MEDICARE

## 2025-04-10 VITALS
TEMPERATURE: 98.2 F | HEART RATE: 77 BPM | RESPIRATION RATE: 24 BRPM | DIASTOLIC BLOOD PRESSURE: 82 MMHG | SYSTOLIC BLOOD PRESSURE: 148 MMHG | OXYGEN SATURATION: 95 %

## 2025-04-10 DIAGNOSIS — J06.9 URI WITH COUGH AND CONGESTION: ICD-10-CM

## 2025-04-10 DIAGNOSIS — G47.33 OSA ON CPAP: ICD-10-CM

## 2025-04-10 DIAGNOSIS — N18.32 CHRONIC KIDNEY DISEASE, STAGE 3B (H): ICD-10-CM

## 2025-04-10 LAB
ALBUMIN SERPL BCG-MCNC: 4 G/DL (ref 3.5–5.2)
ALP SERPL-CCNC: 98 U/L (ref 40–150)
ALT SERPL W P-5'-P-CCNC: 19 U/L (ref 0–50)
ANION GAP SERPL CALCULATED.3IONS-SCNC: 12 MMOL/L (ref 7–15)
AST SERPL W P-5'-P-CCNC: 26 U/L (ref 0–45)
BASOPHILS # BLD AUTO: 0.1 10E3/UL (ref 0–0.2)
BASOPHILS NFR BLD AUTO: 1 %
BILIRUB SERPL-MCNC: 0.3 MG/DL
BUN SERPL-MCNC: 20.5 MG/DL (ref 8–23)
CALCIUM SERPL-MCNC: 9.2 MG/DL (ref 8.8–10.4)
CHLORIDE SERPL-SCNC: 102 MMOL/L (ref 98–107)
CREAT SERPL-MCNC: 1.37 MG/DL (ref 0.51–0.95)
EGFRCR SERPLBLD CKD-EPI 2021: 42 ML/MIN/1.73M2
EOSINOPHIL # BLD AUTO: 0.6 10E3/UL (ref 0–0.7)
EOSINOPHIL NFR BLD AUTO: 8 %
ERYTHROCYTE [DISTWIDTH] IN BLOOD BY AUTOMATED COUNT: 13.9 % (ref 10–15)
FLUAV RNA SPEC QL NAA+PROBE: NEGATIVE
FLUBV RNA RESP QL NAA+PROBE: NEGATIVE
GLUCOSE SERPL-MCNC: 103 MG/DL (ref 70–99)
HCO3 SERPL-SCNC: 23 MMOL/L (ref 22–29)
HCT VFR BLD AUTO: 33.2 % (ref 35–47)
HGB BLD-MCNC: 10.9 G/DL (ref 11.7–15.7)
HOLD SPECIMEN: NORMAL
HOLD SPECIMEN: NORMAL
IMM GRANULOCYTES # BLD: 0 10E3/UL
IMM GRANULOCYTES NFR BLD: 0 %
LYMPHOCYTES # BLD AUTO: 2.9 10E3/UL (ref 0.8–5.3)
LYMPHOCYTES NFR BLD AUTO: 38 %
MCH RBC QN AUTO: 31.1 PG (ref 26.5–33)
MCHC RBC AUTO-ENTMCNC: 32.8 G/DL (ref 31.5–36.5)
MCV RBC AUTO: 95 FL (ref 78–100)
MONOCYTES # BLD AUTO: 0.8 10E3/UL (ref 0–1.3)
MONOCYTES NFR BLD AUTO: 11 %
NEUTROPHILS # BLD AUTO: 3.4 10E3/UL (ref 1.6–8.3)
NEUTROPHILS NFR BLD AUTO: 43 %
NRBC # BLD AUTO: 0 10E3/UL
NRBC BLD AUTO-RTO: 0 /100
PLATELET # BLD AUTO: 220 10E3/UL (ref 150–450)
POTASSIUM SERPL-SCNC: 3.9 MMOL/L (ref 3.4–5.3)
PROT SERPL-MCNC: 7.2 G/DL (ref 6.4–8.3)
RBC # BLD AUTO: 3.51 10E6/UL (ref 3.8–5.2)
RSV RNA SPEC NAA+PROBE: NEGATIVE
SARS-COV-2 RNA RESP QL NAA+PROBE: NEGATIVE
SODIUM SERPL-SCNC: 137 MMOL/L (ref 135–145)
WBC # BLD AUTO: 7.8 10E3/UL (ref 4–11)

## 2025-04-10 PROCEDURE — 80053 COMPREHEN METABOLIC PANEL: CPT | Performed by: FAMILY MEDICINE

## 2025-04-10 PROCEDURE — 99284 EMERGENCY DEPT VISIT MOD MDM: CPT | Mod: 25 | Performed by: FAMILY MEDICINE

## 2025-04-10 PROCEDURE — 36415 COLL VENOUS BLD VENIPUNCTURE: CPT | Performed by: FAMILY MEDICINE

## 2025-04-10 PROCEDURE — 87637 SARSCOV2&INF A&B&RSV AMP PRB: CPT | Performed by: FAMILY MEDICINE

## 2025-04-10 PROCEDURE — 94640 AIRWAY INHALATION TREATMENT: CPT

## 2025-04-10 PROCEDURE — 71046 X-RAY EXAM CHEST 2 VIEWS: CPT

## 2025-04-10 PROCEDURE — 71046 X-RAY EXAM CHEST 2 VIEWS: CPT | Mod: 26 | Performed by: RADIOLOGY

## 2025-04-10 PROCEDURE — 999N000157 HC STATISTIC RCP TIME EA 10 MIN

## 2025-04-10 PROCEDURE — 250N000009 HC RX 250: Performed by: FAMILY MEDICINE

## 2025-04-10 PROCEDURE — 99284 EMERGENCY DEPT VISIT MOD MDM: CPT | Performed by: FAMILY MEDICINE

## 2025-04-10 PROCEDURE — 85004 AUTOMATED DIFF WBC COUNT: CPT | Performed by: FAMILY MEDICINE

## 2025-04-10 RX ORDER — DEXAMETHASONE 4 MG/1
8 TABLET ORAL ONCE
Qty: 2 TABLET | Refills: 0 | Status: SHIPPED | OUTPATIENT
Start: 2025-04-10 | End: 2025-04-10

## 2025-04-10 RX ORDER — ALBUTEROL SULFATE 90 UG/1
2 INHALANT RESPIRATORY (INHALATION) EVERY 6 HOURS PRN
Qty: 8 G | Refills: 0 | Status: SHIPPED | OUTPATIENT
Start: 2025-04-10

## 2025-04-10 RX ORDER — ALBUTEROL SULFATE 0.83 MG/ML
2.5 SOLUTION RESPIRATORY (INHALATION) ONCE
Status: COMPLETED | OUTPATIENT
Start: 2025-04-10 | End: 2025-04-10

## 2025-04-10 RX ORDER — DEXAMETHASONE SODIUM PHOSPHATE 4 MG/ML
10 VIAL (ML) INJECTION ONCE
Status: COMPLETED | OUTPATIENT
Start: 2025-04-10 | End: 2025-04-10

## 2025-04-10 RX ADMIN — ALBUTEROL SULFATE 2.5 MG: 2.5 SOLUTION RESPIRATORY (INHALATION) at 02:34

## 2025-04-10 RX ADMIN — DEXAMETHASONE SODIUM PHOSPHATE 10 MG: 4 INJECTION, SOLUTION INTRAMUSCULAR; INTRAVENOUS at 03:26

## 2025-04-10 ASSESSMENT — COLUMBIA-SUICIDE SEVERITY RATING SCALE - C-SSRS
1. IN THE PAST MONTH, HAVE YOU WISHED YOU WERE DEAD OR WISHED YOU COULD GO TO SLEEP AND NOT WAKE UP?: NO
2. HAVE YOU ACTUALLY HAD ANY THOUGHTS OF KILLING YOURSELF IN THE PAST MONTH?: NO
6. HAVE YOU EVER DONE ANYTHING, STARTED TO DO ANYTHING, OR PREPARED TO DO ANYTHING TO END YOUR LIFE?: NO

## 2025-04-10 ASSESSMENT — ENCOUNTER SYMPTOMS
SHORTNESS OF BREATH: 1
PALPITATIONS: 0
FEVER: 0
CHILLS: 1
COUGH: 1

## 2025-04-10 ASSESSMENT — ACTIVITIES OF DAILY LIVING (ADL): ADLS_ACUITY_SCORE: 44

## 2025-04-10 NOTE — ED TRIAGE NOTES
Pt here with  via private car.  Pt went to  2 days ago for a cough and was put on azithromycin.  Pt states she has had a cough for the past 10 days or so. Pt has not gotten any better and is concerned about pneumonia.

## 2025-04-10 NOTE — ED PROVIDER NOTES
History     Chief Complaint   Patient presents with    Cough    Shortness of Breath     HPI  Ivy Romero is a 69 year old female history of hyperlipidemia, migraine, allergies who presents to the emergency department with concern for pneumonia. Patient was in the clinic recently, she was started on azithromycin for acute bronchitis.  Chest x-ray at that time was unremarkable.  No chest pain or chest pressure.    No asthma or emphysema as far she knows.  She was a former smoker but she quit in 1991.    Reviewed RN notes below, same history relayed to me      Pt here with  via private car. Pt went to  2 days ago for a cough and was put on azithromycin. Pt states she has had a cough for the past 10 days or so. Pt has not gotten any better and is concerned about pneumonia.     Allergies:  Allergies   Allergen Reactions    Cats Cough    Penicillins Rash       Problem List:    Patient Active Problem List    Diagnosis Date Noted    Acute bronchitis due to other specified organisms 04/08/2025     Priority: Medium    S/P total knee arthroplasty, right 09/11/2023     Priority: Medium    H/O adenomatous polyp of colon 10/31/2022     Priority: Medium    Chronic kidney disease, stage 3b (H) 10/19/2022     Priority: Medium    Morbid obesity (H) 10/12/2021     Priority: Medium    Osteoarthritis of left glenohumeral joint 04/10/2018     Priority: Medium    PRADIP on CPAP 11/20/2017     Priority: Medium    Dyslipidemia 10/24/2013     Priority: Medium    Major depressive disorder, recurrent episode, severe (H) 08/10/2011     Priority: Medium    Sarcoidosis 01/01/2007     Priority: Medium        Past Medical History:    Past Medical History:   Diagnosis Date    AC (acromioclavicular) arthritis 4/10/2018    Allergic rhinitis due to pollen     Dorsalgia     Erythema nodosum     Essential tremor     Hyperlipidemia     Ill-defined and unknown cause of mortality (CODE)     Migraine without status migrainosus, not  intractable     Osteoarthritis of left glenohumeral joint 4/10/2018    Postconcussional syndrome     Sarcoidosis     Tendinitis of left rotator cuff 4/10/2018       Past Surgical History:    Past Surgical History:   Procedure Laterality Date    ARTHROPLASTY KNEE Right 2023    Procedure: Arthroplasty knee;  Surgeon: Mack Cohen MD;  Location: [facility] OR    ARTHROSCOPY SHOULDER      ,Right type 3 SLAP lesion repair of labrum - suprascapular ganglion removal with posterior approach - acromioplasty and distal clavicle resection    ARTHROSCOPY SHOULDER      2015    COLONOSCOPY  2011,Normal--10 year followup    COLONOSCOPY N/A 10/31/2022    F/U  adenomatous, advanced by size    DILATION AND CURETTAGE      , and ablation    EXCISE MASS TOE Left 2023    Procedure: EXCISION, MASS, foot with rotational flap closure;  Surgeon: Alejandro Jordan DPM;  Location:  OR    LAPAROSCOPIC CHOLECYSTECTOMY  2015    6/23/15,Cholecystectomy,Laparoscopic    left shoulder replacement  2019    right total shoulder replacement  2009    TONSILLECTOMY       and adenoidectomy age 12       Family History:    Family History   Adopted: Yes   Problem Relation Age of Onset    Unknown/Adopted Other         Unknown,Unknown. She is ADOPTED    Prostate Cancer Father         metastic Prostate CA       Social History:  Marital Status:   [2]  Social History     Tobacco Use    Smoking status: Former     Current packs/day: 0.00     Types: Cigarettes     Quit date: 10/22/1991     Years since quittin.4     Passive exposure: Never    Smokeless tobacco: Never   Vaping Use    Vaping status: Never Used   Substance Use Topics    Alcohol use: Not Currently     Comment: rare    Drug use: No        Medications:    albuterol (VENTOLIN HFA) 108 (90 Base) MCG/ACT inhaler  dexAMETHasone (DECADRON) 4 MG tablet  acetaminophen (TYLENOL) 325 MG tablet  azithromycin (ZITHROMAX) 250 MG tablet  buPROPion (WELLBUTRIN XL) 300 MG  24 hr tablet  Cholecalciferol (VITAMIN D3) 1000 UNITS CAPS  gabapentin (NEURONTIN) 600 MG tablet  guaiFENesin-codeine (ROBITUSSIN AC) 100-10 MG/5ML solution  lamoTRIgine (LAMICTAL) 100 MG tablet  lamoTRIgine (LAMICTAL) 200 MG tablet  order for DME  venlafaxine (EFFEXOR XR) 150 MG 24 hr capsule          Review of Systems   Constitutional:  Positive for chills. Negative for fever.   HENT:  Positive for congestion.    Respiratory:  Positive for cough and shortness of breath.    Cardiovascular:  Negative for chest pain, palpitations and leg swelling.       Physical Exam   BP: (!) 148/82  Pulse: 74  Temp: 98.2  F (36.8  C)  Resp: 24  SpO2: 94 %      Physical Exam  Vitals and nursing note reviewed.   Constitutional:       Appearance: Normal appearance.   HENT:      Mouth/Throat:      Pharynx: No pharyngeal swelling.   Cardiovascular:      Rate and Rhythm: Normal rate and regular rhythm.   Pulmonary:      Effort: Pulmonary effort is normal.      Breath sounds: Wheezing present. No decreased breath sounds.   Chest:      Chest wall: No tenderness.   Musculoskeletal:      Right lower leg: No edema.      Left lower leg: No edema.   Neurological:      General: No focal deficit present.      Mental Status: She is alert.       No respiratory distress, expiratory wheezing primarily in lower lobes.  No rales or rhonchi.  Good air movement.  Improved after nebulizer.      Reviewed previous x-ray results, indeed read as normal.     Results for orders placed or performed during the hospital encounter of 04/10/25 (from the past 24 hours)   Influenza A/B, RSV and SARS-CoV2 PCR (COVID-19) Nose    Specimen: Nose; Swab   Result Value Ref Range    Influenza A PCR Negative Negative    Influenza B PCR Negative Negative    RSV PCR Negative Negative    SARS CoV2 PCR Negative Negative    Narrative    Testing was performed using the Xpert Xpress CoV2/Flu/RSV Assay on the [employer] GeneXpert Instrument. This test should be ordered for the detection  of SARS-CoV2, influenza, and RSV viruses in individuals with signs and symptoms of respiratory tract infection. This test is for in vitro diagnostic use under the [place] FDA for laboratories certified under CLIA to perform high or moderate complexity testing. This test has been [place] FDA cleared. A negative result does not rule out the presence of PCR inhibitors in the specimen or target RNA in concentration below the limit of detection for the assay. If only one viral target is positive but coinfection with multiple targets is suspected, the sample should be re-tested with another FDA cleared, approved, or authorized test, if coninfection would change clinical management. This test was validated by the [facility]. These laboratories are certified under the Clinical Laboratory Improvement Amendments of 1988 (CLIA-88) as qualified to perfom high complexity laboratory testing.   Extra Tube    Narrative    The following orders were created for panel order Extra Tube.  Procedure                               Abnormality         Status                     ---------                               -----------         ------                     Extra Blue Top Tube[[id number]]                             Final result               Extra Red Top Tube[[id number]]                              Final result                 Please view results for these tests on the individual orders.   Extra Blue Top Tube   Result Value Ref Range    Hold Specimen JIC    Extra Red Top Tube   Result Value Ref Range    Hold Specimen JIC    CBC with platelets differential    Narrative    The following orders were created for panel order CBC with platelets differential.  Procedure                               Abnormality         Status                     ---------                               -----------         ------                     CBC with platelets and ...[[id number]]  Abnormal            Final result                 Please  view results for these tests on the individual orders.   Comprehensive metabolic panel   Result Value Ref Range    Sodium 137 135 - 145 mmol/L    Potassium 3.9 3.4 - 5.3 mmol/L    Carbon Dioxide (CO2) 23 22 - 29 mmol/L    Anion Gap 12 7 - 15 mmol/L    Urea Nitrogen 20.5 8.0 - 23.0 mg/dL    Creatinine 1.37 (H) 0.51 - 0.95 mg/dL    GFR Estimate 42 (L) >60 mL/min/1.73m2    Calcium 9.2 8.8 - 10.4 mg/dL    Chloride 102 98 - 107 mmol/L    Glucose 103 (H) 70 - 99 mg/dL    Alkaline Phosphatase 98 40 - 150 U/L    AST 26 0 - 45 U/L    ALT 19 0 - 50 U/L    Protein Total 7.2 6.4 - 8.3 g/dL    Albumin 4.0 3.5 - 5.2 g/dL    Bilirubin Total 0.3 <=1.2 mg/dL   CBC with platelets and differential   Result Value Ref Range    WBC Count 7.8 4.0 - 11.0 10e3/uL    RBC Count 3.51 (L) 3.80 - 5.20 10e6/uL    Hemoglobin 10.9 (L) 11.7 - 15.7 g/dL    Hematocrit 33.2 (L) 35.0 - 47.0 %    MCV 95 78 - 100 fL    MCH 31.1 26.5 - 33.0 pg    MCHC 32.8 31.5 - 36.5 g/dL    RDW 13.9 10.0 - 15.0 %    Platelet Count 220 150 - 450 10e3/uL    % Neutrophils 43 %    % Lymphocytes 38 %    % Monocytes 11 %    % Eosinophils 8 %    % Basophils 1 %    % Immature Granulocytes 0 %    NRBCs per 100 WBC 0 <1 /100    Absolute Neutrophils 3.4 1.6 - 8.3 10e3/uL    Absolute Lymphocytes 2.9 0.8 - 5.3 10e3/uL    Absolute Monocytes 0.8 0.0 - 1.3 10e3/uL    Absolute Eosinophils 0.6 0.0 - 0.7 10e3/uL    Absolute Basophils 0.1 0.0 - 0.2 10e3/uL    Absolute Immature Granulocytes 0.0 <=0.4 10e3/uL    Absolute NRBCs 0.0 10e3/uL   XR Chest 2 Views    Narrative    EXAM: XR CHEST 2 VIEWS  LOCATION: [facility]  DATE: 4/10/2025    INDICATION: SOB cough worsening  COMPARISON: 4/8/2025.      Impression    IMPRESSION: Top normal heart size. Normal pulmonary vascularity. Clear lungs. No pleural fluid or pneumothorax. Bilateral glenohumeral arthroplasty.       Medications   albuterol (PROVENTIL) neb solution 2.5 mg (2.5 mg Nebulization $Given 4/10/25 6810)    dexAMETHasone (DECADRON) injectable solution used ORALLY 10 mg (10 mg Oral $Given 4/10/25 0326)       Assessments & Plan (with Medical Decision Making)     I have reviewed the nursing notes.    I have reviewed the findings, diagnosis, plan and need for follow up with the patient.  Mild creatinine elevation is baseline    Medical Decision Making  The patient's presentation was of moderate complexity (an acute illness with systemic symptoms).    The patient's evaluation involved:  ordering and/or review of 2 test(s) in this encounter (see separate area of note for details)    The patient's management necessitated moderate risk (prescription drug management including medications given in the ED).    Reassuring workup, reassuring clinical trajectory here in the ED.  After shared decision-making conversation patient is in agreement that no further diagnostics or urgent treatments are definitively indicated.  Patient verbalized understanding plan is in agreement, she left the ED in improving condition.     Patient's been on azithromycin for few days, I feel it is too early to make any antibiotic changes moreover etiology of upper respiratory infection certainly could have been viral to begin with.  Viral swab negative I did not test her for pertussis, she is already on therapy for that anyway.  Nonetheless we will continue with azithromycin, Decadron x 1 in the ED, repeat Decadron in 40 to 72 hours.  Inhaler prescribed.  Patient does not have history of asthma or COPD but she is a former smoker, if she continues to have recurrent episodes like this may need pulmonary function testing as an outpatient.  She verbalized understanding of that.    Discharge Medication List as of 4/10/2025  3:30 AM        START taking these medications    Details   albuterol (VENTOLIN HFA) 108 (90 Base) MCG/ACT inhaler Inhale 2 puffs into the lungs every 6 hours as needed for shortness of breath, wheezing or cough., Disp-8 g, R-0,  [facility] may dispense brand covered by insurance (Proair, or proventil or ventolin or generic albuterol inhaler)      dexAMETHasone (DECADRON) 4 MG tablet Take 2 tablets (8 mg) by mouth once for 1 dose., Disp-2 tablet, R-0, E-PrescribeMay take 48-72 hours after ED visit             Final diagnoses:   URI with cough and congestion   Chronic kidney disease, stage 3b (H)   PRADIP on CPAP       4/10/2025   [facility] Island[facility] linn MD  04/10/25 0630

## 2025-04-11 DIAGNOSIS — F33.2 SEVERE EPISODE OF RECURRENT MAJOR DEPRESSIVE DISORDER, WITHOUT PSYCHOTIC FEATURES (H): ICD-10-CM

## 2025-04-15 RX ORDER — LAMOTRIGINE 100 MG/1
TABLET ORAL
Qty: 90 TABLET | Refills: 0 | Status: SHIPPED | OUTPATIENT
Start: 2025-04-15

## 2025-04-15 NOTE — TELEPHONE ENCOUNTER
[facility] sent Rx request for the following:      Requested Prescriptions   Pending Prescriptions Disp Refills    lamoTRIgine (LAMICTAL) 100 MG tablet [Pharmacy Med Name: LAMOTRIGINE 100MG TABLETS] 90 tablet 3     Sig: TAKE 1 TABLET(100 MG) BY MOUTH EVERY MORNING       Anti-Seizure Meds Protocol  Failed - 4/15/2025  1:57 PM        Failed - PHQ-9 score less than 5 in past 6 months.     Please review last PHQ-9 score.       4/2/2024    10:48 AM 7/11/2024     1:49 PM 8/29/2024    10:04 AM   PHQ-9 SCORE   PHQ-9 Total Score MyChart 21 (Severe depression) 19 (Moderately severe depression) 21 (Severe depression)   PHQ-9 Total Score 21 19 21           Failed - Review Authorizing provider's last note.      Refer to last progress notes: confirm request is for original authorizing provider (cannot be through other providers)        Failed - Has GFR on file in past 12 months and most recent value is normal     Last Prescription Date:   4/2/24  Last Fill Qty/Refills:         90, R-3    Last Office Visit:              8/29/24   Future Office visit:           5/7/25    Unable to complete prescription refill per RN Medication Refill Policy. Kelly Hong, Refill RN .............. 4/15/2025  2:10 PM

## 2025-05-07 ENCOUNTER — OFFICE VISIT (OUTPATIENT)
Dept: FAMILY MEDICINE | Facility: OTHER | Age: 70
End: 2025-05-07
Attending: FAMILY MEDICINE
Payer: MEDICARE

## 2025-05-07 ENCOUNTER — HOSPITAL ENCOUNTER (OUTPATIENT)
Dept: MAMMOGRAPHY | Facility: OTHER | Age: 70
Discharge: HOME | End: 2025-05-07
Attending: FAMILY MEDICINE
Payer: MEDICARE

## 2025-05-07 VITALS
BODY MASS INDEX: 40.54 KG/M2 | HEIGHT: 63 IN | RESPIRATION RATE: 18 BRPM | TEMPERATURE: 98.3 F | WEIGHT: 228.8 LBS | HEART RATE: 80 BPM | SYSTOLIC BLOOD PRESSURE: 128 MMHG | OXYGEN SATURATION: 96 % | DIASTOLIC BLOOD PRESSURE: 64 MMHG

## 2025-05-07 DIAGNOSIS — Z00.00 MEDICARE ANNUAL WELLNESS VISIT, SUBSEQUENT: Primary | ICD-10-CM

## 2025-05-07 DIAGNOSIS — F33.2 SEVERE EPISODE OF RECURRENT MAJOR DEPRESSIVE DISORDER, WITHOUT PSYCHOTIC FEATURES (H): ICD-10-CM

## 2025-05-07 DIAGNOSIS — Z12.31 VISIT FOR SCREENING MAMMOGRAM: ICD-10-CM

## 2025-05-07 DIAGNOSIS — E66.813 CLASS 3 OBESITY (H): ICD-10-CM

## 2025-05-07 DIAGNOSIS — N18.32 CHRONIC KIDNEY DISEASE, STAGE 3B (H): ICD-10-CM

## 2025-05-07 DIAGNOSIS — D64.9 ANEMIA, UNSPECIFIED TYPE: ICD-10-CM

## 2025-05-07 DIAGNOSIS — D12.6 ADENOMATOUS POLYP OF COLON, UNSPECIFIED PART OF COLON: ICD-10-CM

## 2025-05-07 DIAGNOSIS — F41.1 GAD (GENERALIZED ANXIETY DISORDER): ICD-10-CM

## 2025-05-07 DIAGNOSIS — E78.5 DYSLIPIDEMIA: ICD-10-CM

## 2025-05-07 PROCEDURE — 77067 SCR MAMMO BI INCL CAD: CPT

## 2025-05-07 PROCEDURE — G0463 HOSPITAL OUTPT CLINIC VISIT: HCPCS

## 2025-05-07 RX ORDER — ALBUTEROL SULFATE 90 UG/1
2 INHALANT RESPIRATORY (INHALATION) EVERY 6 HOURS PRN
Qty: 8 G | Refills: 4 | Status: CANCELLED | OUTPATIENT
Start: 2025-05-07

## 2025-05-07 RX ORDER — VENLAFAXINE HYDROCHLORIDE 75 MG/1
75 CAPSULE, EXTENDED RELEASE ORAL DAILY
Qty: 90 CAPSULE | Refills: 1 | Status: SHIPPED | OUTPATIENT
Start: 2025-05-07

## 2025-05-07 RX ORDER — BUPROPION HYDROCHLORIDE 300 MG/1
300 TABLET ORAL EVERY MORNING
Qty: 90 TABLET | Refills: 4 | Status: SHIPPED | OUTPATIENT
Start: 2025-05-07

## 2025-05-07 RX ORDER — LAMOTRIGINE 100 MG/1
TABLET ORAL
Qty: 90 TABLET | Refills: 4 | Status: SHIPPED | OUTPATIENT
Start: 2025-05-07

## 2025-05-07 RX ORDER — LAMOTRIGINE 200 MG/1
TABLET ORAL
Qty: 90 TABLET | Refills: 4 | Status: SHIPPED | OUTPATIENT
Start: 2025-05-07

## 2025-05-07 SDOH — HEALTH STABILITY: PHYSICAL HEALTH: ON AVERAGE, HOW MANY DAYS PER WEEK DO YOU ENGAGE IN MODERATE TO STRENUOUS EXERCISE (LIKE A BRISK WALK)?: 2 DAYS

## 2025-05-07 SDOH — HEALTH STABILITY: PHYSICAL HEALTH: ON AVERAGE, HOW MANY MINUTES DO YOU ENGAGE IN EXERCISE AT THIS LEVEL?: 20 MIN

## 2025-05-07 ASSESSMENT — ANXIETY QUESTIONNAIRES
7. FEELING AFRAID AS IF SOMETHING AWFUL MIGHT HAPPEN: NEARLY EVERY DAY
6. BECOMING EASILY ANNOYED OR IRRITABLE: NEARLY EVERY DAY
GAD7 TOTAL SCORE: 16
IF YOU CHECKED OFF ANY PROBLEMS ON THIS QUESTIONNAIRE, HOW DIFFICULT HAVE THESE PROBLEMS MADE IT FOR YOU TO DO YOUR WORK, TAKE CARE OF THINGS AT HOME, OR GET ALONG WITH OTHER PEOPLE: SOMEWHAT DIFFICULT
GAD7 TOTAL SCORE: 16
4. TROUBLE RELAXING: MORE THAN HALF THE DAYS
5. BEING SO RESTLESS THAT IT IS HARD TO SIT STILL: NOT AT ALL
2. NOT BEING ABLE TO STOP OR CONTROL WORRYING: MORE THAN HALF THE DAYS
GAD7 TOTAL SCORE: 16
8. IF YOU CHECKED OFF ANY PROBLEMS, HOW DIFFICULT HAVE THESE MADE IT FOR YOU TO DO YOUR WORK, TAKE CARE OF THINGS AT HOME, OR GET ALONG WITH OTHER PEOPLE?: SOMEWHAT DIFFICULT
3. WORRYING TOO MUCH ABOUT DIFFERENT THINGS: NEARLY EVERY DAY
7. FEELING AFRAID AS IF SOMETHING AWFUL MIGHT HAPPEN: NEARLY EVERY DAY
1. FEELING NERVOUS, ANXIOUS, OR ON EDGE: NEARLY EVERY DAY

## 2025-05-07 ASSESSMENT — PAIN SCALES - GENERAL: PAINLEVEL_OUTOF10: MODERATE PAIN (5)

## 2025-05-07 ASSESSMENT — SOCIAL DETERMINANTS OF HEALTH (SDOH): HOW OFTEN DO YOU GET TOGETHER WITH FRIENDS OR RELATIVES?: PATIENT DECLINED

## 2025-05-07 ASSESSMENT — PATIENT HEALTH QUESTIONNAIRE - PHQ9
SUM OF ALL RESPONSES TO PHQ QUESTIONS 1-9: 17
10. IF YOU CHECKED OFF ANY PROBLEMS, HOW DIFFICULT HAVE THESE PROBLEMS MADE IT FOR YOU TO DO YOUR WORK, TAKE CARE OF THINGS AT HOME, OR GET ALONG WITH OTHER PEOPLE: VERY DIFFICULT
SUM OF ALL RESPONSES TO PHQ QUESTIONS 1-9: 17

## 2025-05-07 NOTE — NURSING NOTE
Patient is here for annual medicare wellness.  Has concerns with hands shaking, and her anxiety.     No LMP recorded (lmp unknown). Patient has had an ablation.  Medication Reconciliation: complete    Ashley Slater LPN 5/7/2025 1:25 PM       Advance care directive on file? yes  Advance care directive provided to patient? na       Ashley Slater LPN

## 2025-05-07 NOTE — PROGRESS NOTES
"Preventive Care Visit  [facility]  Yodit Villalta MD, Family Medicine  May 7, 2025  {Provider  Link to [facility] :566380}    Assessment & Plan     Medicare annual wellness visit, subsequent  ***    Severe episode of recurrent major depressive disorder, without psychotic features (H)  Increase effexor to 225 mg daily, stay with wellbtrin 300 mg qam and lamictal  - buPROPion (WELLBUTRIN XL) 300 MG 24 hr tablet; Take 1 tablet (300 mg) by mouth every morning.  - lamoTRIgine (LAMICTAL) 100 MG tablet; TAKE 1 TABLET(100 MG) BY MOUTH EVERY MORNING  - lamoTRIgine (LAMICTAL) 200 MG tablet; TAKE 1 TABLET(200 MG) BY MOUTH AT BEDTIME    Class 3 obesity (H)  Encouraged exercise    Chronic kidney disease, stage 3b (H)  Repeat microalb, avoid nsaids  - Albumin Random Urine Quantitative with Creat Ratio; Future  - Comprehensive Metabolic Panel; Future    Dyslipidemia  Repeat lipid  - Lipid Panel; Future    Adenomatous polyp of colon, unspecified part of colon  Due in 10/2025  - Colonoscopy Screening  Referral; Future    DESI (generalized anxiety disorder)  As above  - venlafaxine (EFFEXOR XR) 75 MG 24 hr capsule; Take 1 capsule (75 mg) by mouth daily.    Patient has been advised of split billing requirements and indicates understanding: Yes        BMI  Estimated body mass index is 40.53 kg/m  as calculated from the following:    Height as of this encounter: 1.6 m (5' 3\").    Weight as of this encounter: 103.8 kg (228 lb 12.8 oz).   Weight management plan: Discussed healthy diet and exercise guidelines    Counseling  Appropriate preventive services were addressed with this patient via screening, questionnaire, or discussion as appropriate for fall prevention, nutrition, physical activity, Tobacco-use cessation, social engagement, weight loss and cognition.  Checklist reviewing preventive services available has been given to the patient.  Reviewed patient's diet, addressing concerns and/or " "questions.   She is at risk for lack of exercise and has been provided with information to increase physical activity for the benefit of her well-being.   She is at risk for psychosocial distress and has been provided with information to reduce risk.   Discussed possible causes of fatigue. The patient was provided with written information regarding signs of hearing loss.   Information on urinary incontinence and treatment options given to patient.   The patient's PHQ-9 score is consistent with moderate depression. She was provided with information regarding depression.       {Follow-up (Optional):077069}    Jurgen Haynes is a 69 year old, presenting for the following:  Medicare Visit        68 yo female presents for medicare wellness exam    Spent winter in [place]. Condo had bugs.  \"I have tons of anxiety\" about the bugs otherwise mood is good.    Needs refills of medications    No change in medical conditions    Due for colonoscopy 102/2025        History of Present Illness       CKD: She uses over the counter pain medication, including tylendo, a few times a month.     ***   {MA/LPN/RN Pre-Provider Visit Orders- hCG/UA/Strep (Optional):328035}  {SUPERLIST (Optional):211067}  {additonal problems for provider to add (Optional):359996}  Advance Care Planning  {The storyboard will display whether the patient has ACP docs on file. Hover over the Code section in the storyboard to access the ACP documents. :156083}  {(AWV REQUIRED) Advance Care Planning Reviewed:292382}        5/7/2025   General Health   How would you rate your overall physical health? (!) FAIR   Feel stress (tense, anxious, or unable to sleep) Very much   (!) STRESS CONCERN      5/7/2025   Nutrition   Diet: I don't know         5/7/2025   Exercise   Days per week of moderate/strenous exercise 2 days   Average minutes spent exercising at this level 20 min   (!) EXERCISE CONCERN      5/7/2025   Social Factors   Frequency of gathering with friends or " relatives Patient declined   Worry food won't last until get money to buy more No   Food not last or not have enough money for food? No   Do you have housing? (Housing is defined as stable permanent housing and does not include staying outside in a car, in a tent, in an abandoned building, in an overnight shelter, or couch-surfing.) Yes   Are you worried about losing your housing? No   Lack of transportation? No   Unable to get utilities (heat,electricity)? No         5/7/2025   Fall Risk   Fallen 2 or more times in the past year? No   Trouble with walking or balance? Yes   Gait Speed Test (Document in seconds) 6.85   Gait Speed Test Interpretation Greater than 5.01 seconds - ABNORMAL          5/7/2025   Activities of Daily Living- Home Safety   Needs help with the following daily activites None of the above   Safety concerns in the home None of the above         5/7/2025   Dental   Dentist two times every year? Yes         5/7/2025   Hearing Screening   Hearing concerns? (!) I NEED TO ASK PEOPLE TO SPEAK UP OR REPEAT THEMSELVES.    (!) IT'S HARD TO FOLLOW A CONVERSATION IN A NOISY RESTAURANT OR CROWDED ROOM.       Multiple values from one day are sorted in reverse-chronological order         5/7/2025   Driving Risk Screening   Patient/family members have concerns about driving No         5/7/2025   General Alertness/Fatigue Screening   Have you been more tired than usual lately? (!) YES         5/7/2025   Urinary Incontinence Screening   Bothered by leaking urine in past 6 months Yes       Today's PHQ-9 Score:       5/7/2025    12:58 PM   PHQ-9 SCORE   PHQ-9 Total Score MyChart 17 (Moderately severe depression)   PHQ-9 Total Score 17        Patient-reported         5/7/2025   Substance Use   Alcohol more than 3/day or more than 7/wk Not Applicable   Do you have a current opioid prescription? No   How severe/bad is pain from 1 to 10? 5/10   Do you use any other substances recreationally? No     Social History      Tobacco Use    Smoking status: Former     Current packs/day: 0.00     Types: Cigarettes     Quit date: 10/22/1991     Years since quittin.5     Passive exposure: Never    Smokeless tobacco: Never   Vaping Use    Vaping status: Never Used   Substance Use Topics    Alcohol use: Not Currently     Comment: rare    Drug use: No     {Provider  If there are gaps in the social history shown above, please follow the link to update and then refresh the note Link to Social and Substance History :855331}      2024   LAST FHS-7 RESULTS   1st degree relative breast or ovarian cancer No   Any relative bilateral breast cancer No   Any male have breast cancer No   Any ONE woman have BOTH breast AND ovarian cancer No   Any woman with breast cancer before 50yrs No   2 or more relatives with breast AND/OR ovarian cancer No   2 or more relatives with breast AND/OR bowel cancer No     {If any of the questions to the FHS7 are answered yes, consider referral for genetic counseling.    Additional indications for genetic referral include personal history of breast or ovarian cancer, genetic mutation in 1st degree relative which increases risk of breast cancer including BRCA1, BRCA2, SUKUMAR, PALB 2, TP53, CHEK2, PTEN, CDH1, STK11 (per ACS) and/or 1st degree relative with history of pancreatic or high-risk prostate cancer (per NCCN):584212}   {Mammogram Decision Support (Optional):451028}      History of abnormal Pap smear: { :113598}        Latest Ref Rng & Units 2022     2:19 PM   PAP / HPV   PAP  Negative for Intraepithelial Lesion or Malignancy (NILM)    HPV 16 DNA Negative Negative    HPV 18 DNA Negative Negative    Other HR HPV Negative Negative      ASCVD Risk   The 10-year ASCVD risk score (Jeff BENSON, et al., 2019) is: 7.1%    Values used to calculate the score:      Age: 69 years      Sex: Female      Is Non- : No      Diabetic: No      Tobacco smoker: No      Systolic Blood Pressure:  128 mmHg      Is BP treated: No      HDL Cholesterol: 56 mg/dL      Total Cholesterol: 219 mg/dL    {Link to Fracture Risk Assessment Tool (Optional):415099}    {Provider  REQUIRED FOR AWV Use the storyboard to review patient history, after sections have been marked as reviewed, refresh note to capture documentation:709923}  {Provider   REQUIRED AWV use this link to review and update sexual activity history  after section has been marked as reviewed, refresh note to capture documentation:659724}  Reviewed and updated as needed this visit by Provider                    {HISTORY OPTIONS (Optional):153665}  Current providers sharing in care for this patient include:  Patient Care Team:  Yodit Mandujano MD as PCP - General (Family Medicine)  Yodit Mandujano MD as Assigned PCP  Mack Cohen MD as Assigned Musculoskeletal Provider  Alejandro Jordan DPM as MD (Podiatry)  Mack Cohen MD as MD (Orthopaedic Surgery)    The following health maintenance items are reviewed in Epic and correct as of today:  Health Maintenance   Topic Date Due    MICROALBUMIN  Never done    COVID-19 Vaccine (7 - 2024-25 season) 03/10/2025    MEDICARE ANNUAL WELLNESS VISIT  04/02/2025    LIPID  04/02/2025    COLORECTAL CANCER SCREENING  10/31/2025    PHQ-9  11/07/2025    MAMMO SCREENING  04/02/2026    BMP  04/10/2026    HEMOGLOBIN  04/10/2026    FALL RISK ASSESSMENT  05/07/2026    DIABETES SCREENING  04/10/2028    ADVANCE CARE PLANNING  04/05/2029    DTAP/TDAP/TD IMMUNIZATION (6 - Td or Tdap) 03/28/2033    DEXA  06/02/2038    PARATHYROID  Completed    PHOSPHORUS  Completed    HEPATITIS C SCREENING  Completed    DEPRESSION ACTION PLAN  Completed    INFLUENZA VACCINE  Completed    Pneumococcal Vaccine: 50+ Years  Completed    URINALYSIS  Completed    ALK PHOS  Completed    ZOSTER IMMUNIZATION  Completed    RSV VACCINE  Completed    HPV IMMUNIZATION  Aged Out    MENINGITIS IMMUNIZATION  Aged Out    PAP   "Discontinued       {ROS Picklists (Optional):344707}     Objective    Exam  /64   Pulse 80   Temp 98.3  F (36.8  C) (Tympanic)   Resp 18   Ht 1.6 m (5' 3\")   Wt 103.8 kg (228 lb 12.8 oz)   LMP  (LMP Unknown)   SpO2 96%   BMI 40.53 kg/m     Estimated body mass index is 40.53 kg/m  as calculated from the following:    Height as of this encounter: 1.6 m (5' 3\").    Weight as of this encounter: 103.8 kg (228 lb 12.8 oz).    Physical Exam  {Exam Choices (Optional):813789}        5/7/2025   Mini Cog   Clock Draw Score 2 Normal   3 Item Recall 3 objects recalled   Mini Cog Total Score 5     {A Mini-Cog total score of 0-2 suggests the possibility of dementia, score of 3-5 suggests no dementia:873062}         Signed Electronically by: Yodit Villalta MD  {Email feedback regarding this note to [email or web address]   :588190}  " S4, no murmur, click or rub, no peripheral edema  ABDOMEN: soft, nontender, no hepatosplenomegaly, no masses and bowel sounds normal  NEURO: Normal strength and tone, mentation intact and speech normal  PSYCH: mentation appears normal, affect normal/bright        5/7/2025   Mini Cog   Clock Draw Score 2 Normal   3 Item Recall 3 objects recalled   Mini Cog Total Score 5              Signed Electronically by: Yodit Villalta MD

## 2025-05-09 ENCOUNTER — LAB (OUTPATIENT)
Dept: LAB | Facility: OTHER | Age: 70
End: 2025-05-09
Attending: FAMILY MEDICINE
Payer: MEDICARE

## 2025-05-09 DIAGNOSIS — Z12.11 ENCOUNTER FOR SCREENING COLONOSCOPY: Primary | ICD-10-CM

## 2025-05-09 DIAGNOSIS — E78.5 DYSLIPIDEMIA: ICD-10-CM

## 2025-05-09 DIAGNOSIS — N18.32 CHRONIC KIDNEY DISEASE, STAGE 3B (H): ICD-10-CM

## 2025-05-09 DIAGNOSIS — D64.9 ANEMIA, UNSPECIFIED TYPE: ICD-10-CM

## 2025-05-09 LAB
ALBUMIN SERPL BCG-MCNC: 4.1 G/DL (ref 3.5–5.2)
ALP SERPL-CCNC: 97 U/L (ref 40–150)
ALT SERPL W P-5'-P-CCNC: 17 U/L (ref 0–50)
ANION GAP SERPL CALCULATED.3IONS-SCNC: 14 MMOL/L (ref 7–15)
AST SERPL W P-5'-P-CCNC: 23 U/L (ref 0–45)
BASOPHILS # BLD AUTO: 0.1 10E3/UL (ref 0–0.2)
BASOPHILS NFR BLD AUTO: 1 %
BILIRUB SERPL-MCNC: 0.3 MG/DL
BUN SERPL-MCNC: 14.3 MG/DL (ref 8–23)
CALCIUM SERPL-MCNC: 9.3 MG/DL (ref 8.8–10.4)
CHLORIDE SERPL-SCNC: 105 MMOL/L (ref 98–107)
CHOLEST SERPL-MCNC: 231 MG/DL
CREAT SERPL-MCNC: 1.47 MG/DL (ref 0.51–0.95)
EGFRCR SERPLBLD CKD-EPI 2021: 39 ML/MIN/1.73M2
EOSINOPHIL # BLD AUTO: 0.5 10E3/UL (ref 0–0.7)
EOSINOPHIL NFR BLD AUTO: 8 %
ERYTHROCYTE [DISTWIDTH] IN BLOOD BY AUTOMATED COUNT: 14.1 % (ref 10–15)
FASTING STATUS PATIENT QL REPORTED: YES
FASTING STATUS PATIENT QL REPORTED: YES
GLUCOSE SERPL-MCNC: 137 MG/DL (ref 70–99)
HCO3 SERPL-SCNC: 24 MMOL/L (ref 22–29)
HCT VFR BLD AUTO: 37.4 % (ref 35–47)
HDLC SERPL-MCNC: 53 MG/DL
HGB BLD-MCNC: 12.1 G/DL (ref 11.7–15.7)
IMM GRANULOCYTES # BLD: 0 10E3/UL
IMM GRANULOCYTES NFR BLD: 0 %
LDLC SERPL CALC-MCNC: 147 MG/DL
LYMPHOCYTES # BLD AUTO: 1.8 10E3/UL (ref 0.8–5.3)
LYMPHOCYTES NFR BLD AUTO: 29 %
MCH RBC QN AUTO: 31.2 PG (ref 26.5–33)
MCHC RBC AUTO-ENTMCNC: 32.4 G/DL (ref 31.5–36.5)
MCV RBC AUTO: 96 FL (ref 78–100)
MONOCYTES # BLD AUTO: 0.7 10E3/UL (ref 0–1.3)
MONOCYTES NFR BLD AUTO: 11 %
NEUTROPHILS # BLD AUTO: 3.1 10E3/UL (ref 1.6–8.3)
NEUTROPHILS NFR BLD AUTO: 50 %
NONHDLC SERPL-MCNC: 178 MG/DL
NRBC # BLD AUTO: 0 10E3/UL
NRBC BLD AUTO-RTO: 0 /100
PLATELET # BLD AUTO: 257 10E3/UL (ref 150–450)
POTASSIUM SERPL-SCNC: 4.6 MMOL/L (ref 3.4–5.3)
PROT SERPL-MCNC: 7.3 G/DL (ref 6.4–8.3)
RBC # BLD AUTO: 3.88 10E6/UL (ref 3.8–5.2)
SODIUM SERPL-SCNC: 143 MMOL/L (ref 135–145)
TRIGL SERPL-MCNC: 156 MG/DL
WBC # BLD AUTO: 6.2 10E3/UL (ref 4–11)

## 2025-05-09 PROCEDURE — 85025 COMPLETE CBC W/AUTO DIFF WBC: CPT | Mod: ZL

## 2025-05-09 PROCEDURE — 84460 ALANINE AMINO (ALT) (SGPT): CPT | Mod: ZL

## 2025-05-09 PROCEDURE — 80061 LIPID PANEL: CPT | Mod: ZL

## 2025-05-09 PROCEDURE — 36415 COLL VENOUS BLD VENIPUNCTURE: CPT | Mod: ZL

## 2025-05-09 NOTE — TELEPHONE ENCOUNTER
Screening Questions for the Scheduling of Screening Colonoscopies   (If Colonoscopy is diagnostic, Provider should review the chart before scheduling.)  Are you younger than 45 or older than 80?  NO  Do you take aspirin or fish oil?  NO (if yes, tell patient to stop 1 week prior to Colonoscopy)  Do you take warfarin (Coumadin), clopidogrel (Plavix), apixaban (Eliquis), dabigatram (Pradaxa), rivaroxaban (Xarelto) or any blood thinner? NO  Do you take semaglutide (Ozempic or Wegovy), tirzepatide (Mounjaro or Zepbound), liraglutide (Victoza), or dulaglutide (Trulicity)? NO  Do you use oxygen or a CPAP at home?  CPAP  Do you have kidney disease? NO  Are you on dialysis? NO  Have you had a stroke or heart attack in the last year? NO  Have you had a stent in your heart or any blood vessel in the last year? NO  Have you had a transplant of any organ? NO  Have you had a colonoscopy or upper endoscopy (EGD) before? YES         When?  2022  Date of scheduled Colonoscopy. 07/21/2025  [facility] is requesting gatorade prep

## 2025-05-12 ENCOUNTER — OFFICE VISIT (OUTPATIENT)
Dept: FAMILY MEDICINE | Facility: OTHER | Age: 70
End: 2025-05-12
Attending: PHYSICIAN ASSISTANT
Payer: MEDICARE

## 2025-05-12 ENCOUNTER — RESULTS FOLLOW-UP (OUTPATIENT)
Dept: FAMILY MEDICINE | Facility: OTHER | Age: 70
End: 2025-05-12

## 2025-05-12 ENCOUNTER — APPOINTMENT (OUTPATIENT)
Dept: LAB | Facility: OTHER | Age: 70
End: 2025-05-12
Attending: FAMILY MEDICINE
Payer: MEDICARE

## 2025-05-12 VITALS
BODY MASS INDEX: 40.54 KG/M2 | SYSTOLIC BLOOD PRESSURE: 138 MMHG | DIASTOLIC BLOOD PRESSURE: 60 MMHG | WEIGHT: 228.8 LBS | HEIGHT: 63 IN | OXYGEN SATURATION: 96 % | RESPIRATION RATE: 18 BRPM | HEART RATE: 92 BPM | TEMPERATURE: 99.1 F

## 2025-05-12 DIAGNOSIS — N18.32 CHRONIC KIDNEY DISEASE, STAGE 3B (H): Primary | ICD-10-CM

## 2025-05-12 LAB
CREAT UR-MCNC: 33.5 MG/DL
MICROALBUMIN UR-MCNC: <12 MG/L
MICROALBUMIN/CREAT UR: NORMAL MG/G{CREAT}

## 2025-05-12 PROCEDURE — G0463 HOSPITAL OUTPT CLINIC VISIT: HCPCS

## 2025-05-12 PROCEDURE — 82043 UR ALBUMIN QUANTITATIVE: CPT | Mod: ZL

## 2025-05-12 RX ORDER — BISACODYL 5 MG/1
TABLET, DELAYED RELEASE ORAL
Qty: 2 TABLET | Refills: 0 | Status: SHIPPED | OUTPATIENT
Start: 2025-07-14

## 2025-05-12 RX ORDER — POLYETHYLENE GLYCOL 3350 17 G/17G
POWDER, FOR SOLUTION ORAL
Qty: 510 G | Refills: 0 | Status: SHIPPED | OUTPATIENT
Start: 2025-07-14

## 2025-05-12 ASSESSMENT — PAIN SCALES - GENERAL: PAINLEVEL_OUTOF10: MODERATE PAIN (4)

## 2025-05-12 NOTE — PROGRESS NOTES
"  Assessment & Plan     Chronic kidney disease, stage 3b (H)  Stable GFR no prior nephrology consultation.  Previous renal artery ultrasound was normal.  Avoid NSAIDs.  Blood pressure is under good control, reviewed previous 24-hour blood pressure monitor.  Microalbumin ordered  - Adult Nephrology  Referral; Future              Subjective   Ivy is a 69 year old, presenting for the following health issues:    70 yo female presents to discuss recent lab results    Wants to discus CKD diagnosis    2022: 24 BP monitor  DBP 72  Renal ultrasound normal      History of Present Illness       Reason for visit:  To go over some information on my last phyical   She is taking medications regularly.                  Review of Systems  Constitutional, HEENT, cardiovascular, pulmonary, gi and gu systems are negative, except as otherwise noted.      Objective    /60   Pulse 92   Temp 99.1  F (37.3  C) (Tympanic)   Resp 18   Ht 1.6 m (5' 3\")   Wt 103.8 kg (228 lb 12.8 oz)   LMP  (LMP Unknown)   SpO2 96%   BMI 40.53 kg/m    Body mass index is 40.53 kg/m .  Physical Exam   GENERAL: alert and no distress  ABDOMEN: soft, nontender, no hepatosplenomegaly, no masses and bowel sounds normal  PSYCH: mentation appears normal, affect normal/bright    No results found for any visits on 05/12/25.        Signed Electronically by: Yodit Villalta MD    "

## 2025-05-12 NOTE — NURSING NOTE
Patient is here to follow up on results and has some questions regarding recent visit.     No LMP recorded (lmp unknown). Patient has had an ablation.  Medication Reconciliation: complete    Ashley Slater LPN 5/12/2025 10:35 AM       Advance care directive on file? yes  Advance care directive provided to patient? na       Ashley Slater LPN

## 2025-05-27 ENCOUNTER — TRANSFERRED RECORDS (OUTPATIENT)
Dept: HEALTH INFORMATION MANAGEMENT | Facility: OTHER | Age: 70
End: 2025-05-27
Payer: MEDICARE

## 2025-05-29 DIAGNOSIS — Z96.651 STATUS POST TOTAL RIGHT KNEE REPLACEMENT: Primary | ICD-10-CM

## 2025-06-18 ENCOUNTER — TRANSFERRED RECORDS (OUTPATIENT)
Dept: HEALTH INFORMATION MANAGEMENT | Facility: OTHER | Age: 70
End: 2025-06-18
Payer: MEDICARE

## 2025-07-17 PROBLEM — Z86.0101 H/O ADENOMATOUS POLYP OF COLON: Status: ACTIVE | Noted: 2022-10-31

## 2025-07-21 ENCOUNTER — ANESTHESIA (OUTPATIENT)
Dept: SURGERY | Facility: OTHER | Age: 70
End: 2025-07-21
Payer: MEDICARE

## 2025-07-21 ENCOUNTER — ANESTHESIA EVENT (OUTPATIENT)
Dept: SURGERY | Facility: OTHER | Age: 70
End: 2025-07-21
Payer: MEDICARE

## 2025-07-21 ENCOUNTER — HOSPITAL ENCOUNTER (OUTPATIENT)
Facility: OTHER | Age: 70
Discharge: HOME | End: 2025-07-21
Attending: SURGERY | Admitting: SURGERY
Payer: MEDICARE

## 2025-07-21 ENCOUNTER — HOSPITAL ENCOUNTER (OUTPATIENT)
Facility: OTHER | Age: 70
End: 2025-07-21
Attending: SURGERY | Admitting: SURGERY
Payer: MEDICARE

## 2025-07-21 VITALS
HEART RATE: 68 BPM | TEMPERATURE: 97.9 F | BODY MASS INDEX: 40.39 KG/M2 | WEIGHT: 228 LBS | RESPIRATION RATE: 18 BRPM | DIASTOLIC BLOOD PRESSURE: 64 MMHG | OXYGEN SATURATION: 95 % | SYSTOLIC BLOOD PRESSURE: 120 MMHG

## 2025-07-21 DIAGNOSIS — Z12.11 ENCOUNTER FOR SCREENING COLONOSCOPY: Primary | ICD-10-CM

## 2025-07-21 PROCEDURE — 258N000003 HC RX IP 258 OP 636: Performed by: SURGERY

## 2025-07-21 RX ORDER — SODIUM CHLORIDE, SODIUM LACTATE, POTASSIUM CHLORIDE, CALCIUM CHLORIDE 600; 310; 30; 20 MG/100ML; MG/100ML; MG/100ML; MG/100ML
INJECTION, SOLUTION INTRAVENOUS CONTINUOUS
Status: DISCONTINUED | OUTPATIENT
Start: 2025-07-21 | End: 2025-07-21 | Stop reason: HOSPADM

## 2025-07-21 RX ORDER — ONDANSETRON 2 MG/ML
4 INJECTION INTRAMUSCULAR; INTRAVENOUS
Status: DISCONTINUED | OUTPATIENT
Start: 2025-07-21 | End: 2025-07-21 | Stop reason: HOSPADM

## 2025-07-21 RX ORDER — LIDOCAINE 40 MG/G
CREAM TOPICAL
Status: DISCONTINUED | OUTPATIENT
Start: 2025-07-21 | End: 2025-07-21 | Stop reason: HOSPADM

## 2025-07-21 RX ADMIN — SODIUM CHLORIDE, SODIUM LACTATE, POTASSIUM CHLORIDE, AND CALCIUM CHLORIDE: .6; .31; .03; .02 INJECTION, SOLUTION INTRAVENOUS at 07:57

## 2025-07-21 ASSESSMENT — ACTIVITIES OF DAILY LIVING (ADL): ADLS_ACUITY_SCORE: 44

## 2025-07-21 NOTE — TELEPHONE ENCOUNTER
"Screening Questions for the Scheduling of Screening Colonoscopies   (If Colonoscopy is diagnostic, Provider should review the chart before scheduling.)    Are you younger than 45 or older than 80?  NO    Do you take aspirin or fish oil?  NO (if yes, tell patient to stop 1 week prior to Colonoscopy)    Do you take warfarin (Coumadin), clopidogrel (Plavix), apixaban (Eliquis), dabigatram (Pradaxa),   rivaroxaban (Xarelto) or any blood thinner? NO    Do you take semaglutide (Ozempic or Wegovy), tirzepatide (Mounjaro or Zepbound), liraglutide   (Victoza), or dulaglutide (Trulicity)? NO    Do you use oxygen or a CPAP at home?  CPAP    Do you have kidney disease? NO    Are you on dialysis? NO    Have you had a stroke or heart attack in the last year? NO    Have you had a stent in your heart or any blood vessel in the last year? NO    Have you had a transplant of any organ? NO    Have you had a colonoscopy or upper endoscopy (EGD) before? YES           When?  2022    Date of scheduled Colonoscopy. 08/28/2025    Provider [facility] - Patient is requesting SuTab.    Note from Dr. Meneses said: \"We should try ClenPiq again and add Reglan and Zofran and try Mag Citrate the day prior to prep.\"  However, when rescheduling patient said she did not want to take anti-nausea medication or have to swallow any liquids.  She is wanting to complete the pill prep.  She does understand that she may have to pay out of pocket and is okay with that.  "

## 2025-07-21 NOTE — ANESTHESIA PREPROCEDURE EVALUATION
Anesthesia Pre-Procedure Evaluation    Patient: Ivy Romero   MRN: [id number] : 1955          Procedure : Procedure(s):  Colonoscopy         Past Medical History:   Diagnosis Date    AC (acromioclavicular) arthritis 4/10/2018    Allergic rhinitis due to pollen     No Comments Provided    Dorsalgia     10/7/2010    Erythema nodosum      2009    Essential tremor     Possible benign    Hyperlipidemia     10/24/2013    Ill-defined and unknown cause of mortality (CODE)     characterized by chronic pain, positive BARBARA at a low titer of 1:160, negative HLAB27,    Migraine without status migrainosus, not intractable     No Comments Provided    Osteoarthritis of left glenohumeral joint 4/10/2018    Postconcussional syndrome     2016    Sarcoidosis     2007    Tendinitis of left rotator cuff 4/10/2018      Past Surgical History:   Procedure Laterality Date    ARTHROPLASTY KNEE Right 2023    Procedure: Arthroplasty knee;  Surgeon: Mack Cohen MD;  Location:  OR    ARTHROSCOPY SHOULDER      ,Right type 3 SLAP lesion repair of labrum - suprascapular ganglion removal with posterior approach - acromioplasty and distal clavicle resection    ARTHROSCOPY SHOULDER      2015    COLONOSCOPY  2011,Normal--10 year followup    COLONOSCOPY N/A 10/31/2022    F/U  adenomatous, advanced by size    DILATION AND CURETTAGE      , and ablation    EXCISE MASS TOE Left 2023    Procedure: EXCISION, MASS, foot with rotational flap closure;  Surgeon: Alejandro Jordan DPM;  Location:  OR    LAPAROSCOPIC CHOLECYSTECTOMY  2015    6/23/15,Cholecystectomy,Laparoscopic    left shoulder replacement      right total shoulder replacement  2009    TONSILLECTOMY       and adenoidectomy age 12      Allergies   Allergen Reactions    Cats Cough    Penicillins Rash      Social History     Tobacco Use    Smoking status: Former     Current packs/day: 0.00     Types: Cigarettes     Quit  date: 10/22/1991     Years since quittin.7     Passive exposure: Never    Smokeless tobacco: Never   Substance Use Topics    Alcohol use: Not Currently     Comment: rare      Wt Readings from Last 1 Encounters:   07/10/25 103.4 kg (228 lb)        Anesthesia Evaluation   Pt has had prior anesthetic.     No history of anesthetic complications       ROS/MED HX  ENT/Pulmonary:     (+) sleep apnea, uses CPAP,         allergic rhinitis,                             Neurologic:       Cardiovascular:     (+) Dyslipidemia - -   -  - -                                      METS/Exercise Tolerance: >4 METS    Hematologic:       Musculoskeletal:   (+)  arthritis,             GI/Hepatic:       Renal/Genitourinary:       Endo:     (+)               Obesity,       Psychiatric/Substance Use:     (+) psychiatric history depression       Infectious Disease:       Malignancy:       Other:              Physical Exam  Airway  Mallampati: II  TM distance: >3 FB  Neck ROM: full  Mouth opening: >= 4 cm    Cardiovascular - normal exam  Rhythm: regular  Rate: normal rate     Dental   (+) Modest Abnormalities - crowns, retainers, 1 or 2 missing teeth      Pulmonary - normal examBreath sounds clear to auscultation        Neurological - normal exam  She appears awake, alert and oriented x3.    Other Findings       OUTSIDE LABS:  CBC:   Lab Results   Component Value Date    WBC 6.2 2025    WBC 7.8 04/10/2025    HGB 12.1 2025    HGB 10.9 (L) 04/10/2025    HCT 37.4 2025    HCT 33.2 (L) 04/10/2025     2025     04/10/2025     BMP:   Lab Results   Component Value Date     2025     04/10/2025    POTASSIUM 4.6 2025    POTASSIUM 3.9 04/10/2025    CHLORIDE 105 2025    CHLORIDE 102 04/10/2025    CO2 24 2025    CO2 23 04/10/2025    BUN 14.3 2025    BUN 20.5 04/10/2025    CR 1.47 (H) 2025    CR 1.37 (H) 04/10/2025     (H) 2025     (H) 04/10/2025  "    COAGS: No results found for: \"PTT\", \"INR\", \"FIBR\"  POC: No results found for: \"BGM\", \"HCG\", \"HCGS\"  HEPATIC:   Lab Results   Component Value Date    ALBUMIN 4.1 05/09/2025    PROTTOTAL 7.3 05/09/2025    ALT 17 05/09/2025    AST 23 05/09/2025    ALKPHOS 97 05/09/2025    BILITOTAL 0.3 05/09/2025     OTHER:   Lab Results   Component Value Date    LYRIC 9.3 05/09/2025    PHOS 3.3 03/28/2023    LIPASE 19.0 06/07/2015    TSH 3.82 01/07/2022    SED 37 (H) 11/05/2014       Anesthesia Plan    ASA Status:  3      NPO Status: NPO Appropriate   Anesthesia Type: MAC.  Airway: natural airway.  Induction: intravenous.  Maintenance: Balanced.   Techniques and Equipment:       - Monitoring Plan: standard ASA monitoring     Consents    Anesthesia Plan(s) and associated risks, benefits, and realistic alternatives discussed. Questions answered and patient/representative(s) expressed understanding.     - Discussed:     - Discussed with:  Patient, family               Postoperative Care    Pain management: non-narcotic analgesics.     Comments:    Other Comments: Risks, benefits and alternatives discussed and would like to proceed. General anesthesia ok if indicated.                    JENNA Muniz CRNA    I have reviewed the pertinent notes and labs in the chart from the past 30 days and (re)examined the patient.  Any updates or changes from those notes are reflected in this note.    Clinically Significant Risk Factors Present on Admission                             # Morbid Obesity: Estimated body mass index is 40.39 kg/m  as calculated from the following:    Height as of 7/10/25: 1.6 m (5' 3\").    Weight as of 7/10/25: 103.4 kg (228 lb).                    "

## 2025-07-21 NOTE — OR NURSING
Pt spoke with Dr. Meneses. Had inadequate prep, vomited last night and not having clear results. Cancelling procedure today. Will have  call to reschedule

## 2025-07-21 NOTE — PROGRESS NOTES
Patient failed Gatorade prep. Only took a fourth and then vomited it up and didn't take any additional prep.   Will re-schedule with Mag Citrate day before, ClenPiq ( which worked last time even though she didn't complete second bottle), Reglan half hour before each bottle and Zofran.

## 2025-07-22 RX ORDER — SOD SULF/POT CHLORIDE/MAG SULF 1.479 G
12 TABLET ORAL DAILY
Qty: 24 TABLET | Refills: 0 | Status: SHIPPED | OUTPATIENT
Start: 2025-08-21 | End: 2025-08-23

## 2025-07-28 ENCOUNTER — PATIENT OUTREACH (OUTPATIENT)
Dept: GASTROENTEROLOGY | Facility: CLINIC | Age: 70
End: 2025-07-28
Payer: MEDICARE

## 2025-08-06 DIAGNOSIS — F33.2 SEVERE EPISODE OF RECURRENT MAJOR DEPRESSIVE DISORDER, WITHOUT PSYCHOTIC FEATURES (H): ICD-10-CM

## 2025-08-10 RX ORDER — VENLAFAXINE HYDROCHLORIDE 150 MG/1
CAPSULE, EXTENDED RELEASE ORAL
Qty: 90 CAPSULE | Refills: 3 | OUTPATIENT
Start: 2025-08-10

## 2025-08-25 ENCOUNTER — OFFICE VISIT (OUTPATIENT)
Dept: PSYCHIATRY | Facility: OTHER | Age: 70
End: 2025-08-25
Payer: MEDICARE

## 2025-08-25 VITALS
RESPIRATION RATE: 16 BRPM | WEIGHT: 228 LBS | BODY MASS INDEX: 40.39 KG/M2 | TEMPERATURE: 98.7 F | SYSTOLIC BLOOD PRESSURE: 124 MMHG | OXYGEN SATURATION: 97 % | DIASTOLIC BLOOD PRESSURE: 77 MMHG | HEART RATE: 83 BPM

## 2025-08-25 DIAGNOSIS — F42.2 MIXED OBSESSIONAL THOUGHTS AND ACTS: Primary | ICD-10-CM

## 2025-08-25 DIAGNOSIS — F41.1 GENERALIZED ANXIETY DISORDER: ICD-10-CM

## 2025-08-25 DIAGNOSIS — F33.2 SEVERE EPISODE OF RECURRENT MAJOR DEPRESSIVE DISORDER, WITHOUT PSYCHOTIC FEATURES (H): ICD-10-CM

## 2025-08-25 PROCEDURE — G0463 HOSPITAL OUTPT CLINIC VISIT: HCPCS

## 2025-08-25 PROCEDURE — 3078F DIAST BP <80 MM HG: CPT

## 2025-08-25 PROCEDURE — 99205 OFFICE O/P NEW HI 60 MIN: CPT

## 2025-08-25 PROCEDURE — 1125F AMNT PAIN NOTED PAIN PRSNT: CPT

## 2025-08-25 PROCEDURE — 3074F SYST BP LT 130 MM HG: CPT

## 2025-08-25 PROCEDURE — 99417 PROLNG OP E/M EACH 15 MIN: CPT

## 2025-08-25 PROCEDURE — G2211 COMPLEX E/M VISIT ADD ON: HCPCS

## 2025-08-25 RX ORDER — ARIPIPRAZOLE 2 MG/1
2 TABLET ORAL DAILY
Qty: 30 TABLET | Refills: 1 | Status: SHIPPED | OUTPATIENT
Start: 2025-08-25

## 2025-08-25 ASSESSMENT — ANXIETY QUESTIONNAIRES
8. IF YOU CHECKED OFF ANY PROBLEMS, HOW DIFFICULT HAVE THESE MADE IT FOR YOU TO DO YOUR WORK, TAKE CARE OF THINGS AT HOME, OR GET ALONG WITH OTHER PEOPLE?: VERY DIFFICULT
GAD7 TOTAL SCORE: 15
GAD7 TOTAL SCORE: 15
1. FEELING NERVOUS, ANXIOUS, OR ON EDGE: NEARLY EVERY DAY
3. WORRYING TOO MUCH ABOUT DIFFERENT THINGS: NEARLY EVERY DAY
2. NOT BEING ABLE TO STOP OR CONTROL WORRYING: NEARLY EVERY DAY
7. FEELING AFRAID AS IF SOMETHING AWFUL MIGHT HAPPEN: NEARLY EVERY DAY
4. TROUBLE RELAXING: NOT AT ALL
7. FEELING AFRAID AS IF SOMETHING AWFUL MIGHT HAPPEN: NEARLY EVERY DAY
5. BEING SO RESTLESS THAT IT IS HARD TO SIT STILL: SEVERAL DAYS
6. BECOMING EASILY ANNOYED OR IRRITABLE: MORE THAN HALF THE DAYS
GAD7 TOTAL SCORE: 15
IF YOU CHECKED OFF ANY PROBLEMS ON THIS QUESTIONNAIRE, HOW DIFFICULT HAVE THESE PROBLEMS MADE IT FOR YOU TO DO YOUR WORK, TAKE CARE OF THINGS AT HOME, OR GET ALONG WITH OTHER PEOPLE: VERY DIFFICULT

## 2025-08-25 ASSESSMENT — PATIENT HEALTH QUESTIONNAIRE - PHQ9
10. IF YOU CHECKED OFF ANY PROBLEMS, HOW DIFFICULT HAVE THESE PROBLEMS MADE IT FOR YOU TO DO YOUR WORK, TAKE CARE OF THINGS AT HOME, OR GET ALONG WITH OTHER PEOPLE: EXTREMELY DIFFICULT
SUM OF ALL RESPONSES TO PHQ QUESTIONS 1-9: 22
SUM OF ALL RESPONSES TO PHQ QUESTIONS 1-9: 22

## 2025-08-25 ASSESSMENT — PAIN SCALES - GENERAL: PAINLEVEL_OUTOF10: MODERATE PAIN (4)

## 2025-08-27 ENCOUNTER — NURSE TRIAGE (OUTPATIENT)
Dept: NURSING | Facility: CLINIC | Age: 70
End: 2025-08-27
Payer: MEDICARE

## 2025-08-27 ENCOUNTER — HOSPITAL ENCOUNTER (EMERGENCY)
Facility: OTHER | Age: 70
Discharge: HOME | End: 2025-08-28
Attending: EMERGENCY MEDICINE
Payer: MEDICARE

## 2025-08-27 VITALS
HEIGHT: 64 IN | TEMPERATURE: 97.6 F | BODY MASS INDEX: 38.93 KG/M2 | HEART RATE: 85 BPM | WEIGHT: 228 LBS | DIASTOLIC BLOOD PRESSURE: 69 MMHG | OXYGEN SATURATION: 100 % | RESPIRATION RATE: 18 BRPM | SYSTOLIC BLOOD PRESSURE: 117 MMHG

## 2025-08-27 DIAGNOSIS — R10.13 EPIGASTRIC PAIN: Primary | ICD-10-CM

## 2025-08-27 LAB
ALBUMIN SERPL BCG-MCNC: 4.3 G/DL (ref 3.5–5.2)
ALP SERPL-CCNC: 94 U/L (ref 40–150)
ALT SERPL W P-5'-P-CCNC: 17 U/L (ref 0–50)
ANION GAP SERPL CALCULATED.3IONS-SCNC: 17 MMOL/L (ref 7–15)
AST SERPL W P-5'-P-CCNC: 23 U/L (ref 0–45)
BASOPHILS # BLD AUTO: 0.06 10E3/UL (ref 0–0.2)
BASOPHILS NFR BLD AUTO: 0.6 %
BILIRUB SERPL-MCNC: 0.5 MG/DL
BUN SERPL-MCNC: 21.9 MG/DL (ref 8–23)
CALCIUM SERPL-MCNC: 9.5 MG/DL (ref 8.8–10.4)
CHLORIDE SERPL-SCNC: 103 MMOL/L (ref 98–107)
CREAT SERPL-MCNC: 1.82 MG/DL (ref 0.51–0.95)
EGFRCR SERPLBLD CKD-EPI 2021: 30 ML/MIN/1.73M2
EOSINOPHIL # BLD AUTO: 0.29 10E3/UL (ref 0–0.7)
EOSINOPHIL NFR BLD AUTO: 3 %
ERYTHROCYTE [DISTWIDTH] IN BLOOD BY AUTOMATED COUNT: 12.9 % (ref 10–15)
GLUCOSE SERPL-MCNC: 108 MG/DL (ref 70–99)
HCO3 SERPL-SCNC: 23 MMOL/L (ref 22–29)
HCT VFR BLD AUTO: 37.8 % (ref 35–47)
HGB BLD-MCNC: 12.4 G/DL (ref 11.7–15.7)
HOLD SPECIMEN: NORMAL
IMM GRANULOCYTES # BLD: 0.04 10E3/UL
IMM GRANULOCYTES NFR BLD: 0.4 %
INR PPP: 1 (ref 0.85–1.15)
LACTATE SERPL-SCNC: 1.4 MMOL/L (ref 0.7–2)
LIPASE SERPL-CCNC: 31 U/L (ref 13–60)
LYMPHOCYTES # BLD AUTO: 1.22 10E3/UL (ref 0.8–5.3)
LYMPHOCYTES NFR BLD AUTO: 12.5 %
MCH RBC QN AUTO: 30.8 PG (ref 26.5–33)
MCHC RBC AUTO-ENTMCNC: 32.8 G/DL (ref 31.5–36.5)
MCV RBC AUTO: 93.8 FL (ref 78–100)
MONOCYTES # BLD AUTO: 0.59 10E3/UL (ref 0–1.3)
MONOCYTES NFR BLD AUTO: 6 %
NEUTROPHILS # BLD AUTO: 7.58 10E3/UL (ref 1.6–8.3)
NEUTROPHILS NFR BLD AUTO: 77.5 %
NRBC # BLD AUTO: <0.03 10E3/UL
NRBC BLD AUTO-RTO: 0 /100
PLATELET # BLD AUTO: 252 10E3/UL (ref 150–450)
POTASSIUM SERPL-SCNC: 3.9 MMOL/L (ref 3.4–5.3)
PROT SERPL-MCNC: 7.6 G/DL (ref 6.4–8.3)
PROTHROMBIN TIME: 13.5 SECONDS (ref 11.8–14.8)
RBC # BLD AUTO: 4.03 10E6/UL (ref 3.8–5.2)
SODIUM SERPL-SCNC: 143 MMOL/L (ref 135–145)
TROPONIN T SERPL HS-MCNC: 16 NG/L
TROPONIN T SERPL HS-MCNC: 16 NG/L
WBC # BLD AUTO: 9.78 10E3/UL (ref 4–11)

## 2025-08-27 PROCEDURE — 36415 COLL VENOUS BLD VENIPUNCTURE: CPT | Performed by: EMERGENCY MEDICINE

## 2025-08-27 PROCEDURE — 83605 ASSAY OF LACTIC ACID: CPT | Performed by: EMERGENCY MEDICINE

## 2025-08-27 PROCEDURE — 85004 AUTOMATED DIFF WBC COUNT: CPT | Performed by: EMERGENCY MEDICINE

## 2025-08-27 PROCEDURE — 84484 ASSAY OF TROPONIN QUANT: CPT | Performed by: EMERGENCY MEDICINE

## 2025-08-27 PROCEDURE — 258N000003 HC RX IP 258 OP 636: Performed by: EMERGENCY MEDICINE

## 2025-08-27 PROCEDURE — 83690 ASSAY OF LIPASE: CPT | Performed by: EMERGENCY MEDICINE

## 2025-08-27 PROCEDURE — 85610 PROTHROMBIN TIME: CPT | Performed by: EMERGENCY MEDICINE

## 2025-08-27 PROCEDURE — 250N000011 HC RX IP 250 OP 636: Performed by: EMERGENCY MEDICINE

## 2025-08-27 PROCEDURE — 82310 ASSAY OF CALCIUM: CPT | Performed by: EMERGENCY MEDICINE

## 2025-08-27 RX ORDER — ONDANSETRON 2 MG/ML
4 INJECTION INTRAMUSCULAR; INTRAVENOUS ONCE
Status: COMPLETED | OUTPATIENT
Start: 2025-08-27 | End: 2025-08-27

## 2025-08-27 RX ADMIN — ONDANSETRON 4 MG: 2 INJECTION INTRAMUSCULAR; INTRAVENOUS at 20:52

## 2025-08-27 RX ADMIN — SODIUM CHLORIDE 500 ML: 0.9 INJECTION, SOLUTION INTRAVENOUS at 20:52

## 2025-08-27 ASSESSMENT — ENCOUNTER SYMPTOMS
PHOTOPHOBIA: 0
FEVER: 0
RESPIRATORY NEGATIVE: 1
PSYCHIATRIC NEGATIVE: 1
ENDOCRINE NEGATIVE: 1
EYES NEGATIVE: 1
MUSCULOSKELETAL NEGATIVE: 1
NEUROLOGICAL NEGATIVE: 1
HEMATOLOGIC/LYMPHATIC NEGATIVE: 1
CARDIOVASCULAR NEGATIVE: 1
ALLERGIC/IMMUNOLOGIC NEGATIVE: 1
VOMITING: 1
CONSTITUTIONAL NEGATIVE: 1
DIARRHEA: 0
SHORTNESS OF BREATH: 0

## 2025-08-27 ASSESSMENT — ACTIVITIES OF DAILY LIVING (ADL)
ADLS_ACUITY_SCORE: 44

## 2025-08-27 ASSESSMENT — COLUMBIA-SUICIDE SEVERITY RATING SCALE - C-SSRS
2. HAVE YOU ACTUALLY HAD ANY THOUGHTS OF KILLING YOURSELF IN THE PAST MONTH?: NO
6. HAVE YOU EVER DONE ANYTHING, STARTED TO DO ANYTHING, OR PREPARED TO DO ANYTHING TO END YOUR LIFE?: NO
1. IN THE PAST MONTH, HAVE YOU WISHED YOU WERE DEAD OR WISHED YOU COULD GO TO SLEEP AND NOT WAKE UP?: NO

## 2025-08-28 ENCOUNTER — PATIENT OUTREACH (OUTPATIENT)
Dept: CARE COORDINATION | Facility: CLINIC | Age: 70
End: 2025-08-28
Payer: MEDICARE

## 2025-08-28 LAB
ATRIAL RATE - MUSE: 76 BPM
ATRIAL RATE - MUSE: 78 BPM
DIASTOLIC BLOOD PRESSURE - MUSE: NORMAL MMHG
DIASTOLIC BLOOD PRESSURE - MUSE: NORMAL MMHG
INTERPRETATION ECG - MUSE: NORMAL
INTERPRETATION ECG - MUSE: NORMAL
P AXIS - MUSE: 32 DEGREES
P AXIS - MUSE: NORMAL DEGREES
PR INTERVAL - MUSE: 224 MS
PR INTERVAL - MUSE: NORMAL MS
QRS DURATION - MUSE: 78 MS
QRS DURATION - MUSE: 82 MS
QT - MUSE: 380 MS
QT - MUSE: 428 MS
QTC - MUSE: 435 MS
QTC - MUSE: 481 MS
R AXIS - MUSE: -13 DEGREES
R AXIS - MUSE: -14 DEGREES
SYSTOLIC BLOOD PRESSURE - MUSE: NORMAL MMHG
SYSTOLIC BLOOD PRESSURE - MUSE: NORMAL MMHG
T AXIS - MUSE: 100 DEGREES
T AXIS - MUSE: 79 DEGREES
VENTRICULAR RATE- MUSE: 76 BPM
VENTRICULAR RATE- MUSE: 79 BPM

## 2025-08-28 RX ORDER — LIDOCAINE 40 MG/G
CREAM TOPICAL
OUTPATIENT
Start: 2025-08-28

## 2025-08-28 RX ORDER — SODIUM CHLORIDE, SODIUM LACTATE, POTASSIUM CHLORIDE, CALCIUM CHLORIDE 600; 310; 30; 20 MG/100ML; MG/100ML; MG/100ML; MG/100ML
INJECTION, SOLUTION INTRAVENOUS CONTINUOUS
OUTPATIENT
Start: 2025-08-28

## 2025-09-03 ENCOUNTER — TRANSFERRED RECORDS (OUTPATIENT)
Dept: HEALTH INFORMATION MANAGEMENT | Facility: OTHER | Age: 70
End: 2025-09-03
Payer: MEDICARE

## (undated) DEVICE — PAD WRAP FOAM HOLDER POSITIONER KNEE DISP LF 2629-00

## (undated) DEVICE — SAW BLADE STRYKER NARROW

## (undated) DEVICE — DRAPE EXTREMITY W/ARMBOARD 29405

## (undated) DEVICE — SU VICRYL 2-0 CT-1 36" UND J945H

## (undated) DEVICE — ENDO KIT COMPLIANCE DYKENDOCMPLY

## (undated) DEVICE — DRSG AQUACEL AG EXTRA 4X4.7" 420677

## (undated) DEVICE — SU PROLENE 3-0 FS-1 18" 8684G

## (undated) DEVICE — SU VICRYL 2-0 SH 27" UND J417H

## (undated) DEVICE — ENDO SNARE POLYPECTOMY OVAL 25MM LOOP SD-240U-25

## (undated) DEVICE — BNDG ELASTIC 4"X5YDS UNSTERILE 6611-40

## (undated) DEVICE — STAPLER SKIN 35 WIDE PMW35 6/BX

## (undated) DEVICE — STRAP STIRRUP W/O SLIP 30187-020

## (undated) DEVICE — HOOD T4 PROTECTIVE STERI FACE SHIELD 400-800

## (undated) DEVICE — TUBING SUCTION 10'X3/16" N510

## (undated) DEVICE — PREP CHLORAPREP 26ML TINTED ORANGE  260815

## (undated) DEVICE — SU VICRYL 1 CT-1 36" J347H

## (undated) DEVICE — DRAPE STERI U 1015

## (undated) DEVICE — PACK MAJOR ORTHO SOP15MOFCA

## (undated) DEVICE — COVER LIGHT HANDLE LT-F02

## (undated) DEVICE — BLADE KNIFE SURG 15 371115

## (undated) DEVICE — PENCIL MEGADYNE TELESCOPING SMOKE EVACUATION 10 FT 251010J

## (undated) DEVICE — ESU GROUND PAD ADULT W/CORD E7507

## (undated) DEVICE — ESU ENDO FORCEP BX HOT FD-210U

## (undated) DEVICE — SUCTION MANIFOLD NEPTUNE 2 SYS 4 PORT 0702-020-000

## (undated) DEVICE — GLOVE BIOGEL PI INDICATOR 8.0 LF 41680

## (undated) DEVICE — CAST PADDING 6" WEBRIL II UNSTERILE 4519

## (undated) DEVICE — GLOVE PROTEXIS PI ORTHO PF 7.5 2D73HT75

## (undated) DEVICE — BNDG ELASTIC 6" DBL LENGTH UNSTERILE 6611-16

## (undated) DEVICE — DRAPE TOP SURGICAL HD 59352

## (undated) DEVICE — GLOVE PROTEXIS PI ORTHO PF 8.5 2D73HT76

## (undated) DEVICE — SOL WATER 1500ML

## (undated) DEVICE — ENDO TRAP POLYP E-TRAP 00711099

## (undated) DEVICE — TOURNIQUET SGL  BLADDER 30"X4" BLUE 5921030135

## (undated) DEVICE — SLEEVE COMPRESSION SCD KNEE MED 74022

## (undated) DEVICE — PEN MARKING SKIN W/LABELS 31145918

## (undated) DEVICE — TOURNIQUET SGL BLADDER 18"X4" RED 5921-218-135

## (undated) DEVICE — DRAPE STERI TOWEL LG 1010

## (undated) DEVICE — GLOVE PROTEXIS POWDER FREE SMT 8.5 2D72PT85X

## (undated) DEVICE — BLADE SAW OSCIL/SAG STRK 25X90X1.27MM 4125-127-090

## (undated) DEVICE — COVER TABLE L60 IN 2 TIER BACK STRL 8197-

## (undated) DEVICE — DRSG XEROFORM 1X8"

## (undated) DEVICE — ENDO BRUSH CHANNEL MASTER CLEANING 2-4.2MM BW-412T

## (undated) DEVICE — GLOVE BIOGEL INDICATOR 7.5 LF 41675

## (undated) DEVICE — CAST PADDING 4" COTTON WEBRIL UNSTERILE 9084

## (undated) DEVICE — DRSG ABDOMINAL PAD UNSTERILE 5X9" 9190

## (undated) DEVICE — GLOVE BIOGEL PI SZ 8.5 40885

## (undated) DEVICE — DRSG GAUZE 4X4" 3033

## (undated) DEVICE — PACK MAJOR EXTREMITY SOP15MEFCA

## (undated) RX ORDER — DEXAMETHASONE SODIUM PHOSPHATE 10 MG/ML
INJECTION, SOLUTION INTRAMUSCULAR; INTRAVENOUS
Status: DISPENSED
Start: 2023-09-11

## (undated) RX ORDER — FENTANYL CITRATE 50 UG/ML
INJECTION, SOLUTION INTRAMUSCULAR; INTRAVENOUS
Status: DISPENSED
Start: 2023-05-04

## (undated) RX ORDER — ONDANSETRON 2 MG/ML
INJECTION INTRAMUSCULAR; INTRAVENOUS
Status: DISPENSED
Start: 2023-05-04

## (undated) RX ORDER — PROPOFOL 10 MG/ML
INJECTION, EMULSION INTRAVENOUS
Status: DISPENSED
Start: 2025-07-21

## (undated) RX ORDER — LIDOCAINE HYDROCHLORIDE 20 MG/ML
INJECTION, SOLUTION EPIDURAL; INFILTRATION; INTRACAUDAL; PERINEURAL
Status: DISPENSED
Start: 2022-10-31

## (undated) RX ORDER — ONDANSETRON 2 MG/ML
INJECTION INTRAMUSCULAR; INTRAVENOUS
Status: DISPENSED
Start: 2025-08-27

## (undated) RX ORDER — TRANEXAMIC ACID 650 MG/1
TABLET ORAL
Status: DISPENSED
Start: 2023-09-11

## (undated) RX ORDER — TRIAMCINOLONE ACETONIDE 40 MG/ML
INJECTION, SUSPENSION INTRA-ARTICULAR; INTRAMUSCULAR
Status: DISPENSED
Start: 2023-04-03

## (undated) RX ORDER — LIDOCAINE HYDROCHLORIDE 10 MG/ML
INJECTION, SOLUTION INFILTRATION; PERINEURAL
Status: DISPENSED
Start: 2022-12-05

## (undated) RX ORDER — BUPIVACAINE HYDROCHLORIDE 5 MG/ML
INJECTION, SOLUTION EPIDURAL; INTRACAUDAL
Status: DISPENSED
Start: 2023-09-11

## (undated) RX ORDER — PROPOFOL 10 MG/ML
INJECTION, EMULSION INTRAVENOUS
Status: DISPENSED
Start: 2023-05-04

## (undated) RX ORDER — PROPOFOL 10 MG/ML
INJECTION, EMULSION INTRAVENOUS
Status: DISPENSED
Start: 2023-09-11

## (undated) RX ORDER — TRIAMCINOLONE ACETONIDE 40 MG/ML
INJECTION, SUSPENSION INTRA-ARTICULAR; INTRAMUSCULAR
Status: DISPENSED
Start: 2022-12-05

## (undated) RX ORDER — CEFAZOLIN SODIUM/WATER 2 G/20 ML
SYRINGE (ML) INTRAVENOUS
Status: DISPENSED
Start: 2023-09-11

## (undated) RX ORDER — ALBUTEROL SULFATE 0.83 MG/ML
SOLUTION RESPIRATORY (INHALATION)
Status: DISPENSED
Start: 2025-04-10

## (undated) RX ORDER — DEXAMETHASONE SODIUM PHOSPHATE 4 MG/ML
INJECTION, SOLUTION INTRA-ARTICULAR; INTRALESIONAL; INTRAMUSCULAR; INTRAVENOUS; SOFT TISSUE
Status: DISPENSED
Start: 2025-04-10

## (undated) RX ORDER — FENTANYL CITRATE 50 UG/ML
INJECTION, SOLUTION INTRAMUSCULAR; INTRAVENOUS
Status: DISPENSED
Start: 2023-09-11

## (undated) RX ORDER — LIDOCAINE HYDROCHLORIDE 10 MG/ML
INJECTION, SOLUTION INFILTRATION; PERINEURAL
Status: DISPENSED
Start: 2023-04-03

## (undated) RX ORDER — ONDANSETRON 2 MG/ML
INJECTION INTRAMUSCULAR; INTRAVENOUS
Status: DISPENSED
Start: 2023-09-11

## (undated) RX ORDER — DEXAMETHASONE SODIUM PHOSPHATE 4 MG/ML
INJECTION, SOLUTION INTRA-ARTICULAR; INTRALESIONAL; INTRAMUSCULAR; INTRAVENOUS; SOFT TISSUE
Status: DISPENSED
Start: 2023-09-11

## (undated) RX ORDER — CLINDAMYCIN PHOSPHATE 900 MG/50ML
INJECTION, SOLUTION INTRAVENOUS
Status: DISPENSED
Start: 2023-05-04

## (undated) RX ORDER — LIDOCAINE HYDROCHLORIDE 10 MG/ML
INJECTION, SOLUTION INFILTRATION; PERINEURAL
Status: DISPENSED
Start: 2023-05-04

## (undated) RX ORDER — BUPIVACAINE HYDROCHLORIDE 5 MG/ML
INJECTION, SOLUTION EPIDURAL; INTRACAUDAL
Status: DISPENSED
Start: 2023-05-04

## (undated) RX ORDER — KETOROLAC TROMETHAMINE 30 MG/ML
INJECTION, SOLUTION INTRAMUSCULAR; INTRAVENOUS
Status: DISPENSED
Start: 2023-05-04

## (undated) RX ORDER — PROPOFOL 10 MG/ML
INJECTION, EMULSION INTRAVENOUS
Status: DISPENSED
Start: 2022-10-31